# Patient Record
Sex: FEMALE | Race: WHITE | HISPANIC OR LATINO | Employment: UNEMPLOYED | URBAN - NONMETROPOLITAN AREA
[De-identification: names, ages, dates, MRNs, and addresses within clinical notes are randomized per-mention and may not be internally consistent; named-entity substitution may affect disease eponyms.]

---

## 2017-09-01 ENCOUNTER — TRANSCRIBE ORDERS (OUTPATIENT)
Dept: ADMINISTRATIVE | Facility: HOSPITAL | Age: 42
End: 2017-09-01

## 2017-09-01 DIAGNOSIS — Z12.31 VISIT FOR SCREENING MAMMOGRAM: Primary | ICD-10-CM

## 2017-09-11 ENCOUNTER — HOSPITAL ENCOUNTER (EMERGENCY)
Facility: HOSPITAL | Age: 42
Discharge: HOME | End: 2017-09-11
Attending: EMERGENCY MEDICINE | Admitting: EMERGENCY MEDICINE

## 2017-09-11 VITALS
SYSTOLIC BLOOD PRESSURE: 135 MMHG | RESPIRATION RATE: 20 BRPM | DIASTOLIC BLOOD PRESSURE: 95 MMHG | TEMPERATURE: 98.5 F | BODY MASS INDEX: 38.45 KG/M2 | HEIGHT: 63 IN | HEART RATE: 94 BPM | WEIGHT: 217 LBS | OXYGEN SATURATION: 99 %

## 2017-09-11 DIAGNOSIS — H65.03 BILATERAL ACUTE SEROUS OTITIS MEDIA, RECURRENCE NOT SPECIFIED: Primary | ICD-10-CM

## 2017-09-11 PROCEDURE — 99282 EMERGENCY DEPT VISIT SF MDM: CPT

## 2017-09-11 RX ORDER — MONTELUKAST SODIUM 10 MG/1
10 TABLET ORAL NIGHTLY
Status: ON HOLD | COMMUNITY
End: 2020-01-01

## 2017-09-11 RX ORDER — AMOXICILLIN 875 MG/1
875 TABLET, COATED ORAL 2 TIMES DAILY
Qty: 20 TABLET | Refills: 0 | Status: ON HOLD | OUTPATIENT
Start: 2017-09-11 | End: 2020-01-01

## 2017-09-11 RX ORDER — PANTOPRAZOLE SODIUM 20 MG/1
20 TABLET, DELAYED RELEASE ORAL DAILY
COMMUNITY
End: 2020-01-01

## 2017-09-11 RX ORDER — MECLIZINE HYDROCHLORIDE 25 MG/1
25 TABLET ORAL 3 TIMES DAILY PRN
Qty: 20 TABLET | Refills: 0 | Status: ON HOLD | OUTPATIENT
Start: 2017-09-11 | End: 2020-01-01

## 2017-09-11 RX ORDER — PSEUDOEPHEDRINE HYDROCHLORIDE 60 MG/1
60 TABLET, FILM COATED ORAL EVERY 6 HOURS PRN
Qty: 20 TABLET | Refills: 0 | Status: SHIPPED | OUTPATIENT
Start: 2017-09-11 | End: 2020-01-01 | Stop reason: HOSPADM

## 2017-09-11 RX ORDER — LEVOCETIRIZINE DIHYDROCHLORIDE 5 MG/1
5 TABLET, FILM COATED ORAL EVERY EVENING
Status: ON HOLD | COMMUNITY
End: 2020-01-01

## 2017-09-11 NOTE — ED PROVIDER NOTES
Subjective   HPI Comments: Feels like ears are popping and has congestion   Has been taking singulair but did not help     Patient is a 42 y.o. female presenting with dizziness.   History provided by:  Patient  Dizziness   Severity:  Moderate  Onset quality:  Sudden  Duration:  4 weeks  Timing:  Constant  Progression:  Worsening  Chronicity:  New  Relieved by:  Nothing  Worsened by:  Nothing  Ineffective treatments:  None tried  Associated symptoms: no chest pain, no diarrhea, no headaches, no nausea, no shortness of breath and no vomiting    Risk factors: no hx of vertigo        Review of Systems   Constitutional: Negative for chills and fever.   HENT: Negative for congestion, ear pain and sore throat.    Respiratory: Negative for cough, shortness of breath and wheezing.    Cardiovascular: Negative for chest pain.   Gastrointestinal: Negative for diarrhea, nausea and vomiting.   Genitourinary: Negative for dysuria and flank pain.   Skin: Negative for rash.   Neurological: Positive for dizziness. Negative for headaches.   Psychiatric/Behavioral: The patient is not nervous/anxious.    All other systems reviewed and are negative.      History reviewed. No pertinent past medical history.    Allergies   Allergen Reactions   • Bactrim [Sulfamethoxazole-Trimethoprim]    • Metronidazole        Past Surgical History:   Procedure Laterality Date   • TUBAL ABDOMINAL LIGATION         History reviewed. No pertinent family history.    Social History     Social History   • Marital status:      Spouse name: N/A   • Number of children: N/A   • Years of education: N/A     Social History Main Topics   • Smoking status: Never Smoker   • Smokeless tobacco: None   • Alcohol use No   • Drug use: No   • Sexual activity: Not Asked     Other Topics Concern   • None     Social History Narrative   • None           Objective   Physical Exam   Constitutional: She is oriented to person, place, and time. She appears well-developed and  well-nourished.   HENT:   Head: Normocephalic.   Mouth/Throat: Oropharynx is clear and moist.   Neck: Neck supple.   Cardiovascular: Normal rate and regular rhythm.    Pulmonary/Chest: Effort normal and breath sounds normal.   Abdominal: Soft. Bowel sounds are normal. There is no tenderness.   Musculoskeletal: Normal range of motion.   Neurological: She is alert and oriented to person, place, and time.   Skin: Skin is warm and dry.   Psychiatric: She has a normal mood and affect. Her behavior is normal. Judgment and thought content normal.   Nursing note and vitals reviewed.      Procedures         ED Course  ED Course                  MDM    Final diagnoses:   Bilateral acute serous otitis media, recurrence not specified            ALLY Whelan  09/13/17 [id number]

## 2018-06-21 ENCOUNTER — HOSPITAL ENCOUNTER (EMERGENCY)
Facility: HOSPITAL | Age: 43
Discharge: HOME | End: 2018-06-21
Attending: EMERGENCY MEDICINE | Admitting: INTERNAL MEDICINE

## 2018-06-21 ENCOUNTER — APPOINTMENT (OUTPATIENT)
Dept: CT IMAGING | Facility: HOSPITAL | Age: 43
End: 2018-06-21

## 2018-06-21 VITALS
DIASTOLIC BLOOD PRESSURE: 75 MMHG | SYSTOLIC BLOOD PRESSURE: 144 MMHG | TEMPERATURE: 98.5 F | BODY MASS INDEX: 38.27 KG/M2 | WEIGHT: 216 LBS | OXYGEN SATURATION: 100 % | HEART RATE: 71 BPM | RESPIRATION RATE: 17 BRPM | HEIGHT: 63 IN

## 2018-06-21 DIAGNOSIS — R51.9 SINUS HEADACHE: ICD-10-CM

## 2018-06-21 DIAGNOSIS — G44.201 ACUTE INTRACTABLE TENSION-TYPE HEADACHE: Primary | ICD-10-CM

## 2018-06-21 LAB
ALBUMIN SERPL-MCNC: 4 G/DL (ref 3.5–5)
ALBUMIN/GLOB SERPL: 1.3 G/DL (ref 1.5–2.5)
ALP SERPL-CCNC: 106 U/L (ref 35–104)
ALT SERPL W P-5'-P-CCNC: 23 U/L (ref 10–36)
ANION GAP SERPL CALCULATED.3IONS-SCNC: 5.8 MMOL/L (ref 3.6–11.2)
AST SERPL-CCNC: 30 U/L (ref 10–30)
BASOPHILS # BLD AUTO: 0.06 10*3/MM3 (ref 0–0.3)
BASOPHILS NFR BLD AUTO: 0.5 % (ref 0–2)
BILIRUB SERPL-MCNC: 0.4 MG/DL (ref 0.2–1.8)
BUN BLD-MCNC: 10 MG/DL (ref 7–21)
BUN/CREAT SERPL: 13 (ref 7–25)
CALCIUM SPEC-SCNC: 8.6 MG/DL (ref 7.7–10)
CHLORIDE SERPL-SCNC: 107 MMOL/L (ref 99–112)
CO2 SERPL-SCNC: 25.2 MMOL/L (ref 24.3–31.9)
CREAT BLD-MCNC: 0.77 MG/DL (ref 0.43–1.29)
DEPRECATED RDW RBC AUTO: 43.3 FL (ref 37–54)
EOSINOPHIL # BLD AUTO: 0.21 10*3/MM3 (ref 0–0.7)
EOSINOPHIL NFR BLD AUTO: 1.9 % (ref 0–5)
ERYTHROCYTE [DISTWIDTH] IN BLOOD BY AUTOMATED COUNT: 14 % (ref 11.5–14.5)
GFR SERPL CREATININE-BSD FRML MDRD: 83 ML/MIN/1.73
GLOBULIN UR ELPH-MCNC: 3.1 GM/DL
GLUCOSE BLD-MCNC: 103 MG/DL (ref 70–110)
HCT VFR BLD AUTO: 38.6 % (ref 37–47)
HGB BLD-MCNC: 12.6 G/DL (ref 12–16)
IMM GRANULOCYTES # BLD: 0.05 10*3/MM3 (ref 0–0.03)
IMM GRANULOCYTES NFR BLD: 0.5 % (ref 0–0.5)
LYMPHOCYTES # BLD AUTO: 3.61 10*3/MM3 (ref 1–3)
LYMPHOCYTES NFR BLD AUTO: 32.6 % (ref 21–51)
MCH RBC QN AUTO: 27.8 PG (ref 27–33)
MCHC RBC AUTO-ENTMCNC: 32.6 G/DL (ref 33–37)
MCV RBC AUTO: 85.2 FL (ref 80–94)
MONOCYTES # BLD AUTO: 0.92 10*3/MM3 (ref 0.1–0.9)
MONOCYTES NFR BLD AUTO: 8.3 % (ref 0–10)
NEUTROPHILS # BLD AUTO: 6.22 10*3/MM3 (ref 1.4–6.5)
NEUTROPHILS NFR BLD AUTO: 56.2 % (ref 30–70)
OSMOLALITY SERPL CALC.SUM OF ELEC: 275 MOSM/KG (ref 273–305)
PLATELET # BLD AUTO: 359 10*3/MM3 (ref 130–400)
PMV BLD AUTO: 11.8 FL (ref 6–10)
POTASSIUM BLD-SCNC: 3.8 MMOL/L (ref 3.5–5.3)
PROT SERPL-MCNC: 7.1 G/DL (ref 6–8)
RBC # BLD AUTO: 4.53 10*6/MM3 (ref 4.2–5.4)
SODIUM BLD-SCNC: 138 MMOL/L (ref 135–153)
WBC NRBC COR # BLD: 11.07 10*3/MM3 (ref 4.5–12.5)

## 2018-06-21 PROCEDURE — 70450 CT HEAD/BRAIN W/O DYE: CPT | Performed by: RADIOLOGY

## 2018-06-21 PROCEDURE — 70450 CT HEAD/BRAIN W/O DYE: CPT

## 2018-06-21 PROCEDURE — 99283 EMERGENCY DEPT VISIT LOW MDM: CPT

## 2018-06-21 PROCEDURE — 80053 COMPREHEN METABOLIC PANEL: CPT | Performed by: PHYSICIAN ASSISTANT

## 2018-06-21 PROCEDURE — 96360 HYDRATION IV INFUSION INIT: CPT

## 2018-06-21 PROCEDURE — 85025 COMPLETE CBC W/AUTO DIFF WBC: CPT | Performed by: PHYSICIAN ASSISTANT

## 2018-06-21 RX ORDER — KETOROLAC TROMETHAMINE 30 MG/ML
15 INJECTION, SOLUTION INTRAMUSCULAR; INTRAVENOUS ONCE
Status: DISCONTINUED | OUTPATIENT
Start: 2018-06-21 | End: 2018-06-22 | Stop reason: HOSPADM

## 2018-06-21 RX ORDER — IBUPROFEN 800 MG/1
800 TABLET ORAL EVERY 8 HOURS PRN
Qty: 90 TABLET | Refills: 0 | Status: ON HOLD | OUTPATIENT
Start: 2018-06-21 | End: 2020-01-01

## 2018-06-21 RX ORDER — GUAIFENESIN 200 MG/10ML
200 LIQUID ORAL EVERY 4 HOURS PRN
Qty: 240 ML | Refills: 0 | Status: ON HOLD | OUTPATIENT
Start: 2018-06-21 | End: 2020-01-01

## 2018-06-21 RX ORDER — SODIUM CHLORIDE 0.9 % (FLUSH) 0.9 %
10 SYRINGE (ML) INJECTION AS NEEDED
Status: DISCONTINUED | OUTPATIENT
Start: 2018-06-21 | End: 2018-06-22 | Stop reason: HOSPADM

## 2018-06-21 RX ADMIN — SODIUM CHLORIDE 1000 ML: 9 INJECTION, SOLUTION INTRAVENOUS at 21:20

## 2018-08-10 ENCOUNTER — HOSPITAL ENCOUNTER (OUTPATIENT)
Dept: MAMMOGRAPHY | Facility: HOSPITAL | Age: 43
Discharge: HOME | End: 2018-08-10
Admitting: NURSE PRACTITIONER

## 2018-08-10 DIAGNOSIS — Z12.31 VISIT FOR SCREENING MAMMOGRAM: ICD-10-CM

## 2018-08-10 PROCEDURE — 77067 SCR MAMMO BI INCL CAD: CPT | Performed by: RADIOLOGY

## 2018-08-10 PROCEDURE — 77063 BREAST TOMOSYNTHESIS BI: CPT

## 2018-08-10 PROCEDURE — 77067 SCR MAMMO BI INCL CAD: CPT

## 2018-08-10 PROCEDURE — 77063 BREAST TOMOSYNTHESIS BI: CPT | Performed by: RADIOLOGY

## 2018-08-23 ENCOUNTER — HOSPITAL ENCOUNTER (OUTPATIENT)
Dept: MAMMOGRAPHY | Facility: HOSPITAL | Age: 43
Discharge: HOME | End: 2018-08-23
Admitting: RADIOLOGY

## 2018-08-23 DIAGNOSIS — R92.8 ABNORMAL MAMMOGRAM: ICD-10-CM

## 2018-08-23 PROCEDURE — 77065 DX MAMMO INCL CAD UNI: CPT | Performed by: RADIOLOGY

## 2018-08-23 PROCEDURE — G0279 TOMOSYNTHESIS, MAMMO: HCPCS

## 2018-08-23 PROCEDURE — 77061 BREAST TOMOSYNTHESIS UNI: CPT | Performed by: RADIOLOGY

## 2018-08-23 PROCEDURE — 77065 DX MAMMO INCL CAD UNI: CPT

## 2020-01-01 ENCOUNTER — APPOINTMENT (OUTPATIENT)
Dept: GENERAL RADIOLOGY | Facility: HOSPITAL | Age: 45
End: 2020-01-01

## 2020-01-01 ENCOUNTER — TELEPHONE (OUTPATIENT)
Dept: ONCOLOGY | Facility: HOSPITAL | Age: 45
End: 2020-01-01

## 2020-01-01 ENCOUNTER — OFFICE VISIT (OUTPATIENT)
Dept: SURGERY | Facility: CLINIC | Age: 45
End: 2020-01-01

## 2020-01-01 ENCOUNTER — APPOINTMENT (OUTPATIENT)
Dept: CT IMAGING | Facility: HOSPITAL | Age: 45
End: 2020-01-01

## 2020-01-01 ENCOUNTER — ANESTHESIA (OUTPATIENT)
Dept: PERIOP | Facility: HOSPITAL | Age: 45
End: 2020-01-01

## 2020-01-01 ENCOUNTER — ANESTHESIA EVENT (OUTPATIENT)
Dept: PERIOP | Facility: HOSPITAL | Age: 45
End: 2020-01-01

## 2020-01-01 ENCOUNTER — CONSULT (OUTPATIENT)
Dept: ONCOLOGY | Facility: CLINIC | Age: 45
End: 2020-01-01

## 2020-01-01 ENCOUNTER — TELEPHONE (OUTPATIENT)
Dept: SURGERY | Facility: CLINIC | Age: 45
End: 2020-01-01

## 2020-01-01 ENCOUNTER — HOSPITAL ENCOUNTER (EMERGENCY)
Facility: HOSPITAL | Age: 45
Discharge: ANOTHER HOSPITAL | End: 2020-01-12
Attending: FAMILY MEDICINE | Admitting: FAMILY MEDICINE

## 2020-01-01 ENCOUNTER — APPOINTMENT (OUTPATIENT)
Dept: ULTRASOUND IMAGING | Facility: HOSPITAL | Age: 45
End: 2020-01-01

## 2020-01-01 ENCOUNTER — HOSPITAL ENCOUNTER (INPATIENT)
Facility: HOSPITAL | Age: 45
LOS: 10 days | End: 2020-02-10
Attending: EMERGENCY MEDICINE | Admitting: INTERNAL MEDICINE

## 2020-01-01 ENCOUNTER — HOSPITAL ENCOUNTER (OUTPATIENT)
Facility: HOSPITAL | Age: 45
Setting detail: HOSPITAL OUTPATIENT SURGERY
Discharge: HOME | End: 2020-01-02
Attending: SURGERY | Admitting: SURGERY

## 2020-01-01 ENCOUNTER — HOSPITAL ENCOUNTER (EMERGENCY)
Facility: HOSPITAL | Age: 45
Discharge: HOME | End: 2020-01-01
Attending: FAMILY MEDICINE | Admitting: FAMILY MEDICINE

## 2020-01-01 ENCOUNTER — INFUSION (OUTPATIENT)
Dept: ONCOLOGY | Facility: HOSPITAL | Age: 45
End: 2020-01-01

## 2020-01-01 ENCOUNTER — APPOINTMENT (OUTPATIENT)
Dept: CARDIOLOGY | Facility: HOSPITAL | Age: 45
End: 2020-01-01

## 2020-01-01 VITALS
OXYGEN SATURATION: 100 % | WEIGHT: 210 LBS | SYSTOLIC BLOOD PRESSURE: 125 MMHG | HEIGHT: 63 IN | RESPIRATION RATE: 18 BRPM | DIASTOLIC BLOOD PRESSURE: 75 MMHG | HEART RATE: 85 BPM | TEMPERATURE: 98.1 F | BODY MASS INDEX: 37.21 KG/M2

## 2020-01-01 VITALS
TEMPERATURE: 97.9 F | DIASTOLIC BLOOD PRESSURE: 83 MMHG | WEIGHT: 203 LBS | SYSTOLIC BLOOD PRESSURE: 129 MMHG | RESPIRATION RATE: 16 BRPM | BODY MASS INDEX: 35.97 KG/M2 | HEIGHT: 63 IN | HEART RATE: 88 BPM | OXYGEN SATURATION: 99 %

## 2020-01-01 VITALS
DIASTOLIC BLOOD PRESSURE: 88 MMHG | BODY MASS INDEX: 37.14 KG/M2 | SYSTOLIC BLOOD PRESSURE: 145 MMHG | HEART RATE: 105 BPM | WEIGHT: 209.6 LBS | HEIGHT: 63 IN

## 2020-01-01 VITALS
TEMPERATURE: 98.9 F | HEART RATE: 117 BPM | HEIGHT: 65 IN | BODY MASS INDEX: 33.66 KG/M2 | WEIGHT: 202 LBS | RESPIRATION RATE: 18 BRPM | DIASTOLIC BLOOD PRESSURE: 85 MMHG | SYSTOLIC BLOOD PRESSURE: 121 MMHG | OXYGEN SATURATION: 98 %

## 2020-01-01 VITALS
OXYGEN SATURATION: 98 % | SYSTOLIC BLOOD PRESSURE: 121 MMHG | RESPIRATION RATE: 18 BRPM | WEIGHT: 202 LBS | BODY MASS INDEX: 33.61 KG/M2 | TEMPERATURE: 98.9 F | HEART RATE: 117 BPM | DIASTOLIC BLOOD PRESSURE: 85 MMHG

## 2020-01-01 VITALS
WEIGHT: 210 LBS | SYSTOLIC BLOOD PRESSURE: 118 MMHG | HEART RATE: 80 BPM | RESPIRATION RATE: 17 BRPM | BODY MASS INDEX: 37.21 KG/M2 | DIASTOLIC BLOOD PRESSURE: 75 MMHG | OXYGEN SATURATION: 98 % | TEMPERATURE: 98 F | HEIGHT: 63 IN

## 2020-01-01 VITALS
SYSTOLIC BLOOD PRESSURE: 138 MMHG | DIASTOLIC BLOOD PRESSURE: 91 MMHG | HEART RATE: 95 BPM | WEIGHT: 210.2 LBS | BODY MASS INDEX: 37.25 KG/M2 | HEIGHT: 63 IN

## 2020-01-01 DIAGNOSIS — C18.2 MALIGNANT NEOPLASM OF ASCENDING COLON (HCC): Primary | ICD-10-CM

## 2020-01-01 DIAGNOSIS — Z95.828 PORT-A-CATH IN PLACE: Primary | ICD-10-CM

## 2020-01-01 DIAGNOSIS — C18.2 MALIGNANT NEOPLASM OF ASCENDING COLON (HCC): ICD-10-CM

## 2020-01-01 DIAGNOSIS — K56.609 SMALL BOWEL OBSTRUCTION (HCC): Primary | ICD-10-CM

## 2020-01-01 DIAGNOSIS — Z09 POSTOP CHECK: ICD-10-CM

## 2020-01-01 DIAGNOSIS — C18.9 METASTATIC COLON CANCER IN FEMALE: Primary | ICD-10-CM

## 2020-01-01 DIAGNOSIS — A41.9 SEPSIS, DUE TO UNSPECIFIED ORGANISM, UNSPECIFIED WHETHER ACUTE ORGAN DYSFUNCTION PRESENT (HCC): Primary | ICD-10-CM

## 2020-01-01 LAB
027 TOXIN: NORMAL
A-A DO2: 109.9 MMHG (ref 0–300)
A-A DO2: 110.2 MMHG (ref 0–300)
A-A DO2: 264.8 MMHG (ref 0–300)
A-A DO2: 34.8 MMHG (ref 0–300)
A-A DO2: 56.3 MMHG (ref 0–300)
A-A DO2: 57.5 MMHG (ref 0–300)
A-A DO2: 59.5 MMHG (ref 0–300)
A-A DO2: 61.4 MMHG (ref 0–300)
A-A DO2: 65.3 MMHG (ref 0–300)
A-A DO2: 66.2 MMHG (ref 0–300)
A-A DO2: 72.6 MMHG (ref 0–300)
A-A DO2: 73.9 MMHG (ref 0–300)
A-A DO2: 74.2 MMHG (ref 0–300)
A-A DO2: 76.5 MMHG (ref 0–300)
A-A DO2: 76.7 MMHG (ref 0–300)
A-A DO2: 78 MMHG (ref 0–300)
A-A DO2: 80.9 MMHG (ref 0–300)
A-A DO2: 81.6 MMHG (ref 0–300)
A-A DO2: 82.6 MMHG (ref 0–300)
A-A DO2: 9.2 MMHG (ref 0–300)
ABO + RH BLD: NORMAL
ABO GROUP BLD: NORMAL
ABO GROUP BLD: NORMAL
ALBUMIN SERPL-MCNC: 2.13 G/DL (ref 3.5–5.2)
ALBUMIN SERPL-MCNC: 2.83 G/DL (ref 3.5–5.2)
ALBUMIN SERPL-MCNC: 3.06 G/DL (ref 3.5–5.2)
ALBUMIN SERPL-MCNC: 3.26 G/DL (ref 3.5–5.2)
ALBUMIN SERPL-MCNC: 3.46 G/DL (ref 3.5–5.2)
ALBUMIN SERPL-MCNC: 3.72 G/DL (ref 3.5–5.2)
ALBUMIN SERPL-MCNC: 3.77 G/DL (ref 3.5–5.2)
ALBUMIN SERPL-MCNC: 3.93 G/DL (ref 3.5–5.2)
ALBUMIN SERPL-MCNC: 4.12 G/DL (ref 3.5–5.2)
ALBUMIN SERPL-MCNC: 4.14 G/DL (ref 3.5–5.2)
ALBUMIN/GLOB SERPL: 0.5 G/DL
ALBUMIN/GLOB SERPL: 0.6 G/DL
ALBUMIN/GLOB SERPL: 0.8 G/DL
ALBUMIN/GLOB SERPL: 0.9 G/DL
ALBUMIN/GLOB SERPL: 1 G/DL
ALBUMIN/GLOB SERPL: 1 G/DL
ALBUMIN/GLOB SERPL: 1.2 G/DL
ALBUMIN/GLOB SERPL: 1.5 G/DL
ALBUMIN/GLOB SERPL: 1.7 G/DL
ALBUMIN/GLOB SERPL: 1.8 G/DL
ALP SERPL-CCNC: 184 U/L (ref 39–117)
ALP SERPL-CCNC: 275 U/L (ref 39–117)
ALP SERPL-CCNC: 304 U/L (ref 39–117)
ALP SERPL-CCNC: 329 U/L (ref 39–117)
ALP SERPL-CCNC: 337 U/L (ref 39–117)
ALP SERPL-CCNC: 434 U/L (ref 39–117)
ALP SERPL-CCNC: 509 U/L (ref 39–117)
ALP SERPL-CCNC: 556 U/L (ref 39–117)
ALP SERPL-CCNC: 753 U/L (ref 39–117)
ALP SERPL-CCNC: 958 U/L (ref 39–117)
ALT SERPL W P-5'-P-CCNC: 112 U/L (ref 1–33)
ALT SERPL W P-5'-P-CCNC: 17 U/L (ref 1–33)
ALT SERPL W P-5'-P-CCNC: 20 U/L (ref 1–33)
ALT SERPL W P-5'-P-CCNC: 24 U/L (ref 1–33)
ALT SERPL W P-5'-P-CCNC: 27 U/L (ref 1–33)
ALT SERPL W P-5'-P-CCNC: 32 U/L (ref 1–33)
ALT SERPL W P-5'-P-CCNC: 40 U/L (ref 1–33)
ALT SERPL W P-5'-P-CCNC: 70 U/L (ref 1–33)
ALT SERPL W P-5'-P-CCNC: 79 U/L (ref 1–33)
ALT SERPL W P-5'-P-CCNC: 91 U/L (ref 1–33)
AMORPH URATE CRY URNS QL MICRO: ABNORMAL /HPF
AMYLASE SERPL-CCNC: 35 U/L (ref 28–100)
AMYLASE SERPL-CCNC: 61 U/L (ref 28–100)
ANION GAP SERPL CALCULATED.3IONS-SCNC: 16.6 MMOL/L (ref 5–15)
ANION GAP SERPL CALCULATED.3IONS-SCNC: 18 MMOL/L (ref 5–15)
ANION GAP SERPL CALCULATED.3IONS-SCNC: 19.7 MMOL/L (ref 5–15)
ANION GAP SERPL CALCULATED.3IONS-SCNC: 20.6 MMOL/L (ref 5–15)
ANION GAP SERPL CALCULATED.3IONS-SCNC: 20.7 MMOL/L (ref 5–15)
ANION GAP SERPL CALCULATED.3IONS-SCNC: 20.8 MMOL/L (ref 5–15)
ANION GAP SERPL CALCULATED.3IONS-SCNC: 22.6 MMOL/L (ref 5–15)
ANION GAP SERPL CALCULATED.3IONS-SCNC: 22.9 MMOL/L (ref 5–15)
ANION GAP SERPL CALCULATED.3IONS-SCNC: 23.1 MMOL/L (ref 5–15)
ANION GAP SERPL CALCULATED.3IONS-SCNC: 24.6 MMOL/L (ref 5–15)
ANION GAP SERPL CALCULATED.3IONS-SCNC: 24.6 MMOL/L (ref 5–15)
ANION GAP SERPL CALCULATED.3IONS-SCNC: 25 MMOL/L (ref 5–15)
ANION GAP SERPL CALCULATED.3IONS-SCNC: 26.1 MMOL/L (ref 5–15)
ANION GAP SERPL CALCULATED.3IONS-SCNC: 28.7 MMOL/L (ref 5–15)
ANISOCYTOSIS BLD QL: ABNORMAL
APPEARANCE FLD: ABNORMAL
APTT PPP: 29.7 SECONDS (ref 23.8–36.1)
APTT PPP: 44 SECONDS (ref 23.8–36.1)
ARTERIAL PATENCY WRIST A: ABNORMAL
ARTERIAL PATENCY WRIST A: POSITIVE
AST SERPL-CCNC: 103 U/L (ref 1–32)
AST SERPL-CCNC: 121 U/L (ref 1–32)
AST SERPL-CCNC: 30 U/L (ref 1–32)
AST SERPL-CCNC: 322 U/L (ref 1–32)
AST SERPL-CCNC: 33 U/L (ref 1–32)
AST SERPL-CCNC: 350 U/L (ref 1–32)
AST SERPL-CCNC: 410 U/L (ref 1–32)
AST SERPL-CCNC: 63 U/L (ref 1–32)
AST SERPL-CCNC: 640 U/L (ref 1–32)
AST SERPL-CCNC: 77 U/L (ref 1–32)
ATMOSPHERIC PRESS: 714 MMHG
ATMOSPHERIC PRESS: 714 MMHG
ATMOSPHERIC PRESS: 715 MMHG
ATMOSPHERIC PRESS: 721 MMHG
ATMOSPHERIC PRESS: 723 MMHG
ATMOSPHERIC PRESS: 724 MMHG
ATMOSPHERIC PRESS: 724 MMHG
ATMOSPHERIC PRESS: 725 MMHG
ATMOSPHERIC PRESS: 725 MMHG
ATMOSPHERIC PRESS: 726 MMHG
ATMOSPHERIC PRESS: 729 MMHG
ATMOSPHERIC PRESS: 730 MMHG
ATMOSPHERIC PRESS: 732 MMHG
ATMOSPHERIC PRESS: 734 MMHG
ATMOSPHERIC PRESS: 735 MMHG
ATMOSPHERIC PRESS: 737 MMHG
ATMOSPHERIC PRESS: 737 MMHG
B PERT DNA SPEC QL NAA+PROBE: NOT DETECTED
B-HCG UR QL: NEGATIVE
BACTERIA FLD CULT: NORMAL
BACTERIA SPEC AEROBE CULT: NORMAL
BACTERIA SPEC ANAEROBE CULT: NORMAL
BACTERIA SPEC RESP CULT: ABNORMAL
BACTERIA SPEC RESP CULT: ABNORMAL
BACTERIA UR QL AUTO: ABNORMAL /HPF
BASE EXCESS BLDA CALC-SCNC: -0.1 MMOL/L (ref 0–2)
BASE EXCESS BLDA CALC-SCNC: -1 MMOL/L (ref 0–2)
BASE EXCESS BLDA CALC-SCNC: -10.2 MMOL/L (ref 0–2)
BASE EXCESS BLDA CALC-SCNC: -12.9 MMOL/L (ref 0–2)
BASE EXCESS BLDA CALC-SCNC: -13.9 MMOL/L (ref 0–2)
BASE EXCESS BLDA CALC-SCNC: -14.1 MMOL/L (ref 0–2)
BASE EXCESS BLDA CALC-SCNC: -14.6 MMOL/L (ref 0–2)
BASE EXCESS BLDA CALC-SCNC: -15.5 MMOL/L (ref 0–2)
BASE EXCESS BLDA CALC-SCNC: -16.4 MMOL/L (ref 0–2)
BASE EXCESS BLDA CALC-SCNC: -2.4 MMOL/L (ref 0–2)
BASE EXCESS BLDA CALC-SCNC: -2.5 MMOL/L (ref 0–2)
BASE EXCESS BLDA CALC-SCNC: -2.7 MMOL/L (ref 0–2)
BASE EXCESS BLDA CALC-SCNC: -2.7 MMOL/L (ref 0–2)
BASE EXCESS BLDA CALC-SCNC: -2.8 MMOL/L (ref 0–2)
BASE EXCESS BLDA CALC-SCNC: -3.9 MMOL/L (ref 0–2)
BASE EXCESS BLDA CALC-SCNC: -5.4 MMOL/L (ref 0–2)
BASE EXCESS BLDA CALC-SCNC: -6.4 MMOL/L (ref 0–2)
BASE EXCESS BLDA CALC-SCNC: -6.7 MMOL/L (ref 0–2)
BASE EXCESS BLDA CALC-SCNC: -9.1 MMOL/L (ref 0–2)
BASE EXCESS BLDA CALC-SCNC: 4.7 MMOL/L (ref 0–2)
BASE EXCESS BLDV CALC-SCNC: -1.4 MMOL/L (ref 0–2)
BASOPHILS # BLD AUTO: 0.09 10*3/MM3 (ref 0–0.2)
BASOPHILS # BLD AUTO: 0.09 10*3/MM3 (ref 0–0.2)
BASOPHILS NFR BLD AUTO: 0.7 % (ref 0–1.5)
BASOPHILS NFR BLD AUTO: 0.7 % (ref 0–1.5)
BDY SITE: ABNORMAL
BH BB BLOOD EXPIRATION DATE: NORMAL
BH BB BLOOD TYPE BARCODE: 5100
BH BB DISPENSE STATUS: NORMAL
BH BB PRODUCT CODE: NORMAL
BH BB UNIT NUMBER: NORMAL
BH CV ECHO MEAS - % IVS THICK: 49.3 %
BH CV ECHO MEAS - % LVPW THICK: 21.7 %
BH CV ECHO MEAS - ACS: 2.1 CM
BH CV ECHO MEAS - AO ROOT AREA (BSA CORRECTED): 1.4
BH CV ECHO MEAS - AO ROOT AREA: 6.2 CM^2
BH CV ECHO MEAS - AO ROOT DIAM: 2.8 CM
BH CV ECHO MEAS - BSA(HAYCOCK): 2.2 M^2
BH CV ECHO MEAS - BSA: 2 M^2
BH CV ECHO MEAS - BZI_BMI: 40.2 KILOGRAMS/M^2
BH CV ECHO MEAS - BZI_METRIC_HEIGHT: 160 CM
BH CV ECHO MEAS - BZI_METRIC_WEIGHT: 103 KG
BH CV ECHO MEAS - EDV(CUBED): 67.1 ML
BH CV ECHO MEAS - EDV(TEICH): 72.7 ML
BH CV ECHO MEAS - EF(CUBED): 63.6 %
BH CV ECHO MEAS - EF(TEICH): 55.7 %
BH CV ECHO MEAS - ESV(CUBED): 24.4 ML
BH CV ECHO MEAS - ESV(TEICH): 32.2 ML
BH CV ECHO MEAS - FS: 28.6 %
BH CV ECHO MEAS - IVS/LVPW: 1.1
BH CV ECHO MEAS - IVSD: 1 CM
BH CV ECHO MEAS - IVSS: 1.5 CM
BH CV ECHO MEAS - LA DIMENSION: 3.8 CM
BH CV ECHO MEAS - LA/AO: 1.3
BH CV ECHO MEAS - LV MASS(C)D: 129.8 GRAMS
BH CV ECHO MEAS - LV MASS(C)DI: 63.6 GRAMS/M^2
BH CV ECHO MEAS - LV MASS(C)S: 127.5 GRAMS
BH CV ECHO MEAS - LV MASS(C)SI: 62.5 GRAMS/M^2
BH CV ECHO MEAS - LVIDD: 4.1 CM
BH CV ECHO MEAS - LVIDS: 2.9 CM
BH CV ECHO MEAS - LVOT AREA (M): 2.5 CM^2
BH CV ECHO MEAS - LVOT AREA: 2.4 CM^2
BH CV ECHO MEAS - LVOT DIAM: 1.8 CM
BH CV ECHO MEAS - LVPWD: 0.96 CM
BH CV ECHO MEAS - LVPWS: 1.2 CM
BH CV ECHO MEAS - PA ACC SLOPE: 1157 CM/SEC^2
BH CV ECHO MEAS - PA ACC TIME: 0.07 SEC
BH CV ECHO MEAS - PA PR(ACCEL): 45.7 MMHG
BH CV ECHO MEAS - RAP SYSTOLE: 10 MMHG
BH CV ECHO MEAS - RVDD: 2.7 CM
BH CV ECHO MEAS - RVSP: 31.3 MMHG
BH CV ECHO MEAS - SI(CUBED): 20.9 ML/M^2
BH CV ECHO MEAS - SI(TEICH): 19.8 ML/M^2
BH CV ECHO MEAS - SV(CUBED): 42.7 ML
BH CV ECHO MEAS - SV(TEICH): 40.5 ML
BH CV ECHO MEAS - TR MAX VEL: 230.8 CM/SEC
BILIRUB SERPL-MCNC: 0.3 MG/DL (ref 0.2–1.2)
BILIRUB SERPL-MCNC: 0.4 MG/DL (ref 0.2–1.2)
BILIRUB SERPL-MCNC: 0.5 MG/DL (ref 0.2–1.2)
BILIRUB SERPL-MCNC: 0.5 MG/DL (ref 0.2–1.2)
BILIRUB SERPL-MCNC: 0.6 MG/DL (ref 0.2–1.2)
BILIRUB SERPL-MCNC: 1.2 MG/DL (ref 0.2–1.2)
BILIRUB SERPL-MCNC: 3.9 MG/DL (ref 0.2–1.2)
BILIRUB SERPL-MCNC: 4 MG/DL (ref 0.2–1.2)
BILIRUB SERPL-MCNC: 4.6 MG/DL (ref 0.2–1.2)
BILIRUB SERPL-MCNC: 5 MG/DL (ref 0.2–1.2)
BILIRUB UR QL STRIP: ABNORMAL
BILIRUB UR QL STRIP: ABNORMAL
BILIRUB UR QL STRIP: NEGATIVE
BILIRUB UR QL STRIP: NEGATIVE
BLD GP AB SCN SERPL QL: NEGATIVE
BODY TEMPERATURE: 0 C
BODY TEMPERATURE: 37 C
BUN BLD-MCNC: 13 MG/DL (ref 6–20)
BUN BLD-MCNC: 16 MG/DL (ref 6–20)
BUN BLD-MCNC: 20 MG/DL (ref 6–20)
BUN BLD-MCNC: 21 MG/DL (ref 6–20)
BUN BLD-MCNC: 21 MG/DL (ref 6–20)
BUN BLD-MCNC: 22 MG/DL (ref 6–20)
BUN BLD-MCNC: 24 MG/DL (ref 6–20)
BUN BLD-MCNC: 25 MG/DL (ref 6–20)
BUN BLD-MCNC: 26 MG/DL (ref 6–20)
BUN BLD-MCNC: 38 MG/DL (ref 6–20)
BUN BLD-MCNC: 5 MG/DL (ref 6–20)
BUN/CREAT SERPL: 11.5 (ref 7–25)
BUN/CREAT SERPL: 11.6 (ref 7–25)
BUN/CREAT SERPL: 12.3 (ref 7–25)
BUN/CREAT SERPL: 12.4 (ref 7–25)
BUN/CREAT SERPL: 12.6 (ref 7–25)
BUN/CREAT SERPL: 12.8 (ref 7–25)
BUN/CREAT SERPL: 13.3 (ref 7–25)
BUN/CREAT SERPL: 13.8 (ref 7–25)
BUN/CREAT SERPL: 16.7 (ref 7–25)
BUN/CREAT SERPL: 6.7 (ref 7–25)
BUN/CREAT SERPL: 7.2 (ref 7–25)
BUN/CREAT SERPL: 8.8 (ref 7–25)
BUN/CREAT SERPL: 9.4 (ref 7–25)
BUN/CREAT SERPL: 9.8 (ref 7–25)
C DIFF TOX GENS STL QL NAA+PROBE: NEGATIVE
C PNEUM DNA NPH QL NAA+NON-PROBE: NOT DETECTED
CA-I SERPL ISE-MCNC: 1 MMOL/L (ref 1.12–1.32)
CA-I SERPL ISE-MCNC: 1.08 MMOL/L (ref 1.12–1.32)
CALCIUM SPEC-SCNC: 7.5 MG/DL (ref 8.6–10.5)
CALCIUM SPEC-SCNC: 7.7 MG/DL (ref 8.6–10.5)
CALCIUM SPEC-SCNC: 7.8 MG/DL (ref 8.6–10.5)
CALCIUM SPEC-SCNC: 8 MG/DL (ref 8.6–10.5)
CALCIUM SPEC-SCNC: 8 MG/DL (ref 8.6–10.5)
CALCIUM SPEC-SCNC: 8.2 MG/DL (ref 8.6–10.5)
CALCIUM SPEC-SCNC: 8.3 MG/DL (ref 8.6–10.5)
CALCIUM SPEC-SCNC: 8.8 MG/DL (ref 8.6–10.5)
CALCIUM SPEC-SCNC: 8.9 MG/DL (ref 8.6–10.5)
CALCIUM SPEC-SCNC: 9 MG/DL (ref 8.6–10.5)
CAMPYLOBACTER STL CULT: NORMAL
CHLORIDE SERPL-SCNC: 100 MMOL/L (ref 98–107)
CHLORIDE SERPL-SCNC: 102 MMOL/L (ref 98–107)
CHLORIDE SERPL-SCNC: 78 MMOL/L (ref 98–107)
CHLORIDE SERPL-SCNC: 82 MMOL/L (ref 98–107)
CHLORIDE SERPL-SCNC: 82 MMOL/L (ref 98–107)
CHLORIDE SERPL-SCNC: 85 MMOL/L (ref 98–107)
CHLORIDE SERPL-SCNC: 87 MMOL/L (ref 98–107)
CHLORIDE SERPL-SCNC: 90 MMOL/L (ref 98–107)
CHLORIDE SERPL-SCNC: 94 MMOL/L (ref 98–107)
CHLORIDE SERPL-SCNC: 99 MMOL/L (ref 98–107)
CHLORIDE SERPL-SCNC: 99 MMOL/L (ref 98–107)
CK SERPL-CCNC: 297 U/L (ref 20–180)
CLARITY UR: ABNORMAL
CO2 BLDA-SCNC: 10.5 MMOL/L (ref 22–33)
CO2 BLDA-SCNC: 11.2 MMOL/L (ref 22–33)
CO2 BLDA-SCNC: 12.1 MMOL/L (ref 22–33)
CO2 BLDA-SCNC: 12.2 MMOL/L (ref 22–33)
CO2 BLDA-SCNC: 13.5 MMOL/L (ref 22–33)
CO2 BLDA-SCNC: 13.5 MMOL/L (ref 22–33)
CO2 BLDA-SCNC: 15.1 MMOL/L (ref 22–33)
CO2 BLDA-SCNC: 18.3 MMOL/L (ref 22–33)
CO2 BLDA-SCNC: 19.5 MMOL/L (ref 22–33)
CO2 BLDA-SCNC: 20.4 MMOL/L (ref 22–33)
CO2 BLDA-SCNC: 21.1 MMOL/L (ref 22–33)
CO2 BLDA-SCNC: 21.3 MMOL/L (ref 22–33)
CO2 BLDA-SCNC: 23.9 MMOL/L (ref 22–33)
CO2 BLDA-SCNC: 24.3 MMOL/L (ref 22–33)
CO2 BLDA-SCNC: 24.5 MMOL/L (ref 22–33)
CO2 BLDA-SCNC: 24.6 MMOL/L (ref 22–33)
CO2 BLDA-SCNC: 24.6 MMOL/L (ref 22–33)
CO2 BLDA-SCNC: 24.8 MMOL/L (ref 22–33)
CO2 BLDA-SCNC: 25 MMOL/L (ref 22–33)
CO2 BLDA-SCNC: 25.4 MMOL/L (ref 22–33)
CO2 BLDA-SCNC: 29.3 MMOL/L (ref 22–33)
CO2 SERPL-SCNC: 10.3 MMOL/L (ref 22–29)
CO2 SERPL-SCNC: 11.1 MMOL/L (ref 22–29)
CO2 SERPL-SCNC: 11.2 MMOL/L (ref 22–29)
CO2 SERPL-SCNC: 12.3 MMOL/L (ref 22–29)
CO2 SERPL-SCNC: 13.4 MMOL/L (ref 22–29)
CO2 SERPL-SCNC: 15.3 MMOL/L (ref 22–29)
CO2 SERPL-SCNC: 18.4 MMOL/L (ref 22–29)
CO2 SERPL-SCNC: 19.9 MMOL/L (ref 22–29)
CO2 SERPL-SCNC: 21 MMOL/L (ref 22–29)
CO2 SERPL-SCNC: 21.9 MMOL/L (ref 22–29)
CO2 SERPL-SCNC: 22 MMOL/L (ref 22–29)
CO2 SERPL-SCNC: 22.4 MMOL/L (ref 22–29)
CO2 SERPL-SCNC: 22.4 MMOL/L (ref 22–29)
CO2 SERPL-SCNC: 9.4 MMOL/L (ref 22–29)
COHGB MFR BLD: 0.9 % (ref 0–5)
COHGB MFR BLD: 1 % (ref 0–5)
COHGB MFR BLD: 1.1 % (ref 0–5)
COHGB MFR BLD: 1.2 % (ref 0–5)
COHGB MFR BLD: 1.2 % (ref 0–5)
COHGB MFR BLD: 1.3 % (ref 0–5)
COHGB MFR BLD: 1.4 % (ref 0–5)
COHGB MFR BLD: 1.4 % (ref 0–5)
COHGB MFR BLD: 1.5 % (ref 0–5)
COHGB MFR BLD: 1.6 % (ref 0–5)
COLOR FLD: YELLOW
COLOR UR: ABNORMAL
CREAT BLD-MCNC: 0.69 MG/DL (ref 0.57–1)
CREAT BLD-MCNC: 0.94 MG/DL (ref 0.57–1)
CREAT BLD-MCNC: 1.71 MG/DL (ref 0.57–1)
CREAT BLD-MCNC: 1.9 MG/DL (ref 0.57–1)
CREAT BLD-MCNC: 1.91 MG/DL (ref 0.57–1)
CREAT BLD-MCNC: 1.95 MG/DL (ref 0.57–1)
CREAT BLD-MCNC: 1.96 MG/DL (ref 0.57–1)
CREAT BLD-MCNC: 2.1 MG/DL (ref 0.57–1)
CREAT BLD-MCNC: 2.13 MG/DL (ref 0.57–1)
CREAT BLD-MCNC: 2.27 MG/DL (ref 0.57–1)
CREAT BLD-MCNC: 2.28 MG/DL (ref 0.57–1)
CREAT BLD-MCNC: 2.39 MG/DL (ref 0.57–1)
CREAT BLD-MCNC: 2.4 MG/DL (ref 0.57–1)
CREAT BLD-MCNC: 2.65 MG/DL (ref 0.57–1)
CREAT UR-MCNC: 146 MG/DL
CRP SERPL-MCNC: 18.05 MG/DL (ref 0–0.5)
CRP SERPL-MCNC: 20.16 MG/DL (ref 0–0.5)
CRP SERPL-MCNC: 22.31 MG/DL (ref 0–0.5)
CRP SERPL-MCNC: 24.06 MG/DL (ref 0–0.5)
CRP SERPL-MCNC: 29.28 MG/DL (ref 0–0.5)
CRP SERPL-MCNC: 29.35 MG/DL (ref 0–0.5)
CRP SERPL-MCNC: 30.94 MG/DL (ref 0–0.5)
CRP SERPL-MCNC: 31.38 MG/DL (ref 0–0.5)
CRP SERPL-MCNC: 34.91 MG/DL (ref 0–0.5)
CRP SERPL-MCNC: 5.27 MG/DL (ref 0–0.5)
CYTOLOGIST CVX/VAG CYTO: NORMAL
CYTOLOGIST CVX/VAG CYTO: NORMAL
D-LACTATE SERPL-SCNC: 1.4 MMOL/L (ref 0.5–2)
D-LACTATE SERPL-SCNC: 3.1 MMOL/L (ref 0.5–2)
D-LACTATE SERPL-SCNC: 3.3 MMOL/L (ref 0.5–2)
D-LACTATE SERPL-SCNC: 3.6 MMOL/L (ref 0.5–2)
D-LACTATE SERPL-SCNC: 3.7 MMOL/L (ref 0.5–2)
D-LACTATE SERPL-SCNC: 3.8 MMOL/L (ref 0.5–2)
D-LACTATE SERPL-SCNC: 3.8 MMOL/L (ref 0.5–2)
D-LACTATE SERPL-SCNC: 4.6 MMOL/L (ref 0.5–2)
D-LACTATE SERPL-SCNC: 4.6 MMOL/L (ref 0.5–2)
D-LACTATE SERPL-SCNC: 5.2 MMOL/L (ref 0.5–2)
D-LACTATE SERPL-SCNC: 5.4 MMOL/L (ref 0.5–2)
D-LACTATE SERPL-SCNC: 5.5 MMOL/L (ref 0.5–2)
D-LACTATE SERPL-SCNC: 5.7 MMOL/L (ref 0.5–2)
D-LACTATE SERPL-SCNC: 5.8 MMOL/L (ref 0.5–2)
D-LACTATE SERPL-SCNC: 6 MMOL/L (ref 0.5–2)
D-LACTATE SERPL-SCNC: 6 MMOL/L (ref 0.5–2)
D-LACTATE SERPL-SCNC: 6.7 MMOL/L (ref 0.5–2)
D-LACTATE SERPL-SCNC: 7.1 MMOL/L (ref 0.5–2)
D-LACTATE SERPL-SCNC: 7.1 MMOL/L (ref 0.5–2)
D-LACTATE SERPL-SCNC: 7.3 MMOL/L (ref 0.5–2)
D-LACTATE SERPL-SCNC: 7.5 MMOL/L (ref 0.5–2)
D-LACTATE SERPL-SCNC: 7.6 MMOL/L (ref 0.5–2)
D-LACTATE SERPL-SCNC: 7.6 MMOL/L (ref 0.5–2)
D-LACTATE SERPL-SCNC: 8.3 MMOL/L (ref 0.5–2)
D-LACTATE SERPL-SCNC: 8.3 MMOL/L (ref 0.5–2)
D-LACTATE SERPL-SCNC: 8.4 MMOL/L (ref 0.5–2)
D-LACTATE SERPL-SCNC: 8.5 MMOL/L (ref 0.5–2)
D-LACTATE SERPL-SCNC: 8.7 MMOL/L (ref 0.5–2)
D-LACTATE SERPL-SCNC: 8.9 MMOL/L (ref 0.5–2)
D-LACTATE SERPL-SCNC: 9.7 MMOL/L (ref 0.5–2)
D-LACTATE SERPL-SCNC: 9.9 MMOL/L (ref 0.5–2)
DEPRECATED RDW RBC AUTO: 42.9 FL (ref 37–54)
DEPRECATED RDW RBC AUTO: 43 FL (ref 37–54)
DEPRECATED RDW RBC AUTO: 46.3 FL (ref 37–54)
DEPRECATED RDW RBC AUTO: 47.2 FL (ref 37–54)
DEPRECATED RDW RBC AUTO: 47.9 FL (ref 37–54)
DEPRECATED RDW RBC AUTO: 49.2 FL (ref 37–54)
DEPRECATED RDW RBC AUTO: 49.4 FL (ref 37–54)
DEPRECATED RDW RBC AUTO: 52 FL (ref 37–54)
DEPRECATED RDW RBC AUTO: 52.2 FL (ref 37–54)
DEPRECATED RDW RBC AUTO: 52.9 FL (ref 37–54)
DEPRECATED RDW RBC AUTO: 53.6 FL (ref 37–54)
DEPRECATED RDW RBC AUTO: 54.3 FL (ref 37–54)
DEPRECATED RDW RBC AUTO: 54.8 FL (ref 37–54)
DEPRECATED RDW RBC AUTO: 56.5 FL (ref 37–54)
E COLI SXT STL QL IA: NEGATIVE
EOSINOPHIL # BLD AUTO: 0.04 10*3/MM3 (ref 0–0.4)
EOSINOPHIL # BLD AUTO: 0.17 10*3/MM3 (ref 0–0.4)
EOSINOPHIL # BLD MANUAL: 0.28 10*3/MM3 (ref 0–0.4)
EOSINOPHIL # BLD MANUAL: 0.54 10*3/MM3 (ref 0–0.4)
EOSINOPHIL # BLD MANUAL: 0.58 10*3/MM3 (ref 0–0.4)
EOSINOPHIL # BLD MANUAL: 0.76 10*3/MM3 (ref 0–0.4)
EOSINOPHIL NFR BLD AUTO: 0.3 % (ref 0.3–6.2)
EOSINOPHIL NFR BLD AUTO: 1.2 % (ref 0.3–6.2)
EOSINOPHIL NFR BLD MANUAL: 1 % (ref 0.3–6.2)
EOSINOPHIL NFR BLD MANUAL: 2 % (ref 0.3–6.2)
EOSINOPHIL NFR BLD MANUAL: 2 % (ref 0.3–6.2)
EOSINOPHIL NFR BLD MANUAL: 3 % (ref 0.3–6.2)
ERYTHROCYTE [DISTWIDTH] IN BLOOD BY AUTOMATED COUNT: 14.5 % (ref 12.3–15.4)
ERYTHROCYTE [DISTWIDTH] IN BLOOD BY AUTOMATED COUNT: 14.9 % (ref 12.3–15.4)
ERYTHROCYTE [DISTWIDTH] IN BLOOD BY AUTOMATED COUNT: 16.3 % (ref 12.3–15.4)
ERYTHROCYTE [DISTWIDTH] IN BLOOD BY AUTOMATED COUNT: 16.5 % (ref 12.3–15.4)
ERYTHROCYTE [DISTWIDTH] IN BLOOD BY AUTOMATED COUNT: 16.7 % (ref 12.3–15.4)
ERYTHROCYTE [DISTWIDTH] IN BLOOD BY AUTOMATED COUNT: 16.8 % (ref 12.3–15.4)
ERYTHROCYTE [DISTWIDTH] IN BLOOD BY AUTOMATED COUNT: 16.8 % (ref 12.3–15.4)
ERYTHROCYTE [DISTWIDTH] IN BLOOD BY AUTOMATED COUNT: 17.7 % (ref 12.3–15.4)
ERYTHROCYTE [DISTWIDTH] IN BLOOD BY AUTOMATED COUNT: 17.8 % (ref 12.3–15.4)
ERYTHROCYTE [DISTWIDTH] IN BLOOD BY AUTOMATED COUNT: 18.7 % (ref 12.3–15.4)
ERYTHROCYTE [DISTWIDTH] IN BLOOD BY AUTOMATED COUNT: 18.7 % (ref 12.3–15.4)
ERYTHROCYTE [DISTWIDTH] IN BLOOD BY AUTOMATED COUNT: 19.9 % (ref 12.3–15.4)
ERYTHROCYTE [DISTWIDTH] IN BLOOD BY AUTOMATED COUNT: 20.8 % (ref 12.3–15.4)
ERYTHROCYTE [DISTWIDTH] IN BLOOD BY AUTOMATED COUNT: 21.2 % (ref 12.3–15.4)
FERRITIN SERPL-MCNC: 58.75 NG/ML (ref 13–150)
FIBRINOGEN PPP-MCNC: 322 MG/DL (ref 173–524)
FLUAV AG NPH QL: NEGATIVE
FLUAV H1 2009 PAND RNA NPH QL NAA+PROBE: NOT DETECTED
FLUAV H1 HA GENE NPH QL NAA+PROBE: NOT DETECTED
FLUAV H3 RNA NPH QL NAA+PROBE: NOT DETECTED
FLUAV SUBTYP SPEC NAA+PROBE: NOT DETECTED
FLUBV AG NPH QL IA: NEGATIVE
FLUBV RNA ISLT QL NAA+PROBE: NOT DETECTED
FOLATE SERPL-MCNC: 6.53 NG/ML (ref 4.78–24.2)
GAS FLOW AIRWAY: 2 LPM
GAS FLOW AIRWAY: 2 LPM
GFR SERPL CREATININE-BSD FRML MDRD: 20 ML/MIN/1.73
GFR SERPL CREATININE-BSD FRML MDRD: 22 ML/MIN/1.73
GFR SERPL CREATININE-BSD FRML MDRD: 22 ML/MIN/1.73
GFR SERPL CREATININE-BSD FRML MDRD: 23 ML/MIN/1.73
GFR SERPL CREATININE-BSD FRML MDRD: 24 ML/MIN/1.73
GFR SERPL CREATININE-BSD FRML MDRD: 25 ML/MIN/1.73
GFR SERPL CREATININE-BSD FRML MDRD: 26 ML/MIN/1.73
GFR SERPL CREATININE-BSD FRML MDRD: 28 ML/MIN/1.73
GFR SERPL CREATININE-BSD FRML MDRD: 28 ML/MIN/1.73
GFR SERPL CREATININE-BSD FRML MDRD: 29 ML/MIN/1.73
GFR SERPL CREATININE-BSD FRML MDRD: 29 ML/MIN/1.73
GFR SERPL CREATININE-BSD FRML MDRD: 33 ML/MIN/1.73
GFR SERPL CREATININE-BSD FRML MDRD: 65 ML/MIN/1.73
GFR SERPL CREATININE-BSD FRML MDRD: 93 ML/MIN/1.73
GLOBULIN UR ELPH-MCNC: 2.4 GM/DL
GLOBULIN UR ELPH-MCNC: 2.5 GM/DL
GLOBULIN UR ELPH-MCNC: 2.6 GM/DL
GLOBULIN UR ELPH-MCNC: 3 GM/DL
GLOBULIN UR ELPH-MCNC: 3.1 GM/DL
GLOBULIN UR ELPH-MCNC: 3.1 GM/DL
GLOBULIN UR ELPH-MCNC: 4.2 GM/DL
GLOBULIN UR ELPH-MCNC: 4.2 GM/DL
GLOBULIN UR ELPH-MCNC: 4.3 GM/DL
GLOBULIN UR ELPH-MCNC: 4.4 GM/DL
GLUCOSE BLD-MCNC: 109 MG/DL (ref 65–99)
GLUCOSE BLD-MCNC: 121 MG/DL (ref 65–99)
GLUCOSE BLD-MCNC: 122 MG/DL (ref 65–99)
GLUCOSE BLD-MCNC: 131 MG/DL (ref 65–99)
GLUCOSE BLD-MCNC: 141 MG/DL (ref 65–99)
GLUCOSE BLD-MCNC: 153 MG/DL (ref 65–99)
GLUCOSE BLD-MCNC: 202 MG/DL (ref 65–99)
GLUCOSE BLD-MCNC: 288 MG/DL (ref 65–99)
GLUCOSE BLD-MCNC: 304 MG/DL (ref 65–99)
GLUCOSE BLD-MCNC: 317 MG/DL (ref 65–99)
GLUCOSE BLD-MCNC: 91 MG/DL (ref 65–99)
GLUCOSE BLD-MCNC: 94 MG/DL (ref 65–99)
GLUCOSE BLD-MCNC: 96 MG/DL (ref 65–99)
GLUCOSE BLD-MCNC: 98 MG/DL (ref 65–99)
GLUCOSE BLDC GLUCOMTR-MCNC: 108 MG/DL (ref 70–130)
GLUCOSE BLDC GLUCOMTR-MCNC: 162 MG/DL (ref 70–130)
GLUCOSE BLDC GLUCOMTR-MCNC: 184 MG/DL (ref 70–130)
GLUCOSE BLDC GLUCOMTR-MCNC: 187 MG/DL (ref 70–130)
GLUCOSE BLDC GLUCOMTR-MCNC: 208 MG/DL (ref 70–130)
GLUCOSE BLDC GLUCOMTR-MCNC: 249 MG/DL (ref 70–130)
GLUCOSE BLDC GLUCOMTR-MCNC: 257 MG/DL (ref 70–130)
GLUCOSE BLDC GLUCOMTR-MCNC: 272 MG/DL (ref 70–130)
GLUCOSE BLDC GLUCOMTR-MCNC: 274 MG/DL (ref 70–130)
GLUCOSE BLDC GLUCOMTR-MCNC: 283 MG/DL (ref 70–130)
GLUCOSE BLDC GLUCOMTR-MCNC: 290 MG/DL (ref 70–130)
GLUCOSE BLDC GLUCOMTR-MCNC: 308 MG/DL (ref 70–130)
GLUCOSE BLDC GLUCOMTR-MCNC: 308 MG/DL (ref 70–130)
GLUCOSE BLDC GLUCOMTR-MCNC: 312 MG/DL (ref 70–130)
GLUCOSE BLDC GLUCOMTR-MCNC: 312 MG/DL (ref 70–130)
GLUCOSE BLDC GLUCOMTR-MCNC: 354 MG/DL (ref 70–130)
GLUCOSE BLDC GLUCOMTR-MCNC: 367 MG/DL (ref 70–130)
GLUCOSE UR STRIP-MCNC: NEGATIVE MG/DL
GRAM STN SPEC: ABNORMAL
GRAM STN SPEC: NORMAL
GRAM STN SPEC: NORMAL
HADV DNA SPEC NAA+PROBE: NOT DETECTED
HAV IGM SERPL QL IA: NORMAL
HBV CORE IGM SERPL QL IA: NORMAL
HBV SURFACE AG SERPL QL IA: NORMAL
HCO3 BLDA-SCNC: 10.4 MMOL/L (ref 20–26)
HCO3 BLDA-SCNC: 11.3 MMOL/L (ref 20–26)
HCO3 BLDA-SCNC: 11.4 MMOL/L (ref 20–26)
HCO3 BLDA-SCNC: 12.5 MMOL/L (ref 20–26)
HCO3 BLDA-SCNC: 12.9 MMOL/L (ref 20–26)
HCO3 BLDA-SCNC: 14 MMOL/L (ref 20–26)
HCO3 BLDA-SCNC: 17.1 MMOL/L (ref 20–26)
HCO3 BLDA-SCNC: 18.5 MMOL/L (ref 20–26)
HCO3 BLDA-SCNC: 19.2 MMOL/L (ref 20–26)
HCO3 BLDA-SCNC: 19.9 MMOL/L (ref 20–26)
HCO3 BLDA-SCNC: 20.3 MMOL/L (ref 20–26)
HCO3 BLDA-SCNC: 22.7 MMOL/L (ref 20–26)
HCO3 BLDA-SCNC: 23.1 MMOL/L (ref 20–26)
HCO3 BLDA-SCNC: 23.2 MMOL/L (ref 20–26)
HCO3 BLDA-SCNC: 23.2 MMOL/L (ref 20–26)
HCO3 BLDA-SCNC: 23.4 MMOL/L (ref 20–26)
HCO3 BLDA-SCNC: 23.5 MMOL/L (ref 20–26)
HCO3 BLDA-SCNC: 23.9 MMOL/L (ref 20–26)
HCO3 BLDA-SCNC: 28.2 MMOL/L (ref 20–26)
HCO3 BLDA-SCNC: 9.8 MMOL/L (ref 20–26)
HCO3 BLDV-SCNC: 24.1 MMOL/L (ref 22–28)
HCOV 229E RNA SPEC QL NAA+PROBE: NOT DETECTED
HCOV HKU1 RNA SPEC QL NAA+PROBE: NOT DETECTED
HCOV NL63 RNA SPEC QL NAA+PROBE: NOT DETECTED
HCOV OC43 RNA SPEC QL NAA+PROBE: NOT DETECTED
HCT VFR BLD AUTO: 23.4 % (ref 34–46.6)
HCT VFR BLD AUTO: 26.1 % (ref 34–46.6)
HCT VFR BLD AUTO: 26.3 % (ref 34–46.6)
HCT VFR BLD AUTO: 26.7 % (ref 34–46.6)
HCT VFR BLD AUTO: 26.9 % (ref 34–46.6)
HCT VFR BLD AUTO: 27 % (ref 34–46.6)
HCT VFR BLD AUTO: 27 % (ref 34–46.6)
HCT VFR BLD AUTO: 28.2 % (ref 34–46.6)
HCT VFR BLD AUTO: 28.6 % (ref 34–46.6)
HCT VFR BLD AUTO: 28.9 % (ref 34–46.6)
HCT VFR BLD AUTO: 29.9 % (ref 34–46.6)
HCT VFR BLD AUTO: 30.6 % (ref 34–46.6)
HCT VFR BLD AUTO: 31.3 % (ref 34–46.6)
HCT VFR BLD AUTO: 33.4 % (ref 34–46.6)
HCT VFR BLD CALC: 22.3 % (ref 38–51)
HCT VFR BLD CALC: 24.5 % (ref 38–51)
HCT VFR BLD CALC: 25 % (ref 38–51)
HCT VFR BLD CALC: 25 % (ref 38–51)
HCT VFR BLD CALC: 25.2 % (ref 38–51)
HCT VFR BLD CALC: 25.3 % (ref 38–51)
HCT VFR BLD CALC: 25.3 % (ref 38–51)
HCT VFR BLD CALC: 25.5 % (ref 38–51)
HCT VFR BLD CALC: 25.5 % (ref 38–51)
HCT VFR BLD CALC: 25.6 % (ref 38–51)
HCT VFR BLD CALC: 25.9 % (ref 38–51)
HCT VFR BLD CALC: 25.9 % (ref 38–51)
HCT VFR BLD CALC: 26.1 % (ref 38–51)
HCT VFR BLD CALC: 26.1 % (ref 38–51)
HCT VFR BLD CALC: 26.2 % (ref 38–51)
HCT VFR BLD CALC: 26.6 % (ref 38–51)
HCT VFR BLD CALC: 27.3 % (ref 38–51)
HCT VFR BLD CALC: 27.9 % (ref 38–51)
HCT VFR BLD CALC: 28.3 % (ref 38–51)
HCT VFR BLD CALC: 28.3 % (ref 38–51)
HCV AB SER DONR QL: NORMAL
HGB BLD-MCNC: 10.1 G/DL (ref 12–15.9)
HGB BLD-MCNC: 6.6 G/DL (ref 12–15.9)
HGB BLD-MCNC: 7.6 G/DL (ref 12–15.9)
HGB BLD-MCNC: 7.9 G/DL (ref 12–15.9)
HGB BLD-MCNC: 8 G/DL (ref 12–15.9)
HGB BLD-MCNC: 8.2 G/DL (ref 12–15.9)
HGB BLD-MCNC: 8.3 G/DL (ref 12–15.9)
HGB BLD-MCNC: 8.3 G/DL (ref 12–15.9)
HGB BLD-MCNC: 8.4 G/DL (ref 12–15.9)
HGB BLD-MCNC: 8.4 G/DL (ref 12–15.9)
HGB BLD-MCNC: 8.8 G/DL (ref 12–15.9)
HGB BLD-MCNC: 8.8 G/DL (ref 12–15.9)
HGB BLD-MCNC: 9.2 G/DL (ref 12–15.9)
HGB BLD-MCNC: 9.4 G/DL (ref 12–15.9)
HGB BLDA-MCNC: 7.3 G/DL (ref 13.5–17.5)
HGB BLDA-MCNC: 8 G/DL (ref 13.5–17.5)
HGB BLDA-MCNC: 8.1 G/DL (ref 13.5–17.5)
HGB BLDA-MCNC: 8.2 G/DL (ref 13.5–17.5)
HGB BLDA-MCNC: 8.3 G/DL (ref 13.5–17.5)
HGB BLDA-MCNC: 8.4 G/DL (ref 13.5–17.5)
HGB BLDA-MCNC: 8.5 G/DL (ref 13.5–17.5)
HGB BLDA-MCNC: 8.6 G/DL (ref 13.5–17.5)
HGB BLDA-MCNC: 8.7 G/DL (ref 13.5–17.5)
HGB BLDA-MCNC: 8.9 G/DL (ref 13.5–17.5)
HGB BLDA-MCNC: 9.1 G/DL (ref 13.5–17.5)
HGB BLDA-MCNC: 9.2 G/DL (ref 13.5–17.5)
HGB BLDA-MCNC: 9.2 G/DL (ref 13.5–17.5)
HGB UR QL STRIP.AUTO: ABNORMAL
HGB UR QL STRIP.AUTO: ABNORMAL
HGB UR QL STRIP.AUTO: NEGATIVE
HGB UR QL STRIP.AUTO: NEGATIVE
HMPV RNA NPH QL NAA+NON-PROBE: NOT DETECTED
HOLD SPECIMEN: NORMAL
HOROWITZ INDEX BLD+IHG-RTO: 21 %
HOROWITZ INDEX BLD+IHG-RTO: 21 %
HOROWITZ INDEX BLD+IHG-RTO: 28 %
HOROWITZ INDEX BLD+IHG-RTO: 30 %
HOROWITZ INDEX BLD+IHG-RTO: 32 %
HOROWITZ INDEX BLD+IHG-RTO: 40 %
HOROWITZ INDEX BLD+IHG-RTO: 40 %
HOROWITZ INDEX BLD+IHG-RTO: 60 %
HPIV1 RNA SPEC QL NAA+PROBE: NOT DETECTED
HPIV2 RNA SPEC QL NAA+PROBE: NOT DETECTED
HPIV3 RNA NPH QL NAA+PROBE: NOT DETECTED
HPIV4 P GENE NPH QL NAA+PROBE: NOT DETECTED
HYALINE CASTS UR QL AUTO: ABNORMAL /LPF
HYPOCHROMIA BLD QL: ABNORMAL
IMM GRANULOCYTES # BLD AUTO: 0.06 10*3/MM3 (ref 0–0.05)
IMM GRANULOCYTES # BLD AUTO: 0.33 10*3/MM3 (ref 0–0.05)
IMM GRANULOCYTES NFR BLD AUTO: 0.5 % (ref 0–0.5)
IMM GRANULOCYTES NFR BLD AUTO: 2.4 % (ref 0–0.5)
INR PPP: 1.12 (ref 0.9–1.1)
INR PPP: 1.34 (ref 0.9–1.1)
INR PPP: 1.88 (ref 0.9–1.1)
INTERNAL NEGATIVE CONTROL: NEGATIVE
INTERNAL POSITIVE CONTROL: POSITIVE
IRON 24H UR-MRATE: 9 MCG/DL (ref 37–145)
IRON SATN MFR SERPL: 6 % (ref 20–50)
KETONES UR QL STRIP: ABNORMAL
KETONES UR QL STRIP: NEGATIVE
L PNEUMO1 AG UR QL IA: NEGATIVE
LAB AP CASE REPORT: NORMAL
LARGE PLATELETS: ABNORMAL
LDH SERPL-CCNC: 274 U/L (ref 135–214)
LEUKOCYTE ESTERASE UR QL STRIP.AUTO: ABNORMAL
LEUKOCYTE ESTERASE UR QL STRIP.AUTO: ABNORMAL
LEUKOCYTE ESTERASE UR QL STRIP.AUTO: NEGATIVE
LEUKOCYTE ESTERASE UR QL STRIP.AUTO: NEGATIVE
LIPASE SERPL-CCNC: 126 U/L (ref 13–60)
LIPASE SERPL-CCNC: 20 U/L (ref 13–60)
LIPASE SERPL-CCNC: 49 U/L (ref 13–60)
LYMPHOCYTES # BLD AUTO: 1.37 10*3/MM3 (ref 0.7–3.1)
LYMPHOCYTES # BLD AUTO: 2.01 10*3/MM3 (ref 0.7–3.1)
LYMPHOCYTES # BLD MANUAL: 0.5 10*3/MM3 (ref 0.7–3.1)
LYMPHOCYTES # BLD MANUAL: 1.35 10*3/MM3 (ref 0.7–3.1)
LYMPHOCYTES # BLD MANUAL: 1.4 10*3/MM3 (ref 0.7–3.1)
LYMPHOCYTES # BLD MANUAL: 1.68 10*3/MM3 (ref 0.7–3.1)
LYMPHOCYTES # BLD MANUAL: 1.75 10*3/MM3 (ref 0.7–3.1)
LYMPHOCYTES # BLD MANUAL: 2.28 10*3/MM3 (ref 0.7–3.1)
LYMPHOCYTES NFR BLD AUTO: 10 % (ref 19.6–45.3)
LYMPHOCYTES NFR BLD AUTO: 15.2 % (ref 19.6–45.3)
LYMPHOCYTES NFR BLD MANUAL: 2 % (ref 19.6–45.3)
LYMPHOCYTES NFR BLD MANUAL: 2 % (ref 5–12)
LYMPHOCYTES NFR BLD MANUAL: 3 % (ref 5–12)
LYMPHOCYTES NFR BLD MANUAL: 4 % (ref 5–12)
LYMPHOCYTES NFR BLD MANUAL: 4 % (ref 5–12)
LYMPHOCYTES NFR BLD MANUAL: 5 % (ref 19.6–45.3)
LYMPHOCYTES NFR BLD MANUAL: 5 % (ref 19.6–45.3)
LYMPHOCYTES NFR BLD MANUAL: 5.1 % (ref 19.6–45.3)
LYMPHOCYTES NFR BLD MANUAL: 5.1 % (ref 5–12)
LYMPHOCYTES NFR BLD MANUAL: 6 % (ref 19.6–45.3)
LYMPHOCYTES NFR BLD MANUAL: 8 % (ref 19.6–45.3)
LYMPHOCYTES NFR BLD MANUAL: 9 % (ref 5–12)
LYMPHOCYTES NFR FLD MANUAL: 9 %
Lab: ABNORMAL
Lab: NORMAL
M PNEUMO IGG SER IA-ACNC: NOT DETECTED
M PNEUMO IGM SER QL: NEGATIVE
MAGNESIUM SERPL-MCNC: 1.5 MG/DL (ref 1.6–2.6)
MAGNESIUM SERPL-MCNC: 1.6 MG/DL (ref 1.6–2.6)
MAGNESIUM SERPL-MCNC: 1.8 MG/DL (ref 1.6–2.6)
MAGNESIUM SERPL-MCNC: 1.9 MG/DL (ref 1.6–2.6)
MAGNESIUM SERPL-MCNC: 2 MG/DL (ref 1.6–2.6)
MAGNESIUM SERPL-MCNC: 2.1 MG/DL (ref 1.6–2.6)
MAGNESIUM SERPL-MCNC: 2.5 MG/DL (ref 1.6–2.6)
MAGNESIUM SERPL-MCNC: 2.5 MG/DL (ref 1.6–2.6)
MAXIMAL PREDICTED HEART RATE: 178 BPM
MCH RBC QN AUTO: 23 PG (ref 26.6–33)
MCH RBC QN AUTO: 23.4 PG (ref 26.6–33)
MCH RBC QN AUTO: 23.8 PG (ref 26.6–33)
MCH RBC QN AUTO: 24 PG (ref 26.6–33)
MCH RBC QN AUTO: 24.2 PG (ref 26.6–33)
MCH RBC QN AUTO: 24.3 PG (ref 26.6–33)
MCH RBC QN AUTO: 24.4 PG (ref 26.6–33)
MCH RBC QN AUTO: 24.5 PG (ref 26.6–33)
MCH RBC QN AUTO: 24.6 PG (ref 26.6–33)
MCH RBC QN AUTO: 24.9 PG (ref 26.6–33)
MCH RBC QN AUTO: 25 PG (ref 26.6–33)
MCH RBC QN AUTO: 25.2 PG (ref 26.6–33)
MCH RBC QN AUTO: 25.6 PG (ref 26.6–33)
MCH RBC QN AUTO: 26 PG (ref 26.6–33)
MCHC RBC AUTO-ENTMCNC: 28.2 G/DL (ref 31.5–35.7)
MCHC RBC AUTO-ENTMCNC: 29.1 G/DL (ref 31.5–35.7)
MCHC RBC AUTO-ENTMCNC: 29.4 G/DL (ref 31.5–35.7)
MCHC RBC AUTO-ENTMCNC: 29.6 G/DL (ref 31.5–35.7)
MCHC RBC AUTO-ENTMCNC: 29.8 G/DL (ref 31.5–35.7)
MCHC RBC AUTO-ENTMCNC: 30.2 G/DL (ref 31.5–35.7)
MCHC RBC AUTO-ENTMCNC: 30.4 G/DL (ref 31.5–35.7)
MCHC RBC AUTO-ENTMCNC: 30.7 G/DL (ref 31.5–35.7)
MCHC RBC AUTO-ENTMCNC: 30.7 G/DL (ref 31.5–35.7)
MCHC RBC AUTO-ENTMCNC: 30.9 G/DL (ref 31.5–35.7)
MCV RBC AUTO: 79.5 FL (ref 79–97)
MCV RBC AUTO: 79.5 FL (ref 79–97)
MCV RBC AUTO: 80.4 FL (ref 79–97)
MCV RBC AUTO: 80.8 FL (ref 79–97)
MCV RBC AUTO: 81.1 FL (ref 79–97)
MCV RBC AUTO: 81.5 FL (ref 79–97)
MCV RBC AUTO: 82.2 FL (ref 79–97)
MCV RBC AUTO: 82.3 FL (ref 79–97)
MCV RBC AUTO: 82.4 FL (ref 79–97)
MCV RBC AUTO: 82.8 FL (ref 79–97)
MCV RBC AUTO: 82.8 FL (ref 79–97)
MCV RBC AUTO: 83.3 FL (ref 79–97)
MCV RBC AUTO: 83.4 FL (ref 79–97)
MCV RBC AUTO: 84.3 FL (ref 79–97)
METAMYELOCYTES NFR BLD MANUAL: 2 % (ref 0–0)
METAMYELOCYTES NFR BLD MANUAL: 2 % (ref 0–0)
METAMYELOCYTES NFR BLD MANUAL: 3 % (ref 0–0)
METAMYELOCYTES NFR BLD MANUAL: 3 % (ref 0–0)
METAMYELOCYTES NFR BLD MANUAL: 7 % (ref 0–0)
METHGB BLD QL: 0.3 % (ref 0–3)
METHGB BLD QL: 0.4 % (ref 0–3)
METHGB BLD QL: 0.4 % (ref 0–3)
METHGB BLD QL: 0.5 % (ref 0–3)
METHGB BLD QL: 0.5 % (ref 0–3)
METHGB BLD QL: 0.6 % (ref 0–3)
METHGB BLD QL: 0.6 % (ref 0–3)
METHGB BLD QL: 0.7 % (ref 0–3)
METHGB BLD QL: 0.8 % (ref 0–3)
METHGB BLD QL: 0.8 % (ref 0–3)
METHGB BLD QL: 0.9 % (ref 0–3)
METHGB BLD QL: 1 % (ref 0–3)
METHGB BLD QL: <-0.1 % (ref 0–3)
METHOD: ABNORMAL
MICROCYTES BLD QL: ABNORMAL
MODALITY: ABNORMAL
MONOCYTES # BLD AUTO: 0.56 10*3/MM3 (ref 0.1–0.9)
MONOCYTES # BLD AUTO: 0.81 10*3/MM3 (ref 0.1–0.9)
MONOCYTES # BLD AUTO: 0.86 10*3/MM3 (ref 0.1–0.9)
MONOCYTES # BLD AUTO: 0.87 10*3/MM3 (ref 0.1–0.9)
MONOCYTES # BLD AUTO: 1.01 10*3/MM3 (ref 0.1–0.9)
MONOCYTES # BLD AUTO: 1.14 10*3/MM3 (ref 0.1–0.9)
MONOCYTES # BLD AUTO: 1.68 10*3/MM3 (ref 0.1–0.9)
MONOCYTES # BLD AUTO: 2.62 10*3/MM3 (ref 0.1–0.9)
MONOCYTES NFR BLD AUTO: 6.4 % (ref 5–12)
MONOCYTES NFR BLD AUTO: 6.5 % (ref 5–12)
MONOS+MACROS NFR FLD: 13 %
MUCOUS THREADS URNS QL MICRO: ABNORMAL /HPF
MYELOCYTES NFR BLD MANUAL: 1 % (ref 0–0)
MYELOCYTES NFR BLD MANUAL: 3 % (ref 0–0)
NEUTROPHILS # BLD AUTO: 10.15 10*3/MM3 (ref 1.7–7)
NEUTROPHILS # BLD AUTO: 10.85 10*3/MM3 (ref 1.7–7)
NEUTROPHILS # BLD AUTO: 21.91 10*3/MM3 (ref 1.7–7)
NEUTROPHILS # BLD AUTO: 22.51 10*3/MM3 (ref 1.7–7)
NEUTROPHILS # BLD AUTO: 23.48 10*3/MM3 (ref 1.7–7)
NEUTROPHILS # BLD AUTO: 24.15 10*3/MM3 (ref 1.7–7)
NEUTROPHILS # BLD AUTO: 24.58 10*3/MM3 (ref 1.7–7)
NEUTROPHILS # BLD AUTO: 28.68 10*3/MM3 (ref 1.7–7)
NEUTROPHILS NFR BLD AUTO: 76.8 % (ref 42.7–76)
NEUTROPHILS NFR BLD AUTO: 79.3 % (ref 42.7–76)
NEUTROPHILS NFR BLD MANUAL: 71 % (ref 42.7–76)
NEUTROPHILS NFR BLD MANUAL: 76 % (ref 42.7–76)
NEUTROPHILS NFR BLD MANUAL: 78 % (ref 42.7–76)
NEUTROPHILS NFR BLD MANUAL: 83 % (ref 42.7–76)
NEUTROPHILS NFR BLD MANUAL: 83.8 % (ref 42.7–76)
NEUTROPHILS NFR BLD MANUAL: 87 % (ref 42.7–76)
NEUTROPHILS NFR FLD MANUAL: 78 %
NEUTS BAND NFR BLD MANUAL: 1 % (ref 0–5)
NEUTS BAND NFR BLD MANUAL: 12 % (ref 0–5)
NEUTS BAND NFR BLD MANUAL: 3 % (ref 0–5)
NEUTS BAND NFR BLD MANUAL: 3 % (ref 0–5)
NEUTS BAND NFR BLD MANUAL: 4 % (ref 0–5)
NEUTS BAND NFR BLD MANUAL: 9 % (ref 0–5)
NITRITE UR QL STRIP: NEGATIVE
NOTE: ABNORMAL
NOTIFIED BY: ABNORMAL
NOTIFIED WHO: ABNORMAL
NRBC BLD AUTO-RTO: 0 /100 WBC (ref 0–0.2)
NRBC BLD AUTO-RTO: 0 /100 WBC (ref 0–0.2)
NRBC SPEC MANUAL: 1 /100 WBC (ref 0–0.2)
NT-PROBNP SERPL-MCNC: 1467 PG/ML (ref 5–450)
NT-PROBNP SERPL-MCNC: 214.1 PG/ML (ref 5–450)
NUC CELL # FLD: 4390 /MM3
OXYHGB MFR BLDV: 67.5 % (ref 45–75)
OXYHGB MFR BLDV: 92.7 % (ref 94–99)
OXYHGB MFR BLDV: 93.7 % (ref 94–99)
OXYHGB MFR BLDV: 93.8 % (ref 94–99)
OXYHGB MFR BLDV: 94.2 % (ref 94–99)
OXYHGB MFR BLDV: 94.3 % (ref 94–99)
OXYHGB MFR BLDV: 94.4 % (ref 94–99)
OXYHGB MFR BLDV: 94.6 % (ref 94–99)
OXYHGB MFR BLDV: 94.9 % (ref 94–99)
OXYHGB MFR BLDV: 94.9 % (ref 94–99)
OXYHGB MFR BLDV: 95.2 % (ref 94–99)
OXYHGB MFR BLDV: 95.5 % (ref 94–99)
OXYHGB MFR BLDV: 95.8 % (ref 94–99)
OXYHGB MFR BLDV: 96 % (ref 94–99)
OXYHGB MFR BLDV: 96.2 % (ref 94–99)
OXYHGB MFR BLDV: 96.3 % (ref 94–99)
OXYHGB MFR BLDV: 96.8 % (ref 94–99)
OXYHGB MFR BLDV: 96.9 % (ref 94–99)
OXYHGB MFR BLDV: 97.6 % (ref 94–99)
OXYHGB MFR BLDV: 98.5 % (ref 94–99)
OXYHGB MFR BLDV: 99.3 % (ref 94–99)
PATH INTERP BLD-IMP: NORMAL
PATH INTERP BLD-IMP: NORMAL
PATH REPORT.FINAL DX SPEC: NORMAL
PCO2 BLDA: 20 MM HG (ref 35–45)
PCO2 BLDA: 24 MM HG (ref 35–45)
PCO2 BLDA: 24.6 MM HG (ref 35–45)
PCO2 BLDA: 25 MM HG (ref 35–45)
PCO2 BLDA: 26.2 MM HG (ref 35–45)
PCO2 BLDA: 30.4 MM HG (ref 35–45)
PCO2 BLDA: 32.5 MM HG (ref 35–45)
PCO2 BLDA: 33.6 MM HG (ref 35–45)
PCO2 BLDA: 35.2 MM HG (ref 35–45)
PCO2 BLDA: 35.4 MM HG (ref 35–45)
PCO2 BLDA: 36.4 MM HG (ref 35–45)
PCO2 BLDA: 37.2 MM HG (ref 35–45)
PCO2 BLDA: 37.3 MM HG (ref 35–45)
PCO2 BLDA: 37.8 MM HG (ref 35–45)
PCO2 BLDA: 39.5 MM HG (ref 35–45)
PCO2 BLDA: 39.9 MM HG (ref 35–45)
PCO2 BLDA: 41.5 MM HG (ref 35–45)
PCO2 BLDA: 44.1 MM HG (ref 35–45)
PCO2 BLDA: 44.7 MM HG (ref 35–45)
PCO2 BLDA: 46.3 MM HG (ref 35–45)
PCO2 BLDV: 42.9 MM HG (ref 41–51)
PCO2 TEMP ADJ BLD: ABNORMAL MM[HG]
PEEP RESPIRATORY: 10 CM[H2O]
PEEP RESPIRATORY: 5 CM[H2O]
PEEP RESPIRATORY: 6 CM[H2O]
PEEP RESPIRATORY: 8 CM[H2O]
PEEP RESPIRATORY: 8 CM[H2O]
PF4 HEPARIN CMPLX AB SER-ACNC: 0.15 OD (ref 0–0.4)
PH BLDA: 7.21 PH UNITS (ref 7.35–7.45)
PH BLDA: 7.22 PH UNITS (ref 7.35–7.45)
PH BLDA: 7.22 PH UNITS (ref 7.35–7.45)
PH BLDA: 7.24 PH UNITS (ref 7.35–7.45)
PH BLDA: 7.24 PH UNITS (ref 7.35–7.45)
PH BLDA: 7.26 PH UNITS (ref 7.35–7.45)
PH BLDA: 7.27 PH UNITS (ref 7.35–7.45)
PH BLDA: 7.3 PH UNITS (ref 7.35–7.45)
PH BLDA: 7.31 PH UNITS (ref 7.35–7.45)
PH BLDA: 7.32 PH UNITS (ref 7.35–7.45)
PH BLDA: 7.33 PH UNITS (ref 7.35–7.45)
PH BLDA: 7.34 PH UNITS (ref 7.35–7.45)
PH BLDA: 7.35 PH UNITS (ref 7.35–7.45)
PH BLDA: 7.35 PH UNITS (ref 7.35–7.45)
PH BLDA: 7.36 PH UNITS (ref 7.35–7.45)
PH BLDA: 7.41 PH UNITS (ref 7.35–7.45)
PH BLDA: 7.41 PH UNITS (ref 7.35–7.45)
PH BLDA: 7.42 PH UNITS (ref 7.35–7.45)
PH BLDA: 7.44 PH UNITS (ref 7.35–7.45)
PH BLDA: 7.5 PH UNITS (ref 7.35–7.45)
PH BLDV: 7.36 PH UNITS (ref 7.32–7.42)
PH UR STRIP.AUTO: 6.5 [PH] (ref 5–8)
PH UR STRIP.AUTO: <=5 [PH] (ref 5–8)
PH, TEMP CORRECTED: ABNORMAL
PHOSPHATE SERPL-MCNC: 1.6 MG/DL (ref 2.5–4.5)
PHOSPHATE SERPL-MCNC: 1.8 MG/DL (ref 2.5–4.5)
PHOSPHATE SERPL-MCNC: 2.2 MG/DL (ref 2.5–4.5)
PHOSPHATE SERPL-MCNC: 3.3 MG/DL (ref 2.5–4.5)
PHOSPHATE SERPL-MCNC: 3.6 MG/DL (ref 2.5–4.5)
PHOSPHATE SERPL-MCNC: 3.7 MG/DL (ref 2.5–4.5)
PHOSPHATE SERPL-MCNC: 3.9 MG/DL (ref 2.5–4.5)
PLAT MORPH BLD: NORMAL
PLATELET # BLD AUTO: 104 10*3/MM3 (ref 140–450)
PLATELET # BLD AUTO: 119 10*3/MM3 (ref 140–450)
PLATELET # BLD AUTO: 178 10*3/MM3 (ref 140–450)
PLATELET # BLD AUTO: 206 10*3/MM3 (ref 140–450)
PLATELET # BLD AUTO: 217 10*3/MM3 (ref 140–450)
PLATELET # BLD AUTO: 273 10*3/MM3 (ref 140–450)
PLATELET # BLD AUTO: 362 10*3/MM3 (ref 140–450)
PLATELET # BLD AUTO: 40 10*3/MM3 (ref 140–450)
PLATELET # BLD AUTO: 483 10*3/MM3 (ref 140–450)
PLATELET # BLD AUTO: 490 10*3/MM3 (ref 140–450)
PLATELET # BLD AUTO: 50 10*3/MM3 (ref 140–450)
PLATELET # BLD AUTO: 506 10*3/MM3 (ref 140–450)
PLATELET # BLD AUTO: 56 10*3/MM3 (ref 140–450)
PLATELET # BLD AUTO: 67 10*3/MM3 (ref 140–450)
PMV BLD AUTO: 10.4 FL (ref 6–12)
PMV BLD AUTO: 10.6 FL (ref 6–12)
PMV BLD AUTO: 10.6 FL (ref 6–12)
PMV BLD AUTO: 10.7 FL (ref 6–12)
PMV BLD AUTO: 10.9 FL (ref 6–12)
PMV BLD AUTO: 11 FL (ref 6–12)
PMV BLD AUTO: 11.2 FL (ref 6–12)
PMV BLD AUTO: 11.3 FL (ref 6–12)
PMV BLD AUTO: 11.4 FL (ref 6–12)
PMV BLD AUTO: 12.1 FL (ref 6–12)
PMV BLD AUTO: 12.3 FL (ref 6–12)
PMV BLD AUTO: ABNORMAL FL
PO2 BLDA: 102 MM HG (ref 83–108)
PO2 BLDA: 102 MM HG (ref 83–108)
PO2 BLDA: 103 MM HG (ref 83–108)
PO2 BLDA: 105 MM HG (ref 83–108)
PO2 BLDA: 107 MM HG (ref 83–108)
PO2 BLDA: 117 MM HG (ref 83–108)
PO2 BLDA: 149 MM HG (ref 83–108)
PO2 BLDA: 73.2 MM HG (ref 83–108)
PO2 BLDA: 78.9 MM HG (ref 83–108)
PO2 BLDA: 79.1 MM HG (ref 83–108)
PO2 BLDA: 80.8 MM HG (ref 83–108)
PO2 BLDA: 83.9 MM HG (ref 83–108)
PO2 BLDA: 85.2 MM HG (ref 83–108)
PO2 BLDA: 85.4 MM HG (ref 83–108)
PO2 BLDA: 85.7 MM HG (ref 83–108)
PO2 BLDA: 85.9 MM HG (ref 83–108)
PO2 BLDA: 88.4 MM HG (ref 83–108)
PO2 BLDA: 89.6 MM HG (ref 83–108)
PO2 BLDA: 90.9 MM HG (ref 83–108)
PO2 BLDA: 96.6 MM HG (ref 83–108)
PO2 BLDV: 37 MM HG (ref 27–53)
PO2 TEMP ADJ BLD: ABNORMAL MM[HG]
POIKILOCYTOSIS BLD QL SMEAR: ABNORMAL
POLYCHROMASIA BLD QL SMEAR: ABNORMAL
POTASSIUM BLD-SCNC: 3 MMOL/L (ref 3.5–5.2)
POTASSIUM BLD-SCNC: 3.2 MMOL/L (ref 3.5–5.2)
POTASSIUM BLD-SCNC: 3.4 MMOL/L (ref 3.5–5.2)
POTASSIUM BLD-SCNC: 3.7 MMOL/L (ref 3.5–5.2)
POTASSIUM BLD-SCNC: 3.7 MMOL/L (ref 3.5–5.2)
POTASSIUM BLD-SCNC: 3.9 MMOL/L (ref 3.5–5.2)
POTASSIUM BLD-SCNC: 4.2 MMOL/L (ref 3.5–5.2)
POTASSIUM BLD-SCNC: 4.3 MMOL/L (ref 3.5–5.2)
POTASSIUM BLD-SCNC: 4.6 MMOL/L (ref 3.5–5.2)
POTASSIUM BLD-SCNC: 4.8 MMOL/L (ref 3.5–5.2)
POTASSIUM BLD-SCNC: 5 MMOL/L (ref 3.5–5.2)
PROCALCITONIN SERPL-MCNC: 4.07 NG/ML (ref 0.1–0.25)
PROT SERPL-MCNC: 6.2 G/DL (ref 6–8.5)
PROT SERPL-MCNC: 6.3 G/DL (ref 6–8.5)
PROT SERPL-MCNC: 6.4 G/DL (ref 6–8.5)
PROT SERPL-MCNC: 6.5 G/DL (ref 6–8.5)
PROT SERPL-MCNC: 6.5 G/DL (ref 6–8.5)
PROT SERPL-MCNC: 6.6 G/DL (ref 6–8.5)
PROT SERPL-MCNC: 6.8 G/DL (ref 6–8.5)
PROT SERPL-MCNC: 7.2 G/DL (ref 6–8.5)
PROT SERPL-MCNC: 7.8 G/DL (ref 6–8.5)
PROT SERPL-MCNC: 7.9 G/DL (ref 6–8.5)
PROT UR QL STRIP: ABNORMAL
PROTHROMBIN TIME: 15 SECONDS (ref 11–15.4)
PROTHROMBIN TIME: 17.2 SECONDS (ref 11–15.4)
PROTHROMBIN TIME: 22.6 SECONDS (ref 11–15.4)
RBC # BLD AUTO: 2.87 10*6/MM3 (ref 3.77–5.28)
RBC # BLD AUTO: 3.17 10*6/MM3 (ref 3.77–5.28)
RBC # BLD AUTO: 3.19 10*6/MM3 (ref 3.77–5.28)
RBC # BLD AUTO: 3.2 10*6/MM3 (ref 3.77–5.28)
RBC # BLD AUTO: 3.24 10*6/MM3 (ref 3.77–5.28)
RBC # BLD AUTO: 3.32 10*6/MM3 (ref 3.77–5.28)
RBC # BLD AUTO: 3.34 10*6/MM3 (ref 3.77–5.28)
RBC # BLD AUTO: 3.38 10*6/MM3 (ref 3.77–5.28)
RBC # BLD AUTO: 3.47 10*6/MM3 (ref 3.77–5.28)
RBC # BLD AUTO: 3.49 10*6/MM3 (ref 3.77–5.28)
RBC # BLD AUTO: 3.76 10*6/MM3 (ref 3.77–5.28)
RBC # BLD AUTO: 3.78 10*6/MM3 (ref 3.77–5.28)
RBC # BLD AUTO: 3.85 10*6/MM3 (ref 3.77–5.28)
RBC # BLD AUTO: 4.12 10*6/MM3 (ref 3.77–5.28)
RBC # FLD AUTO: ABNORMAL 10*3/UL
RBC # UR: ABNORMAL /HPF
REF LAB TEST METHOD: ABNORMAL
RH BLD: POSITIVE
RH BLD: POSITIVE
RHINOVIRUS RNA SPEC NAA+PROBE: NOT DETECTED
RSV RNA NPH QL NAA+NON-PROBE: NOT DETECTED
S PNEUM AG SPEC QL LA: NEGATIVE
SALM + SHIG STL CULT: NORMAL
SAO2 % BLDCOA: 94.5 % (ref 94–99)
SAO2 % BLDCOA: 95.9 % (ref 94–99)
SAO2 % BLDCOA: 96.3 % (ref 94–99)
SAO2 % BLDCOA: 96.5 % (ref 94–99)
SAO2 % BLDCOA: 96.6 % (ref 94–99)
SAO2 % BLDCOA: 96.7 % (ref 94–99)
SAO2 % BLDCOA: 96.8 % (ref 94–99)
SAO2 % BLDCOA: 97 % (ref 94–99)
SAO2 % BLDCOA: 97.2 % (ref 94–99)
SAO2 % BLDCOA: 97.6 % (ref 94–99)
SAO2 % BLDCOA: 98 % (ref 94–99)
SAO2 % BLDCOA: 98.2 % (ref 94–99)
SAO2 % BLDCOA: 98.4 % (ref 94–99)
SAO2 % BLDCOA: 98.6 % (ref 94–99)
SAO2 % BLDCOA: 99 % (ref 94–99)
SAO2 % BLDCOA: 99.1 % (ref 94–99)
SAO2 % BLDCOA: >99.2 % (ref 94–99)
SAO2 % BLDCOA: >99.2 % (ref 94–99)
SCAN SLIDE: NORMAL
SET MECH RESP RATE: 15
SET MECH RESP RATE: 20
SET MECH RESP RATE: 20
SET MECH RESP RATE: 24
SET MECH RESP RATE: 24
SET MECH RESP RATE: 28
SET MECH RESP RATE: 30
SET MECH RESP RATE: 30
SMALL PLATELETS BLD QL SMEAR: ADEQUATE
SODIUM BLD-SCNC: 125 MMOL/L (ref 136–145)
SODIUM BLD-SCNC: 127 MMOL/L (ref 136–145)
SODIUM BLD-SCNC: 128 MMOL/L (ref 136–145)
SODIUM BLD-SCNC: 130 MMOL/L (ref 136–145)
SODIUM BLD-SCNC: 130 MMOL/L (ref 136–145)
SODIUM BLD-SCNC: 131 MMOL/L (ref 136–145)
SODIUM BLD-SCNC: 132 MMOL/L (ref 136–145)
SODIUM BLD-SCNC: 133 MMOL/L (ref 136–145)
SODIUM BLD-SCNC: 133 MMOL/L (ref 136–145)
SODIUM BLD-SCNC: 134 MMOL/L (ref 136–145)
SODIUM BLD-SCNC: 139 MMOL/L (ref 136–145)
SODIUM BLD-SCNC: 139 MMOL/L (ref 136–145)
SODIUM UR-SCNC: <20 MMOL/L
SP GR UR STRIP: 1.02 (ref 1–1.03)
SP GR UR STRIP: >1.03 (ref 1–1.03)
SQUAMOUS #/AREA URNS HPF: ABNORMAL /HPF
STOMATOCYTES BLD QL SMEAR: ABNORMAL
STRESS TARGET HR: 151 BPM
T&S EXPIRATION DATE: NORMAL
T3FREE SERPL-MCNC: 1.14 PG/ML (ref 2–4.4)
T4 FREE SERPL-MCNC: 0.97 NG/DL (ref 0.93–1.7)
TIBC SERPL-MCNC: 162 MCG/DL (ref 298–536)
TRANSFERRIN SERPL-MCNC: 109 MG/DL (ref 200–360)
TRIGL SERPL-MCNC: 481 MG/DL (ref 0–150)
TROPONIN T SERPL-MCNC: <0.01 NG/ML (ref 0–0.03)
TROPONIN T SERPL-MCNC: <0.01 NG/ML (ref 0–0.03)
TSH SERPL DL<=0.05 MIU/L-ACNC: 9.27 UIU/ML (ref 0.27–4.2)
UNIT  ABO: NORMAL
UNIT  RH: NORMAL
URATE CRY URNS QL MICRO: ABNORMAL /HPF
URATE CRY URNS QL MICRO: ABNORMAL /HPF
URATE SERPL-MCNC: 5.3 MG/DL (ref 2.4–5.7)
UROBILINOGEN UR QL STRIP: ABNORMAL
VANCOMYCIN SERPL-MCNC: 15.4 MCG/ML (ref 5–40)
VANCOMYCIN SERPL-MCNC: 16.9 MCG/ML (ref 5–40)
VANCOMYCIN SERPL-MCNC: 21.6 MCG/ML (ref 5–40)
VANCOMYCIN SERPL-MCNC: 23.2 MCG/ML (ref 5–40)
VANCOMYCIN SERPL-MCNC: 24.8 MCG/ML (ref 5–40)
VANCOMYCIN SERPL-MCNC: 29.6 MCG/ML (ref 5–40)
VANCOMYCIN SERPL-MCNC: 30.8 MCG/ML (ref 5–40)
VANCOMYCIN SERPL-MCNC: 38.2 MCG/ML (ref 5–40)
VANCOMYCIN SERPL-MCNC: 39.1 MCG/ML (ref 5–40)
VENTILATOR MODE: ABNORMAL
VIT B12 BLD-MCNC: 1757 PG/ML (ref 211–946)
VT ON VENT VENT: 330 ML
VT ON VENT VENT: 380 ML
VT ON VENT VENT: 450 ML
WBC NRBC COR # BLD: 13.21 10*3/MM3 (ref 3.4–10.8)
WBC NRBC COR # BLD: 13.68 10*3/MM3 (ref 3.4–10.8)
WBC NRBC COR # BLD: 25.18 10*3/MM3 (ref 3.4–10.8)
WBC NRBC COR # BLD: 26.99 10*3/MM3 (ref 3.4–10.8)
WBC NRBC COR # BLD: 27.93 10*3/MM3 (ref 3.4–10.8)
WBC NRBC COR # BLD: 28.49 10*3/MM3 (ref 3.4–10.8)
WBC NRBC COR # BLD: 29.1 10*3/MM3 (ref 3.4–10.8)
WBC NRBC COR # BLD: 33.02 10*3/MM3 (ref 3.4–10.8)
WBC NRBC COR # BLD: 38.57 10*3/MM3 (ref 3.4–10.8)
WBC NRBC COR # BLD: 40.02 10*3/MM3 (ref 3.4–10.8)
WBC NRBC COR # BLD: 42.7 10*3/MM3 (ref 3.4–10.8)
WBC NRBC COR # BLD: 49.47 10*3/MM3 (ref 3.4–10.8)
WBC NRBC COR # BLD: 50.27 10*3/MM3 (ref 3.4–10.8)
WBC NRBC COR # BLD: 54.33 10*3/MM3 (ref 3.4–10.8)
WBC UR QL AUTO: ABNORMAL /HPF
WHOLE BLOOD HOLD SPECIMEN: NORMAL
WHOLE BLOOD HOLD SPECIMEN: NORMAL

## 2020-01-01 PROCEDURE — 25010000002 VANCOMYCIN 5 G RECONSTITUTED SOLUTION 5,000 MG VIAL: Performed by: INTERNAL MEDICINE

## 2020-01-01 PROCEDURE — 94003 VENT MGMT INPAT SUBQ DAY: CPT

## 2020-01-01 PROCEDURE — C1769 GUIDE WIRE: HCPCS | Performed by: SURGERY

## 2020-01-01 PROCEDURE — 83050 HGB METHEMOGLOBIN QUAN: CPT

## 2020-01-01 PROCEDURE — 71045 X-RAY EXAM CHEST 1 VIEW: CPT | Performed by: RADIOLOGY

## 2020-01-01 PROCEDURE — P9047 ALBUMIN (HUMAN), 25%, 50ML: HCPCS | Performed by: INTERNAL MEDICINE

## 2020-01-01 PROCEDURE — 82375 ASSAY CARBOXYHB QUANT: CPT

## 2020-01-01 PROCEDURE — 80053 COMPREHEN METABOLIC PANEL: CPT | Performed by: INTERNAL MEDICINE

## 2020-01-01 PROCEDURE — 96374 THER/PROPH/DIAG INJ IV PUSH: CPT

## 2020-01-01 PROCEDURE — 84484 ASSAY OF TROPONIN QUANT: CPT | Performed by: EMERGENCY MEDICINE

## 2020-01-01 PROCEDURE — 83605 ASSAY OF LACTIC ACID: CPT | Performed by: EMERGENCY MEDICINE

## 2020-01-01 PROCEDURE — 83735 ASSAY OF MAGNESIUM: CPT | Performed by: INTERNAL MEDICINE

## 2020-01-01 PROCEDURE — P9016 RBC LEUKOCYTES REDUCED: HCPCS

## 2020-01-01 PROCEDURE — 25010000002 LORAZEPAM PER 2 MG: Performed by: INTERNAL MEDICINE

## 2020-01-01 PROCEDURE — 74176 CT ABD & PELVIS W/O CONTRAST: CPT

## 2020-01-01 PROCEDURE — 99291 CRITICAL CARE FIRST HOUR: CPT | Performed by: INTERNAL MEDICINE

## 2020-01-01 PROCEDURE — 25010000002 THIAMINE PER 100 MG: Performed by: INTERNAL MEDICINE

## 2020-01-01 PROCEDURE — 25010000002 ONDANSETRON PER 1 MG: Performed by: NURSE ANESTHETIST, CERTIFIED REGISTERED

## 2020-01-01 PROCEDURE — P9612 CATHETERIZE FOR URINE SPEC: HCPCS

## 2020-01-01 PROCEDURE — 84132 ASSAY OF SERUM POTASSIUM: CPT | Performed by: INTERNAL MEDICINE

## 2020-01-01 PROCEDURE — 74177 CT ABD & PELVIS W/CONTRAST: CPT

## 2020-01-01 PROCEDURE — 82728 ASSAY OF FERRITIN: CPT | Performed by: INTERNAL MEDICINE

## 2020-01-01 PROCEDURE — 99283 EMERGENCY DEPT VISIT LOW MDM: CPT

## 2020-01-01 PROCEDURE — 85007 BL SMEAR W/DIFF WBC COUNT: CPT | Performed by: SURGERY

## 2020-01-01 PROCEDURE — 36600 WITHDRAWAL OF ARTERIAL BLOOD: CPT

## 2020-01-01 PROCEDURE — 82805 BLOOD GASES W/O2 SATURATION: CPT

## 2020-01-01 PROCEDURE — 94799 UNLISTED PULMONARY SVC/PX: CPT

## 2020-01-01 PROCEDURE — 25010000002 ENOXAPARIN PER 10 MG: Performed by: INTERNAL MEDICINE

## 2020-01-01 PROCEDURE — P9047 ALBUMIN (HUMAN), 25%, 50ML: HCPCS | Performed by: SURGERY

## 2020-01-01 PROCEDURE — 87045 FECES CULTURE AEROBIC BACT: CPT | Performed by: INTERNAL MEDICINE

## 2020-01-01 PROCEDURE — 71045 X-RAY EXAM CHEST 1 VIEW: CPT

## 2020-01-01 PROCEDURE — 85007 BL SMEAR W/DIFF WBC COUNT: CPT | Performed by: INTERNAL MEDICINE

## 2020-01-01 PROCEDURE — 84100 ASSAY OF PHOSPHORUS: CPT | Performed by: INTERNAL MEDICINE

## 2020-01-01 PROCEDURE — 83605 ASSAY OF LACTIC ACID: CPT | Performed by: INTERNAL MEDICINE

## 2020-01-01 PROCEDURE — 82330 ASSAY OF CALCIUM: CPT | Performed by: INTERNAL MEDICINE

## 2020-01-01 PROCEDURE — 87899 AGENT NOS ASSAY W/OPTIC: CPT | Performed by: INTERNAL MEDICINE

## 2020-01-01 PROCEDURE — 93005 ELECTROCARDIOGRAM TRACING: CPT | Performed by: INTERNAL MEDICINE

## 2020-01-01 PROCEDURE — 25010000002 MIDAZOLAM PER 1MG: Performed by: INTERNAL MEDICINE

## 2020-01-01 PROCEDURE — 47379 UNLISTED LAPS PX LIVER: CPT | Performed by: SURGERY

## 2020-01-01 PROCEDURE — 36415 COLL VENOUS BLD VENIPUNCTURE: CPT

## 2020-01-01 PROCEDURE — 25010000002 MICAFUNGIN SODIUM 100 MG RECONSTITUTED SOLUTION 1 EACH VIAL: Performed by: SURGERY

## 2020-01-01 PROCEDURE — 85384 FIBRINOGEN ACTIVITY: CPT | Performed by: INTERNAL MEDICINE

## 2020-01-01 PROCEDURE — 93306 TTE W/DOPPLER COMPLETE: CPT

## 2020-01-01 PROCEDURE — 80048 BASIC METABOLIC PNL TOTAL CA: CPT | Performed by: INTERNAL MEDICINE

## 2020-01-01 PROCEDURE — 93005 ELECTROCARDIOGRAM TRACING: CPT | Performed by: EMERGENCY MEDICINE

## 2020-01-01 PROCEDURE — 85610 PROTHROMBIN TIME: CPT | Performed by: RADIOLOGY

## 2020-01-01 PROCEDURE — 96361 HYDRATE IV INFUSION ADD-ON: CPT

## 2020-01-01 PROCEDURE — 25010000002 HEPARIN (PORCINE) PER 1000 UNITS: Performed by: SURGERY

## 2020-01-01 PROCEDURE — 84550 ASSAY OF BLOOD/URIC ACID: CPT | Performed by: PHYSICIAN ASSISTANT

## 2020-01-01 PROCEDURE — 76705 ECHO EXAM OF ABDOMEN: CPT

## 2020-01-01 PROCEDURE — 85007 BL SMEAR W/DIFF WBC COUNT: CPT | Performed by: EMERGENCY MEDICINE

## 2020-01-01 PROCEDURE — 25010000002 HYDROMORPHONE PER 4 MG: Performed by: INTERNAL MEDICINE

## 2020-01-01 PROCEDURE — 99233 SBSQ HOSP IP/OBS HIGH 50: CPT | Performed by: INTERNAL MEDICINE

## 2020-01-01 PROCEDURE — 80202 ASSAY OF VANCOMYCIN: CPT | Performed by: INTERNAL MEDICINE

## 2020-01-01 PROCEDURE — 25010000002 HEPARIN (PORCINE) PER 1000 UNITS: Performed by: INTERNAL MEDICINE

## 2020-01-01 PROCEDURE — 87040 BLOOD CULTURE FOR BACTERIA: CPT | Performed by: INTERNAL MEDICINE

## 2020-01-01 PROCEDURE — 85027 COMPLETE CBC AUTOMATED: CPT | Performed by: INTERNAL MEDICINE

## 2020-01-01 PROCEDURE — 82962 GLUCOSE BLOOD TEST: CPT

## 2020-01-01 PROCEDURE — 86140 C-REACTIVE PROTEIN: CPT | Performed by: SURGERY

## 2020-01-01 PROCEDURE — 63710000001 INSULIN ASPART PER 5 UNITS: Performed by: PHYSICIAN ASSISTANT

## 2020-01-01 PROCEDURE — 82570 ASSAY OF URINE CREATININE: CPT | Performed by: INTERNAL MEDICINE

## 2020-01-01 PROCEDURE — 25010000002 PROPOFOL 10 MG/ML EMULSION: Performed by: NURSE ANESTHETIST, CERTIFIED REGISTERED

## 2020-01-01 PROCEDURE — 76604 US EXAM CHEST: CPT

## 2020-01-01 PROCEDURE — 83735 ASSAY OF MAGNESIUM: CPT | Performed by: EMERGENCY MEDICINE

## 2020-01-01 PROCEDURE — 99024 POSTOP FOLLOW-UP VISIT: CPT | Performed by: SURGERY

## 2020-01-01 PROCEDURE — 25010000002 ALBUMIN HUMAN 25% PER 50 ML: Performed by: SURGERY

## 2020-01-01 PROCEDURE — 25010000002 HYDROMORPHONE PER 4 MG: Performed by: EMERGENCY MEDICINE

## 2020-01-01 PROCEDURE — 76705 ECHO EXAM OF ABDOMEN: CPT | Performed by: RADIOLOGY

## 2020-01-01 PROCEDURE — 5A1955Z RESPIRATORY VENTILATION, GREATER THAN 96 CONSECUTIVE HOURS: ICD-10-PCS | Performed by: NURSE ANESTHETIST, CERTIFIED REGISTERED

## 2020-01-01 PROCEDURE — 86140 C-REACTIVE PROTEIN: CPT | Performed by: NURSE PRACTITIONER

## 2020-01-01 PROCEDURE — 84481 FREE ASSAY (FT-3): CPT | Performed by: INTERNAL MEDICINE

## 2020-01-01 PROCEDURE — 02HV33Z INSERTION OF INFUSION DEVICE INTO SUPERIOR VENA CAVA, PERCUTANEOUS APPROACH: ICD-10-PCS | Performed by: SURGERY

## 2020-01-01 PROCEDURE — 25010000002 ALBUMIN HUMAN 25% PER 50 ML: Performed by: INTERNAL MEDICINE

## 2020-01-01 PROCEDURE — 25010000002 FENTANYL CITRATE (PF) 100 MCG/2ML SOLUTION: Performed by: NURSE ANESTHETIST, CERTIFIED REGISTERED

## 2020-01-01 PROCEDURE — 0W9G3ZZ DRAINAGE OF PERITONEAL CAVITY, PERCUTANEOUS APPROACH: ICD-10-PCS | Performed by: INTERNAL MEDICINE

## 2020-01-01 PROCEDURE — 86022 PLATELET ANTIBODIES: CPT | Performed by: INTERNAL MEDICINE

## 2020-01-01 PROCEDURE — 99239 HOSP IP/OBS DSCHRG MGMT >30: CPT | Performed by: INTERNAL MEDICINE

## 2020-01-01 PROCEDURE — 25010000002 HYDROCORTISONE SODIUM SUCCINATE 100 MG RECONSTITUTED SOLUTION: Performed by: INTERNAL MEDICINE

## 2020-01-01 PROCEDURE — 99292 CRITICAL CARE ADDL 30 MIN: CPT | Performed by: INTERNAL MEDICINE

## 2020-01-01 PROCEDURE — 96375 TX/PRO/DX INJ NEW DRUG ADDON: CPT

## 2020-01-01 PROCEDURE — 25010000002 KETOROLAC TROMETHAMINE PER 15 MG: Performed by: NURSE ANESTHETIST, CERTIFIED REGISTERED

## 2020-01-01 PROCEDURE — 25010000002 FUROSEMIDE PER 20 MG: Performed by: INTERNAL MEDICINE

## 2020-01-01 PROCEDURE — 82607 VITAMIN B-12: CPT | Performed by: INTERNAL MEDICINE

## 2020-01-01 PROCEDURE — 84300 ASSAY OF URINE SODIUM: CPT | Performed by: INTERNAL MEDICINE

## 2020-01-01 PROCEDURE — 25010000002 ONDANSETRON PER 1 MG: Performed by: INTERNAL MEDICINE

## 2020-01-01 PROCEDURE — 25010000002 PIPERACILLIN-TAZOBACTAM: Performed by: INTERNAL MEDICINE

## 2020-01-01 PROCEDURE — 87040 BLOOD CULTURE FOR BACTERIA: CPT | Performed by: PHYSICIAN ASSISTANT

## 2020-01-01 PROCEDURE — 87427 SHIGA-LIKE TOXIN AG IA: CPT | Performed by: INTERNAL MEDICINE

## 2020-01-01 PROCEDURE — 63710000001 INSULIN REGULAR HUMAN PER 5 UNITS: Performed by: INTERNAL MEDICINE

## 2020-01-01 PROCEDURE — 86140 C-REACTIVE PROTEIN: CPT | Performed by: PHYSICIAN ASSISTANT

## 2020-01-01 PROCEDURE — 81025 URINE PREGNANCY TEST: CPT | Performed by: ANESTHESIOLOGY

## 2020-01-01 PROCEDURE — 87015 SPECIMEN INFECT AGNT CONCNTJ: CPT | Performed by: INTERNAL MEDICINE

## 2020-01-01 PROCEDURE — 0 IOVERSOL 68 % SOLUTION: Performed by: INTERNAL MEDICINE

## 2020-01-01 PROCEDURE — 25010000002 VITAMIN K1 PER 1 MG: Performed by: INTERNAL MEDICINE

## 2020-01-01 PROCEDURE — 76000 FLUOROSCOPY <1 HR PHYS/QHP: CPT

## 2020-01-01 PROCEDURE — 25010000002 ONDANSETRON PER 1 MG: Performed by: PHYSICIAN ASSISTANT

## 2020-01-01 PROCEDURE — 87493 C DIFF AMPLIFIED PROBE: CPT | Performed by: INTERNAL MEDICINE

## 2020-01-01 PROCEDURE — 99223 1ST HOSP IP/OBS HIGH 75: CPT | Performed by: INTERNAL MEDICINE

## 2020-01-01 PROCEDURE — 0 IOVERSOL 68 % SOLUTION: Performed by: FAMILY MEDICINE

## 2020-01-01 PROCEDURE — 0BH18EZ INSERTION OF ENDOTRACHEAL AIRWAY INTO TRACHEA, VIA NATURAL OR ARTIFICIAL OPENING ENDOSCOPIC: ICD-10-PCS | Performed by: NURSE ANESTHETIST, CERTIFIED REGISTERED

## 2020-01-01 PROCEDURE — C1751 CATH, INF, PER/CENT/MIDLINE: HCPCS

## 2020-01-01 PROCEDURE — 86738 MYCOPLASMA ANTIBODY: CPT | Performed by: INTERNAL MEDICINE

## 2020-01-01 PROCEDURE — 84443 ASSAY THYROID STIM HORMONE: CPT | Performed by: EMERGENCY MEDICINE

## 2020-01-01 PROCEDURE — 25010000002 PIPERACILLIN-TAZOBACTAM: Performed by: SURGERY

## 2020-01-01 PROCEDURE — 87205 SMEAR GRAM STAIN: CPT | Performed by: PHYSICIAN ASSISTANT

## 2020-01-01 PROCEDURE — 85025 COMPLETE CBC W/AUTO DIFF WBC: CPT | Performed by: INTERNAL MEDICINE

## 2020-01-01 PROCEDURE — 85610 PROTHROMBIN TIME: CPT | Performed by: INTERNAL MEDICINE

## 2020-01-01 PROCEDURE — 80053 COMPREHEN METABOLIC PANEL: CPT | Performed by: PHYSICIAN ASSISTANT

## 2020-01-01 PROCEDURE — 94002 VENT MGMT INPAT INIT DAY: CPT

## 2020-01-01 PROCEDURE — 93010 ELECTROCARDIOGRAM REPORT: CPT | Performed by: SPECIALIST

## 2020-01-01 PROCEDURE — 84145 PROCALCITONIN (PCT): CPT | Performed by: INTERNAL MEDICINE

## 2020-01-01 PROCEDURE — 84439 ASSAY OF FREE THYROXINE: CPT | Performed by: INTERNAL MEDICINE

## 2020-01-01 PROCEDURE — 87046 STOOL CULTR AEROBIC BACT EA: CPT | Performed by: INTERNAL MEDICINE

## 2020-01-01 PROCEDURE — 36556 INSERT NON-TUNNEL CV CATH: CPT | Performed by: SURGERY

## 2020-01-01 PROCEDURE — 25010000002 PROPOFOL 10 MG/ML EMULSION: Performed by: INTERNAL MEDICINE

## 2020-01-01 PROCEDURE — 87070 CULTURE OTHR SPECIMN AEROBIC: CPT | Performed by: PHYSICIAN ASSISTANT

## 2020-01-01 PROCEDURE — 85730 THROMBOPLASTIN TIME PARTIAL: CPT | Performed by: RADIOLOGY

## 2020-01-01 PROCEDURE — 25010000002 HYDROMORPHONE 1 MG/ML SOLUTION: Performed by: INTERNAL MEDICINE

## 2020-01-01 PROCEDURE — S0260 H&P FOR SURGERY: HCPCS | Performed by: SURGERY

## 2020-01-01 PROCEDURE — 87804 INFLUENZA ASSAY W/OPTIC: CPT | Performed by: EMERGENCY MEDICINE

## 2020-01-01 PROCEDURE — 76000 FLUOROSCOPY <1 HR PHYS/QHP: CPT | Performed by: RADIOLOGY

## 2020-01-01 PROCEDURE — 74178 CT ABD&PLV WO CNTR FLWD CNTR: CPT | Performed by: RADIOLOGY

## 2020-01-01 PROCEDURE — 0JH63XZ INSERTION OF TUNNELED VASCULAR ACCESS DEVICE INTO CHEST SUBCUTANEOUS TISSUE AND FASCIA, PERCUTANEOUS APPROACH: ICD-10-PCS | Performed by: SURGERY

## 2020-01-01 PROCEDURE — 86140 C-REACTIVE PROTEIN: CPT | Performed by: EMERGENCY MEDICINE

## 2020-01-01 PROCEDURE — 86900 BLOOD TYPING SEROLOGIC ABO: CPT

## 2020-01-01 PROCEDURE — 83690 ASSAY OF LIPASE: CPT | Performed by: EMERGENCY MEDICINE

## 2020-01-01 PROCEDURE — 86140 C-REACTIVE PROTEIN: CPT | Performed by: INTERNAL MEDICINE

## 2020-01-01 PROCEDURE — 86901 BLOOD TYPING SEROLOGIC RH(D): CPT | Performed by: INTERNAL MEDICINE

## 2020-01-01 PROCEDURE — C1750 CATH, HEMODIALYSIS,LONG-TERM: HCPCS | Performed by: SURGERY

## 2020-01-01 PROCEDURE — 99285 EMERGENCY DEPT VISIT HI MDM: CPT

## 2020-01-01 PROCEDURE — 5A1D70Z PERFORMANCE OF URINARY FILTRATION, INTERMITTENT, LESS THAN 6 HOURS PER DAY: ICD-10-PCS | Performed by: INTERNAL MEDICINE

## 2020-01-01 PROCEDURE — 25010000002 MIDAZOLAM PER 1 MG: Performed by: NURSE ANESTHETIST, CERTIFIED REGISTERED

## 2020-01-01 PROCEDURE — 25010000002 PIPERACILLIN-TAZOBACTAM: Performed by: EMERGENCY MEDICINE

## 2020-01-01 PROCEDURE — 25010000003 CEFAZOLIN PER 500 MG: Performed by: SURGERY

## 2020-01-01 PROCEDURE — 36558 INSERT TUNNELED CV CATH: CPT | Performed by: SURGERY

## 2020-01-01 PROCEDURE — 81001 URINALYSIS AUTO W/SCOPE: CPT | Performed by: PHYSICIAN ASSISTANT

## 2020-01-01 PROCEDURE — 82550 ASSAY OF CK (CPK): CPT | Performed by: INTERNAL MEDICINE

## 2020-01-01 PROCEDURE — 81001 URINALYSIS AUTO W/SCOPE: CPT | Performed by: EMERGENCY MEDICINE

## 2020-01-01 PROCEDURE — C1788 PORT, INDWELLING, IMP: HCPCS | Performed by: SURGERY

## 2020-01-01 PROCEDURE — 86901 BLOOD TYPING SEROLOGIC RH(D): CPT

## 2020-01-01 PROCEDURE — 99203 OFFICE O/P NEW LOW 30 MIN: CPT | Performed by: SURGERY

## 2020-01-01 PROCEDURE — 80074 ACUTE HEPATITIS PANEL: CPT | Performed by: INTERNAL MEDICINE

## 2020-01-01 PROCEDURE — 80053 COMPREHEN METABOLIC PANEL: CPT | Performed by: EMERGENCY MEDICINE

## 2020-01-01 PROCEDURE — 89051 BODY FLUID CELL COUNT: CPT | Performed by: INTERNAL MEDICINE

## 2020-01-01 PROCEDURE — 85025 COMPLETE CBC W/AUTO DIFF WBC: CPT | Performed by: SURGERY

## 2020-01-01 PROCEDURE — 74022 RADEX COMPL AQT ABD SERIES: CPT

## 2020-01-01 PROCEDURE — 86923 COMPATIBILITY TEST ELECTRIC: CPT

## 2020-01-01 PROCEDURE — 83615 LACTATE (LD) (LDH) ENZYME: CPT | Performed by: PHYSICIAN ASSISTANT

## 2020-01-01 PROCEDURE — 83690 ASSAY OF LIPASE: CPT | Performed by: INTERNAL MEDICINE

## 2020-01-01 PROCEDURE — 36410 VNPNXR 3YR/> PHY/QHP DX/THER: CPT

## 2020-01-01 PROCEDURE — 36561 INSERT TUNNELED CV CATH: CPT | Performed by: SURGERY

## 2020-01-01 PROCEDURE — 25010000002 MEROPENEM: Performed by: INTERNAL MEDICINE

## 2020-01-01 PROCEDURE — 82746 ASSAY OF FOLIC ACID SERUM: CPT | Performed by: INTERNAL MEDICINE

## 2020-01-01 PROCEDURE — 83690 ASSAY OF LIPASE: CPT | Performed by: PHYSICIAN ASSISTANT

## 2020-01-01 PROCEDURE — 49083 ABD PARACENTESIS W/IMAGING: CPT | Performed by: INTERNAL MEDICINE

## 2020-01-01 PROCEDURE — C1894 INTRO/SHEATH, NON-LASER: HCPCS | Performed by: SURGERY

## 2020-01-01 PROCEDURE — 87086 URINE CULTURE/COLONY COUNT: CPT | Performed by: FAMILY MEDICINE

## 2020-01-01 PROCEDURE — 83540 ASSAY OF IRON: CPT | Performed by: INTERNAL MEDICINE

## 2020-01-01 PROCEDURE — 36430 TRANSFUSION BLD/BLD COMPNT: CPT

## 2020-01-01 PROCEDURE — 83605 ASSAY OF LACTIC ACID: CPT | Performed by: PHYSICIAN ASSISTANT

## 2020-01-01 PROCEDURE — 84478 ASSAY OF TRIGLYCERIDES: CPT | Performed by: INTERNAL MEDICINE

## 2020-01-01 PROCEDURE — 85025 COMPLETE CBC W/AUTO DIFF WBC: CPT | Performed by: EMERGENCY MEDICINE

## 2020-01-01 PROCEDURE — 99253 IP/OBS CNSLTJ NEW/EST LOW 45: CPT | Performed by: INTERNAL MEDICINE

## 2020-01-01 PROCEDURE — 96376 TX/PRO/DX INJ SAME DRUG ADON: CPT

## 2020-01-01 PROCEDURE — 71250 CT THORAX DX C-: CPT

## 2020-01-01 PROCEDURE — 81001 URINALYSIS AUTO W/SCOPE: CPT | Performed by: FAMILY MEDICINE

## 2020-01-01 PROCEDURE — 85060 BLOOD SMEAR INTERPRETATION: CPT | Performed by: INTERNAL MEDICINE

## 2020-01-01 PROCEDURE — 83880 ASSAY OF NATRIURETIC PEPTIDE: CPT | Performed by: EMERGENCY MEDICINE

## 2020-01-01 PROCEDURE — 82150 ASSAY OF AMYLASE: CPT | Performed by: EMERGENCY MEDICINE

## 2020-01-01 PROCEDURE — 87040 BLOOD CULTURE FOR BACTERIA: CPT | Performed by: EMERGENCY MEDICINE

## 2020-01-01 PROCEDURE — 87075 CULTR BACTERIA EXCEPT BLOOD: CPT | Performed by: INTERNAL MEDICINE

## 2020-01-01 PROCEDURE — 96523 IRRIG DRUG DELIVERY DEVICE: CPT

## 2020-01-01 PROCEDURE — 74178 CT ABD&PLV WO CNTR FLWD CNTR: CPT

## 2020-01-01 PROCEDURE — 86900 BLOOD TYPING SEROLOGIC ABO: CPT | Performed by: INTERNAL MEDICINE

## 2020-01-01 PROCEDURE — 82820 HEMOGLOBIN-OXYGEN AFFINITY: CPT

## 2020-01-01 PROCEDURE — 25010000002 ONDANSETRON PER 1 MG: Performed by: EMERGENCY MEDICINE

## 2020-01-01 PROCEDURE — 77001 FLUOROGUIDE FOR VEIN DEVICE: CPT | Performed by: SURGERY

## 2020-01-01 PROCEDURE — 25010000002 KETOROLAC TROMETHAMINE PER 15 MG: Performed by: PHYSICIAN ASSISTANT

## 2020-01-01 PROCEDURE — 93306 TTE W/DOPPLER COMPLETE: CPT | Performed by: INTERNAL MEDICINE

## 2020-01-01 PROCEDURE — 81001 URINALYSIS AUTO W/SCOPE: CPT | Performed by: INTERNAL MEDICINE

## 2020-01-01 PROCEDURE — 82150 ASSAY OF AMYLASE: CPT | Performed by: INTERNAL MEDICINE

## 2020-01-01 PROCEDURE — 85025 COMPLETE CBC W/AUTO DIFF WBC: CPT | Performed by: PHYSICIAN ASSISTANT

## 2020-01-01 PROCEDURE — 84484 ASSAY OF TROPONIN QUANT: CPT | Performed by: INTERNAL MEDICINE

## 2020-01-01 PROCEDURE — 0099U HC BIOFIRE FILMARRAY RESP PANEL 1: CPT | Performed by: INTERNAL MEDICINE

## 2020-01-01 PROCEDURE — 85730 THROMBOPLASTIN TIME PARTIAL: CPT | Performed by: INTERNAL MEDICINE

## 2020-01-01 PROCEDURE — 80202 ASSAY OF VANCOMYCIN: CPT

## 2020-01-01 PROCEDURE — 25010000002 NEOSTIGMINE 10 MG/10ML SOLUTION: Performed by: NURSE ANESTHETIST, CERTIFIED REGISTERED

## 2020-01-01 PROCEDURE — 80202 ASSAY OF VANCOMYCIN: CPT | Performed by: SURGERY

## 2020-01-01 PROCEDURE — 93010 ELECTROCARDIOGRAM REPORT: CPT | Performed by: INTERNAL MEDICINE

## 2020-01-01 PROCEDURE — 76604 US EXAM CHEST: CPT | Performed by: RADIOLOGY

## 2020-01-01 PROCEDURE — 84466 ASSAY OF TRANSFERRIN: CPT | Performed by: INTERNAL MEDICINE

## 2020-01-01 PROCEDURE — 87070 CULTURE OTHR SPECIMN AEROBIC: CPT | Performed by: INTERNAL MEDICINE

## 2020-01-01 PROCEDURE — 87205 SMEAR GRAM STAIN: CPT | Performed by: INTERNAL MEDICINE

## 2020-01-01 PROCEDURE — 99205 OFFICE O/P NEW HI 60 MIN: CPT | Performed by: INTERNAL MEDICINE

## 2020-01-01 PROCEDURE — 99284 EMERGENCY DEPT VISIT MOD MDM: CPT

## 2020-01-01 PROCEDURE — 25010000002 ONDANSETRON PER 1 MG: Performed by: FAMILY MEDICINE

## 2020-01-01 PROCEDURE — 83880 ASSAY OF NATRIURETIC PEPTIDE: CPT | Performed by: INTERNAL MEDICINE

## 2020-01-01 PROCEDURE — 25010000002 VANCOMYCIN 5 G RECONSTITUTED SOLUTION 5,000 MG VIAL: Performed by: EMERGENCY MEDICINE

## 2020-01-01 PROCEDURE — 86850 RBC ANTIBODY SCREEN: CPT | Performed by: INTERNAL MEDICINE

## 2020-01-01 DEVICE — VACCESS CT POWER-INJECTABLE IMPLANTABLE PORT (WITH SUTURE PLUGS) (8F)
Type: IMPLANTABLE DEVICE | Site: SUBCLAVIAN | Status: FUNCTIONAL
Brand: VACCESS

## 2020-01-01 RX ORDER — ONDANSETRON 2 MG/ML
INJECTION INTRAMUSCULAR; INTRAVENOUS AS NEEDED
Status: DISCONTINUED | OUTPATIENT
Start: 2020-01-01 | End: 2020-01-01 | Stop reason: SURG

## 2020-01-01 RX ORDER — ALBUMIN (HUMAN) 12.5 G/50ML
12 SOLUTION INTRAVENOUS AS NEEDED
Status: DISPENSED | OUTPATIENT
Start: 2020-01-01 | End: 2020-01-01

## 2020-01-01 RX ORDER — SODIUM CHLORIDE 0.9 % (FLUSH) 0.9 %
10 SYRINGE (ML) INJECTION EVERY 12 HOURS SCHEDULED
Status: DISCONTINUED | OUTPATIENT
Start: 2020-01-01 | End: 2020-02-11 | Stop reason: HOSPADM

## 2020-01-01 RX ORDER — MIDAZOLAM HYDROCHLORIDE 1 MG/ML
2 INJECTION INTRAMUSCULAR; INTRAVENOUS
Status: DISCONTINUED | OUTPATIENT
Start: 2020-01-01 | End: 2020-02-11 | Stop reason: HOSPADM

## 2020-01-01 RX ORDER — FAMOTIDINE 10 MG/ML
20 INJECTION, SOLUTION INTRAVENOUS AS NEEDED
Status: CANCELLED | OUTPATIENT
Start: 2020-01-01

## 2020-01-01 RX ORDER — AMOXICILLIN AND CLAVULANATE POTASSIUM 875; 125 MG/1; MG/1
1 TABLET, FILM COATED ORAL 2 TIMES DAILY
COMMUNITY

## 2020-01-01 RX ORDER — PANTOPRAZOLE SODIUM 40 MG/1
40 TABLET, DELAYED RELEASE ORAL EVERY MORNING
Status: DISCONTINUED | OUTPATIENT
Start: 2020-01-01 | End: 2020-01-01

## 2020-01-01 RX ORDER — SODIUM CHLORIDE 0.9 % (FLUSH) 0.9 %
10 SYRINGE (ML) INJECTION AS NEEDED
Status: DISCONTINUED | OUTPATIENT
Start: 2020-01-01 | End: 2020-01-01 | Stop reason: HOSPADM

## 2020-01-01 RX ORDER — ALBUMIN (HUMAN) 12.5 G/50ML
25 SOLUTION INTRAVENOUS
Status: COMPLETED | OUTPATIENT
Start: 2020-01-01 | End: 2020-01-01

## 2020-01-01 RX ORDER — SODIUM CHLORIDE 0.9 % (FLUSH) 0.9 %
10 SYRINGE (ML) INJECTION AS NEEDED
Status: DISCONTINUED | OUTPATIENT
Start: 2020-01-01 | End: 2020-02-11 | Stop reason: HOSPADM

## 2020-01-01 RX ORDER — FUROSEMIDE 10 MG/ML
INJECTION INTRAMUSCULAR; INTRAVENOUS
Status: DISPENSED
Start: 2020-01-01 | End: 2020-01-01

## 2020-01-01 RX ORDER — LIDOCAINE HYDROCHLORIDE 20 MG/ML
INJECTION, SOLUTION INFILTRATION; PERINEURAL AS NEEDED
Status: DISCONTINUED | OUTPATIENT
Start: 2020-01-01 | End: 2020-01-01 | Stop reason: SURG

## 2020-01-01 RX ORDER — CYCLOBENZAPRINE HCL 5 MG
5 TABLET ORAL 2 TIMES DAILY
COMMUNITY

## 2020-01-01 RX ORDER — FENTANYL CITRATE 50 UG/ML
INJECTION, SOLUTION INTRAMUSCULAR; INTRAVENOUS AS NEEDED
Status: DISCONTINUED | OUTPATIENT
Start: 2020-01-01 | End: 2020-01-01 | Stop reason: SURG

## 2020-01-01 RX ORDER — POTASSIUM CHLORIDE 7.45 MG/ML
10 INJECTION INTRAVENOUS
Status: DISPENSED | OUTPATIENT
Start: 2020-01-01 | End: 2020-01-01

## 2020-01-01 RX ORDER — ROCURONIUM BROMIDE 10 MG/ML
INJECTION, SOLUTION INTRAVENOUS AS NEEDED
Status: DISCONTINUED | OUTPATIENT
Start: 2020-01-01 | End: 2020-01-01 | Stop reason: SURG

## 2020-01-01 RX ORDER — KETOROLAC TROMETHAMINE 30 MG/ML
INJECTION, SOLUTION INTRAMUSCULAR; INTRAVENOUS AS NEEDED
Status: DISCONTINUED | OUTPATIENT
Start: 2020-01-01 | End: 2020-01-01 | Stop reason: SURG

## 2020-01-01 RX ORDER — HEPARIN SODIUM 5000 [USP'U]/ML
INJECTION, SOLUTION INTRAVENOUS; SUBCUTANEOUS AS NEEDED
Status: DISCONTINUED | OUTPATIENT
Start: 2020-01-01 | End: 2020-01-01 | Stop reason: HOSPADM

## 2020-01-01 RX ORDER — PANTOPRAZOLE SODIUM 40 MG/1
40 TABLET, DELAYED RELEASE ORAL EVERY MORNING
Status: CANCELLED | OUTPATIENT
Start: 2020-01-01

## 2020-01-01 RX ORDER — MEPERIDINE HYDROCHLORIDE 25 MG/ML
12.5 INJECTION INTRAMUSCULAR; INTRAVENOUS; SUBCUTANEOUS
Status: DISCONTINUED | OUTPATIENT
Start: 2020-01-01 | End: 2020-01-01 | Stop reason: HOSPADM

## 2020-01-01 RX ORDER — ONDANSETRON 4 MG/1
4 TABLET, ORALLY DISINTEGRATING ORAL EVERY 6 HOURS PRN
COMMUNITY

## 2020-01-01 RX ORDER — MIDAZOLAM HYDROCHLORIDE 1 MG/ML
INJECTION INTRAMUSCULAR; INTRAVENOUS AS NEEDED
Status: DISCONTINUED | OUTPATIENT
Start: 2020-01-01 | End: 2020-01-01 | Stop reason: SURG

## 2020-01-01 RX ORDER — OXYCODONE HYDROCHLORIDE 5 MG/1
5 TABLET ORAL EVERY 6 HOURS PRN
Status: CANCELLED | OUTPATIENT
Start: 2020-01-01

## 2020-01-01 RX ORDER — SODIUM CHLORIDE 0.9 % (FLUSH) 0.9 %
10 SYRINGE (ML) INJECTION EVERY 12 HOURS SCHEDULED
Status: DISCONTINUED | OUTPATIENT
Start: 2020-01-01 | End: 2020-01-01 | Stop reason: HOSPADM

## 2020-01-01 RX ORDER — CHLORHEXIDINE GLUCONATE 0.12 MG/ML
15 RINSE ORAL EVERY 12 HOURS SCHEDULED
Status: DISCONTINUED | OUTPATIENT
Start: 2020-01-01 | End: 2020-02-11 | Stop reason: HOSPADM

## 2020-01-01 RX ORDER — ACETAMINOPHEN 325 MG/1
650 TABLET ORAL EVERY 6 HOURS PRN
Status: DISCONTINUED | OUTPATIENT
Start: 2020-01-01 | End: 2020-02-11 | Stop reason: HOSPADM

## 2020-01-01 RX ORDER — PHYTONADIONE 10 MG/ML
10 INJECTION, EMULSION INTRAMUSCULAR; INTRAVENOUS; SUBCUTANEOUS DAILY
Status: DISCONTINUED | OUTPATIENT
Start: 2020-01-01 | End: 2020-01-01

## 2020-01-01 RX ORDER — ALBUMIN (HUMAN) 12.5 G/50ML
25 SOLUTION INTRAVENOUS AS NEEDED
Status: DISPENSED | OUTPATIENT
Start: 2020-01-01 | End: 2020-01-01

## 2020-01-01 RX ORDER — MAGNESIUM SULFATE HEPTAHYDRATE 40 MG/ML
2 INJECTION, SOLUTION INTRAVENOUS AS NEEDED
Status: DISCONTINUED | OUTPATIENT
Start: 2020-01-01 | End: 2020-02-11 | Stop reason: HOSPADM

## 2020-01-01 RX ORDER — DIAZEPAM 2 MG/1
2 TABLET ORAL EVERY 8 HOURS PRN
COMMUNITY

## 2020-01-01 RX ORDER — PROPOFOL 10 MG/ML
VIAL (ML) INTRAVENOUS AS NEEDED
Status: DISCONTINUED | OUTPATIENT
Start: 2020-01-01 | End: 2020-01-01 | Stop reason: SURG

## 2020-01-01 RX ORDER — DIAZEPAM 2 MG/1
2 TABLET ORAL EVERY 8 HOURS PRN
Status: CANCELLED | OUTPATIENT
Start: 2020-01-01

## 2020-01-01 RX ORDER — MAGNESIUM SULFATE HEPTAHYDRATE 40 MG/ML
4 INJECTION, SOLUTION INTRAVENOUS ONCE
Status: COMPLETED | OUTPATIENT
Start: 2020-01-01 | End: 2020-01-01

## 2020-01-01 RX ORDER — SODIUM CHLORIDE 9 MG/ML
INJECTION, SOLUTION INTRAVENOUS CONTINUOUS PRN
Status: DISCONTINUED | OUTPATIENT
Start: 2020-01-01 | End: 2020-01-01 | Stop reason: SURG

## 2020-01-01 RX ORDER — SODIUM CHLORIDE 0.9 % (FLUSH) 0.9 %
10 SYRINGE (ML) INJECTION AS NEEDED
Status: CANCELLED | OUTPATIENT
Start: 2020-01-01

## 2020-01-01 RX ORDER — HYDROMORPHONE HYDROCHLORIDE 1 MG/ML
0.5 INJECTION, SOLUTION INTRAMUSCULAR; INTRAVENOUS; SUBCUTANEOUS EVERY 4 HOURS PRN
Status: DISCONTINUED | OUTPATIENT
Start: 2020-01-01 | End: 2020-01-01

## 2020-01-01 RX ORDER — HEPARIN SODIUM (PORCINE) LOCK FLUSH IV SOLN 100 UNIT/ML 100 UNIT/ML
500 SOLUTION INTRAVENOUS AS NEEDED
Status: CANCELLED | OUTPATIENT
Start: 2020-01-01

## 2020-01-01 RX ORDER — FENTANYL CITRATE 50 UG/ML
50 INJECTION, SOLUTION INTRAMUSCULAR; INTRAVENOUS
Status: DISCONTINUED | OUTPATIENT
Start: 2020-01-01 | End: 2020-01-01 | Stop reason: HOSPADM

## 2020-01-01 RX ORDER — ALBUMIN (HUMAN) 12.5 G/50ML
25 SOLUTION INTRAVENOUS AS NEEDED
Status: COMPLETED | OUTPATIENT
Start: 2020-01-01 | End: 2020-01-01

## 2020-01-01 RX ORDER — PANTOPRAZOLE SODIUM 40 MG/10ML
40 INJECTION, POWDER, LYOPHILIZED, FOR SOLUTION INTRAVENOUS EVERY 12 HOURS SCHEDULED
Status: DISCONTINUED | OUTPATIENT
Start: 2020-01-01 | End: 2020-02-11 | Stop reason: HOSPADM

## 2020-01-01 RX ORDER — MAGNESIUM HYDROXIDE 1200 MG/15ML
LIQUID ORAL AS NEEDED
Status: DISCONTINUED | OUTPATIENT
Start: 2020-01-01 | End: 2020-01-01 | Stop reason: HOSPADM

## 2020-01-01 RX ORDER — FENTANYL CITRATE/PF 100MCG/2ML
SYRINGE (ML) INTRAVENOUS CONTINUOUS
Status: DISCONTINUED | OUTPATIENT
Start: 2020-01-01 | End: 2020-02-11 | Stop reason: HOSPADM

## 2020-01-01 RX ORDER — HEPARIN SODIUM (PORCINE) LOCK FLUSH IV SOLN 100 UNIT/ML 100 UNIT/ML
5 SOLUTION INTRAVENOUS AS NEEDED
Status: DISCONTINUED | OUTPATIENT
Start: 2020-01-01 | End: 2020-02-11 | Stop reason: HOSPADM

## 2020-01-01 RX ORDER — FUROSEMIDE 10 MG/ML
80 INJECTION INTRAMUSCULAR; INTRAVENOUS ONCE
Status: COMPLETED | OUTPATIENT
Start: 2020-01-01 | End: 2020-01-01

## 2020-01-01 RX ORDER — SODIUM CHLORIDE 9 MG/ML
INJECTION, SOLUTION INTRAVENOUS AS NEEDED
Status: DISCONTINUED | OUTPATIENT
Start: 2020-01-01 | End: 2020-01-01 | Stop reason: HOSPADM

## 2020-01-01 RX ORDER — ACETAMINOPHEN 325 MG/1
975 TABLET ORAL ONCE
Status: DISCONTINUED | OUTPATIENT
Start: 2020-01-01 | End: 2020-01-01

## 2020-01-01 RX ORDER — SODIUM CHLORIDE 9 MG/ML
75 INJECTION, SOLUTION INTRAVENOUS CONTINUOUS
Status: DISCONTINUED | OUTPATIENT
Start: 2020-01-01 | End: 2020-01-01

## 2020-01-01 RX ORDER — MAGNESIUM SULFATE HEPTAHYDRATE 40 MG/ML
4 INJECTION, SOLUTION INTRAVENOUS AS NEEDED
Status: DISCONTINUED | OUTPATIENT
Start: 2020-01-01 | End: 2020-02-11 | Stop reason: HOSPADM

## 2020-01-01 RX ORDER — ONDANSETRON 2 MG/ML
4 INJECTION INTRAMUSCULAR; INTRAVENOUS ONCE
Status: COMPLETED | OUTPATIENT
Start: 2020-01-01 | End: 2020-01-01

## 2020-01-01 RX ORDER — CYCLOBENZAPRINE HCL 10 MG
5 TABLET ORAL 2 TIMES DAILY
Status: CANCELLED | OUTPATIENT
Start: 2020-01-01

## 2020-01-01 RX ORDER — ACETAMINOPHEN 325 MG/1
650 TABLET ORAL EVERY 6 HOURS PRN
Status: CANCELLED | OUTPATIENT
Start: 2020-01-01

## 2020-01-01 RX ORDER — FLUOROURACIL 50 MG/ML
400 INJECTION, SOLUTION INTRAVENOUS ONCE
Status: CANCELLED | OUTPATIENT
Start: 2020-01-01

## 2020-01-01 RX ORDER — IPRATROPIUM BROMIDE AND ALBUTEROL SULFATE 2.5; .5 MG/3ML; MG/3ML
3 SOLUTION RESPIRATORY (INHALATION) ONCE AS NEEDED
Status: DISCONTINUED | OUTPATIENT
Start: 2020-01-01 | End: 2020-01-01 | Stop reason: HOSPADM

## 2020-01-01 RX ORDER — SODIUM CHLORIDE, SODIUM LACTATE, POTASSIUM CHLORIDE, CALCIUM CHLORIDE 600; 310; 30; 20 MG/100ML; MG/100ML; MG/100ML; MG/100ML
125 INJECTION, SOLUTION INTRAVENOUS CONTINUOUS
Status: DISCONTINUED | OUTPATIENT
Start: 2020-01-01 | End: 2020-01-01 | Stop reason: HOSPADM

## 2020-01-01 RX ORDER — LIDOCAINE HYDROCHLORIDE 20 MG/ML
5 SOLUTION OROPHARYNGEAL
Status: DISCONTINUED | OUTPATIENT
Start: 2020-01-01 | End: 2020-01-01 | Stop reason: HOSPADM

## 2020-01-01 RX ORDER — HYDROMORPHONE HYDROCHLORIDE 1 MG/ML
0.5 INJECTION, SOLUTION INTRAMUSCULAR; INTRAVENOUS; SUBCUTANEOUS ONCE
Status: COMPLETED | OUTPATIENT
Start: 2020-01-01 | End: 2020-01-01

## 2020-01-01 RX ORDER — SODIUM CHLORIDE 0.9 % (FLUSH) 0.9 %
20 SYRINGE (ML) INJECTION AS NEEDED
Status: DISCONTINUED | OUTPATIENT
Start: 2020-01-01 | End: 2020-02-11 | Stop reason: HOSPADM

## 2020-01-01 RX ORDER — CYCLOBENZAPRINE HCL 10 MG
5 TABLET ORAL 2 TIMES DAILY
Status: DISCONTINUED | OUTPATIENT
Start: 2020-01-01 | End: 2020-01-01

## 2020-01-01 RX ORDER — HYDROCODONE BITARTRATE AND ACETAMINOPHEN 7.5; 325 MG/1; MG/1
1 TABLET ORAL 4 TIMES DAILY PRN
Qty: 12 TABLET | Refills: 0 | Status: SHIPPED | OUTPATIENT
Start: 2020-01-01 | End: 2020-01-01

## 2020-01-01 RX ORDER — ACETAMINOPHEN 325 MG/1
650 TABLET ORAL EVERY 6 HOURS PRN
COMMUNITY

## 2020-01-01 RX ORDER — FAMOTIDINE 10 MG/ML
INJECTION, SOLUTION INTRAVENOUS AS NEEDED
Status: DISCONTINUED | OUTPATIENT
Start: 2020-01-01 | End: 2020-01-01 | Stop reason: SURG

## 2020-01-01 RX ORDER — L.ACID,PARA/B.BIFIDUM/S.THERM 8B CELL
1 CAPSULE ORAL DAILY
Status: DISCONTINUED | OUTPATIENT
Start: 2020-01-01 | End: 2020-01-01 | Stop reason: ALTCHOICE

## 2020-01-01 RX ORDER — SODIUM CHLORIDE 9 MG/ML
250 INJECTION, SOLUTION INTRAVENOUS CONTINUOUS
Status: DISCONTINUED | OUTPATIENT
Start: 2020-01-01 | End: 2020-01-01

## 2020-01-01 RX ORDER — ONDANSETRON 4 MG/1
4 TABLET, ORALLY DISINTEGRATING ORAL EVERY 8 HOURS PRN
Qty: 15 TABLET | Refills: 0 | Status: SHIPPED | OUTPATIENT
Start: 2020-01-01 | End: 2020-01-01

## 2020-01-01 RX ORDER — ONDANSETRON 2 MG/ML
4 INJECTION INTRAMUSCULAR; INTRAVENOUS AS NEEDED
Status: DISCONTINUED | OUTPATIENT
Start: 2020-01-01 | End: 2020-01-01 | Stop reason: HOSPADM

## 2020-01-01 RX ORDER — OXYCODONE HYDROCHLORIDE AND ACETAMINOPHEN 5; 325 MG/1; MG/1
1 TABLET ORAL ONCE AS NEEDED
Status: DISCONTINUED | OUTPATIENT
Start: 2020-01-01 | End: 2020-01-01 | Stop reason: HOSPADM

## 2020-01-01 RX ORDER — FLUOXETINE HYDROCHLORIDE 40 MG/1
CAPSULE ORAL
COMMUNITY
Start: 2019-01-01 | End: 2020-01-01

## 2020-01-01 RX ORDER — ONDANSETRON 4 MG/1
4 TABLET, ORALLY DISINTEGRATING ORAL EVERY 6 HOURS PRN
Qty: 20 TABLET | Refills: 0 | Status: ON HOLD | OUTPATIENT
Start: 2020-01-01 | End: 2020-01-01

## 2020-01-01 RX ORDER — ONDANSETRON 4 MG/1
4 TABLET, ORALLY DISINTEGRATING ORAL EVERY 6 HOURS PRN
Status: CANCELLED | OUTPATIENT
Start: 2020-01-01

## 2020-01-01 RX ORDER — LIDOCAINE HYDROCHLORIDE 20 MG/ML
INJECTION, SOLUTION EPIDURAL; INFILTRATION; INTRACAUDAL; PERINEURAL AS NEEDED
Status: DISCONTINUED | OUTPATIENT
Start: 2020-01-01 | End: 2020-01-01 | Stop reason: SURG

## 2020-01-01 RX ORDER — NEOSTIGMINE METHYLSULFATE 1 MG/ML
INJECTION, SOLUTION INTRAVENOUS AS NEEDED
Status: DISCONTINUED | OUTPATIENT
Start: 2020-01-01 | End: 2020-01-01 | Stop reason: SURG

## 2020-01-01 RX ORDER — ALBUMIN (HUMAN) 12.5 G/50ML
25 SOLUTION INTRAVENOUS AS NEEDED
Status: ACTIVE | OUTPATIENT
Start: 2020-01-01 | End: 2020-01-01

## 2020-01-01 RX ORDER — DIAZEPAM 5 MG/1
2.5 TABLET ORAL EVERY 8 HOURS PRN
Status: DISCONTINUED | OUTPATIENT
Start: 2020-01-01 | End: 2020-02-11 | Stop reason: HOSPADM

## 2020-01-01 RX ORDER — CETIRIZINE HYDROCHLORIDE 10 MG/1
10 TABLET ORAL DAILY
COMMUNITY
End: 2020-01-01

## 2020-01-01 RX ORDER — DOBUTAMINE HYDROCHLORIDE 200 MG/100ML
5 INJECTION INTRAVENOUS CONTINUOUS
Status: DISCONTINUED | OUTPATIENT
Start: 2020-01-01 | End: 2020-01-01

## 2020-01-01 RX ORDER — MONTELUKAST SODIUM 10 MG/1
10 TABLET ORAL NIGHTLY
COMMUNITY
End: 2020-01-01

## 2020-01-01 RX ORDER — GLYCOPYRROLATE 0.2 MG/ML
INJECTION INTRAMUSCULAR; INTRAVENOUS AS NEEDED
Status: DISCONTINUED | OUTPATIENT
Start: 2020-01-01 | End: 2020-01-01 | Stop reason: SURG

## 2020-01-01 RX ORDER — BUPIVACAINE HYDROCHLORIDE AND EPINEPHRINE 5; 5 MG/ML; UG/ML
INJECTION, SOLUTION EPIDURAL; INTRACAUDAL; PERINEURAL AS NEEDED
Status: DISCONTINUED | OUTPATIENT
Start: 2020-01-01 | End: 2020-01-01 | Stop reason: HOSPADM

## 2020-01-01 RX ORDER — DEXTROSE MONOHYDRATE 50 MG/ML
250 INJECTION, SOLUTION INTRAVENOUS ONCE
Status: CANCELLED | OUTPATIENT
Start: 2020-01-01

## 2020-01-01 RX ORDER — LORAZEPAM 2 MG/ML
1 INJECTION INTRAMUSCULAR
Status: DISCONTINUED | OUTPATIENT
Start: 2020-01-01 | End: 2020-02-11 | Stop reason: HOSPADM

## 2020-01-01 RX ORDER — GLYCOPYRROLATE 0.2 MG/ML
0.1 INJECTION INTRAMUSCULAR; INTRAVENOUS EVERY 4 HOURS PRN
Status: DISCONTINUED | OUTPATIENT
Start: 2020-01-01 | End: 2020-02-11 | Stop reason: HOSPADM

## 2020-01-01 RX ORDER — ALBUMIN (HUMAN) 12.5 G/50ML
12.5 SOLUTION INTRAVENOUS
Status: COMPLETED | OUTPATIENT
Start: 2020-01-01 | End: 2020-01-01

## 2020-01-01 RX ORDER — AMOXICILLIN AND CLAVULANATE POTASSIUM 875; 125 MG/1; MG/1
1 TABLET, FILM COATED ORAL 2 TIMES DAILY
Status: CANCELLED | OUTPATIENT
Start: 2020-01-01 | End: 2020-01-01

## 2020-01-01 RX ORDER — ACETAMINOPHEN 160 MG/5ML
650 SOLUTION ORAL ONCE
Status: COMPLETED | OUTPATIENT
Start: 2020-01-01 | End: 2020-01-01

## 2020-01-01 RX ORDER — ONDANSETRON 2 MG/ML
4 INJECTION INTRAMUSCULAR; INTRAVENOUS EVERY 6 HOURS PRN
Status: DISCONTINUED | OUTPATIENT
Start: 2020-01-01 | End: 2020-02-11 | Stop reason: HOSPADM

## 2020-01-01 RX ORDER — KETOROLAC TROMETHAMINE 30 MG/ML
30 INJECTION, SOLUTION INTRAMUSCULAR; INTRAVENOUS ONCE
Status: COMPLETED | OUTPATIENT
Start: 2020-01-01 | End: 2020-01-01

## 2020-01-01 RX ORDER — OXYCODONE HYDROCHLORIDE 5 MG/1
5 TABLET ORAL EVERY 6 HOURS PRN
COMMUNITY

## 2020-01-01 RX ORDER — LORAZEPAM 2 MG/ML
1 INJECTION INTRAMUSCULAR EVERY 4 HOURS PRN
Status: DISCONTINUED | OUTPATIENT
Start: 2020-01-01 | End: 2020-01-01

## 2020-01-01 RX ORDER — OMEPRAZOLE 20 MG/1
20 CAPSULE, DELAYED RELEASE ORAL DAILY
COMMUNITY

## 2020-01-01 RX ORDER — FLUOXETINE HYDROCHLORIDE 20 MG/1
20 CAPSULE ORAL DAILY
Status: DISCONTINUED | OUTPATIENT
Start: 2020-01-01 | End: 2020-01-01

## 2020-01-01 RX ORDER — OXYCODONE HYDROCHLORIDE 5 MG/1
5 TABLET ORAL EVERY 6 HOURS PRN
Status: DISCONTINUED | OUTPATIENT
Start: 2020-01-01 | End: 2020-02-11 | Stop reason: HOSPADM

## 2020-01-01 RX ORDER — DIPHENHYDRAMINE HYDROCHLORIDE 50 MG/ML
50 INJECTION INTRAMUSCULAR; INTRAVENOUS AS NEEDED
Status: CANCELLED | OUTPATIENT
Start: 2020-01-01

## 2020-01-01 RX ORDER — PALONOSETRON 0.05 MG/ML
0.25 INJECTION, SOLUTION INTRAVENOUS ONCE
Status: CANCELLED | OUTPATIENT
Start: 2020-01-01

## 2020-01-01 RX ORDER — HEPARIN SODIUM 5000 [USP'U]/ML
5000 INJECTION, SOLUTION INTRAVENOUS; SUBCUTANEOUS EVERY 8 HOURS SCHEDULED
Status: DISCONTINUED | OUTPATIENT
Start: 2020-01-01 | End: 2020-01-01

## 2020-01-01 RX ORDER — SODIUM CHLORIDE 9 MG/ML
250 INJECTION, SOLUTION INTRAVENOUS ONCE
Status: CANCELLED | OUTPATIENT
Start: 2020-01-01

## 2020-01-01 RX ORDER — ALBUMIN (HUMAN) 12.5 G/50ML
25 SOLUTION INTRAVENOUS ONCE
Status: DISCONTINUED | OUTPATIENT
Start: 2020-01-01 | End: 2020-01-01

## 2020-01-01 RX ADMIN — SODIUM CHLORIDE 150 ML/HR: 9 INJECTION, SOLUTION INTRAVENOUS at 20:17

## 2020-01-01 RX ADMIN — ALBUMIN (HUMAN) 25 G: 0.25 INJECTION, SOLUTION INTRAVENOUS at 09:21

## 2020-01-01 RX ADMIN — SODIUM CHLORIDE, PRESERVATIVE FREE 10 ML: 5 INJECTION INTRAVENOUS at 20:22

## 2020-01-01 RX ADMIN — PROPOFOL 40 MCG/KG/MIN: 10 INJECTION, EMULSION INTRAVENOUS at 16:52

## 2020-01-01 RX ADMIN — HYDROMORPHONE HYDROCHLORIDE 1 MG: 1 INJECTION, SOLUTION INTRAMUSCULAR; INTRAVENOUS; SUBCUTANEOUS at 13:56

## 2020-01-01 RX ADMIN — SODIUM CHLORIDE, PRESERVATIVE FREE 10 ML: 5 INJECTION INTRAVENOUS at 09:37

## 2020-01-01 RX ADMIN — PHYTONADIONE 10 MG: 10 INJECTION, EMULSION INTRAMUSCULAR; INTRAVENOUS; SUBCUTANEOUS at 13:01

## 2020-01-01 RX ADMIN — CYCLOBENZAPRINE HYDROCHLORIDE 5 MG: 10 TABLET, FILM COATED ORAL at 08:28

## 2020-01-01 RX ADMIN — Medication: at 10:38

## 2020-01-01 RX ADMIN — ALBUMIN (HUMAN) 25 G: 0.25 INJECTION, SOLUTION INTRAVENOUS at 21:40

## 2020-01-01 RX ADMIN — PROPOFOL 40 MCG/KG/MIN: 10 INJECTION, EMULSION INTRAVENOUS at 18:06

## 2020-01-01 RX ADMIN — HYDROMORPHONE HYDROCHLORIDE 1 MG: 1 INJECTION, SOLUTION INTRAMUSCULAR; INTRAVENOUS; SUBCUTANEOUS at 02:50

## 2020-01-01 RX ADMIN — CYCLOBENZAPRINE HYDROCHLORIDE 5 MG: 10 TABLET, FILM COATED ORAL at 08:20

## 2020-01-01 RX ADMIN — SODIUM CHLORIDE, PRESERVATIVE FREE 10 ML: 5 INJECTION INTRAVENOUS at 20:45

## 2020-01-01 RX ADMIN — NOREPINEPHRINE BITARTRATE 0.1 MCG/KG/MIN: 1 INJECTION INTRAVENOUS at 04:15

## 2020-01-01 RX ADMIN — HEPARIN SODIUM 5000 UNITS: 5000 INJECTION INTRAVENOUS; SUBCUTANEOUS at 05:38

## 2020-01-01 RX ADMIN — THIAMINE HYDROCHLORIDE 500 MG: 100 INJECTION, SOLUTION INTRAMUSCULAR; INTRAVENOUS at 23:32

## 2020-01-01 RX ADMIN — SODIUM CHLORIDE 75 ML/HR: 9 INJECTION, SOLUTION INTRAVENOUS at 18:35

## 2020-01-01 RX ADMIN — PANTOPRAZOLE SODIUM 40 MG: 40 TABLET, DELAYED RELEASE ORAL at 06:39

## 2020-01-01 RX ADMIN — CHLORHEXIDINE GLUCONATE 15 ML: 1.2 RINSE ORAL at 20:48

## 2020-01-01 RX ADMIN — MICAFUNGIN SODIUM 100 MG: 20 INJECTION, POWDER, LYOPHILIZED, FOR SOLUTION INTRAVENOUS at 12:17

## 2020-01-01 RX ADMIN — ALBUMIN (HUMAN) 25 G: 0.25 INJECTION, SOLUTION INTRAVENOUS at 02:12

## 2020-01-01 RX ADMIN — SODIUM CHLORIDE, PRESERVATIVE FREE 10 ML: 5 INJECTION INTRAVENOUS at 08:59

## 2020-01-01 RX ADMIN — SODIUM BICARBONATE 125 MEQ: 84 INJECTION, SOLUTION INTRAVENOUS at 20:32

## 2020-01-01 RX ADMIN — Medication 500 UNITS: at 16:28

## 2020-01-01 RX ADMIN — MICAFUNGIN SODIUM 100 MG: 20 INJECTION, POWDER, LYOPHILIZED, FOR SOLUTION INTRAVENOUS at 12:36

## 2020-01-01 RX ADMIN — SODIUM CHLORIDE, PRESERVATIVE FREE 10 ML: 5 INJECTION INTRAVENOUS at 20:16

## 2020-01-01 RX ADMIN — HYDROCORTISONE SODIUM SUCCINATE 50 MG: 100 INJECTION, POWDER, FOR SOLUTION INTRAMUSCULAR; INTRAVENOUS at 04:11

## 2020-01-01 RX ADMIN — SODIUM CHLORIDE 1000 ML: 9 INJECTION, SOLUTION INTRAVENOUS at 08:09

## 2020-01-01 RX ADMIN — PANTOPRAZOLE SODIUM 40 MG: 40 INJECTION, POWDER, FOR SOLUTION INTRAVENOUS at 20:46

## 2020-01-01 RX ADMIN — SODIUM CHLORIDE 1572 ML: 9 INJECTION, SOLUTION INTRAVENOUS at 20:16

## 2020-01-01 RX ADMIN — SODIUM CHLORIDE, PRESERVATIVE FREE 10 ML: 5 INJECTION INTRAVENOUS at 21:02

## 2020-01-01 RX ADMIN — NOREPINEPHRINE BITARTRATE 0.08 MCG/KG/MIN: 1 INJECTION INTRAVENOUS at 05:00

## 2020-01-01 RX ADMIN — FENTANYL CITRATE 50 MCG: 50 INJECTION INTRAMUSCULAR; INTRAVENOUS at 13:50

## 2020-01-01 RX ADMIN — CYCLOBENZAPRINE HYDROCHLORIDE 5 MG: 10 TABLET, FILM COATED ORAL at 21:29

## 2020-01-01 RX ADMIN — THIAMINE HYDROCHLORIDE 500 MG: 100 INJECTION, SOLUTION INTRAMUSCULAR; INTRAVENOUS at 20:06

## 2020-01-01 RX ADMIN — MEROPENEM 500 MG: 500 INJECTION, POWDER, FOR SOLUTION INTRAVENOUS at 14:07

## 2020-01-01 RX ADMIN — PANTOPRAZOLE SODIUM 40 MG: 40 TABLET, DELAYED RELEASE ORAL at 12:58

## 2020-01-01 RX ADMIN — SODIUM CHLORIDE, PRESERVATIVE FREE 10 ML: 5 INJECTION INTRAVENOUS at 08:27

## 2020-01-01 RX ADMIN — SODIUM CHLORIDE 250 ML: 9 INJECTION, SOLUTION INTRAVENOUS at 09:05

## 2020-01-01 RX ADMIN — PHYTONADIONE 10 MG: 10 INJECTION, EMULSION INTRAMUSCULAR; INTRAVENOUS; SUBCUTANEOUS at 12:27

## 2020-01-01 RX ADMIN — MIDAZOLAM HYDROCHLORIDE 2 MG: 1 INJECTION, SOLUTION INTRAMUSCULAR; INTRAVENOUS at 07:22

## 2020-01-01 RX ADMIN — SODIUM CHLORIDE, PRESERVATIVE FREE 10 ML: 5 INJECTION INTRAVENOUS at 08:05

## 2020-01-01 RX ADMIN — HYDROMORPHONE HYDROCHLORIDE 0.5 MG: 1 INJECTION, SOLUTION INTRAMUSCULAR; INTRAVENOUS; SUBCUTANEOUS at 10:32

## 2020-01-01 RX ADMIN — SODIUM CHLORIDE 250 ML: 9 INJECTION, SOLUTION INTRAVENOUS at 09:44

## 2020-01-01 RX ADMIN — ONDANSETRON 4 MG: 2 INJECTION INTRAMUSCULAR; INTRAVENOUS at 09:42

## 2020-01-01 RX ADMIN — SODIUM CHLORIDE, PRESERVATIVE FREE 10 ML: 5 INJECTION INTRAVENOUS at 09:50

## 2020-01-01 RX ADMIN — MIDAZOLAM HYDROCHLORIDE 2 MG: 1 INJECTION, SOLUTION INTRAMUSCULAR; INTRAVENOUS at 14:05

## 2020-01-01 RX ADMIN — MAGNESIUM SULFATE IN WATER 4 G: 40 INJECTION, SOLUTION INTRAVENOUS at 11:01

## 2020-01-01 RX ADMIN — Medication: at 10:37

## 2020-01-01 RX ADMIN — SODIUM CHLORIDE, PRESERVATIVE FREE 10 ML: 5 INJECTION INTRAVENOUS at 08:06

## 2020-01-01 RX ADMIN — THIAMINE HYDROCHLORIDE 500 MG: 100 INJECTION, SOLUTION INTRAMUSCULAR; INTRAVENOUS at 05:12

## 2020-01-01 RX ADMIN — INSULIN ASPART 2 UNITS: 100 INJECTION, SOLUTION INTRAVENOUS; SUBCUTANEOUS at 12:43

## 2020-01-01 RX ADMIN — HEPARIN SODIUM 5000 UNITS: 5000 INJECTION INTRAVENOUS; SUBCUTANEOUS at 06:38

## 2020-01-01 RX ADMIN — SODIUM CHLORIDE, PRESERVATIVE FREE 10 ML: 5 INJECTION INTRAVENOUS at 20:08

## 2020-01-01 RX ADMIN — NOREPINEPHRINE BITARTRATE 0.08 MCG/KG/MIN: 1 INJECTION INTRAVENOUS at 21:13

## 2020-01-01 RX ADMIN — PROPOFOL 40 MCG/KG/MIN: 10 INJECTION, EMULSION INTRAVENOUS at 21:53

## 2020-01-01 RX ADMIN — HEPARIN SODIUM 5000 UNITS: 5000 INJECTION INTRAVENOUS; SUBCUTANEOUS at 22:48

## 2020-01-01 RX ADMIN — PROPOFOL 40 MCG/KG/MIN: 10 INJECTION, EMULSION INTRAVENOUS at 03:00

## 2020-01-01 RX ADMIN — ALBUMIN (HUMAN) 25 G: 0.25 INJECTION, SOLUTION INTRAVENOUS at 08:05

## 2020-01-01 RX ADMIN — PIPERACILLIN SODIUM,TAZOBACTAM SODIUM 3.38 G: 3; .375 INJECTION, POWDER, FOR SOLUTION INTRAVENOUS at 14:17

## 2020-01-01 RX ADMIN — INSULIN ASPART 7 UNITS: 100 INJECTION, SOLUTION INTRAVENOUS; SUBCUTANEOUS at 00:45

## 2020-01-01 RX ADMIN — SODIUM CHLORIDE, PRESERVATIVE FREE 10 ML: 5 INJECTION INTRAVENOUS at 08:21

## 2020-01-01 RX ADMIN — HEPARIN SODIUM 5000 UNITS: 5000 INJECTION INTRAVENOUS; SUBCUTANEOUS at 05:00

## 2020-01-01 RX ADMIN — GLYCOPYRROLATE 0.4 MG: 0.4 INJECTION INTRAMUSCULAR; INTRAVENOUS at 15:04

## 2020-01-01 RX ADMIN — CHLORHEXIDINE GLUCONATE 15 ML: 1.2 RINSE ORAL at 21:00

## 2020-01-01 RX ADMIN — NEOSTIGMINE METHYLSULFATE 3 MG: 1 INJECTION, SOLUTION INTRAVENOUS at 15:04

## 2020-01-01 RX ADMIN — HYDROCORTISONE SODIUM SUCCINATE 50 MG: 100 INJECTION, POWDER, FOR SOLUTION INTRAMUSCULAR; INTRAVENOUS at 14:29

## 2020-01-01 RX ADMIN — VASOPRESSIN 0.04 UNITS/MIN: 20 INJECTION INTRAVENOUS at 00:25

## 2020-01-01 RX ADMIN — HYDROMORPHONE HYDROCHLORIDE 1 MG: 1 INJECTION, SOLUTION INTRAMUSCULAR; INTRAVENOUS; SUBCUTANEOUS at 15:57

## 2020-01-01 RX ADMIN — PIPERACILLIN SODIUM,TAZOBACTAM SODIUM 3.38 G: 3; .375 INJECTION, POWDER, FOR SOLUTION INTRAVENOUS at 06:34

## 2020-01-01 RX ADMIN — SODIUM CHLORIDE, PRESERVATIVE FREE 10 ML: 5 INJECTION INTRAVENOUS at 20:09

## 2020-01-01 RX ADMIN — Medication: at 16:26

## 2020-01-01 RX ADMIN — HYDROMORPHONE HYDROCHLORIDE 1 MG: 1 INJECTION, SOLUTION INTRAMUSCULAR; INTRAVENOUS; SUBCUTANEOUS at 22:36

## 2020-01-01 RX ADMIN — KETOROLAC TROMETHAMINE 30 MG: 30 INJECTION, SOLUTION INTRAMUSCULAR at 17:26

## 2020-01-01 RX ADMIN — PROPOFOL 40 MCG/KG/MIN: 10 INJECTION, EMULSION INTRAVENOUS at 04:40

## 2020-01-01 RX ADMIN — SODIUM CHLORIDE, PRESERVATIVE FREE 10 ML: 5 INJECTION INTRAVENOUS at 09:18

## 2020-01-01 RX ADMIN — SODIUM CHLORIDE, PRESERVATIVE FREE 10 ML: 5 INJECTION INTRAVENOUS at 08:38

## 2020-01-01 RX ADMIN — HYDROMORPHONE HYDROCHLORIDE 0.5 MG: 1 INJECTION, SOLUTION INTRAMUSCULAR; INTRAVENOUS; SUBCUTANEOUS at 13:02

## 2020-01-01 RX ADMIN — HYDROMORPHONE HYDROCHLORIDE 0.5 MG: 1 INJECTION, SOLUTION INTRAMUSCULAR; INTRAVENOUS; SUBCUTANEOUS at 14:59

## 2020-01-01 RX ADMIN — ALBUMIN (HUMAN) 25 G: 0.25 INJECTION, SOLUTION INTRAVENOUS at 07:32

## 2020-01-01 RX ADMIN — PROPOFOL 40 MCG/KG/MIN: 10 INJECTION, EMULSION INTRAVENOUS at 01:01

## 2020-01-01 RX ADMIN — SODIUM CHLORIDE, PRESERVATIVE FREE 10 ML: 5 INJECTION INTRAVENOUS at 21:04

## 2020-01-01 RX ADMIN — ALBUMIN HUMAN 12.5 G: 0.25 SOLUTION INTRAVENOUS at 18:35

## 2020-01-01 RX ADMIN — ALBUMIN (HUMAN) 25 G: 0.25 INJECTION, SOLUTION INTRAVENOUS at 10:18

## 2020-01-01 RX ADMIN — SODIUM CHLORIDE, PRESERVATIVE FREE 10 ML: 5 INJECTION INTRAVENOUS at 20:17

## 2020-01-01 RX ADMIN — SODIUM CHLORIDE, PRESERVATIVE FREE 10 ML: 5 INJECTION INTRAVENOUS at 20:27

## 2020-01-01 RX ADMIN — THIAMINE HYDROCHLORIDE 500 MG: 100 INJECTION, SOLUTION INTRAMUSCULAR; INTRAVENOUS at 05:00

## 2020-01-01 RX ADMIN — ALBUMIN HUMAN 12.5 G: 0.25 SOLUTION INTRAVENOUS at 10:38

## 2020-01-01 RX ADMIN — HYDROMORPHONE HYDROCHLORIDE 0.5 MG: 1 INJECTION, SOLUTION INTRAMUSCULAR; INTRAVENOUS; SUBCUTANEOUS at 05:01

## 2020-01-01 RX ADMIN — PROPOFOL 40 MCG/KG/MIN: 10 INJECTION, EMULSION INTRAVENOUS at 04:48

## 2020-01-01 RX ADMIN — DOXYCYCLINE 100 MG: 100 INJECTION, POWDER, LYOPHILIZED, FOR SOLUTION INTRAVENOUS at 06:33

## 2020-01-01 RX ADMIN — SODIUM CHLORIDE, PRESERVATIVE FREE 10 ML: 5 INJECTION INTRAVENOUS at 09:01

## 2020-01-01 RX ADMIN — VASOPRESSIN 0.04 UNITS/MIN: 20 INJECTION INTRAVENOUS at 09:16

## 2020-01-01 RX ADMIN — HYDROCORTISONE SODIUM SUCCINATE 50 MG: 100 INJECTION, POWDER, FOR SOLUTION INTRAMUSCULAR; INTRAVENOUS at 14:33

## 2020-01-01 RX ADMIN — ONDANSETRON 4 MG: 2 INJECTION INTRAMUSCULAR; INTRAVENOUS at 21:28

## 2020-01-01 RX ADMIN — Medication: at 02:02

## 2020-01-01 RX ADMIN — SODIUM CHLORIDE, PRESERVATIVE FREE 10 ML: 5 INJECTION INTRAVENOUS at 20:23

## 2020-01-01 RX ADMIN — SODIUM CHLORIDE, PRESERVATIVE FREE 10 ML: 5 INJECTION INTRAVENOUS at 08:37

## 2020-01-01 RX ADMIN — HYDROCORTISONE SODIUM SUCCINATE 50 MG: 100 INJECTION, POWDER, FOR SOLUTION INTRAMUSCULAR; INTRAVENOUS at 03:17

## 2020-01-01 RX ADMIN — MIDAZOLAM HYDROCHLORIDE 2 MG: 1 INJECTION, SOLUTION INTRAMUSCULAR; INTRAVENOUS at 13:31

## 2020-01-01 RX ADMIN — SODIUM BICARBONATE 125 MEQ: 84 INJECTION, SOLUTION INTRAVENOUS at 08:26

## 2020-01-01 RX ADMIN — PROPOFOL 40 MCG/KG/MIN: 10 INJECTION, EMULSION INTRAVENOUS at 11:00

## 2020-01-01 RX ADMIN — ALBUMIN HUMAN 12.5 G: 0.25 SOLUTION INTRAVENOUS at 18:53

## 2020-01-01 RX ADMIN — SODIUM CHLORIDE 250 ML: 9 INJECTION, SOLUTION INTRAVENOUS at 10:05

## 2020-01-01 RX ADMIN — CYCLOBENZAPRINE HYDROCHLORIDE 5 MG: 10 TABLET, FILM COATED ORAL at 20:27

## 2020-01-01 RX ADMIN — ALBUMIN (HUMAN) 25 G: 0.25 INJECTION, SOLUTION INTRAVENOUS at 03:57

## 2020-01-01 RX ADMIN — Medication: at 06:12

## 2020-01-01 RX ADMIN — FENTANYL CITRATE 100 MCG: 50 INJECTION INTRAMUSCULAR; INTRAVENOUS at 14:05

## 2020-01-01 RX ADMIN — THIAMINE HYDROCHLORIDE 500 MG: 100 INJECTION, SOLUTION INTRAMUSCULAR; INTRAVENOUS at 20:59

## 2020-01-01 RX ADMIN — THIAMINE HYDROCHLORIDE 500 MG: 100 INJECTION, SOLUTION INTRAMUSCULAR; INTRAVENOUS at 12:40

## 2020-01-01 RX ADMIN — ALBUMIN (HUMAN) 25 G: 0.25 INJECTION, SOLUTION INTRAVENOUS at 10:34

## 2020-01-01 RX ADMIN — HUMAN INSULIN 15 UNITS: 100 INJECTION, SOLUTION SUBCUTANEOUS at 19:56

## 2020-01-01 RX ADMIN — INSULIN ASPART 6 UNITS: 100 INJECTION, SOLUTION INTRAVENOUS; SUBCUTANEOUS at 00:25

## 2020-01-01 RX ADMIN — SODIUM CHLORIDE, PRESERVATIVE FREE 10 ML: 5 INJECTION INTRAVENOUS at 20:48

## 2020-01-01 RX ADMIN — INSULIN ASPART 4 UNITS: 100 INJECTION, SOLUTION INTRAVENOUS; SUBCUTANEOUS at 23:57

## 2020-01-01 RX ADMIN — HYDROMORPHONE HYDROCHLORIDE 0.5 MG: 1 INJECTION, SOLUTION INTRAMUSCULAR; INTRAVENOUS; SUBCUTANEOUS at 17:11

## 2020-01-01 RX ADMIN — CHLORHEXIDINE GLUCONATE 15 ML: 1.2 RINSE ORAL at 20:06

## 2020-01-01 RX ADMIN — PROPOFOL 50 MCG/KG/MIN: 10 INJECTION, EMULSION INTRAVENOUS at 13:33

## 2020-01-01 RX ADMIN — SODIUM BICARBONATE 125 MEQ: 84 INJECTION, SOLUTION INTRAVENOUS at 15:28

## 2020-01-01 RX ADMIN — SODIUM CHLORIDE, PRESERVATIVE FREE 10 ML: 5 INJECTION INTRAVENOUS at 20:49

## 2020-01-01 RX ADMIN — PROPOFOL 40 MCG/KG/MIN: 10 INJECTION, EMULSION INTRAVENOUS at 21:31

## 2020-01-01 RX ADMIN — SODIUM CHLORIDE, PRESERVATIVE FREE 10 ML: 5 INJECTION INTRAVENOUS at 16:27

## 2020-01-01 RX ADMIN — LIDOCAINE HYDROCHLORIDE 60 MG: 20 INJECTION, SOLUTION INFILTRATION; PERINEURAL at 14:12

## 2020-01-01 RX ADMIN — NOREPINEPHRINE BITARTRATE 0.08 MCG/KG/MIN: 1 INJECTION INTRAVENOUS at 14:55

## 2020-01-01 RX ADMIN — ALBUMIN HUMAN 12.5 G: 0.25 SOLUTION INTRAVENOUS at 19:54

## 2020-01-01 RX ADMIN — PROPOFOL 40 MCG/KG/MIN: 10 INJECTION, EMULSION INTRAVENOUS at 08:41

## 2020-01-01 RX ADMIN — SODIUM BICARBONATE 150 MEQ: 84 INJECTION, SOLUTION INTRAVENOUS at 00:25

## 2020-01-01 RX ADMIN — HYDROMORPHONE HYDROCHLORIDE 0.5 MG: 1 INJECTION, SOLUTION INTRAMUSCULAR; INTRAVENOUS; SUBCUTANEOUS at 00:04

## 2020-01-01 RX ADMIN — PROPOFOL 40 MCG/KG/MIN: 10 INJECTION, EMULSION INTRAVENOUS at 10:58

## 2020-01-01 RX ADMIN — HYDROCORTISONE SODIUM SUCCINATE 50 MG: 100 INJECTION, POWDER, FOR SOLUTION INTRAMUSCULAR; INTRAVENOUS at 08:26

## 2020-01-01 RX ADMIN — CYCLOBENZAPRINE HYDROCHLORIDE 5 MG: 10 TABLET, FILM COATED ORAL at 21:02

## 2020-01-01 RX ADMIN — POTASSIUM PHOSPHATE, MONOBASIC AND POTASSIUM PHOSPHATE, DIBASIC 15 MMOL: 224; 236 INJECTION, SOLUTION INTRAVENOUS at 19:36

## 2020-01-01 RX ADMIN — LIDOCAINE HYDROCHLORIDE 3 ML: 20 INJECTION, SOLUTION EPIDURAL; INFILTRATION; INTRACAUDAL; PERINEURAL at 13:31

## 2020-01-01 RX ADMIN — ONDANSETRON 4 MG: 2 INJECTION INTRAMUSCULAR; INTRAVENOUS at 15:27

## 2020-01-01 RX ADMIN — VASOPRESSIN 0.04 UNITS/MIN: 20 INJECTION INTRAVENOUS at 10:04

## 2020-01-01 RX ADMIN — ASCORBIC ACID, VITAMIN A PALMITATE, CHOLECALCIFEROL, THIAMINE HYDROCHLORIDE, RIBOFLAVIN-5 PHOSPHATE SODIUM, PYRIDOXINE HYDROCHLORIDE, NIACINAMIDE, DEXPANTHENOL, ALPHA-TOCOPHEROL ACETATE, VITAMIN K1, FOLIC ACID, BIOTIN, CYANOCOBALAMIN: 200; 3300; 200; 6; 3.6; 6; 40; 15; 10; 150; 600; 60; 5 INJECTION, SOLUTION INTRAVENOUS at 08:43

## 2020-01-01 RX ADMIN — IOVERSOL 100 ML: 678 INJECTION INTRA-ARTERIAL; INTRAVENOUS at 19:13

## 2020-01-01 RX ADMIN — Medication 1 CAPSULE: at 11:11

## 2020-01-01 RX ADMIN — SODIUM CHLORIDE, POTASSIUM CHLORIDE, SODIUM LACTATE AND CALCIUM CHLORIDE 125 ML/HR: 600; 310; 30; 20 INJECTION, SOLUTION INTRAVENOUS at 13:47

## 2020-01-01 RX ADMIN — PIPERACILLIN SODIUM,TAZOBACTAM SODIUM 3.38 G: 3; .375 INJECTION, POWDER, FOR SOLUTION INTRAVENOUS at 21:48

## 2020-01-01 RX ADMIN — HEPARIN SODIUM 5000 UNITS: 5000 INJECTION INTRAVENOUS; SUBCUTANEOUS at 21:01

## 2020-01-01 RX ADMIN — PANTOPRAZOLE SODIUM 40 MG: 40 INJECTION, POWDER, FOR SOLUTION INTRAVENOUS at 08:42

## 2020-01-01 RX ADMIN — DEXMEDETOMIDINE HYDROCHLORIDE 1.5 MCG/KG/HR: 100 INJECTION, SOLUTION INTRAVENOUS at 19:51

## 2020-01-01 RX ADMIN — PANTOPRAZOLE SODIUM 40 MG: 40 TABLET, DELAYED RELEASE ORAL at 06:27

## 2020-01-01 RX ADMIN — PROPOFOL 40 MCG/KG/MIN: 10 INJECTION, EMULSION INTRAVENOUS at 06:27

## 2020-01-01 RX ADMIN — DEXMEDETOMIDINE HYDROCHLORIDE 1 MCG/KG/HR: 100 INJECTION, SOLUTION INTRAVENOUS at 23:26

## 2020-01-01 RX ADMIN — HYDROCORTISONE SODIUM SUCCINATE 50 MG: 100 INJECTION, POWDER, FOR SOLUTION INTRAMUSCULAR; INTRAVENOUS at 20:43

## 2020-01-01 RX ADMIN — SODIUM CHLORIDE, PRESERVATIVE FREE 10 ML: 5 INJECTION INTRAVENOUS at 09:17

## 2020-01-01 RX ADMIN — SODIUM CHLORIDE, PRESERVATIVE FREE 10 ML: 5 INJECTION INTRAVENOUS at 08:04

## 2020-01-01 RX ADMIN — THIAMINE HYDROCHLORIDE 500 MG: 100 INJECTION, SOLUTION INTRAMUSCULAR; INTRAVENOUS at 04:12

## 2020-01-01 RX ADMIN — THIAMINE HYDROCHLORIDE 500 MG: 100 INJECTION, SOLUTION INTRAMUSCULAR; INTRAVENOUS at 13:32

## 2020-01-01 RX ADMIN — Medication: at 08:45

## 2020-01-01 RX ADMIN — HYDROMORPHONE HYDROCHLORIDE 1 MG: 1 INJECTION, SOLUTION INTRAMUSCULAR; INTRAVENOUS; SUBCUTANEOUS at 20:02

## 2020-01-01 RX ADMIN — SODIUM CHLORIDE, PRESERVATIVE FREE 10 ML: 5 INJECTION INTRAVENOUS at 08:23

## 2020-01-01 RX ADMIN — HYDROCORTISONE SODIUM SUCCINATE 50 MG: 100 INJECTION, POWDER, FOR SOLUTION INTRAMUSCULAR; INTRAVENOUS at 03:08

## 2020-01-01 RX ADMIN — OXYCODONE HYDROCHLORIDE 5 MG: 5 TABLET ORAL at 01:36

## 2020-01-01 RX ADMIN — SODIUM BICARBONATE 150 MEQ: 84 INJECTION, SOLUTION INTRAVENOUS at 17:30

## 2020-01-01 RX ADMIN — MAGNESIUM SULFATE IN WATER 4 G: 40 INJECTION, SOLUTION INTRAVENOUS at 12:01

## 2020-01-01 RX ADMIN — Medication: at 14:10

## 2020-01-01 RX ADMIN — CHLORHEXIDINE GLUCONATE 15 ML: 1.2 RINSE ORAL at 08:36

## 2020-01-01 RX ADMIN — PANTOPRAZOLE SODIUM 40 MG: 40 INJECTION, POWDER, FOR SOLUTION INTRAVENOUS at 09:16

## 2020-01-01 RX ADMIN — LEUCINE, PHENYLALANINE, LYSINE, METHIONINE, ISOLEUCINE, VALINE, HISTIDINE, THREONINE, TRYPTOPHAN, ALANINE, GLYCINE, ARGININE, PROLINE, SERINE, TYROSINE, DEXTROSE: 365; 280; 290; 200; 300; 290; 240; 210; 90; 1035; 515; 575; 340; 250; 20; 20 INJECTION INTRAVENOUS at 18:09

## 2020-01-01 RX ADMIN — PIPERACILLIN SODIUM,TAZOBACTAM SODIUM 3.38 G: 3; .375 INJECTION, POWDER, FOR SOLUTION INTRAVENOUS at 15:00

## 2020-01-01 RX ADMIN — GLYCOPYRROLATE 0.1 MG: 0.2 INJECTION INTRAMUSCULAR; INTRAVENOUS at 15:57

## 2020-01-01 RX ADMIN — PROPOFOL 40 MCG/KG/MIN: 10 INJECTION, EMULSION INTRAVENOUS at 04:02

## 2020-01-01 RX ADMIN — Medication: at 08:06

## 2020-01-01 RX ADMIN — INSULIN ASPART 6 UNITS: 100 INJECTION, SOLUTION INTRAVENOUS; SUBCUTANEOUS at 05:08

## 2020-01-01 RX ADMIN — PANTOPRAZOLE SODIUM 40 MG: 40 INJECTION, POWDER, FOR SOLUTION INTRAVENOUS at 20:07

## 2020-01-01 RX ADMIN — SODIUM CHLORIDE 250 ML: 9 INJECTION, SOLUTION INTRAVENOUS at 10:45

## 2020-01-01 RX ADMIN — HYDROMORPHONE HYDROCHLORIDE 0.5 MG: 1 INJECTION, SOLUTION INTRAMUSCULAR; INTRAVENOUS; SUBCUTANEOUS at 19:34

## 2020-01-01 RX ADMIN — LEUCINE, PHENYLALANINE, LYSINE, METHIONINE, ISOLEUCINE, VALINE, HISTIDINE, THREONINE, TRYPTOPHAN, ALANINE, GLYCINE, ARGININE, PROLINE, SERINE, TYROSINE, DEXTROSE: 365; 280; 290; 200; 300; 290; 240; 210; 90; 1035; 515; 575; 340; 250; 20; 20 INJECTION INTRAVENOUS at 18:07

## 2020-01-01 RX ADMIN — VANCOMYCIN HYDROCHLORIDE 1750 MG: 5 INJECTION, POWDER, LYOPHILIZED, FOR SOLUTION INTRAVENOUS at 21:54

## 2020-01-01 RX ADMIN — PIPERACILLIN AND TAZOBACTAM 4.5 G: 4; .5 INJECTION, POWDER, LYOPHILIZED, FOR SOLUTION INTRAVENOUS; PARENTERAL at 20:20

## 2020-01-01 RX ADMIN — SODIUM CHLORIDE, PRESERVATIVE FREE 10 ML: 5 INJECTION INTRAVENOUS at 20:44

## 2020-01-01 RX ADMIN — SODIUM CHLORIDE 1000 ML: 9 INJECTION, SOLUTION INTRAVENOUS at 08:08

## 2020-01-01 RX ADMIN — HEPARIN SODIUM 5000 UNITS: 5000 INJECTION INTRAVENOUS; SUBCUTANEOUS at 06:27

## 2020-01-01 RX ADMIN — FLUOXETINE HYDROCHLORIDE 20 MG: 20 CAPSULE ORAL at 08:58

## 2020-01-01 RX ADMIN — ALBUMIN (HUMAN) 25 G: 0.25 INJECTION, SOLUTION INTRAVENOUS at 09:03

## 2020-01-01 RX ADMIN — PIPERACILLIN SODIUM,TAZOBACTAM SODIUM 3.38 G: 3; .375 INJECTION, POWDER, FOR SOLUTION INTRAVENOUS at 05:33

## 2020-01-01 RX ADMIN — ALBUMIN (HUMAN) 25 G: 0.25 INJECTION, SOLUTION INTRAVENOUS at 13:05

## 2020-01-01 RX ADMIN — HYDROCORTISONE SODIUM SUCCINATE 50 MG: 100 INJECTION, POWDER, FOR SOLUTION INTRAMUSCULAR; INTRAVENOUS at 09:16

## 2020-01-01 RX ADMIN — ROCURONIUM BROMIDE 20 MG: 10 SOLUTION INTRAVENOUS at 14:12

## 2020-01-01 RX ADMIN — SODIUM CHLORIDE 500 ML: 9 INJECTION, SOLUTION INTRAVENOUS at 19:18

## 2020-01-01 RX ADMIN — PROPOFOL 40 MCG/KG/MIN: 10 INJECTION, EMULSION INTRAVENOUS at 00:38

## 2020-01-01 RX ADMIN — ALBUMIN HUMAN 12.5 G: 0.25 SOLUTION INTRAVENOUS at 19:09

## 2020-01-01 RX ADMIN — CHLORHEXIDINE GLUCONATE 15 ML: 1.2 RINSE ORAL at 09:15

## 2020-01-01 RX ADMIN — HYDROCORTISONE SODIUM SUCCINATE 50 MG: 100 INJECTION, POWDER, FOR SOLUTION INTRAMUSCULAR; INTRAVENOUS at 15:17

## 2020-01-01 RX ADMIN — PANTOPRAZOLE SODIUM 40 MG: 40 INJECTION, POWDER, FOR SOLUTION INTRAVENOUS at 08:37

## 2020-01-01 RX ADMIN — PANTOPRAZOLE SODIUM 40 MG: 40 INJECTION, POWDER, FOR SOLUTION INTRAVENOUS at 20:57

## 2020-01-01 RX ADMIN — PIPERACILLIN SODIUM,TAZOBACTAM SODIUM 3.38 G: 3; .375 INJECTION, POWDER, FOR SOLUTION INTRAVENOUS at 17:17

## 2020-01-01 RX ADMIN — ALBUMIN (HUMAN) 25 G: 0.25 INJECTION, SOLUTION INTRAVENOUS at 02:46

## 2020-01-01 RX ADMIN — CEFAZOLIN 2 G: 1 INJECTION, POWDER, FOR SOLUTION INTRAMUSCULAR; INTRAVENOUS; PARENTERAL at 14:05

## 2020-01-01 RX ADMIN — VANCOMYCIN HYDROCHLORIDE 1250 MG: 5 INJECTION, POWDER, LYOPHILIZED, FOR SOLUTION INTRAVENOUS at 17:21

## 2020-01-01 RX ADMIN — NOREPINEPHRINE BITARTRATE 0.3 MCG/KG/MIN: 1 INJECTION INTRAVENOUS at 08:27

## 2020-01-01 RX ADMIN — ALBUMIN HUMAN 12.5 G: 0.25 SOLUTION INTRAVENOUS at 13:17

## 2020-01-01 RX ADMIN — HEPARIN SODIUM 5000 UNITS: 5000 INJECTION INTRAVENOUS; SUBCUTANEOUS at 22:31

## 2020-01-01 RX ADMIN — MINERAL OIL AND PETROLATUM 1 APPLICATION: 150; 830 OINTMENT OPHTHALMIC at 15:49

## 2020-01-01 RX ADMIN — SODIUM CHLORIDE, PRESERVATIVE FREE 10 ML: 5 INJECTION INTRAVENOUS at 21:05

## 2020-01-01 RX ADMIN — VASOPRESSIN 0.04 UNITS/MIN: 20 INJECTION INTRAVENOUS at 16:34

## 2020-01-01 RX ADMIN — HYDROMORPHONE HYDROCHLORIDE 0.5 MG: 1 INJECTION, SOLUTION INTRAMUSCULAR; INTRAVENOUS; SUBCUTANEOUS at 16:30

## 2020-01-01 RX ADMIN — Medication: at 02:27

## 2020-01-01 RX ADMIN — NOREPINEPHRINE BITARTRATE 0.14 MCG/KG/MIN: 1 INJECTION INTRAVENOUS at 18:53

## 2020-01-01 RX ADMIN — ONDANSETRON 4 MG: 2 INJECTION INTRAMUSCULAR; INTRAVENOUS at 03:34

## 2020-01-01 RX ADMIN — CHLORHEXIDINE GLUCONATE 15 ML: 1.2 RINSE ORAL at 20:21

## 2020-01-01 RX ADMIN — DEXMEDETOMIDINE HYDROCHLORIDE 1.5 MCG/KG/HR: 100 INJECTION, SOLUTION INTRAVENOUS at 16:14

## 2020-01-01 RX ADMIN — PIPERACILLIN SODIUM,TAZOBACTAM SODIUM 3.38 G: 3; .375 INJECTION, POWDER, FOR SOLUTION INTRAVENOUS at 05:38

## 2020-01-01 RX ADMIN — PROPOFOL 40 MCG/KG/MIN: 10 INJECTION, EMULSION INTRAVENOUS at 13:07

## 2020-01-01 RX ADMIN — PANTOPRAZOLE SODIUM 40 MG: 40 INJECTION, POWDER, FOR SOLUTION INTRAVENOUS at 08:25

## 2020-01-01 RX ADMIN — Medication: at 20:33

## 2020-01-01 RX ADMIN — MIDAZOLAM HYDROCHLORIDE 2 MG: 1 INJECTION, SOLUTION INTRAMUSCULAR; INTRAVENOUS at 16:48

## 2020-01-01 RX ADMIN — MINERAL OIL AND PETROLATUM: 150; 830 OINTMENT OPHTHALMIC at 06:02

## 2020-01-01 RX ADMIN — Medication: at 08:36

## 2020-01-01 RX ADMIN — HYDROCORTISONE SODIUM SUCCINATE 50 MG: 100 INJECTION, POWDER, FOR SOLUTION INTRAMUSCULAR; INTRAVENOUS at 20:57

## 2020-01-01 RX ADMIN — PANTOPRAZOLE SODIUM 40 MG: 40 INJECTION, POWDER, FOR SOLUTION INTRAVENOUS at 08:04

## 2020-01-01 RX ADMIN — PROPOFOL 100 MG: 10 INJECTION, EMULSION INTRAVENOUS at 13:33

## 2020-01-01 RX ADMIN — SODIUM BICARBONATE 150 MEQ: 84 INJECTION, SOLUTION INTRAVENOUS at 12:37

## 2020-01-01 RX ADMIN — Medication: at 14:22

## 2020-01-01 RX ADMIN — SODIUM CHLORIDE 1000 ML: 9 INJECTION, SOLUTION INTRAVENOUS at 15:26

## 2020-01-01 RX ADMIN — VASOPRESSIN 0.04 UNITS/MIN: 20 INJECTION INTRAVENOUS at 12:33

## 2020-01-01 RX ADMIN — PHYTONADIONE 10 MG: 10 INJECTION, EMULSION INTRAMUSCULAR; INTRAVENOUS; SUBCUTANEOUS at 14:19

## 2020-01-01 RX ADMIN — THIAMINE HYDROCHLORIDE 500 MG: 100 INJECTION, SOLUTION INTRAMUSCULAR; INTRAVENOUS at 20:44

## 2020-01-01 RX ADMIN — NOREPINEPHRINE BITARTRATE 0.1 MCG/KG/MIN: 1 INJECTION INTRAVENOUS at 20:07

## 2020-01-01 RX ADMIN — HYDROMORPHONE HYDROCHLORIDE 0.5 MG: 1 INJECTION, SOLUTION INTRAMUSCULAR; INTRAVENOUS; SUBCUTANEOUS at 12:23

## 2020-01-01 RX ADMIN — INSULIN ASPART 7 UNITS: 100 INJECTION, SOLUTION INTRAVENOUS; SUBCUTANEOUS at 17:11

## 2020-01-01 RX ADMIN — PROPOFOL 40 MCG/KG/MIN: 10 INJECTION, EMULSION INTRAVENOUS at 23:11

## 2020-01-01 RX ADMIN — ALBUMIN HUMAN 12.5 G: 0.25 SOLUTION INTRAVENOUS at 13:15

## 2020-01-01 RX ADMIN — SODIUM CHLORIDE, PRESERVATIVE FREE 10 ML: 5 INJECTION INTRAVENOUS at 20:20

## 2020-01-01 RX ADMIN — THIAMINE HYDROCHLORIDE 500 MG: 100 INJECTION, SOLUTION INTRAMUSCULAR; INTRAVENOUS at 11:02

## 2020-01-01 RX ADMIN — THIAMINE HYDROCHLORIDE 500 MG: 100 INJECTION, SOLUTION INTRAMUSCULAR; INTRAVENOUS at 20:09

## 2020-01-01 RX ADMIN — Medication 150 MEQ: at 20:50

## 2020-01-01 RX ADMIN — PANTOPRAZOLE SODIUM 40 MG: 40 TABLET, DELAYED RELEASE ORAL at 05:38

## 2020-01-01 RX ADMIN — PIPERACILLIN SODIUM,TAZOBACTAM SODIUM 3.38 G: 3; .375 INJECTION, POWDER, FOR SOLUTION INTRAVENOUS at 22:31

## 2020-01-01 RX ADMIN — SODIUM CHLORIDE, PRESERVATIVE FREE 10 ML: 5 INJECTION INTRAVENOUS at 09:38

## 2020-01-01 RX ADMIN — PROPOFOL 40 MCG/KG/MIN: 10 INJECTION, EMULSION INTRAVENOUS at 17:43

## 2020-01-01 RX ADMIN — SODIUM CHLORIDE 1000 ML: 9 INJECTION, SOLUTION INTRAVENOUS at 07:14

## 2020-01-01 RX ADMIN — FENTANYL CITRATE 50 MCG: 50 INJECTION INTRAMUSCULAR; INTRAVENOUS at 14:56

## 2020-01-01 RX ADMIN — SODIUM CHLORIDE, PRESERVATIVE FREE 10 ML: 5 INJECTION INTRAVENOUS at 08:24

## 2020-01-01 RX ADMIN — INSULIN ASPART 4 UNITS: 100 INJECTION, SOLUTION INTRAVENOUS; SUBCUTANEOUS at 18:18

## 2020-01-01 RX ADMIN — INSULIN ASPART 4 UNITS: 100 INJECTION, SOLUTION INTRAVENOUS; SUBCUTANEOUS at 12:19

## 2020-01-01 RX ADMIN — HYDROCORTISONE SODIUM SUCCINATE 50 MG: 100 INJECTION, POWDER, FOR SOLUTION INTRAMUSCULAR; INTRAVENOUS at 20:06

## 2020-01-01 RX ADMIN — ALBUMIN HUMAN 12.5 G: 0.25 SOLUTION INTRAVENOUS at 19:41

## 2020-01-01 RX ADMIN — FUROSEMIDE 80 MG: 10 INJECTION, SOLUTION INTRAMUSCULAR; INTRAVENOUS at 16:32

## 2020-01-01 RX ADMIN — PROPOFOL 40 MCG/KG/MIN: 10 INJECTION, EMULSION INTRAVENOUS at 20:42

## 2020-01-01 RX ADMIN — PROPOFOL 30 MCG/KG/MIN: 10 INJECTION, EMULSION INTRAVENOUS at 07:21

## 2020-01-01 RX ADMIN — MEROPENEM 500 MG: 500 INJECTION, POWDER, FOR SOLUTION INTRAVENOUS at 14:22

## 2020-01-01 RX ADMIN — HYDROCORTISONE SODIUM SUCCINATE 50 MG: 100 INJECTION, POWDER, FOR SOLUTION INTRAMUSCULAR; INTRAVENOUS at 03:01

## 2020-01-01 RX ADMIN — ACETAMINOPHEN 650 MG: 325 TABLET ORAL at 16:05

## 2020-01-01 RX ADMIN — CYCLOBENZAPRINE HYDROCHLORIDE 5 MG: 10 TABLET, FILM COATED ORAL at 08:58

## 2020-01-01 RX ADMIN — MICAFUNGIN SODIUM 100 MG: 20 INJECTION, POWDER, LYOPHILIZED, FOR SOLUTION INTRAVENOUS at 13:31

## 2020-01-01 RX ADMIN — SODIUM CHLORIDE 1000 ML: 9 INJECTION, SOLUTION INTRAVENOUS at 15:49

## 2020-01-01 RX ADMIN — SODIUM BICARBONATE 150 MEQ: 84 INJECTION, SOLUTION INTRAVENOUS at 20:21

## 2020-01-01 RX ADMIN — PROPOFOL 40 MCG/KG/MIN: 10 INJECTION, EMULSION INTRAVENOUS at 14:18

## 2020-01-01 RX ADMIN — MIDAZOLAM HYDROCHLORIDE 2 MG: 1 INJECTION, SOLUTION INTRAMUSCULAR; INTRAVENOUS at 13:17

## 2020-01-01 RX ADMIN — SODIUM CHLORIDE 250 ML: 9 INJECTION, SOLUTION INTRAVENOUS at 12:06

## 2020-01-01 RX ADMIN — INSULIN ASPART 7 UNITS: 100 INJECTION, SOLUTION INTRAVENOUS; SUBCUTANEOUS at 06:01

## 2020-01-01 RX ADMIN — Medication: at 08:10

## 2020-01-01 RX ADMIN — SODIUM CHLORIDE 75 ML/HR: 9 INJECTION, SOLUTION INTRAVENOUS at 06:39

## 2020-01-01 RX ADMIN — MICAFUNGIN SODIUM 100 MG: 20 INJECTION, POWDER, LYOPHILIZED, FOR SOLUTION INTRAVENOUS at 12:04

## 2020-01-01 RX ADMIN — SODIUM CHLORIDE 1000 ML: 9 INJECTION, SOLUTION INTRAVENOUS at 03:34

## 2020-01-01 RX ADMIN — HYDROMORPHONE HYDROCHLORIDE 0.5 MG: 1 INJECTION, SOLUTION INTRAMUSCULAR; INTRAVENOUS; SUBCUTANEOUS at 06:39

## 2020-01-01 RX ADMIN — FENTANYL CITRATE 50 MCG: 50 INJECTION INTRAMUSCULAR; INTRAVENOUS at 13:39

## 2020-01-01 RX ADMIN — VASOPRESSIN 0.04 UNITS/MIN: 20 INJECTION INTRAVENOUS at 22:52

## 2020-01-01 RX ADMIN — SODIUM CHLORIDE 1000 ML: 9 INJECTION, SOLUTION INTRAVENOUS at 10:38

## 2020-01-01 RX ADMIN — VANCOMYCIN HYDROCHLORIDE 1000 MG: 5 INJECTION, POWDER, LYOPHILIZED, FOR SOLUTION INTRAVENOUS at 17:12

## 2020-01-01 RX ADMIN — HYDROMORPHONE HYDROCHLORIDE 1 MG: 1 INJECTION, SOLUTION INTRAMUSCULAR; INTRAVENOUS; SUBCUTANEOUS at 06:15

## 2020-01-01 RX ADMIN — PROPOFOL 40 MCG/KG/MIN: 10 INJECTION, EMULSION INTRAVENOUS at 09:30

## 2020-01-01 RX ADMIN — INSULIN ASPART 8 UNITS: 100 INJECTION, SOLUTION INTRAVENOUS; SUBCUTANEOUS at 05:31

## 2020-01-01 RX ADMIN — HYDROCORTISONE SODIUM SUCCINATE 50 MG: 100 INJECTION, POWDER, FOR SOLUTION INTRAMUSCULAR; INTRAVENOUS at 08:04

## 2020-01-01 RX ADMIN — SODIUM CHLORIDE, PRESERVATIVE FREE 10 ML: 5 INJECTION INTRAVENOUS at 08:42

## 2020-01-01 RX ADMIN — ACETAMINOPHEN 650 MG: 160 SOLUTION ORAL at 20:17

## 2020-01-01 RX ADMIN — SODIUM CHLORIDE 250 ML: 9 INJECTION, SOLUTION INTRAVENOUS at 08:05

## 2020-01-01 RX ADMIN — OXYCODONE HYDROCHLORIDE 5 MG: 5 TABLET ORAL at 13:52

## 2020-01-01 RX ADMIN — SODIUM CHLORIDE 250 ML: 9 INJECTION, SOLUTION INTRAVENOUS at 07:13

## 2020-01-01 RX ADMIN — NOREPINEPHRINE BITARTRATE 0.16 MCG/KG/MIN: 1 INJECTION INTRAVENOUS at 09:30

## 2020-01-01 RX ADMIN — OXYCODONE HYDROCHLORIDE 5 MG: 5 TABLET ORAL at 20:48

## 2020-01-01 RX ADMIN — MAGNESIUM SULFATE IN WATER 4 G: 40 INJECTION, SOLUTION INTRAVENOUS at 10:27

## 2020-01-01 RX ADMIN — SODIUM CHLORIDE 250 ML: 9 INJECTION, SOLUTION INTRAVENOUS at 08:48

## 2020-01-01 RX ADMIN — HEPARIN SODIUM 5000 UNITS: 5000 INJECTION INTRAVENOUS; SUBCUTANEOUS at 14:07

## 2020-01-01 RX ADMIN — PIPERACILLIN SODIUM,TAZOBACTAM SODIUM 3.38 G: 3; .375 INJECTION, POWDER, FOR SOLUTION INTRAVENOUS at 00:04

## 2020-01-01 RX ADMIN — CHLORHEXIDINE GLUCONATE 15 ML: 1.2 RINSE ORAL at 09:36

## 2020-01-01 RX ADMIN — LIDOCAINE HYDROCHLORIDE 5 ML: 20 SOLUTION ORAL; TOPICAL at 07:03

## 2020-01-01 RX ADMIN — SODIUM BICARBONATE 150 MEQ: 84 INJECTION, SOLUTION INTRAVENOUS at 08:41

## 2020-01-01 RX ADMIN — SODIUM BICARBONATE 125 MEQ: 84 INJECTION, SOLUTION INTRAVENOUS at 13:53

## 2020-01-01 RX ADMIN — INSULIN ASPART 7 UNITS: 100 INJECTION, SOLUTION INTRAVENOUS; SUBCUTANEOUS at 17:40

## 2020-01-01 RX ADMIN — SODIUM BICARBONATE 150 MEQ: 84 INJECTION, SOLUTION INTRAVENOUS at 06:52

## 2020-01-01 RX ADMIN — THIAMINE HYDROCHLORIDE 500 MG: 100 INJECTION, SOLUTION INTRAMUSCULAR; INTRAVENOUS at 03:17

## 2020-01-01 RX ADMIN — ALBUMIN HUMAN 12.5 G: 0.25 SOLUTION INTRAVENOUS at 10:39

## 2020-01-01 RX ADMIN — FAMOTIDINE 20 MG: 10 INJECTION INTRAVENOUS at 14:12

## 2020-01-01 RX ADMIN — POTASSIUM PHOSPHATE, MONOBASIC AND POTASSIUM PHOSPHATE, DIBASIC 15 MMOL: 224; 236 INJECTION, SOLUTION INTRAVENOUS at 11:02

## 2020-01-01 RX ADMIN — MICAFUNGIN SODIUM 100 MG: 20 INJECTION, POWDER, LYOPHILIZED, FOR SOLUTION INTRAVENOUS at 14:33

## 2020-01-01 RX ADMIN — SODIUM CHLORIDE: 9 INJECTION, SOLUTION INTRAVENOUS at 13:28

## 2020-01-01 RX ADMIN — HYDROCORTISONE SODIUM SUCCINATE 50 MG: 100 INJECTION, POWDER, FOR SOLUTION INTRAMUSCULAR; INTRAVENOUS at 02:57

## 2020-01-01 RX ADMIN — HEPARIN SODIUM 5000 UNITS: 5000 INJECTION INTRAVENOUS; SUBCUTANEOUS at 13:53

## 2020-01-01 RX ADMIN — ALBUMIN HUMAN 12.5 G: 0.25 SOLUTION INTRAVENOUS at 20:08

## 2020-01-01 RX ADMIN — SODIUM CHLORIDE 1000 ML: 9 INJECTION, SOLUTION INTRAVENOUS at 07:31

## 2020-01-01 RX ADMIN — SODIUM CHLORIDE 250 ML: 9 INJECTION, SOLUTION INTRAVENOUS at 11:10

## 2020-01-01 RX ADMIN — LORAZEPAM 1 MG: 2 INJECTION INTRAMUSCULAR; INTRAVENOUS at 17:55

## 2020-01-01 RX ADMIN — HYDROMORPHONE HYDROCHLORIDE 0.5 MG: 1 INJECTION, SOLUTION INTRAMUSCULAR; INTRAVENOUS; SUBCUTANEOUS at 21:29

## 2020-01-01 RX ADMIN — ALBUMIN HUMAN 12.5 G: 0.25 SOLUTION INTRAVENOUS at 10:33

## 2020-01-01 RX ADMIN — POTASSIUM PHOSPHATE, MONOBASIC AND POTASSIUM PHOSPHATE, DIBASIC 30 MMOL: 224; 236 INJECTION, SOLUTION INTRAVENOUS at 05:41

## 2020-01-01 RX ADMIN — HYDROMORPHONE HYDROCHLORIDE 0.5 MG: 1 INJECTION, SOLUTION INTRAMUSCULAR; INTRAVENOUS; SUBCUTANEOUS at 20:32

## 2020-01-01 RX ADMIN — SODIUM CHLORIDE 250 ML: 9 INJECTION, SOLUTION INTRAVENOUS at 12:53

## 2020-01-01 RX ADMIN — Medication: at 03:05

## 2020-01-01 RX ADMIN — LEUCINE, PHENYLALANINE, LYSINE, METHIONINE, ISOLEUCINE, VALINE, HISTIDINE, THREONINE, TRYPTOPHAN, ALANINE, GLYCINE, ARGININE, PROLINE, SERINE, TYROSINE, DEXTROSE: 365; 280; 290; 200; 300; 290; 240; 210; 90; 1035; 515; 575; 340; 250; 20; 20 INJECTION INTRAVENOUS at 17:36

## 2020-01-01 RX ADMIN — MINERAL OIL AND PETROLATUM: 150; 830 OINTMENT OPHTHALMIC at 05:52

## 2020-01-01 RX ADMIN — THIAMINE HYDROCHLORIDE 500 MG: 100 INJECTION, SOLUTION INTRAMUSCULAR; INTRAVENOUS at 14:32

## 2020-01-01 RX ADMIN — MICAFUNGIN SODIUM 100 MG: 20 INJECTION, POWDER, LYOPHILIZED, FOR SOLUTION INTRAVENOUS at 15:27

## 2020-01-01 RX ADMIN — OXYCODONE HYDROCHLORIDE 5 MG: 5 TABLET ORAL at 11:47

## 2020-01-01 RX ADMIN — LORAZEPAM 1 MG: 2 INJECTION INTRAMUSCULAR; INTRAVENOUS at 14:36

## 2020-01-01 RX ADMIN — HYDROCORTISONE SODIUM SUCCINATE 50 MG: 100 INJECTION, POWDER, FOR SOLUTION INTRAMUSCULAR; INTRAVENOUS at 16:49

## 2020-01-01 RX ADMIN — MINERAL OIL AND PETROLATUM: 150; 830 OINTMENT OPHTHALMIC at 17:21

## 2020-01-01 RX ADMIN — MINERAL OIL AND PETROLATUM: 150; 830 OINTMENT OPHTHALMIC at 04:37

## 2020-01-01 RX ADMIN — ENOXAPARIN SODIUM 40 MG: 40 INJECTION SUBCUTANEOUS at 12:57

## 2020-01-01 RX ADMIN — SODIUM CHLORIDE 250 ML: 9 INJECTION, SOLUTION INTRAVENOUS at 11:48

## 2020-01-01 RX ADMIN — SODIUM BICARBONATE 150 MEQ: 84 INJECTION, SOLUTION INTRAVENOUS at 02:57

## 2020-01-01 RX ADMIN — HYDROCORTISONE SODIUM SUCCINATE 50 MG: 100 INJECTION, POWDER, FOR SOLUTION INTRAMUSCULAR; INTRAVENOUS at 16:33

## 2020-01-01 RX ADMIN — INSULIN ASPART 6 UNITS: 100 INJECTION, SOLUTION INTRAVENOUS; SUBCUTANEOUS at 05:43

## 2020-01-01 RX ADMIN — MEROPENEM 500 MG: 500 INJECTION, POWDER, FOR SOLUTION INTRAVENOUS at 16:33

## 2020-01-01 RX ADMIN — IOVERSOL 100 ML: 678 INJECTION INTRA-ARTERIAL; INTRAVENOUS at 17:08

## 2020-01-01 RX ADMIN — ALBUMIN HUMAN 12.5 G: 0.25 SOLUTION INTRAVENOUS at 10:37

## 2020-01-01 RX ADMIN — PROPOFOL 40 MCG/KG/MIN: 10 INJECTION, EMULSION INTRAVENOUS at 23:31

## 2020-01-01 RX ADMIN — PHYTONADIONE 10 MG: 10 INJECTION, EMULSION INTRAMUSCULAR; INTRAVENOUS; SUBCUTANEOUS at 14:32

## 2020-01-01 RX ADMIN — HYDROCORTISONE SODIUM SUCCINATE 50 MG: 100 INJECTION, POWDER, FOR SOLUTION INTRAMUSCULAR; INTRAVENOUS at 00:26

## 2020-01-01 RX ADMIN — SODIUM BICARBONATE 125 MEQ: 84 INJECTION, SOLUTION INTRAVENOUS at 05:12

## 2020-01-01 RX ADMIN — THIAMINE HYDROCHLORIDE 500 MG: 100 INJECTION, SOLUTION INTRAMUSCULAR; INTRAVENOUS at 20:11

## 2020-01-01 RX ADMIN — FENTANYL CITRATE 50 MCG: 50 INJECTION INTRAMUSCULAR; INTRAVENOUS at 14:58

## 2020-01-01 RX ADMIN — SODIUM CHLORIDE 500 ML: 9 INJECTION, SOLUTION INTRAVENOUS at 18:11

## 2020-01-01 RX ADMIN — PROPOFOL 40 MCG/KG/MIN: 10 INJECTION, EMULSION INTRAVENOUS at 17:29

## 2020-01-01 RX ADMIN — PROPOFOL 40 MCG/KG/MIN: 10 INJECTION, EMULSION INTRAVENOUS at 03:13

## 2020-01-01 RX ADMIN — INSULIN ASPART 2 UNITS: 100 INJECTION, SOLUTION INTRAVENOUS; SUBCUTANEOUS at 17:35

## 2020-01-01 RX ADMIN — SODIUM CHLORIDE, PRESERVATIVE FREE 10 ML: 5 INJECTION INTRAVENOUS at 08:43

## 2020-01-01 RX ADMIN — SODIUM CHLORIDE, PRESERVATIVE FREE 10 ML: 5 INJECTION INTRAVENOUS at 08:44

## 2020-01-01 RX ADMIN — VANCOMYCIN HYDROCHLORIDE 1250 MG: 5 INJECTION, POWDER, LYOPHILIZED, FOR SOLUTION INTRAVENOUS at 18:18

## 2020-01-01 RX ADMIN — CHLORHEXIDINE GLUCONATE 15 ML: 1.2 RINSE ORAL at 08:04

## 2020-01-01 RX ADMIN — HYDROCORTISONE SODIUM SUCCINATE 50 MG: 100 INJECTION, POWDER, FOR SOLUTION INTRAMUSCULAR; INTRAVENOUS at 08:42

## 2020-01-01 RX ADMIN — Medication: at 00:25

## 2020-01-01 RX ADMIN — LORAZEPAM 1 MG: 2 INJECTION INTRAMUSCULAR; INTRAVENOUS at 20:54

## 2020-01-01 RX ADMIN — HYDROCORTISONE SODIUM SUCCINATE 100 MG: 100 INJECTION, POWDER, FOR SOLUTION INTRAMUSCULAR; INTRAVENOUS at 20:46

## 2020-01-01 RX ADMIN — OXYCODONE HYDROCHLORIDE 5 MG: 5 TABLET ORAL at 06:27

## 2020-01-01 RX ADMIN — PROPOFOL 5 MCG/KG/MIN: 10 INJECTION, EMULSION INTRAVENOUS at 19:52

## 2020-01-01 RX ADMIN — PANTOPRAZOLE SODIUM 40 MG: 40 INJECTION, POWDER, FOR SOLUTION INTRAVENOUS at 20:21

## 2020-01-01 RX ADMIN — VANCOMYCIN HYDROCHLORIDE 1000 MG: 5 INJECTION, POWDER, LYOPHILIZED, FOR SOLUTION INTRAVENOUS at 11:12

## 2020-01-01 RX ADMIN — PROPOFOL 30 MCG/KG/MIN: 10 INJECTION, EMULSION INTRAVENOUS at 12:39

## 2020-01-01 RX ADMIN — ASCORBIC ACID, VITAMIN A PALMITATE, CHOLECALCIFEROL, THIAMINE HYDROCHLORIDE, RIBOFLAVIN-5 PHOSPHATE SODIUM, PYRIDOXINE HYDROCHLORIDE, NIACINAMIDE, DEXPANTHENOL, ALPHA-TOCOPHEROL ACETATE, VITAMIN K1, FOLIC ACID, BIOTIN, CYANOCOBALAMIN: 200; 3300; 200; 6; 3.6; 6; 40; 15; 10; 150; 600; 60; 5 INJECTION, SOLUTION INTRAVENOUS at 09:16

## 2020-01-01 RX ADMIN — PANTOPRAZOLE SODIUM 40 MG: 40 INJECTION, POWDER, FOR SOLUTION INTRAVENOUS at 20:44

## 2020-01-01 RX ADMIN — SODIUM CHLORIDE, PRESERVATIVE FREE 10 ML: 5 INJECTION INTRAVENOUS at 20:28

## 2020-01-01 RX ADMIN — ROCURONIUM BROMIDE 50 MG: 10 SOLUTION INTRAVENOUS at 13:33

## 2020-01-01 RX ADMIN — HYDROMORPHONE HYDROCHLORIDE 0.5 MG: 1 INJECTION, SOLUTION INTRAMUSCULAR; INTRAVENOUS; SUBCUTANEOUS at 08:22

## 2020-01-01 RX ADMIN — MINERAL OIL AND PETROLATUM: 150; 830 OINTMENT OPHTHALMIC at 10:07

## 2020-01-01 RX ADMIN — OXYCODONE HYDROCHLORIDE 5 MG: 5 TABLET ORAL at 19:47

## 2020-01-01 RX ADMIN — HYDROMORPHONE HYDROCHLORIDE 0.5 MG: 1 INJECTION, SOLUTION INTRAMUSCULAR; INTRAVENOUS; SUBCUTANEOUS at 02:04

## 2020-01-01 RX ADMIN — PIPERACILLIN SODIUM,TAZOBACTAM SODIUM 3.38 G: 3; .375 INJECTION, POWDER, FOR SOLUTION INTRAVENOUS at 06:27

## 2020-01-01 RX ADMIN — NOREPINEPHRINE BITARTRATE 0.08 MCG/KG/MIN: 1 INJECTION INTRAVENOUS at 17:45

## 2020-01-01 RX ADMIN — ONDANSETRON 4 MG: 2 INJECTION INTRAMUSCULAR; INTRAVENOUS at 14:12

## 2020-01-01 RX ADMIN — CYCLOBENZAPRINE HYDROCHLORIDE 5 MG: 10 TABLET, FILM COATED ORAL at 09:49

## 2020-01-01 RX ADMIN — HYDROCORTISONE SODIUM SUCCINATE 50 MG: 100 INJECTION, POWDER, FOR SOLUTION INTRAMUSCULAR; INTRAVENOUS at 08:37

## 2020-01-01 RX ADMIN — SODIUM BICARBONATE 125 MEQ: 84 INJECTION, SOLUTION INTRAVENOUS at 16:03

## 2020-01-01 RX ADMIN — FLUOXETINE HYDROCHLORIDE 20 MG: 20 CAPSULE ORAL at 10:32

## 2020-01-01 RX ADMIN — Medication: at 20:30

## 2020-01-01 RX ADMIN — PROPOFOL 40 MCG/KG/MIN: 10 INJECTION, EMULSION INTRAVENOUS at 18:09

## 2020-01-01 RX ADMIN — CHLORHEXIDINE GLUCONATE 15 ML: 1.2 RINSE ORAL at 08:44

## 2020-01-01 RX ADMIN — CHLORHEXIDINE GLUCONATE 15 ML: 1.2 RINSE ORAL at 20:43

## 2020-01-01 RX ADMIN — ACETAMINOPHEN 650 MG: 325 TABLET ORAL at 23:30

## 2020-01-01 RX ADMIN — HYDROMORPHONE HYDROCHLORIDE 0.5 MG: 1 INJECTION, SOLUTION INTRAMUSCULAR; INTRAVENOUS; SUBCUTANEOUS at 04:15

## 2020-01-01 RX ADMIN — SODIUM CHLORIDE 1000 ML: 9 INJECTION, SOLUTION INTRAVENOUS at 20:35

## 2020-01-01 RX ADMIN — LEUCINE, PHENYLALANINE, LYSINE, METHIONINE, ISOLEUCINE, VALINE, HISTIDINE, THREONINE, TRYPTOPHAN, ALANINE, GLYCINE, ARGININE, PROLINE, SERINE, TYROSINE, DEXTROSE: 365; 280; 290; 200; 300; 290; 240; 210; 90; 1035; 515; 575; 340; 250; 20; 20 INJECTION INTRAVENOUS at 17:30

## 2020-01-01 RX ADMIN — SODIUM CHLORIDE 250 ML: 9 INJECTION, SOLUTION INTRAVENOUS at 12:48

## 2020-01-01 RX ADMIN — Medication: at 15:56

## 2020-01-01 RX ADMIN — PIPERACILLIN SODIUM,TAZOBACTAM SODIUM 3.38 G: 3; .375 INJECTION, POWDER, FOR SOLUTION INTRAVENOUS at 06:38

## 2020-01-01 RX ADMIN — INSULIN ASPART 8 UNITS: 100 INJECTION, SOLUTION INTRAVENOUS; SUBCUTANEOUS at 00:41

## 2020-01-01 RX ADMIN — HEPARIN SODIUM 5000 UNITS: 5000 INJECTION INTRAVENOUS; SUBCUTANEOUS at 13:50

## 2020-01-01 RX ADMIN — CHLORHEXIDINE GLUCONATE 15 ML: 1.2 RINSE ORAL at 20:10

## 2020-01-01 RX ADMIN — FLUOXETINE HYDROCHLORIDE 20 MG: 20 CAPSULE ORAL at 08:27

## 2020-01-01 RX ADMIN — INSULIN ASPART 2 UNITS: 100 INJECTION, SOLUTION INTRAVENOUS; SUBCUTANEOUS at 11:06

## 2020-01-01 RX ADMIN — Medication: at 00:38

## 2020-01-01 RX ADMIN — Medication: at 21:51

## 2020-01-01 RX ADMIN — VANCOMYCIN HYDROCHLORIDE 1000 MG: 5 INJECTION, POWDER, LYOPHILIZED, FOR SOLUTION INTRAVENOUS at 17:35

## 2020-01-01 RX ADMIN — HYDROMORPHONE HYDROCHLORIDE 1 MG: 1 INJECTION, SOLUTION INTRAMUSCULAR; INTRAVENOUS; SUBCUTANEOUS at 17:55

## 2020-01-01 RX ADMIN — PROPOFOL 150 MG: 10 INJECTION, EMULSION INTRAVENOUS at 14:12

## 2020-01-01 RX ADMIN — Medication 200 MEQ: at 20:23

## 2020-01-01 RX ADMIN — KETOROLAC TROMETHAMINE 30 MG: 30 INJECTION, SOLUTION INTRAMUSCULAR; INTRAVENOUS at 15:11

## 2020-01-01 RX ADMIN — SODIUM CHLORIDE, PRESERVATIVE FREE 10 ML: 5 INJECTION INTRAVENOUS at 08:22

## 2020-01-01 RX ADMIN — HYDROMORPHONE HYDROCHLORIDE 0.5 MG: 1 INJECTION, SOLUTION INTRAMUSCULAR; INTRAVENOUS; SUBCUTANEOUS at 09:04

## 2020-01-01 RX ADMIN — ALBUMIN (HUMAN) 25 G: 0.25 INJECTION, SOLUTION INTRAVENOUS at 11:35

## 2020-01-01 RX ADMIN — HEPARIN SODIUM 5000 UNITS: 5000 INJECTION INTRAVENOUS; SUBCUTANEOUS at 21:51

## 2020-01-01 RX ADMIN — NOREPINEPHRINE BITARTRATE 0.02 MCG/KG/MIN: 1 INJECTION INTRAVENOUS at 19:51

## 2020-01-01 RX ADMIN — MEROPENEM 500 MG: 500 INJECTION, POWDER, FOR SOLUTION INTRAVENOUS at 15:22

## 2020-01-01 RX ADMIN — VANCOMYCIN HYDROCHLORIDE 1250 MG: 5 INJECTION, POWDER, LYOPHILIZED, FOR SOLUTION INTRAVENOUS at 20:47

## 2020-01-01 RX ADMIN — VASOPRESSIN 0.04 UNITS/MIN: 20 INJECTION INTRAVENOUS at 08:39

## 2020-01-01 RX ADMIN — SODIUM CHLORIDE, PRESERVATIVE FREE 10 ML: 5 INJECTION INTRAVENOUS at 08:20

## 2020-01-01 RX ADMIN — ONDANSETRON 4 MG: 2 INJECTION INTRAMUSCULAR; INTRAVENOUS at 07:14

## 2020-01-01 RX ADMIN — ALBUMIN HUMAN 12.5 G: 0.25 SOLUTION INTRAVENOUS at 19:24

## 2020-01-01 RX ADMIN — THIAMINE HYDROCHLORIDE 500 MG: 100 INJECTION, SOLUTION INTRAMUSCULAR; INTRAVENOUS at 03:01

## 2020-01-01 RX ADMIN — PANTOPRAZOLE SODIUM 40 MG: 40 INJECTION, POWDER, FOR SOLUTION INTRAVENOUS at 20:10

## 2020-01-01 RX ADMIN — HEPARIN SODIUM 5000 UNITS: 5000 INJECTION INTRAVENOUS; SUBCUTANEOUS at 05:33

## 2020-01-01 RX ADMIN — PROPOFOL 40 MCG/KG/MIN: 10 INJECTION, EMULSION INTRAVENOUS at 13:12

## 2020-01-01 RX ADMIN — ONDANSETRON 4 MG: 2 INJECTION INTRAMUSCULAR; INTRAVENOUS at 15:06

## 2020-01-01 RX ADMIN — PROPOFOL 40 MCG/KG/MIN: 10 INJECTION, EMULSION INTRAVENOUS at 00:30

## 2020-01-01 RX ADMIN — HYDROCORTISONE SODIUM SUCCINATE 50 MG: 100 INJECTION, POWDER, FOR SOLUTION INTRAMUSCULAR; INTRAVENOUS at 20:21

## 2020-01-01 RX ADMIN — ALBUMIN (HUMAN) 25 G: 0.25 INJECTION, SOLUTION INTRAVENOUS at 11:49

## 2020-01-01 RX ADMIN — MEROPENEM 500 MG: 500 INJECTION, POWDER, FOR SOLUTION INTRAVENOUS at 14:33

## 2020-01-01 RX ADMIN — HEPARIN SODIUM 5000 UNITS: 5000 INJECTION INTRAVENOUS; SUBCUTANEOUS at 14:59

## 2020-01-01 RX ADMIN — IOVERSOL 90 ML: 678 INJECTION INTRA-ARTERIAL; INTRAVENOUS at 05:35

## 2020-01-01 RX ADMIN — Medication: at 20:10

## 2020-01-01 RX ADMIN — HYDROMORPHONE HYDROCHLORIDE 0.5 MG: 1 INJECTION, SOLUTION INTRAMUSCULAR; INTRAVENOUS; SUBCUTANEOUS at 21:28

## 2020-01-01 RX ADMIN — THIAMINE HYDROCHLORIDE 500 MG: 100 INJECTION, SOLUTION INTRAMUSCULAR; INTRAVENOUS at 12:05

## 2020-01-01 RX ADMIN — ALBUMIN HUMAN 12.5 G: 0.25 SOLUTION INTRAVENOUS at 13:16

## 2020-01-01 RX ADMIN — CHLORHEXIDINE GLUCONATE 15 ML: 1.2 RINSE ORAL at 08:21

## 2020-01-01 RX ADMIN — PROPOFOL 30 MCG/KG/MIN: 10 INJECTION, EMULSION INTRAVENOUS at 06:43

## 2020-01-01 NOTE — ED PROVIDER NOTES
Subjective     History provided by:  Patient  Abdominal Pain   Pain location:  Generalized  Pain quality: aching    Pain radiates to:  Back  Pain severity:  Moderate  Onset quality:  Sudden  Duration:  2 days  Timing:  Constant  Progression:  Waxing and waning  Chronicity:  New  Relieved by:  Nothing  Associated symptoms: nausea and vomiting    Associated symptoms: no chest pain, no dysuria and no fever        Review of Systems   Constitutional: Negative.  Negative for fever.   HENT: Negative.    Respiratory: Negative.    Cardiovascular: Negative.  Negative for chest pain.   Gastrointestinal: Positive for abdominal pain, nausea and vomiting.   Endocrine: Negative.    Genitourinary: Negative.  Negative for dysuria.   Skin: Negative.    Neurological: Negative.    Psychiatric/Behavioral: Negative.    All other systems reviewed and are negative.      No past medical history on file.    Allergies   Allergen Reactions   • Bactrim [Sulfamethoxazole-Trimethoprim]    • Metronidazole        Past Surgical History:   Procedure Laterality Date   • TUBAL ABDOMINAL LIGATION         Family History   Problem Relation Age of Onset   • Breast cancer Neg Hx        Social History     Socioeconomic History   • Marital status:      Spouse name: Not on file   • Number of children: Not on file   • Years of education: Not on file   • Highest education level: Not on file   Tobacco Use   • Smoking status: Never Smoker   Substance and Sexual Activity   • Alcohol use: No   • Drug use: No           Objective   Physical Exam   Constitutional: She is oriented to person, place, and time. She appears well-developed and well-nourished. No distress.   HENT:   Head: Normocephalic and atraumatic.   Right Ear: External ear normal.   Left Ear: External ear normal.   Nose: Nose normal.   Eyes: Pupils are equal, round, and reactive to light. Conjunctivae and EOM are normal.   Neck: Normal range of motion. Neck supple. No JVD present. No tracheal  deviation present.   Cardiovascular: Normal rate, regular rhythm and normal heart sounds.   No murmur heard.  Pulmonary/Chest: Effort normal and breath sounds normal. No respiratory distress. She has no wheezes.   Abdominal: Soft. Bowel sounds are normal. There is generalized tenderness.   Musculoskeletal: Normal range of motion. She exhibits no edema or deformity.   Neurological: She is alert and oriented to person, place, and time. No cranial nerve deficit.   Skin: Skin is warm and dry. No rash noted. She is not diaphoretic. No erythema. No pallor.   Psychiatric: She has a normal mood and affect. Her behavior is normal. Thought content normal.   Nursing note and vitals reviewed.      Procedures           ED Course  ED Course as of Jan 01 1845   Wed Jan 01, 2020   1747 CT abd pelvis rad interpreted:  1. Markedly abnormal examination. There is an approximate 7.5 cm x 5.3 cm cecal mass suggesting colon carcinoma. There is inflammatory stranding in the surrounding mesenteric fat which could reflect neoplastic spread as well as multiple small lymph nodes  in the mesentery of the right lower quadrant concerning for metastatic disease.  2. Slight dilatation of the terminal ileum which demonstrates an air-fluid level which could reflect a degree of partial obstruction of the terminal ileum presumably due to the mass. However, the remainder of the small bowel is normal in course and  caliber.  3. Extensive metastatic disease seen throughout the liver as detailed above.  4. Adenopathy in the periportal region characteristic of metastatic disease. Retroperitoneal lymph nodes are borderline in size and could also reflect metastatic disease. The largest of which measures 1.3 cm at the level of the left renal vein.  5. 2.6 cm low-density mass on the left adrenal gland could represent metastatic disease or possibly an adrenal adenoma.  6. Haziness in the anterior mesentery of the left upper quadrant in the region of the omentum  with subtle nodularity concerning for metastatic disease.  7. Diverticulosis.  8. Cortical scarring right kidney.    [RB]   1826 Discussed CT findings with patient.  Patient does not wish to be admitted in the hospital.    [RB]   1826 Consulted on-call oncology Dr. Burton.  Requested for him to be consulted as well as consult general surgery so pathology can be obtained.    [RB]   1827 Discussed patient with Dr. Grimes, will see pt on 1/2 at 12:15. .    [RB]      ED Course User Index  [RB] Viraj Mckenna PA                                               MDM  Number of Diagnoses or Management Options  Metastatic colon cancer in female (CMS/HCC): new and requires workup     Amount and/or Complexity of Data Reviewed  Clinical lab tests: ordered and reviewed  Tests in the radiology section of CPT®: ordered and reviewed  Discuss the patient with other providers: yes    Risk of Complications, Morbidity, and/or Mortality  Presenting problems: moderate  Diagnostic procedures: moderate  Management options: moderate    Patient Progress  Patient progress: stable      Final diagnoses:   Metastatic colon cancer in female (CMS/HCC)            Viraj Mckenna PA  01/01/20 8100

## 2020-01-02 PROBLEM — C18.2 MALIGNANT NEOPLASM OF ASCENDING COLON (HCC): Status: ACTIVE | Noted: 2020-01-01

## 2020-01-02 NOTE — ANESTHESIA POSTPROCEDURE EVALUATION
Patient: Gracy Norman    Procedure Summary     Date:  01/02/20 Room / Location:   [facility] 01 /  [facility]    Anesthesia Start:  1405 Anesthesia Stop:  1515    Procedures:       INSERTION VENOUS ACCESS DEVICE (N/A )      LIVER BIOPSY LAPAROSCOPIC (N/A Abdomen) Diagnosis:       Malignant neoplasm of ascending colon (CMS/HCC)      (Malignant neoplasm of ascending colon (CMS/HCC) [C18.2])    Surgeon:  Sophie Grimes MD Provider:  Rich Lubin MD    Anesthesia Type:  general ASA Status:  2          Anesthesia Type: general    Vitals  Vitals Value Taken Time   /73 1/2/2020  3:46 PM   Temp 97.2 °F (36.2 °C) 1/2/2020  3:16 PM   Pulse 80 1/2/2020  3:46 PM   Resp 18 1/2/2020  3:46 PM   SpO2 99 % 1/2/2020  3:46 PM           Post Anesthesia Care and Evaluation    Patient location during evaluation: PHASE II  Patient participation: complete - patient participated  Level of consciousness: awake and alert  Pain score: 1  Pain management: adequate  Airway patency: patent  Anesthetic complications: No anesthetic complications  PONV Status: controlled  Cardiovascular status: acceptable  Respiratory status: acceptable  Hydration status: acceptable

## 2020-01-02 NOTE — OP NOTE
[id number]-x-Llwd insertion    Surgeon:  Sophie Grimes M.D., F.A.C.S.    Assistant;  none    Indications: This patient presents for placement of an [id number]-f-[id number] for chemotherapy    Pre-operative Diagnosis: stage IV colon cancer    Post-operative Diagnosis:  Same    Anesthesia: general    Procedure Details   After obtaining informed consent and receiving preoperative antibiotic patient was taken to the operating room and placed in the supine position.  After administration of anesthesia the chest and neck were prepped and draped in sterile fashion.  The left subclavian vein was cannulated with use of the Seldinger technique.  The guidewire was passed and position was confirmed under fluoroscopy.  Following this, a transverse incision was made inferior to the site of guidewire entry and carried down to the pectoralis fashion.  A pocket large enough to admit the port without tension was created.  The guidewire was brought through the incision.    The port and catheter were attached and flushed with heparinized saline, 50 units per mL.  The catheter was cut to appropriate length.  Under fluoroscopic guidance, the dilator sheath was passed over the guidewire.  The guidewire and dilator were removed and the catheter was passed over the peel-away sheath which was then removed.  There was no resistance to irrigation or aspiration of blood.  Fluoroscopy confirmed the catheter to be in good position without kinking.  The port was then sutured in with 3-0 Prolene sutures.  After ensuring hemostasis, the skin was closed in a 2 layer closure of 3-0 Vicryl sutures to Hoda's fascia.  The skin was closed with a 4-0 Vicryl subcuticular stitch.  Dressing was placed.     Patient tolerated the procedure well, was taken to the recovery room in stable condition where chest x-ray pending    Instrument, sponge, and needle counts were correct at closure and at the conclusion of the case.     Findings:  Flouroscopy demonstrated good position  of the port    Estimated Blood Loss: 10 mL or less    Blood administered:  none           Drains: None           Specimens: None        Grafts and Implants: yes         Complications: none           Condition: Stable

## 2020-01-02 NOTE — TELEPHONE ENCOUNTER
Called Miss Swanson in our Finance Department and ask what to do about a pre-cert on Miss Norman's Liver Biopsy. She said that as a rule when they activate Medicaid it goes back to at least to the first of the month. I guess I'll wait until she has an insurance # and retro authorize it. I really have not other choice. ARABELLA

## 2020-01-02 NOTE — ANESTHESIA PREPROCEDURE EVALUATION
Anesthesia Evaluation     Patient summary reviewed and Nursing notes reviewed   no history of anesthetic complications:  NPO Solid Status: > 8 hours  NPO Liquid Status: > 8 hours           Airway   Mallampati: II  TM distance: >3 FB  Neck ROM: full  no difficulty expected  Dental - normal exam     Pulmonary - negative pulmonary ROS and normal exam   (-) asthma, not a smoker  Cardiovascular - negative cardio ROS and normal exam  Exercise tolerance: good (4-7 METS)    NYHA Classification: II    (-) hypertension, past MI, dysrhythmias, angina, CHF      Neuro/Psych  (+) headaches, psychiatric history Anxiety and Depression,     (-) seizures, CVA  GI/Hepatic/Renal/Endo    (+) obesity,  GERD,  liver disease,   (-) no renal disease, diabetes, no thyroid disorder    Musculoskeletal (-) negative ROS    Abdominal  - normal exam    Bowel sounds: normal.   Substance History - negative use  (-) alcohol use, drug use     OB/GYN negative ob/gyn ROS         Other      history of cancer    (-) arthritis                  Anesthesia Plan    ASA 2     general     intravenous induction     Anesthetic plan, all risks, benefits, and alternatives have been provided, discussed and informed consent has been obtained with: patient.  Use of blood products discussed with patient  Consented to blood products.

## 2020-01-02 NOTE — ANESTHESIA PROCEDURE NOTES
Airway  Urgency: elective    Date/Time: 1/2/2020 2:12 PM  Airway not difficult    General Information and Staff    Patient location during procedure: OR  Anesthesiologist: Rich Lubin MD  CRNA: Hammad Mcclure CRNA    Indications and Patient Condition  Indications for airway management: airway protection    Preoxygenated: yes  MILS maintained throughout  Mask difficulty assessment: 0 - not attempted    Final Airway Details  Final airway type: endotracheal airway      Successful airway: ETT  Cuffed: yes   Successful intubation technique: direct laryngoscopy  Facilitating devices/methods: intubating stylet  Endotracheal tube insertion site: oral  Blade: Fabi  Blade size: 3  ETT size (mm): 7.0  Cormack-Lehane Classification: grade IIa - partial view of glottis  Placement verified by: chest auscultation, capnometry and palpation of cuff   Cuff volume (mL): 10  Measured from: lips  ETT/EBT  to lips (cm): 21  Number of attempts at approach: 1  Assessment: lips, teeth, and gum same as pre-op and atraumatic intubation    Additional Comments  Dentition and lips same as preop

## 2020-01-02 NOTE — H&P (VIEW-ONLY)
Subjective   Gracy Norman is a 44 y.o. female is being seen for consultation today at the request of ALLY Crockett for ER follow up.    History of Present Illness  Ms. Norman was seen in the office today for follow-up following her emergency room visit yesterday.  The patient presented to the emergency room with complaints of nausea and vomiting and felt that she had a stomach virus.  CT was done which demonstrated findings suggestive of right colon cancer metastatic to the liver.  The patient states she feels well.  She has not noticed any blood in her stool.  She has not had any fatigue.  She has not had any unexplained weight loss.  The patient has no family history of colon cancer  Allergies   Allergen Reactions   • Bactrim [Sulfamethoxazole-Trimethoprim]    • Metronidazole      Current Outpatient Medications   Medication Sig Dispense Refill   • FLUoxetine (PROzac) 40 MG capsule      • ibuprofen (ADVIL,MOTRIN) 800 MG tablet Take 1 tablet by mouth Every 8 (Eight) Hours As Needed for Mild Pain . 90 tablet 0   • montelukast (SINGULAIR) 10 MG tablet Take 10 mg by mouth Every Night.     • ondansetron ODT (ZOFRAN-ODT) 4 MG disintegrating tablet Take 1 tablet by mouth Every 6 (Six) Hours As Needed for Nausea. 20 tablet 0   • pantoprazole (PROTONIX) 20 MG EC tablet Take 20 mg by mouth Daily.     • pseudoephedrine (SUDAFED) 60 MG tablet Take 1 tablet by mouth Every 6 (Six) Hours As Needed for Congestion. 20 tablet 0   • amoxicillin (AMOXIL) 875 MG tablet Take 1 tablet by mouth 2 (Two) Times a Day. 20 tablet 0   • guaifenesin (ROBITUSSIN) 100 MG/5ML liquid Take 10 mL by mouth Every 4 (Four) Hours As Needed for Cough. 240 mL 0   • levocetirizine (XYZAL) 5 MG tablet Take 5 mg by mouth Every Evening.     • meclizine (ANTIVERT) 25 MG tablet Take 1 tablet by mouth 3 (Three) Times a Day As Needed for dizziness. 20 tablet 0     No current facility-administered medications for this visit.      History reviewed. No pertinent past  "medical history.  Past Surgical History:   Procedure Laterality Date   • TUBAL ABDOMINAL LIGATION       Review of Systems  General: negative  Integumentary: negative  Eyes: negative  ENT: negative  Respiratory: shortness of breath  Gastrointestinal: heartburn  Cardiovascular: negative  Neurological: headaches  Psychiatric: anxiety  Hematologic/Lymphatic: negative  Genitourinary: negative  Musculoskeletal: negative  Endocrine: negative  Breasts: negative    The following portions of the patient's history were reviewed and updated as appropriate: allergies, current medications, past family history, past medical history, past social history, past surgical history and problem list.    Objective   Ht 160 cm (63\")   Wt 95.3 kg (210 lb 3.2 oz)   BMI 37.24 kg/m²    Physical Exam  General:  This is a WD WN female in no acute distress  HEENT exam:  WNL. Sclera are anicteric.  EOMI  Neck:  supple, FROM, without thyromegaly, cervical or supraclavicular adenopathy  Lungs:  Respiratory effort normal. Auscultation: Clear, without wheezes, rhonchi, rales  Heart:  Regular rate and rhythm, without murmur, gallop, rub.  No pedal edema  Abdomen: Bowel sounds present.  The abdomen is soft, nontender, nondistended.  No palpable mass  Musculoskeletal:  muscle strength/tone is normal.  Gait and station: normal. No digital cyanosis  Psyc:  alert, oriented x 3.  Mood and affect are appropriate  skin:  Warm with good turgor.  Without rash or lesion  extremities:  Examination of the extremities revealed no cyanosis, clubbing or edema.    Results/Data  CT report and images were reviewed and I agree with the assessment.  Case discussed with Dr. Burton  Procedures       Assessment/Plan   Colon cancer, metastatic to liver    Proceed with port placement and laparoscopic liver biopsy       Discussion/Summary: Dr. Burton to see patient in follow-up in office next week to recommend treatment planned and review results    Patient's Body mass index is " 37.24 kg/m². BMI is above normal parameters. Recommendations include: educational material.       Future Appointments   Date Time Provider Department Center   1/9/2020  3:00 PM CELSO Burton MD [facility]         Please note that portions of this note were completed with a voice recognition program.  This document has been electronically signed by Sophie FLORES MD on January 2, 2020 1:01 PM

## 2020-01-02 NOTE — OP NOTE
Laparoscopic liver biopsy    Pre-op: Metastatic colon cancer    Post-op:  Same    Surgeon:  Sophie Grimes M.D., F.A.C.S.    Assistant: Andrea    Anesthesia:  general      Indications: Patient with CT findings suggestive of ascending colon carcinoma with liver metastases       Procedure Details   After obtaining informed consent and receiving preoperative antibiotics and with venous compression boots in place, the patient was taken to the operating room and placed under anesthesia.  Incision was made above the umbilicus and the Veress needle was inserted.  Pneumoperitoneum was obtained to 15 mmHg and the 5 mm trocar was placed.  Camera was inserted, confirming position within the abdomen.  Patient was placed in reverse Trendelenburg position and under direct visualization a left upper quadrant and right upper quadrant 5 mm trocar were placed.  With use of the EndoShears a nodule at the edge of the liver just medial to the gallbladder was excised and removed and sent for permanent section.  After obtaining hemostasis with the cautery and irrigation the skin incisions were closed with 4-0 Vicryl sutures.  Half percent Marcaine with epinephrine was injected for analgesia    Findings:  Extensive metastatic disease with multiple tumor nodules in the right and left lobes of the liver.  A sending colon tumor mass identified which was not fixed to the pelvic or lateral sidewall    Estimated Blood Loss:  Minimal    Blood Administered: none           Drains: none           Specimens:   ID Type Source Tests Collected by Time   A : liver biopsy  Tissue Liver TISSUE PATHOLOGY EXAM Sophie Grimes MD 1/2/2020 [id number]        Grafts and Implants: No       Complications:  none           Disposition: PACU - hemodynamically stable.           Condition: stable

## 2020-01-02 NOTE — PROGRESS NOTES
Subjective   Gracy Norman is a 44 y.o. female is being seen for consultation today at the request of ALLY Crockett for ER follow up.    History of Present Illness  Ms. Norman was seen in the office today for follow-up following her emergency room visit yesterday.  The patient presented to the emergency room with complaints of nausea and vomiting and felt that she had a stomach virus.  CT was done which demonstrated findings suggestive of right colon cancer metastatic to the liver.  The patient states she feels well.  She has not noticed any blood in her stool.  She has not had any fatigue.  She has not had any unexplained weight loss.  The patient has no family history of colon cancer  Allergies   Allergen Reactions   • Bactrim [Sulfamethoxazole-Trimethoprim]    • Metronidazole      Current Outpatient Medications   Medication Sig Dispense Refill   • FLUoxetine (PROzac) 40 MG capsule      • ibuprofen (ADVIL,MOTRIN) 800 MG tablet Take 1 tablet by mouth Every 8 (Eight) Hours As Needed for Mild Pain . 90 tablet 0   • montelukast (SINGULAIR) 10 MG tablet Take 10 mg by mouth Every Night.     • ondansetron ODT (ZOFRAN-ODT) 4 MG disintegrating tablet Take 1 tablet by mouth Every 6 (Six) Hours As Needed for Nausea. 20 tablet 0   • pantoprazole (PROTONIX) 20 MG EC tablet Take 20 mg by mouth Daily.     • pseudoephedrine (SUDAFED) 60 MG tablet Take 1 tablet by mouth Every 6 (Six) Hours As Needed for Congestion. 20 tablet 0   • amoxicillin (AMOXIL) 875 MG tablet Take 1 tablet by mouth 2 (Two) Times a Day. 20 tablet 0   • guaifenesin (ROBITUSSIN) 100 MG/5ML liquid Take 10 mL by mouth Every 4 (Four) Hours As Needed for Cough. 240 mL 0   • levocetirizine (XYZAL) 5 MG tablet Take 5 mg by mouth Every Evening.     • meclizine (ANTIVERT) 25 MG tablet Take 1 tablet by mouth 3 (Three) Times a Day As Needed for dizziness. 20 tablet 0     No current facility-administered medications for this visit.      History reviewed. No pertinent past  "medical history.  Past Surgical History:   Procedure Laterality Date   • TUBAL ABDOMINAL LIGATION       Review of Systems  General: negative  Integumentary: negative  Eyes: negative  ENT: negative  Respiratory: shortness of breath  Gastrointestinal: heartburn  Cardiovascular: negative  Neurological: headaches  Psychiatric: anxiety  Hematologic/Lymphatic: negative  Genitourinary: negative  Musculoskeletal: negative  Endocrine: negative  Breasts: negative    The following portions of the patient's history were reviewed and updated as appropriate: allergies, current medications, past family history, past medical history, past social history, past surgical history and problem list.    Objective   Ht 160 cm (63\")   Wt 95.3 kg (210 lb 3.2 oz)   BMI 37.24 kg/m²   Physical Exam  General:  This is a WD WN female in no acute distress  HEENT exam:  WNL. Sclera are anicteric.  EOMI  Neck:  supple, FROM, without thyromegaly, cervical or supraclavicular adenopathy  Lungs:  Respiratory effort normal. Auscultation: Clear, without wheezes, rhonchi, rales  Heart:  Regular rate and rhythm, without murmur, gallop, rub.  No pedal edema  Abdomen: Bowel sounds present.  The abdomen is soft, nontender, nondistended.  No palpable mass  Musculoskeletal:  muscle strength/tone is normal.  Gait and station: normal. No digital cyanosis  Psyc:  alert, oriented x 3.  Mood and affect are appropriate  skin:  Warm with good turgor.  Without rash or lesion  extremities:  Examination of the extremities revealed no cyanosis, clubbing or edema.    Results/Data  CT report and images were reviewed and I agree with the assessment.  Case discussed with Dr. Burton  Procedures       Assessment/Plan   Colon cancer, metastatic to liver    Proceed with port placement and laparoscopic liver biopsy       Discussion/Summary: Dr. Burton to see patient in follow-up in office next week to recommend treatment planned and review results    Patient's Body mass index is " 37.24 kg/m². BMI is above normal parameters. Recommendations include: educational material.       Future Appointments   Date Time Provider Department Center   1/9/2020  3:00 PM CELSO Burton MD [facility]         Please note that portions of this note were completed with a voice recognition program.

## 2020-01-09 PROBLEM — Z95.828 PORT-A-CATH IN PLACE: Status: ACTIVE | Noted: 2020-01-01

## 2020-01-09 NOTE — PROGRESS NOTES
"Subjective   Gracy Norman is a 44 y.o. female here today for post op and pathology report.    History of Present Illness  Ms. Norman was seen in the office today for first postoperative visit following port placement and laparoscopic liver biopsy which demonstrated poorly differentiated adenocarcinoma consistent with colon primary.  The patient voices no complaints related to her surgery.  She did have constipation immediately after surgery which has resolved.  She has no symptoms of obstruction.  She is scheduled to see Dr. Burton this afternoon regarding treatment.  The patient complains of difficulty sleeping.  She is concerned about some changes in medication her primary care provider made.  She also stated that friends had suggested she would be better off in [place] for her treatment and this was discussed at length.  Allergies   Allergen Reactions   • Bactrim [Sulfamethoxazole-Trimethoprim]    • Metronidazole          Current Outpatient Medications   Medication Sig Dispense Refill   • cetirizine (zyrTEC) 10 MG tablet Take 10 mg by mouth Daily.     • montelukast (SINGULAIR) 10 MG tablet Take 10 mg by mouth Every Night.     • ondansetron ODT (ZOFRAN-ODT) 4 MG disintegrating tablet Dissolve 1 tablet on the tongue Every 8 (Eight) Hours As Needed for Nausea or Vomiting. 15 tablet 0   • pantoprazole (PROTONIX) 20 MG EC tablet Take 20 mg by mouth Daily.     • FLUoxetine (PROzac) 40 MG capsule      • HYDROcodone-acetaminophen (NORCO) 7.5-325 MG per tablet Take 1 tablet by mouth 4 (Four) Times a Day As Needed for Moderate Pain. 12 tablet 0     No current facility-administered medications for this visit.        Objective   /88 (BP Location: Left arm)   Pulse 105   Ht 160 cm (63\")   Wt 95.1 kg (209 lb 9.6 oz)   LMP 12/31/2019 (Exact Date)   BMI 37.13 kg/m²    Physical Exam  This is a well-developed well-nourished female in no acute distress  HEENT examination: Sclera are anicteric  Abdomen: Nontender, " nondistended  Skin/incisions: Incision sites were inspected and demonstrate no drainage or erythema  Results/Data  Pathology report was reviewed and discussed with the patient    Procedures     Assessment/Plan   A sending colon cancer metastatic to the liver.    Patient has scheduled appointment with medical oncology this afternoon.       Discussion/Summary: I talked with the patient about the NCCN guidelines and reassured her that she would be getting excellent care by staying here in [place].  We also discussed that if she has a good response to treatment there would probably be a window of opportunity down the road where colon resection would be reasonable and I would be happy to see her back at that time.     Future Appointments   Date Time Provider Department Center   1/9/2020  3:00 PM CELSO Burton MD [facility]         Please note that portions of this note were completed with a voice recognition program.

## 2020-01-09 NOTE — PROGRESS NOTES
"  Name:  Gracy Norman  :  1975  Date:  2020     REFERRING PROVIDER  ALLY Crockett    PRIMARY CARE PHYSICIAN  Almas Stone MD    REASON FOR CONSULTATION  1. Malignant neoplasm of ascending colon (CMS/HCC)      CHIEF COMPLAINT  None.    Dear Dr. Stone,    HISTORY OF PRESENT ILLNESS:   I saw Ms. Norman in initial consultation today in our medical oncology clinic. As you are aware, she is a pleasant, 44 y.o.,  female with minimal past medical history who presented to the ED on 2020 with symptoms of fatigue, nausea and intermittent vomiting that she thought was related to a stomach virus. Unfortunately, and very unexpectedly, a CT of the abdomen and pelvis identified a large, but nonobstructing, ~7.5 x 5.3 cm cecal mass, numerous areas of adenopathy and extensive lesions throughout the liver, highly concerning for metastatic colon cancer. As she felt well enough to be discharged from the ED, she was referred to both our clinic and general surgery. Upon outpatient follow up, surgery has since placed a powerport and biopsied one of the liver lesions, confirming the diagnosis. She now presents to our clinic for further evaluation and management.    INTERIM HISTORY:  Ms. Norman presents to clinic today for initial consultation accompanied by her . They confirm the above history. She has no known family history of colon cancer. She and her  have five children (the youngest is thirteen). Other than mild, persistent fatigue and intermittent nausea, she has no specific symptoms and feels to be in her usual state of health. She denies any pain, changes in appetite or changes in her bowel movements. She is keeping her attitude as positive as she can, because doing so is \"all that can be done\".    Past Medical History:   Diagnosis Date   • Anxiety    • Cancer (CMS/HCC)     colon   • GERD (gastroesophageal reflux disease)    • Headache    • Snoring        Past Surgical History: "   Procedure Laterality Date   • LIVER BIOPSY N/A 1/2/2020    Procedure: LIVER BIOPSY LAPAROSCOPIC;  Surgeon: Sophie Grimes MD;  Location: [facility] OR;  Service: General   • TUBAL ABDOMINAL LIGATION     • VENOUS ACCESS DEVICE (PORT) INSERTION N/A 1/2/2020    Procedure: INSERTION VENOUS ACCESS DEVICE;  Surgeon: Sophie Grimes MD;  Location: [facility] OR;  Service: General       Social History     Socioeconomic History   • Marital status:      Spouse name: Not on file   • Number of children: Not on file   • Years of education: Not on file   • Highest education level: Not on file   Tobacco Use   • Smoking status: Never Smoker   • Smokeless tobacco: Never Used   Substance and Sexual Activity   • Alcohol use: No   • Drug use: No   • Sexual activity: Defer       Family History   Problem Relation Age of Onset   • Hypertension Mother    • Diabetes Mother    • Cancer Father         stomach   • Breast cancer Neg Hx        Allergies   Allergen Reactions   • Bactrim [Sulfamethoxazole-Trimethoprim] Swelling     Throat swells    • Metronidazole Swelling     Throat swelling and facial rash        Current Outpatient Medications   Medication Sig Dispense Refill   • cetirizine (zyrTEC) 10 MG tablet Take 10 mg by mouth Daily.     • FLUoxetine (PROzac) 40 MG capsule      • HYDROcodone-acetaminophen (NORCO) 7.5-325 MG per tablet Take 1 tablet by mouth 4 (Four) Times a Day As Needed for Moderate Pain. 12 tablet 0   • montelukast (SINGULAIR) 10 MG tablet Take 10 mg by mouth Every Night.     • ondansetron ODT (ZOFRAN-ODT) 4 MG disintegrating tablet Dissolve 1 tablet on the tongue Every 8 (Eight) Hours As Needed for Nausea or Vomiting. 15 tablet 0   • pantoprazole (PROTONIX) 20 MG EC tablet Take 20 mg by mouth Daily.       No current facility-administered medications for this visit.      Facility-Administered Medications Ordered in Other Visits   Medication Dose Route Frequency Provider Last Rate Last Dose   • heparin flush  "(porcine) 100 UNIT/ML injection 500 Units  500 Units Intravenous PRN CELSO Burton MD   500 Units at 01/09/20 1628   • sodium chloride 0.9 % flush 10 mL  10 mL Intravenous PRN CELSO Burton MD   10 mL at 01/09/20 1627     REVIEW OF SYSTEMS  CONSTITUTIONAL:  No fever, chills or night sweats. Mild, chronic fatigue.  EYES:  No blurry vision, diplopia or other vision changes.  ENT:  No hearing loss, nosebleeds or sore throat.  CARDIOVASCULAR:  No palpitations, arrhythmia, syncopal episodes or edema.  PULMONARY:  No hemoptysis, wheezing, chronic cough or shortness of breath.  GASTROINTESTINAL:  No constipation or diarrhea. No abdominal pain. Intermittent nausea with occasional (but very infrequent) vomiting.  GENITOURINARY:  No hematuria, kidney stones or frequent urination.  MUSCULOSKELETAL:  No joint or back pains.  INTEGUMENTARY: No rashes or pruritus.  ENDOCRINE:  No excessive thirst or hot flashes.  HEMATOLOGIC:  No history of free bleeding, spontaneous bleeding or clotting.  IMMUNOLOGIC:  No allergies or frequent infections.  NEUROLOGIC: No numbness, tingling, seizures or weakness.  PSYCHIATRIC:  No anxiety or depression.    PHYSICAL EXAMINATION  /85   Pulse 117   Temp 98.9 °F (37.2 °C) (Oral)   Resp 18   Ht 165.1 cm (65\")   Wt 91.6 kg (202 lb)   LMP 12/31/2019 (Exact Date)   SpO2 98%   BMI 33.61 kg/m²     Pain Score:  Pain Score    01/09/20 1520   PainSc: 0-No pain       PHQ-Score Total:  PHQ-9 Total Score: 1    GENERAL:  A well-developed, well-nourished, obese,  female in no acute distress.  HEENT:  Pupils equally round and reactive to light. Extraocular muscles intact.  CARDIOVASCULAR:  Regular rate and rhythm.  No murmurs, gallops or rubs.  LUNGS:  Clear to auscultation bilaterally.  ABDOMEN:  Soft, nontender, nondistended with positive bowel sounds.  EXTREMITIES:  No clubbing, cyanosis or edema bilaterally.  SKIN:  No rashes or petechiae.  NEURO:  Cranial nerves grossly intact. No " focal deficits.  PSYCH:  Alert and oriented x3.    LABORATORY    Lab Results   Component Value Date    WBC 13.21 (H) 01/01/2020    HGB 10.1 (L) 01/01/2020    HCT 33.4 (L) 01/01/2020    MCV 81.1 01/01/2020     (H) 01/01/2020    NEUTROABS 10.15 (H) 01/01/2020       Lab Results   Component Value Date     01/01/2020    K 3.9 01/01/2020     01/01/2020    CO2 22.4 01/01/2020    BUN 5 (L) 01/01/2020    CREATININE 0.69 01/01/2020    GLUCOSE 109 (H) 01/01/2020    CALCIUM 8.9 01/01/2020    AST 33 (H) 01/01/2020    ALT 24 01/01/2020    ALKPHOS 184 (H) 01/01/2020    BILITOT 0.3 01/01/2020    PROTEINTOT 7.9 01/01/2020    ALBUMIN 3.72 01/01/2020     IMAGING  CT abdomen and pelvis with contrast (01/01/2020):  Impression:  1) Markedly abnormal examination. There is an approximately 7.5 cm x 5.3 cm cecal mass suggesting colon carcinoma. There is inflammatory stranding in the surrounding mesenteric fat which could reflect neoplastic spread as well as multiple small lymph nodes in the mesentery of the right lower quadrant concerning for metastatic disease.  2) Slight dilatation of the terminal ileum which demonstrates an air-fluid level which could reflect a degree of partial obstruction of the terminal ileum presumably due to the mass. However, the remainder of the small bowel is normal in course and caliber.  3) Extensive metastatic disease seen throughout the liver. There are multiple, too numerous to count low-density masses involving both hepatic lobes characteristic of hepatic metastatic disease. The largest mass in the left lobe measures 4.4 x 4.3 cm while the largest mass in the inferior right hepatic lobe measures 4.9 x 4.4 cm.  4) Adenopathy in the periportal region characteristic of metastatic disease. Retroperitoneal lymph nodes are borderline in size and could also reflect metastatic disease. The largest of which measures 1.3 cm at the level of the left renal vein.  5) 2.6 cm low-density mass on the  left adrenal gland could represent metastatic disease or possibly an adrenal adenoma.  6) Haziness in the anterior mesentery of the left upper quadrant in the region of the omentum with subtle nodularity concerning for metastatic disease.  7) Diverticulosis.  8) Cortical scarring right kidney.    PATHOLOGY  Liver lesion, biopsy (01/02/2020):  Metastatic, poorly differentiated adenocarcinoma, morphologically compatible with colonic origin. MSI testing pending.    IMPRESSION AND PLAN  Ms. Normna is a 44 y.o., white female with:  Metastatic colon adenocarcinoma: Diagnosed on New Year's Day after presenting to the ED with symptoms of fatigue and nausea. A CT of the abdomen and pelvis unexpectedly identified a nonobstructing, but large (7.5 x 5.3 cm) cecal mass (the probable primary), extensive adenopathy (including the periportal and retroperitoneal regions) and multiple, too numerous to count low-density lesions in both lobes of the liver (the largest measuring ~4.9 x 4.4 cm). Surgery performed a biopsy of one of these liver lesions on 01/02/2020 and confirmed the diagnosis. I had a long discussion with the patient and her  in clinic today regarding this diagnosis and its prognosis. They are aware that this disease is not curable; however, particularly given her young age and excellent performance status, it is treatable, and sustained remissions are possible (and a debulking surgery eventually may be an option if she responds to initial, systemic therapy). By default, the standard, front-line palliative treatment regimen is [id number] FOLFOX + Avastin; and, as a powerport was placed at the time of her biopsy, we could potentially start this as early as next week. However, prior to proceeding, we will await the pending results of MSI testing. If she is found to be MSI-H, then she would be an excellent candidate for the NRG- trial (and she would be very interested). If she is found to be microsatellite stable,  we will plan to see her back in clinic in ~two to three weeks, on day #1 of the second cycle of FOLFOX + Avastin. If she is MSI-H, we will change the follow up plan accordingly. In the meantime, regardless, we will also request that her liver biopsy specimen be sent off for complete molecular profiling (CARIS). The patient and her  were in agreement with these plans.    It is a pleasure to participate in Ms. Norman's care. Please do not hesitate to call with any questions or concerns that you may have.    A total of 60 minutes were spent coordinating this patient’s care in clinic today; more than 50% of this time was face-to-face with the patient and her , reviewing her medical history, discussing the diagnosis and its prognosis and counseling on the current evaluation, likely treatment and followup plan. All questions were answered to their satisfaction.    FOLLOW UP  MSI testing on last week's liver biopsy pending. If stable, then proceed with palliative, [id number] FOLFOX + Avastin by the end of next week and return to clinic in ~2.5 weeks, on day #1 of cycle #2. If MSI-H, enrollment into the NRG- trial will likely be pursued instead (and the follow up will be adjusted accordingly).          This document was electronically signed by CELSO Burton MD January 9, 2020 4:42 PM      CC: MD Viraj Hernandez PA Barbara Michna, MD

## 2020-01-12 NOTE — ED NOTES
Called [facility] Spoke with Belen requesting GI Per Provider Fatmata Vaughn Speaking with Dr. Harper [place] Rect[facility] Specialist.     Nikki Otoole  01/12/20 2777

## 2020-01-12 NOTE — ED PROVIDER NOTES
Subjective   44-year-old female presented to the emergency room with complaints of vomiting.  She states this started yesterday.  She states is gotten progressively worse.  She is having difficulty now keeping even clear liquids down.  She denies diarrhea or constipation.  She denies significant abdominal pain.  She denies chest pain, shortness of breath, or fever.  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  She recently had liver biopsy on 1/2/2020 by Dr. Grimes.      History provided by:  Patient   used: No    Vomiting   The primary symptoms include abdominal pain, nausea and vomiting. Primary symptoms do not include fever, diarrhea, hematemesis, dysuria, myalgias or arthralgias. The illness began yesterday. The onset was sudden. The problem has been gradually worsening.   The illness is also significant for bloating. The illness does not include chills, anorexia or constipation. Associated medical issues comments: colon cancer with metastasis to the liver.       Review of Systems   Constitutional: Negative.  Negative for chills and fever.   HENT: Negative.  Negative for congestion, facial swelling, hearing loss, nosebleeds, rhinorrhea, sore throat, tinnitus and trouble swallowing.    Eyes: Negative.  Negative for pain, discharge, redness and itching.   Respiratory: Negative for cough, chest tightness, shortness of breath and wheezing.    Cardiovascular: Negative.  Negative for chest pain.   Gastrointestinal: Positive for abdominal pain, bloating, nausea and vomiting. Negative for anorexia, constipation, diarrhea and hematemesis.   Endocrine: Negative.    Genitourinary: Negative.  Negative for dysuria.   Musculoskeletal: Negative for arthralgias and myalgias.   Skin: Negative.    Neurological: Negative.    Psychiatric/Behavioral: Negative.    All other systems reviewed and are negative.      Past Medical History:   Diagnosis Date   •  Anxiety    • Cancer (CMS/HCC)     colon   • GERD (gastroesophageal reflux disease)    • Headache    • Snoring        Allergies   Allergen Reactions   • Bactrim [Sulfamethoxazole-Trimethoprim] Swelling     Throat swells    • Metronidazole Swelling     Throat swelling and facial rash        Past Surgical History:   Procedure Laterality Date   • LIVER BIOPSY N/A 1/2/2020    Procedure: LIVER BIOPSY LAPAROSCOPIC;  Surgeon: Sophie Grimes MD;  Location: [facility] OR;  Service: General   • TUBAL ABDOMINAL LIGATION     • VENOUS ACCESS DEVICE (PORT) INSERTION N/A 1/2/2020    Procedure: INSERTION VENOUS ACCESS DEVICE;  Surgeon: Sophie Grimes MD;  Location: [facility] OR;  Service: General       Family History   Problem Relation Age of Onset   • Hypertension Mother    • Diabetes Mother    • Cancer Father         stomach   • Breast cancer Neg Hx        Social History     Socioeconomic History   • Marital status:      Spouse name: Not on file   • Number of children: Not on file   • Years of education: Not on file   • Highest education level: Not on file   Tobacco Use   • Smoking status: Never Smoker   • Smokeless tobacco: Never Used   Substance and Sexual Activity   • Alcohol use: No   • Drug use: No   • Sexual activity: Defer           Objective   Physical Exam   Constitutional: She is oriented to person, place, and time. She appears well-developed and well-nourished. No distress.   HENT:   Head: Normocephalic and atraumatic.   Right Ear: External ear normal.   Left Ear: External ear normal.   Nose: Nose normal.   Eyes: Pupils are equal, round, and reactive to light. Conjunctivae and EOM are normal.   Neck: Normal range of motion. Neck supple. No JVD present. No tracheal deviation present.   Cardiovascular: Normal rate, regular rhythm and normal heart sounds.   No murmur heard.  Pulmonary/Chest: Effort normal and breath sounds normal. No respiratory distress. She has no wheezes.   Abdominal: Soft. Bowel sounds are  normal. She exhibits no distension. There is no tenderness. There is no rebound.   Musculoskeletal: Normal range of motion. She exhibits no edema or deformity.   Neurological: She is alert and oriented to person, place, and time. No cranial nerve deficit.   Skin: Skin is warm and dry. No rash noted. She is not diaphoretic. No erythema. No pallor.   Psychiatric: She has a normal mood and affect. Her behavior is normal. Thought content normal.   Nursing note and vitals reviewed.      Procedures           ED Course  ED Course as of Jan 12 0703   Sun Jan 12, 2020   0457 IMPRESSION:  No acute findings. Nonobstructive bowel gas pattern.     Signer Name: Hang Gutierrez MD   Signed: 1/12/2020 4:39 AM    [MH]   0601 IMPRESSION:     1. Again noted is a large mass within the cecum and proximal ascending colon, most consistent with colon carcinoma. There is extensive metastatic lymphadenopathy surrounding the cecum and throughout the retroperitoneum, small bowel mesentery, and upper  abdomen. Worsening nodular thickening throughout the omentum, consistent with peritoneal carcinomatosis.  2. Extensive hepatic metastatic disease.  3. 2.7 cm left adrenal mass, highly suspicious for metastatic disease.  4. Small amount of abdominal and pelvic ascites.  5. Increasing dilatation of the terminal ileum with fecalization of the ileal contents and air-fluid levels in the distal ileum, likely representing developing small bowel obstruction secondary to the colonic mass.              Signer Name: Hang Gutierrez MD   Signed: 1/12/2020 5:50 AM    [MH]   0611 Spoke with Dr. Grullon he recommends transferring to tertiary center.     [MH]   0633 Spoke with Dr. Harper, colorectal surgeon at , he recommended placing NG tube and transfer to  ED.  He will accept the patient and further work-up and evaluate.    [MH]   0659   Discussed plan of care with patient.  She is agreeable to be transferred to the [facility].     []      ED Course User Index  [MH] Fatmata Vaughn PA-C                                               MDM  Number of Diagnoses or Management Options  Malignant neoplasm of ascending colon (CMS/HCC): new and requires workup  Small bowel obstruction (CMS/HCC): new and requires workup     Amount and/or Complexity of Data Reviewed  Clinical lab tests: ordered and reviewed  Tests in the radiology section of CPT®: ordered and reviewed  Discuss the patient with other providers: yes    Risk of Complications, Morbidity, and/or Mortality  Presenting problems: moderate  Diagnostic procedures: moderate  Management options: moderate    Patient Progress  Patient progress: stable      Final diagnoses:   Small bowel obstruction (CMS/HCC)   Malignant neoplasm of ascending colon (CMS/HCC)            Fatmata Vaughn PA-C  01/12/20 0703

## 2020-01-12 NOTE — ED NOTES
Radha called from [facility] and advised [facility]. Nurse and Provider has been made aware.      Brisa Shah  01/12/20 0745

## 2020-01-12 NOTE — ED NOTES
Called [facility] for transfer. Talked to Radha, she is going to get a hold of [facility] and call me back.      Brisa Shah  01/12/20 0798

## 2020-01-12 NOTE — ED NOTES
[facility] is not able to take the transfer due to no trucks. Per [facility] Brisa Rincon  01/12/20 0741

## 2020-01-12 NOTE — ED NOTES
Called [facility] requesting transport to  ED awaiting return call from [facility].     Nikki Otoole  01/12/20 0616

## 2020-01-12 NOTE — ED NOTES
Called [facility] for an update if they are able to take the patient or not.      Brisa Shah  01/12/20 0732

## 2020-01-28 NOTE — TELEPHONE ENCOUNTER
----- Message from Erin Caruso sent at 1/22/2020  4:23 PM EST -----  Regarding: RE: new treatment  I moved her ov out 2 weeks in hopes that we hear from her.        ----- Message -----  From: Regla Montano, RADHA  Sent: 1/22/2020   3:33 PM EST  To: Ryanne Acevedon Nino, Erin Caruso  Subject: FW: new treatment                                Office visit on 1/28 can be cancelled per Dr Burton and he will see her with cycle 2. I have tried to call her to see if we can start her tx on Monday 1/27 @ 830 am but am having a very hard time getting in touch with her. (Discharged from  on 1/20/20). I have tried numerous x and their is no voicemail to leave a message. If patient calls back would you please let her know we can start her tx (FOLFOX- hold Avastin with first cycle) on Monday?  Thank you   ----- Message -----  From: Alice Rene RegSched Rep  Sent: 1/22/2020  12:37 PM EST  To: CELSO Burton MD, Regla Montano RN  Subject: RE: new treatment                                Good to go.  ----- Message -----  From: CELSO Burton MD  Sent: 1/9/2020   4:07 PM EST  To: Fany Mariee [facility] ED, #  Subject: new treatment                                    Palliative, [id number] FOLFOX + Avastin for newly diagnosed metastatic colon adenocarcinoma. [facility] already in place and would like to give the first cycle next week UNLESS she is shown to be MSI-H (these results are pending, but they may be back as early as tomorrow), in which case she may pursue enrollment into [facility] instead. Thanks.

## 2020-01-28 NOTE — TELEPHONE ENCOUNTER
Attempted to call patient to schedule treatment. (Several failed attempts have been made). Patient does not have a voice mail set up so unable to leave a message. Will send letter today asking patient to call the office to schedule treatment.

## 2020-01-31 PROBLEM — A41.9 SEPSIS (HCC): Status: ACTIVE | Noted: 2020-01-01

## 2020-02-01 NOTE — ED NOTES
Pt resting quietly on stretcher with improving complaints of abdominal pain, HR tachycardic and provider aware.  Pt AAOx4 with no resp distress noted, respirations even and unlabored.  Pt denies any needs at this time.  Skin PWD.  Pt family at bedside. Will continue to monitor and follow plan of care.  Bed rails up x2, bed in lowest position, call light in reach.       Berenice Méndez, RN  02/01/20 1193

## 2020-02-01 NOTE — H&P
[facility] HOSPITALIST HISTORY AND PHYSICAL    Patient Identification:  Name:  Gracy Norman  Age:  44 y.o.  Sex:  female  :  1975  MRN:  [id number]   Visit Number:  [id number]  Room number:  CC10/1C  Primary Care Physician:  Almas Stone MD    Date of Admission: 2020     Subjective     Chief complaint:    Chief Complaint   Patient presents with   • Weakness - Generalized   • Post-op Problem       History of presenting illness:  44 y.o. female who presented to [facility] emergency department with abdominal pain after drinking a sugary, carbonated drink.  She states that at about 3 PM on 2020 couple minutes after she drank the sugary drink, she had sudden onset of bilateral lower abdominal pain that was sharp and radiated to her bilateral lower back.  She denies fever, vomiting, shortness of air, chest pain, dysuria.  The patient states that she has had frequent kidney infections in the past but she does not feel that this is a problem at this time as she does not burn when she urinates.  She also states that her normal heart rate runs 100-120.  She does have a cough but she thought this was due to allergies as she has been producing some mucus that is nonbloody.  In the emergency department, patient had signs of sepsis and thus she was admitted to the critical care unit because she possibly had septic shock based on labs and vital signs.    The patient was previously healthy until 2020.  She came to [facility] emergency department at that time with nausea, vomiting, and fatigue.  A CT scan of her abdomen and pelvis found a mass in her cecum that measured 7.5 cm x 5.3 cm.  There was also lots of liver metastases and extensive lymphadenopathy.  She underwent outpatient liver biopsy on 2020 and pathology came back as poorly differentiated adenocarcinoma of colon origin.  She then presented to our emergency department again on 2020 and was  diagnosed with a partial small bowel obstruction.  She was transferred to the [facility] so that she could be evaluated by a larger surgical team.  She underwent a diverting loop ileostomy on 1/17/2020.  She then was discharged home from the [facility] on 1/20/2020.  She saw Dr. Burton in our oncology clinic 1 time and she has chosen to follow-up at [facility].  In fact, she has an appointment on Monday, February 3, 2020, at which time she is expected to start chemotherapy.  Please note that she had a left chest port placed by our surgeons on 1/2/2020.  ---------------------------------------------------------------------------------------------------------------------   Review of Systems   Constitutional: Negative for chills, fatigue and fever.   HENT: Negative for congestion and rhinorrhea.    Eyes: Negative for pain, discharge and redness.   Respiratory: Positive for cough. Negative for shortness of breath and wheezing.    Cardiovascular: Negative for chest pain, palpitations and leg swelling.   Gastrointestinal: Positive for abdominal distention. Negative for diarrhea, nausea and vomiting.   Genitourinary: Negative for enuresis and hematuria.   Musculoskeletal: Positive for back pain. Negative for arthralgias, joint swelling and myalgias.   Skin: Negative for color change, pallor, rash and wound.   Neurological: Negative for seizures, syncope, speech difficulty, weakness and light-headedness.   Psychiatric/Behavioral: Negative for agitation, behavioral problems and confusion.     ---------------------------------------------------------------------------------------------------------------------   Past Medical History:   Diagnosis Date   • Adenocarcinoma of cecum, stage 4b (CMS/HCC) 01/2020   • Anxiety    • GERD (gastroesophageal reflux disease)    • Headache    • Obesity (BMI 30-39.9)    • Snoring      Past Surgical History:   Procedure Laterality Date   • LIVER BIOPSY N/A  1/2/2020    Procedure: LIVER BIOPSY LAPAROSCOPIC;  Surgeon: Sophie Grimes MD;  Location: [facility];  Service: General   • NY ILEOSTOMY/JEJUNOSTOMY,NONTUBE     • TUBAL ABDOMINAL LIGATION     • VENOUS ACCESS DEVICE (PORT) INSERTION N/A 1/2/2020    Procedure: INSERTION VENOUS ACCESS DEVICE;  Surgeon: Sophie Grimes MD;  Location: [facility];  Service: General     Family History   Problem Relation Age of Onset   • Hypertension Mother    • Diabetes Mother    • Cancer Father         stomach   • Breast cancer Neg Hx      Social History     Socioeconomic History   • Marital status:    Tobacco Use   • Smoking status: Never Smoker   • Smokeless tobacco: Never Used   Substance and Sexual Activity   • Alcohol use: No   • Drug use: No   • Sexual activity: Defer     ---------------------------------------------------------------------------------------------------------------------   Allergies:  Bactrim [sulfamethoxazole-trimethoprim]; Metronidazole; and Sulfa antibiotics  ---------------------------------------------------------------------------------------------------------------------   Medications below are reported home medications pulling from within the system; at this time, these medications have not been reconciled unless otherwise specified and are in the verification process for further verifcation as current home medications.    Prior to Admission Medications     Prescriptions Last Dose Informant Patient Reported? Taking?    amoxicillin-clavulanate (AUGMENTIN) 875-125 MG per tablet 1/31/2020 Medication Bottle Yes Yes    Take 1 tablet by mouth 2 (Two) Times a Day.    cyclobenzaprine (FLEXERIL) 5 MG tablet 1/31/2020 Medication Bottle Yes Yes    Take 5 mg by mouth 2 (Two) Times a Day.    enoxaparin (LOVENOX) 40 MG/0.4ML solution syringe 1/31/2020 Medication Bottle Yes Yes    Inject 40 mg under the skin into the appropriate area as directed Daily.    omeprazole (priLOSEC) 20 MG capsule 1/31/2020 Medication  Bottle Yes Yes    Take 20 mg by mouth Daily.    acetaminophen (TYLENOL) 325 MG tablet Unknown Medication Bottle Yes No    Take 650 mg by mouth Every 6 (Six) Hours As Needed for Mild Pain .    diazePAM (VALIUM) 2 MG tablet Unknown Medication Bottle Yes No    Take 2 mg by mouth Every 8 (Eight) Hours As Needed for Anxiety.    ondansetron ODT (ZOFRAN-ODT) 4 MG disintegrating tablet Unknown Medication Bottle Yes No    Take 4 mg by mouth Every 6 (Six) Hours As Needed for Nausea or Vomiting.    oxyCODONE (ROXICODONE) 5 MG immediate release tablet Unknown Medication Bottle Yes No    Take 5 mg by mouth Every 6 (Six) Hours As Needed for Moderate Pain .        Objective     Vital Signs:  Temp:  [98.4 °F (36.9 °C)-102.4 °F (39.1 °C)] 98.4 °F (36.9 °C)  Heart Rate:  [117-152] 117  Resp:  [20] 20  BP: ()/(55-96) 129/93    Mean Arterial Pressure (Non-Invasive) for the past 24 hrs (Last 3 readings):   Noninvasive MAP (mmHg)   02/01/20 0242 106   02/01/20 0232 94   02/01/20 0221 104     SpO2:  [94 %-100 %] 98 %  Device (Oxygen Therapy): room air  Body mass index is 36.31 kg/m².    Wt Readings from Last 3 Encounters:   01/31/20 93 kg (205 lb)   01/12/20 92.1 kg (203 lb)   01/09/20 91.6 kg (202 lb)      ---------------------------------------------------------------------------------------------------------------------   Physical Exam:  General: She is sitting up in bed.  She does not appear to be acutely ill nor chronically ill.  She is in no acute respiratory distress and is able to talk in complete sentences.  HENT: Normocephalic, atraumatic.  Tacky mucous membranes.  Eyes: Pupils are equal, round, and reactive to light.  Extraocular movements are intact.  No icterus and no conjunctivitis.  Cardiac: Tachycardic, no murmurs.  Lungs: She has some faint crackles in her bilateral lower lobes.  There is no wheezing.  No prolonged expiratory phase.  Good air movement throughout all lung fields.  Abdomen: She has a fresh ileostomy  on the right side of her abdomen with liquid and brown-colored stool in the ostomy bag.  She has active bowel sounds.  She is tender to palpation but it is mainly where her surgical site is located.  There is no rebound tenderness.  Genitourinary: No Christine catheter in place.  Neurologic: Cranial nerves II through XII are intact.  She is able to follow all commands.  She does not have slurred speech.  She does not have a facial droop.  She has normal and equal strength in her bilateral arms and legs.  Peripheral vascular: No edema.  Strong pulses in all 4 extremities.  No mottling and no cyanosis.  Musculoskeletal: No edema.  No obvious deformities to her arms and legs.  She is not missing any digits.  Her ankles, knees, wrists, and elbows are without any edema or erythema.  Skin: There are no rashes, open sores, or skin discoloration.  Psych: She has a normal thought process with no pressured speech.  She appears to have good decision skills.  ---------------------------------------------------------------------------------------------------------------------  EKG:  Sinus tachycardia 146, QTc 536 ms, flat Twaves in the inferior and lateral leads with no comparison EKGs in our system.      Telemetry:  Sinus tachycardia in the 125's.    I have personally looked at both the EKG and the telemetry strips.  --------------------------------------------------------------------------------------------------------------------  LABS:    CBC and coagulation:  Results from last 7 days   Lab Units 02/01/20  0022 01/31/20 1955   LACTATE mmol/L 3.6* 5.2*   CRP mg/dL  --  30.94*   WBC 10*3/mm3  --  29.10*   HEMOGLOBIN g/dL  --  8.8*   HEMATOCRIT %  --  29.9*   MCV fL  --  79.5   MCHC g/dL  --  29.4*   PLATELETS 10*3/mm3  --  483*     Acid/base balance:  Results from last 7 days   Lab Units 01/31/20 2005   PH, ARTERIAL pH units 7.417   PO2 ART mm Hg 85.7   PCO2, ARTERIAL mm Hg 20.0*   HCO3 ART mmol/L 12.9*     Renal and  electrolytes:  Results from last 7 days   Lab Units 01/31/20 1955   SODIUM mmol/L 130*   POTASSIUM mmol/L 5.0   MAGNESIUM mg/dL 1.6   CHLORIDE mmol/L 94*   CO2 mmol/L 13.4*   BUN mg/dL 26*   CREATININE mg/dL 1.95*   EGFR IF NONAFRICN AM mL/min/1.73 28*   CALCIUM mg/dL 8.8   GLUCOSE mg/dL 122*     Estimated Creatinine Clearance: 40.7 mL/min (A) (by C-G formula based on SCr of 1.95 mg/dL (H)).    Liver and pancreatic function:  Results from last 7 days   Lab Units 01/31/20 1955   ALBUMIN g/dL 2.83*   BILIRUBIN mg/dL 0.5   ALK PHOS U/L 958*   AST (SGOT) U/L 121*   ALT (SGPT) U/L 32   AMYLASE U/L 35   LIPASE U/L 49     Endocrine function:  Lab Results   Component Value Date    TSH 9.270 (H) 01/31/2020     Cardiac:  Results from last 7 days   Lab Units 01/31/20 1955   TROPONIN T ng/mL <0.010   PROBNP pg/mL 214.1       Cultures:  Brief Urine Lab Results  (Last result in the past 365 days)      Color   Clarity   Blood   Leuk Est   Nitrite   Protein   CREAT   Urine HCG        01/31/20 2028 Dark Yellow Turbid Negative Negative Negative 30 mg/dL (1+)             Lab Results   Component Value Date    URINECX 25,000 CFU/mL Mixed Razia Isolated 01/01/2020     Lab Results   Component Value Date    BLOODCX No growth at 5 days 01/01/2020    BLOODCX No growth at 5 days 01/01/2020       I have personally looked at the labs and they are summarized above.  ----------------------------------------------------------------------------------------------------------------------  Detailed radiology reports for the last 24 hours:    Imaging Results (Last 24 Hours)     Procedure Component Value Units Date/Time    CT Chest Without Contrast [[id number]] Collected:  01/31/20 2149     Updated:  01/31/20 2151    Narrative:       CT Chest WO    INDICATION:   Sepsis with recent abdominal surgery for advanced colon cancer.    TECHNIQUE:   CT of the thorax without IV contrast. Coronal and sagittal reconstructions were obtained.  Radiation dose  reduction techniques included automated exposure control or exposure modulation based on body size. Count of known CT and cardiac nuc med studies  performed in previous 12 months: 2.     COMPARISON:   None available.    FINDINGS:  Right pleural effusion layering to a depth of 2.5 cm and a small left pleural effusion layering to a depth of less than a centimeter. Right-sided volume loss with atelectasis right lung base. Minimal left basilar atelectasis. No suspicious nodules or  masses. No focal consolidation. Small amount of vertical atelectasis anterior right upper lobe.    Heart size normal. Extensive anterior mediastinal lymphadenopathy which in the setting of known malignancy is concerning for metastases. Venous access port noted over the left upper chest with distal tip in the SVC. Widely disseminated liver metastases.  Please see CT abdomen and pelvis for remainder of findings within the abdomen and pelvis. Osseous structures and thoracic inlet unremarkable.      Impression:       Small bilateral pleural effusions right greater than left with bibasilar volume loss right greater than left. Atelectasis favored over focal consolidation.    Multiple enlarged anterior mediastinal lymph nodes largest measuring 1.2 x 1.5 cm and in the setting of known malignancy is highly concerning for metastatic disease. No definite pulmonary metastasis.    Signer Name: BUSTER Olivares MD   Signed: 1/31/2020 9:49 PM   Workstation Name: [email or web address] [facility]    [facility]    CT Abdomen Pelvis Without Contrast [[id number]] Collected:  01/31/20 2131     Updated:  01/31/20 2133    Narrative:       CT Abdomen Pelvis WO    INDICATION:   Abdominal pain with fever and sepsis. Recent surgery. Reported colon cancer and pain around surgical area.    TECHNIQUE:   CT of the abdomen and pelvis without IV contrast. Coronal and sagittal reconstructions were obtained.  Radiation dose reduction techniques included automated exposure  control or exposure modulation based on body size. Count of known CT and cardiac nuc  med studies performed in previous 12 months: 2.     COMPARISON:   CT abdomen and pelvis 1/12/2020    FINDINGS:  Abdomen: Small bilateral pleural effusions right greater than left with bibasilar atelectasis. Multiple hypodense and hyperdense liver lesions compatible with widely disseminated liver metastases. No ductal dilatation. Spleen and gallbladder are  unremarkable. Pancreas, kidneys and adrenal glands are unremarkable except for a small nonobstructing right renal stone.    Patient has undergone apparent diverting ileostomy. Soft tissue mass within the right lower quadrant measuring 8.69 6.8 x 8.9 cm most compatible with colon carcinoma involving the cecum and right colon with extension into the mesentery. Extensive  mesenteric and retroperitoneal lymphadenopathy. Increasing ascites when compared to 1/12/2020. Stomach and small bowel unremarkable.    Pelvis: Bladder decompressed. Uterus and adnexa are unremarkable. Moderate amount of induration and edema within the subcutaneous tissues along the lateral abdomen which may be related to third spacing of fluid. No drainable fluid collection or abscess.      Impression:       Postoperative changes from apparent diverting ileostomy with a normal-appearing right lower anterior abdominal wall ostomy.    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 Extensive retroperitoneal and mesenteric lymphadenopathy concerning for metastatic adenopathy.    Moderate amount of ascites which has increased from January 12.    Extensive liver metastases.    Small bilateral pleural effusions with bibasilar atelectasis right greater than left.    Moderate amount of induration and edema within the subcutaneous tissues along the flank regions bilaterally most likely related to third spacing of fluid and recent operative  intervention. No definitive abscess.          Signer Name: BUSTER Olivares MD   Signed: 1/31/2020 9:31 PM   Workstation Name: [email or web address]    [facility]    XR Chest 1 View [[id number]] Collected:  01/31/20 2046     Updated:  01/31/20 2048    Narrative:       XR CHEST 1 VW-     CLINICAL INDICATION: sepsis        COMPARISON: 01/12/2020      TECHNIQUE: Single frontal view of the chest.     FINDINGS:     Right basilar airspace disease  The cardiac silhouette is normal. The pulmonary vasculature is  unremarkable.  There is no evidence of an acute osseous abnormality.   There are no suspicious-appearing parenchymal soft tissue nodules.          Impression:       Appearance compatible with right basilar pneumonia     This report was finalized on 1/31/2020 8:46 PM by Dr. Cloton Salgado MD.           Final impressions for the last 30 days of radiology reports:    Xr Abdomen 2+ Vw With Chest 1 Vw  Result Date: 1/12/2020  No acute findings. Nonobstructive bowel gas pattern. Signer Name: Hang Gutierrez MD  Signed: 1/12/2020 4:39 AM  Workstation Name: [email or web address]  [facility]    Ct Abdomen Pelvis With Contrast  Result Date: 1/12/2020  1. Again noted is a large mass within the cecum and proximal ascending colon, most consistent with colon carcinoma. There is extensive metastatic lymphadenopathy surrounding the cecum and throughout the retroperitoneum, small bowel mesentery, and upper abdomen. Worsening nodular thickening throughout the omentum, consistent with peritoneal carcinomatosis. 2. Extensive hepatic metastatic disease. 3. 2.7 cm left adrenal mass, highly suspicious for metastatic disease. 4. Small amount of abdominal and pelvic ascites. 5. Increasing dilatation of the terminal ileum with fecalization of the ileal contents and air-fluid levels in the distal ileum, likely representing developing small bowel obstruction secondary to the colonic mass. Signer Name: Hang  MD Matt  Signed: 1/12/2020 5:50 AM  Workstation Name: [email or web address]  [facility]    Ct Abdomen Pelvis With Contrast  Result Date: 1/1/2020  1. Markedly abnormal examination. There is an approximate 7.5 cm x 5.3 cm cecal mass suggesting colon carcinoma. There is inflammatory stranding in the surrounding mesenteric fat which could reflect neoplastic spread as well as multiple small lymph nodes in the mesentery of the right lower quadrant concerning for metastatic disease. 2. Slight dilatation of the terminal ileum which demonstrates an air-fluid level which could reflect a degree of partial obstruction of the terminal ileum presumably due to the mass. However, the remainder of the small bowel is normal in course and caliber. 3. Extensive metastatic disease seen throughout the liver as detailed above. 4. Adenopathy in the periportal region characteristic of metastatic disease. Retroperitoneal lymph nodes are borderline in size and could also reflect metastatic disease. The largest of which measures 1.3 cm at the level of the left renal vein. 5. 2.6 cm low-density mass on the left adrenal gland could represent metastatic disease or possibly an adrenal adenoma. 6. Haziness in the anterior mesentery of the left upper quadrant in the region of the omentum with subtle nodularity concerning for metastatic disease. 7. Diverticulosis. 8. Cortical scarring right kidney. Signer Name: Bert Emerson MD  Signed: 1/1/2020 5:39 PM  Workstation Name: [email or web address]  [facility]    Xr Chest 1 View  Result Date: 1/12/2020  Orogastric tube in satisfactory position. Signer Name: Alex Burris MD  Signed: 1/12/2020 7:28 AM  Workstation Name: [email or web address]  [facility]    Xr Chest Ap  Result Date: 1/2/2020  Left Port-A-Cath is noted with tip in SVC. LEFT LUNG BASE ATELECTASIS OR MINIMAL AIRSPACE DISEASE. TINY PNEUMOTHORAX PERITONEUM PRESUMABLY POSTSURGICAL.  This report was  finalized on 1/2/2020 3:35 PM by Dr. Karlos Mendiola MD.      Fl Surgery Fluoro  Result Date: 1/2/2020  As above  This report was finalized on 1/2/2020 2:53 PM by Dr. Karlos Mendiola MD.        I have personally looked at the radiology images and I have read the available final reports.    Assessment & Plan       -Septic shock that was present on admission (WBC 29,100, CRP 30.94, lactic acid 5.2, temperature 102.4, heart rate 152, blood pressure 72/60) due to either right sided pneumonia or spontaneous bacterial peritonitis  -Stage 4 colon cancer with bulky lymphadenopathy and liver lesions and adrenal masses, with no chemotherapy started yet  -Lactic acidosis  -Prolonged QTC of 536 ms  -Acute kidney injury with baseline creatinine 0.7 and admission creatinine 1.95  -Elevated TSH with no history of hypothyroidism  -Acute hypomagnesemia  -Normocytic anemia    Admitted to the critical care unit as she has septic shock with low blood pressures in the emergency department.  The goal mean arterial blood pressure will be 65 mmHg or above.  If her blood pressures drop then we will start Levophed.  Cultures were obtained in the emergency department.  She is empirically been started on vancomycin, Zosyn, and doxycycline.  I am not sure if she has pneumonia but with her report of cough and possible atelectasis versus infiltrate on the imaging I have opted to treat her for possible pneumonia.  I will order sputum culture, atypical testing, a viral respiratory panel, and streptococcal testing.  I have discussed the patient with the daytime hospitalist, Dr. Riggs.  Dr. Riggs will evaluate the patient for possible spontaneous bacterial peritonitis; currently, the broad-spectrum antibiotics that we have her on would cover for this type of infection.  I will give her IV fluids for the acute kidney injury and continue to monitor her creatinine.  We will avoid QT prolonging agents.  We will replace the magnesium per our protocol and  continue to monitor the levels.  We will also trend the hemoglobin levels and if she continues to drop then we will consider a blood transfusion if the hemoglobin is less than 7.  We will also trend the lactic acid level as it is high.  The patient has outpatient follow-up with [facility] already scheduled for February 3, 2020.    VTE Prophylaxis:   Mechanical Order History:     None      Pharmalogical Order History:     Ordered     Dose Route Frequency Stop    02/01/20 0309  enoxaparin (LOVENOX) syringe 40 mg      40 mg SC Every 24 Hours --        The patient is high risk due to the following diagnoses/reasons:  Septic shock.    Denny Coy MD  [facility] Center[facility]  02/01/20  4:06 AM

## 2020-02-01 NOTE — PROGRESS NOTES
Pharmacy to dose Zosyn for sepsis: CrCl = 40.7 SCr = 1.95.  Dosed as follows:  Zosyn 4.5 gm x 1 given in ED then 3.375gm iv ext infusion q8h.  Pharmacy will monitor and adjust dosing as needed.     Kevyn Fields RPH  5:49 AM  02/01/20

## 2020-02-01 NOTE — PAYOR COMM NOTE
"[facility]   ANNIE GODOY  PHONE  [phone]  FAX  [phone]  NPI:  [id number]    REQUEST FOR INPATIENT AUTH    Matt Norman (44 y.o. Female)     Date of Birth Social Security Number Address Home Phone MRN    1975  [address] [place] [address]  [id number]    Mormonism Marital Status          None        Admission Date Admission Type Admitting Provider Attending Provider Department, Room/Bed    1/31/20 Emergency Denny Coy MD Hacker, Bill J, MD [facility] CRITICAL CARE, CC10/1C    Discharge Date Discharge Disposition Discharge Destination                       Attending Provider:  Max Riggs MD    Allergies:  Bactrim [Sulfamethoxazole-trimethoprim], Metronidazole, Sulfa Antibiotics    Isolation:  None   Infection:  None   Code Status:  CPR    Ht:  160 cm (63\")   Wt:  94.3 kg (207 lb 14.3 oz)    Admission Cmt:  None   Principal Problem:  Sepsis (CMS/HCC) [A41.9]                 Active Insurance as of 1/31/2020     Primary Coverage     Payor Plan Insurance Group Employer/Plan Group    [employer] [id number]     Payor Plan Address Payor Plan Phone Number Payor Plan Fax Number Effective Dates    [address] [phone]  11/1/1997 - None Entered    [facility] [id number]       Subscriber Name Subscriber Birth Date Member ID       MATT NORMAN 1975 [id number]                 Emergency Contacts      (Rel.) Home Phone Work Phone Mobile Phone    Danilo Norman (Spouse) -- [phone]            History & Physical    No notes of this type exist for this encounter.            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  She presents " with a one-day history of increasing diffuse right-sided abdominal pain that extends into her right flank and fever associated with increasing generalized weakness.  She denies headaches, neck pain, chest pain, shortness of breath, vomiting, syncope or near syncope, focal numbness or weakness, other symptoms or other complaints.          Review of Systems   Constitutional: Positive for fever. Negative for diaphoresis.   HENT: Negative for congestion, ear pain, sore throat and trouble swallowing.    Eyes: Negative for photophobia and pain.   Respiratory: Negative for shortness of breath, wheezing and stridor.    Cardiovascular: Negative for chest pain and palpitations.   Gastrointestinal: Positive for abdominal pain. Negative for abdominal distention, blood in stool, diarrhea and vomiting.   Endocrine: Negative for polydipsia and polyphagia.   Genitourinary: Positive for flank pain. Negative for difficulty urinating.   Musculoskeletal: Negative for neck pain and neck stiffness.   Skin: Negative for color change and pallor.   Neurological: Negative for seizures, syncope and speech difficulty.   Psychiatric/Behavioral: Negative for confusion.   All other systems reviewed and are negative.      Past Medical History:   Diagnosis Date   • Anxiety    • Cancer (CMS/HCC)     colon   • GERD (gastroesophageal reflux disease)    • Headache    • Snoring        Allergies   Allergen Reactions   • Bactrim [Sulfamethoxazole-Trimethoprim] Swelling     Throat swells    • Metronidazole Swelling     Throat swelling and facial rash    • Sulfa Antibiotics Anaphylaxis       Past Surgical History:   Procedure Laterality Date   • LIVER BIOPSY N/A 1/2/2020    Procedure: LIVER BIOPSY LAPAROSCOPIC;  Surgeon: Sophie Grimes MD;  Location: [facility];  Service: General   • TUBAL ABDOMINAL LIGATION     • VENOUS ACCESS DEVICE (PORT) INSERTION N/A 1/2/2020    Procedure: INSERTION VENOUS ACCESS DEVICE;  Surgeon: Sophie Grimes MD;  Location:   [facility] OR;  Service: General       Family History   Problem Relation Age of Onset   • Hypertension Mother    • Diabetes Mother    • Cancer Father         stomach   • Breast cancer Neg Hx        Social History     Socioeconomic History   • Marital status:      Spouse name: Not on file   • Number of children: Not on file   • Years of education: Not on file   • Highest education level: Not on file   Tobacco Use   • Smoking status: Never Smoker   • Smokeless tobacco: Never Used   Substance and Sexual Activity   • Alcohol use: No   • Drug use: No   • Sexual activity: Defer           Objective   Physical Exam   Constitutional: She is oriented to person, place, and time. She appears well-developed and well-nourished. She appears distressed.   Pleasant young female, appears acutely ill.   HENT:   Head: Normocephalic and atraumatic.   Eyes: Pupils are equal, round, and reactive to light. EOM are normal. No scleral icterus.   Neck: Normal range of motion. Neck supple. No neck rigidity. No tracheal deviation present.   Cardiovascular: Normal rate, regular rhythm and intact distal pulses.   Pulmonary/Chest: Effort normal and breath sounds normal. No respiratory distress. She exhibits no tenderness.   Abdominal: Soft. Bowel sounds are normal. There is tenderness (There is moderate diffuse right abdominal and right flank tenderness, no other tenderness, no acute peritoneal signs.). There is no rebound and no guarding.   Musculoskeletal: Normal range of motion. She exhibits no tenderness.   Neurological: She is alert and oriented to person, place, and time. She has normal strength. No sensory deficit. GCS eye subscore is 4. GCS verbal subscore is 5. GCS motor subscore is 6.   Skin: Skin is warm and dry. Capillary refill takes less than 2 seconds. She is not diaphoretic. No cyanosis. No pallor.   Psychiatric: She has a normal mood and affect. Her behavior is normal.   Nursing note and vitals reviewed.      Procedures  EKG  shows sinus tachycardia with a rate of 146.  Nonspecific ST-T changes.  No apparent acute ischemia.  CT Abdomen Pelvis Without Contrast   Final Result   Postoperative changes from apparent diverting ileostomy with a normal-appearing right lower anterior abdominal wall ostomy.      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 Extensive retroperitoneal and mesenteric lymphadenopathy concerning for metastatic adenopathy.      Moderate amount of ascites which has increased from January 12.      Extensive liver metastases.      Small bilateral pleural effusions with bibasilar atelectasis right greater than left.      Moderate amount of induration and edema within the subcutaneous tissues along the flank regions bilaterally most likely related to third spacing of fluid and recent operative intervention. No definitive abscess.               Signer Name: BUSTER Olivares MD    Signed: 1/31/2020 9:31 PM    Workstation Name: [facility]     Radiology [facility]      CT Chest Without Contrast   Final Result   Small bilateral pleural effusions right greater than left with bibasilar volume loss right greater than left. Atelectasis favored over focal consolidation.      Multiple enlarged anterior mediastinal lymph nodes largest measuring 1.2 x 1.5 cm and in the setting of known malignancy is highly concerning for metastatic disease. No definite pulmonary metastasis.      Signer Name: BUSTER Olivares MD    Signed: 1/31/2020 9:49 PM    Workstation Name: [facility]     Radiology [facility]      XR Chest 1 View   Final Result   Appearance compatible with right basilar pneumonia       This report was finalized on 1/31/2020 8:46 PM by Dr. Colton Salgado MD.            Results for orders placed or performed during the hospital encounter of 01/31/20   Influenza Antigen, Rapid - Swab, Nasopharynx   Result Value Ref Range     Influenza A Ag, EIA Negative Negative    Influenza B Ag, EIA Negative Negative   Comprehensive Metabolic Panel   Result Value Ref Range    Glucose 122 (H) 65 - 99 mg/dL    BUN 26 (H) 6 - 20 mg/dL    Creatinine 1.95 (H) 0.57 - 1.00 mg/dL    Sodium 130 (L) 136 - 145 mmol/L    Potassium 5.0 3.5 - 5.2 mmol/L    Chloride 94 (L) 98 - 107 mmol/L    CO2 13.4 (L) 22.0 - 29.0 mmol/L    Calcium 8.8 8.6 - 10.5 mg/dL    Total Protein 7.2 6.0 - 8.5 g/dL    Albumin 2.83 (L) 3.50 - 5.20 g/dL    ALT (SGPT) 32 1 - 33 U/L    AST (SGOT) 121 (H) 1 - 32 U/L    Alkaline Phosphatase 958 (H) 39 - 117 U/L    Total Bilirubin 0.5 0.2 - 1.2 mg/dL    eGFR Non African Amer 28 (L) >60 mL/min/1.73    Globulin 4.4 gm/dL    A/G Ratio 0.6 g/dL    BUN/Creatinine Ratio 13.3 7.0 - 25.0    Anion Gap 22.6 (H) 5.0 - 15.0 mmol/L   Lipase   Result Value Ref Range    Lipase 49 13 - 60 U/L   Amylase   Result Value Ref Range    Amylase 35 28 - 100 U/L   Urinalysis With Culture If Indicated - Urine, Catheter   Result Value Ref Range    Color, UA Dark Yellow (A) Yellow, Straw    Appearance, UA Turbid (A) Clear    pH, UA <=5.0 5.0 - 8.0    Specific Gravity, UA 1.023 1.005 - 1.030    Glucose, UA Negative Negative    Ketones, UA Trace (A) Negative    Bilirubin, UA Small (1+) (A) Negative    Blood, UA Negative Negative    Protein, UA 30 mg/dL (1+) (A) Negative    Leuk Esterase, UA Negative Negative    Nitrite, UA Negative Negative    Urobilinogen, UA 0.2 E.U./dL 0.2 - 1.0 E.U./dL   Troponin   Result Value Ref Range    Troponin T <0.010 0.000 - 0.030 ng/mL   Lactic Acid, Plasma   Result Value Ref Range    Lactate 5.2 (C) 0.5 - 2.0 mmol/L   C-reactive Protein   Result Value Ref Range    C-Reactive Protein 30.94 (H) 0.00 - 0.50 mg/dL   TSH   Result Value Ref Range    TSH 9.270 (H) 0.270 - 4.200 uIU/mL   Magnesium   Result Value Ref Range    Magnesium 1.6 1.6 - 2.6 mg/dL   BNP   Result Value Ref Range    proBNP 214.1 5.0 - 450.0 pg/mL   CBC Auto Differential   Result  Value Ref Range    WBC 29.10 (H) 3.40 - 10.80 10*3/mm3    RBC 3.76 (L) 3.77 - 5.28 10*6/mm3    Hemoglobin 8.8 (L) 12.0 - 15.9 g/dL    Hematocrit 29.9 (L) 34.0 - 46.6 %    MCV 79.5 79.0 - 97.0 fL    MCH 23.4 (L) 26.6 - 33.0 pg    MCHC 29.4 (L) 31.5 - 35.7 g/dL    RDW 16.5 (H) 12.3 - 15.4 %    RDW-SD 46.3 37.0 - 54.0 fl    MPV 10.9 6.0 - 12.0 fL    Platelets 483 (H) 140 - 450 10*3/mm3   Blood Gas, Arterial With Co-Ox   Result Value Ref Range    Site Left Radial     Rashaun's Test Positive     pH, Arterial 7.417 7.350 - 7.450 pH units    pCO2, Arterial 20.0 (L) 35.0 - 45.0 mm Hg    pO2, Arterial 85.7 83.0 - 108.0 mm Hg    HCO3, Arterial 12.9 (L) 20.0 - 26.0 mmol/L    Base Excess, Arterial -10.2 (L) 0.0 - 2.0 mmol/L    O2 Saturation, Arterial 97.0 94.0 - 99.0 %    Hemoglobin, Blood Gas 8.5 (L) 13.5 - 17.5 g/dL    Hematocrit, Blood Gas 26.1 (L) 38.0 - 51.0 %    Oxyhemoglobin 95.8 94 - 99 %    Methemoglobin 0.30 0.00 - 3.00 %    Carboxyhemoglobin 0.9 0 - 5 %    A-a Gradiant 34.8 0.0 - 300.0 mmHg    CO2 Content 13.5 (L) 22 - 33 mmol/L    Temperature 0.0 C    Barometric Pressure for Blood Gas 729 mmHg    Modality Room Air     FIO2 21 %    Ventilator Mode NA     Note      Collected by [id number]     pH, Temp Corrected      pCO2, Temperature Corrected      pO2, Temperature Corrected     Urinalysis, Microscopic Only - Urine, Catheter   Result Value Ref Range    RBC, UA 0-2 None Seen, 0-2 /HPF    WBC, UA 0-2 None Seen, 0-2 /HPF    Bacteria, UA 2+ (A) None Seen /HPF    Squamous Epithelial Cells, UA 3-6 (A) None Seen, 0-2 /HPF    Hyaline Casts, UA 0-2 None Seen /LPF    Uric Acid Crystals, UA Large/3+ None Seen /HPF    Amorphous Crystals, UA Moderate/2+ None Seen /HPF    Methodology Manual Light Microscopy    Light Blue Top   Result Value Ref Range    Extra Tube hold for add-on    Green Top (Gel)   Result Value Ref Range    Extra Tube Hold for add-ons.    Lavender Top   Result Value Ref Range    Extra Tube hold for add-on    Gold Top -  SST   Result Value Ref Range    Extra Tube Hold for add-ons.    Manual Differential   Result Value Ref Range    Neutrophil % 71.0 42.7 - 76.0 %    Lymphocyte % 6.0 (L) 19.6 - 45.3 %    Monocyte % 9.0 5.0 - 12.0 %    Eosinophil % 2.0 0.3 - 6.2 %    Bands %  12.0 (H) 0.0 - 5.0 %    Neutrophils Absolute 24.15 (H) 1.70 - 7.00 10*3/mm3    Lymphocytes Absolute 1.75 0.70 - 3.10 10*3/mm3    Monocytes Absolute 2.62 (H) 0.10 - 0.90 10*3/mm3    Eosinophils Absolute 0.58 (H) 0.00 - 0.40 10*3/mm3    Anisocytosis Slight/1+ None Seen    Hypochromia Mod/2+ None Seen    Microcytes Slight/1+ None Seen    Platelet Morphology Normal Normal               ED Course  ED Course as of Jan 31 2305 Fri Jan 31, 2020 2239 Case discussed with ALLY Frey with the hospitalist service.  She will discussed the case with Dr. Coy and make arrangements to admit patient to the hospital.    [CM]      ED Course User Index  [CM] Waqas York MD                                               [facility]    Final diagnoses:   Sepsis, due to unspecified organism, unspecified whether acute organ dysfunction present (CMS/[facility])             Please note that portions of this note were completed with a voice recognition program.        Waqas York MD  01/31/20 2305      Electronically signed by Waqas York MD at 01/31/20 2305     Steph Ibrahim at 01/31/20 2008        EKG performed @2008, given to Steph Randle  01/31/20 2014      Electronically signed by Steph Ibrahim at 01/31/20 2014     Berenice Méndez, RN at 01/31/20 2045        Pt resting quietly on stretcher with complaints of abdominal pain, HR tachycardic and provider aware.  Pt AAOx4 with no resp distress noted, respirations even and unlabored.  Pt denies any needs at this time.  Skin PWD.  Pt family at bedside. Will continue to monitor and follow plan of care.  Bed rails up x2, bed in lowest position, call light in reach.           Dev  Berenice ANDUJAR RN  01/31/20 2355      Electronically signed by Berenice Méndez RN at 01/31/20 2355     Berenice Méndez RN at 01/31/20 2145        Pt resting quietly on stretcher with complaints of abdominal pain, HR tachycardic and provider aware.  Pt AAOx4 with no resp distress noted, respirations even and unlabored.  Pt denies any needs at this time.  Skin PWD.  Pt family at bedside. Will continue to monitor and follow plan of care.  Bed rails up x2, bed in lowest position, call light in reach.         Berenice Méndez RN  01/31/20 2355      Electronically signed by Berenice Méndez, RN at 01/31/20 2355     Fatmata Keen at 01/31/20 2153        Paged  @this time     Fatmata Keen  01/31/20 2154      Electronically signed by Fatmata Keen at 01/31/20 2154     Berenice Méndez RN at 01/31/20 2245        Pt resting quietly on stretcher with complaints of abdominal pain, HR tachycardic and provider aware.  Pt AAOx4 with no resp distress noted, respirations even and unlabored.  Pt denies any needs at this time.  Skin PWD.  Pt family at bedside. Will continue to monitor and follow plan of care.  Bed rails up x2, bed in lowest position, call light in reach.         Berenice Méndez RN  01/31/20 2356      Electronically signed by Berenice Méndez RN at 01/31/20 2356     Berenice Méndez RN at 01/31/20 2345        Pt resting quietly on stretcher with complaints of abdominal pain, HR tachycardic and provider aware.  Pt AAOx4 with no resp distress noted, respirations even and unlabored.  Pt denies any needs at this time.  Skin PWD.  Pt family at bedside. Will continue to monitor and follow plan of care.  Bed rails up x2, bed in lowest position, call light in reach.         Berenice Méndez RN  01/31/20 2357      Electronically signed by Berenice Méndez, RN at 01/31/20 2357     Berenice Méndez, RN at 02/01/20 0045        Pt resting quietly on stretcher with improving complaints of  abdominal pain, HR tachycardic and provider aware.  Pt AAOx4 with no resp distress noted, respirations even and unlabored.  Pt denies any needs at this time.  Skin PWD.  Pt family at bedside. Will continue to monitor and follow plan of care.  Bed rails up x2, bed in lowest position, call light in reach.       Berenice Méndez RN  02/01/20 0317      Electronically signed by Berenice Méndez RN at 02/01/20 0317     Berenice Méndez RN at 02/01/20 0145        Pt resting quietly on stretcher with improving complaints of abdominal pain, HR tachycardic and provider aware.  Pt AAOx4 with no resp distress noted, respirations even and unlabored.  Pt denies any needs at this time.  Skin PWD.  Pt family at bedside. Will continue to monitor and follow plan of care.  Bed rails up x2, bed in lowest position, call light in reach.       Berenice Méndez RN  02/01/20 0317      Electronically signed by Berenice Méndez RN at 02/01/20 0317     Berenice Méndez RN at 02/01/20 0213        Report called to Larissa GARCIA at this time.      Berenice Méndez RN  02/01/20 0214      Electronically signed by Berenice Méndez RN at 02/01/20 0214     Berenice Méndez RN at 02/01/20 0250        Pt transferred to inpatient floor at this time. NADN, skin PWD, no resp distress noted. IV intact with no signs of infiltration. All pt belongings transferred with pt. Pt transferred via stretcher with ED nurse and ED tech, zols monitor in place.        Berenice Méndez RN  02/01/20 0319      Electronically signed by Berenice Méndez RN at 02/01/20 0319         Current Facility-Administered Medications   Medication Dose Route Frequency Provider Last Rate Last Dose   • cyclobenzaprine (FLEXERIL) tablet 5 mg  5 mg Oral BID Denny Coy MD   5 mg at 02/01/20 0913   • diazePAM (VALIUM) tablet 2.5 mg  2.5 mg Oral Q8H PRN Denny Coy MD       • heparin (porcine) 5000 UNIT/ML injection 5,000 Units  5,000 Units Subcutaneous  Q8H Max Riggs MD       • heparin injection 500 Units  5 mL Intravenous PRN Denny Coy MD       • HYDROmorphone (DILAUDID) injection 0.5 mg  0.5 mg Intravenous Q4H PRN Denny Coy MD   0.5 mg at 02/01/20 1302   • Magnesium Sulfate 2 gram Bolus, followed by 8 gram infusion (total Mg dose 10 grams)- Mg less than or equal to 1mg/dL  2 g Intravenous PRN Denny Coy MD        Or   • Magnesium Sulfate 2 gram / 50mL Infusion (GIVE X 3 BAGS TO EQUAL 6GM TOTAL DOSE) - Mg 1.1 - 1.5 mg/dl  2 g Intravenous PRN Denny Coy MD        Or   • Magnesium Sulfate 4 gram infusion- Mg 1.6-1.9 mg/dL  4 g Intravenous PRN Denny Coy MD       • magnesium sulfate 4g/100mL (PREMIX) infusion  4 g Intravenous Once Dorothy Salvador, [facility]   4 g at 02/01/20 1201   • ondansetron (ZOFRAN) injection 4 mg  4 mg Intravenous Q6H PRN Max Riggs MD   4 mg at 02/01/20 0942   • oxyCODONE (ROXICODONE) immediate release tablet 5 mg  5 mg Oral Q6H PRN Max Riggs MD   5 mg at 02/01/20 1147   • pantoprazole (PROTONIX) EC tablet 40 mg  40 mg Oral QAM Max Riggs MD   40 mg at 02/01/20 1258   • piperacillin-tazobactam (ZOSYN) 3.375 g/100 mL 0.9% NS IVPB (mbp)  3.375 g Intravenous Q8H Denny Coy MD   3.375 g at 02/01/20 1417   • sodium chloride 0.9 % bolus 1,000 mL  1,000 mL Intravenous Once Max Riggs MD       • sodium chloride 0.9 % flush 10 mL  10 mL Intravenous PRN Denny Coy MD       • sodium chloride 0.9 % flush 10 mL  10 mL Intravenous Q12H Denny Coy MD       • sodium chloride 0.9 % flush 10 mL  10 mL Intravenous PRN Denny Coy MD       • sodium chloride 0.9 % flush 10 mL  10 mL Intravenous Q12H Denny Coy MD       • sodium chloride 0.9 % flush 10 mL  10 mL Intravenous PRN Denny Coy MD       • sodium chloride 0.9 % flush 10 mL  10 mL Intravenous Q12H Denny Coy MD   10 mL at 02/01/20 0950   • sodium chloride 0.9 % flush 10 mL  10 mL  Intravenous PRN Denny Coy MD       • sodium chloride 0.9 % flush 20 mL  20 mL Intravenous PRN Denny Coy MD       • sodium chloride 0.9 % infusion  75 mL/hr Intravenous Continuous Denny Coy MD 75 mL/hr at 02/01/20 0328 75 mL/hr at 02/01/20 0328   • Vancomycin Pharmacy Intermittent Dosing   Does not apply Daily Denny Coy MD         Orders (last 24 hrs)      Start     Ordered    02/02/20 0600  Magnesium  Morning Draw      02/01/20 1226    02/02/20 0600  Iron Profile  Morning Draw      02/01/20 1454    02/02/20 0600  Ferritin  Morning Draw      02/01/20 1454    02/02/20 0600  Vitamin B12  Morning Draw      02/01/20 1454    02/02/20 0600  Folate  Morning Draw      02/01/20 1454    02/02/20 0600  CBC & Differential  Morning Draw      02/01/20 1454    02/02/20 0600  Comprehensive Metabolic Panel  Morning Draw      02/01/20 1454    02/02/20 0600  Lactic Acid, Plasma  Morning Draw      02/01/20 1454    02/01/20 1800  Dietary Nutrition Supplements Boost Plus (Ensure Enlive, Ensure Plus)  Daily With Breakfast, Lunch & Dinner,   Status:  Canceled     Comments:  Strawberry or vanilla   And add to floor stock for as needed.    02/01/20 1408    02/01/20 1800  Dietary Nutrition Supplements Boost Plus (Ensure Enlive, Ensure Plus)  Daily With Breakfast, Lunch & Dinner     Comments:  Strawberry or vanilla   And add to floor stock for as needed.  Bottled water on trays please    02/01/20 1408    02/01/20 1545  Vancomycin Pharmacy Intermittent Dosing  Daily      02/01/20 1447    02/01/20 1545  heparin (porcine) 5000 UNIT/ML injection 5,000 Units  Every 8 Hours Scheduled      02/01/20 1455    02/01/20 1530  sodium chloride 0.9 % bolus 1,000 mL  Once      02/01/20 1434    02/01/20 1454  Lactic Acid, Plasma  Once      02/01/20 1454    02/01/20 1447  T3, Free  Once      02/01/20 1454    02/01/20 1447  T4, Free  Once      02/01/20 1454    02/01/20 1435  Sodium, Urine, Random - Urine, Clean Catch  Once       02/01/20 1434    02/01/20 1435  Creatinine, Urine, Random - Urine, Clean Catch  Once      02/01/20 1434    02/01/20 1400  Vancomycin, Random  Timed      02/01/20 1100    02/01/20 1200  pantoprazole (PROTONIX) EC tablet 40 mg  Every Morning      02/01/20 1101    02/01/20 1141  Body fluid differential - Body Fluid, Peritoneum  Once      02/01/20 1140    02/01/20 1100  oxyCODONE (ROXICODONE) immediate release tablet 5 mg  Every 6 Hours PRN      02/01/20 1101    02/01/20 1030  sodium chloride 0.9 % bolus 1,000 mL  Once      02/01/20 0942    02/01/20 1000  magnesium sulfate 4g/100mL (PREMIX) infusion  Once      02/01/20 0841    02/01/20 0945  Bedside Paracentesis Without  Radiology  Once     Comments:  This order was created via procedure documentation    02/01/20 0944    02/01/20 0942  Diagnostic Bedside Paracentesis Without  Radiology  Once      02/01/20 0942    02/01/20 0942  Obtain Informed Consent  Once      02/01/20 0942    02/01/20 0942  Paracentesis Tray to Bedside  Once      02/01/20 0942    02/01/20 0942  Notify Provider - If Leaking From Paracentesis Site  Until Discontinued      02/01/20 0942    02/01/20 0942  Notify Provider - Paracentesis Labs  Until Discontinued      02/01/20 0942    02/01/20 0942  Body Fluid Cell Count With Differential - Body Fluid, Peritoneum  STAT      02/01/20 0942    02/01/20 0942  Body Fluid Culture - Body Fluid, Peritoneum  STAT      02/01/20 0942    02/01/20 0942  Anaerobic Culture - Body Fluid, Peritoneum  STAT      02/01/20 0942    02/01/20 0942  Body fluid cell count - Body Fluid, Peritoneum  PROCEDURE ONCE      02/01/20 0942    02/01/20 0939  Diet Full Liquid  Diet Effective Now      02/01/20 0942    02/01/20 0939  ECG 12 Lead  Once      02/01/20 0942    02/01/20 0937  ondansetron (ZOFRAN) injection 4 mg  Every 6 Hours PRN      02/01/20 0937    02/01/20 0932  Manual Differential  Once      02/01/20 0931    02/01/20 0900  sodium chloride 0.9 % flush 10 mL  Every 12 Hours  Scheduled      02/01/20 0309    02/01/20 0900  enoxaparin (LOVENOX) syringe 40 mg  Every 24 Hours,   Status:  Discontinued      02/01/20 0309    02/01/20 0900  cyclobenzaprine (FLEXERIL) tablet 5 mg  2 Times Daily      02/01/20 0459    02/01/20 0900  sodium chloride 0.9 % flush 10 mL  Every 12 Hours Scheduled      02/01/20 0559    02/01/20 0900  sodium chloride 0.9 % flush 10 mL  Every 12 Hours Scheduled      02/01/20 0559    02/01/20 0850  Scan Slide  Once      02/01/20 0849    02/01/20 0830  Respiratory Panel, PCR - Swab, Nasopharynx  Once      02/01/20 0830    02/01/20 0830  Mycoplasma Pneumoniae Antibody, IgM - Blood,  Once      02/01/20 0830    02/01/20 0830  Respiratory Culture - Sputum, Cough  Once      02/01/20 0830    02/01/20 0829  CBC & Differential  Morning Draw      02/01/20 0830    02/01/20 0829  Comprehensive Metabolic Panel  Morning Draw      02/01/20 0830    02/01/20 0829  C-reactive Protein  Morning Draw      02/01/20 0830    02/01/20 0829  Phosphorus  Morning Draw      02/01/20 0830    02/01/20 0829  Troponin  Morning Draw      02/01/20 0830    02/01/20 0829  S. Pneumo Ag Urine or CSF - Urine, Urine, Clean Catch  Once      02/01/20 0830    02/01/20 0829  Legionella Antigen, Urine - Urine, Urine, Clean Catch  Once      02/01/20 0830    02/01/20 0829  Lactic Acid, Plasma  Morning Draw      02/01/20 0830    02/01/20 0829  Protime-INR  Morning Draw      02/01/20 0830    02/01/20 0829  Magnesium  Morning Draw      02/01/20 0830    02/01/20 0829  CBC Auto Differential  PROCEDURE ONCE      02/01/20 0830    02/01/20 0828  Patient Currently On Electrolyte Replacement Protocol - Please Refer to MAR for Protocol Details  Misc Nursing Order (Specify)  Daily     Comments:  Patient Currently On Electrolyte Replacement Protocol - Please Refer to MAR for Protocol Details    02/01/20 0827    02/01/20 0827  Magnesium Sulfate 2 gram Bolus, followed by 8 gram infusion (total Mg dose 10 grams)- Mg less than or equal  to 1mg/dL  As Needed      02/01/20 0827 02/01/20 0827  Magnesium Sulfate 2 gram / 50mL Infusion (GIVE X 3 BAGS TO EQUAL 6GM TOTAL DOSE) - Mg 1.1 - 1.5 mg/dl  As Needed      02/01/20 0827    02/01/20 0827  Magnesium Sulfate 4 gram infusion- Mg 1.6-1.9 mg/dL  As Needed      02/01/20 0827    02/01/20 0639  DIET MESSAGE Pt request fresh fruit for breakfast, prefers cantaloupe  Once,   Status:  Canceled     Comments:  Pt request fresh fruit for breakfast, prefers cantaloupe    02/01/20 0640    02/01/20 0638  Nutrition Consult  Once     Provider:  (Not yet assigned)    02/01/20 0640 02/01/20 0630  piperacillin-tazobactam (ZOSYN) 3.375 g/100 mL 0.9% NS IVPB (mbp)  Every 8 Hours      02/01/20 0517    02/01/20 0626  Diet Regular  Diet Effective Now,   Status:  Canceled      02/01/20 0626    02/01/20 0600  doxycycline (VIBRAMYCIN) 100 mg/100 mL 0.9% NS MBP  Every 12 Hours,   Status:  Discontinued      02/01/20 0507    02/01/20 0600  Connectors / Hubs Must Be Scrubbed 15 Seconds Using 70% Alcohol Before Access - Allow to Dry Before Accessing Line  Continuous      02/01/20 0559 02/01/20 0600  Change Needleless Connectors  Per Order Details     Comments:  Change Needleless Connectors When:  - Removed For Any Reason  - Residual Blood or Debris Within Connector  - Prior to Drawing Blood Cultures  - Contamination of Connector  - After Administration of Blood or Blood Components    02/01/20 0559 02/01/20 0600  IMPLANTED PORT CARE - Change Transparent Dressing Every 7 Days  Weekly     Comments:  Per [facility] Policy    02/01/20 0559    02/01/20 0600  IMPLANTED PORT CARE - Change Mendieta Needle When Changing Dressing  Weekly      02/01/20 0559    02/01/20 0559  sodium chloride 0.9 % flush 10 mL  As Needed      02/01/20 0559 02/01/20 0559  sodium chloride 0.9 % flush 20 mL  As Needed      02/01/20 0559    02/01/20 0559  heparin injection 500 Units  As Needed      02/01/20 0559 02/01/20 0559  sodium chloride 0.9 % flush 10 mL   As Needed      02/01/20 0559    02/01/20 0554  Wound Ostomy Eval & Treat  Once     Comments:  Ostomy    02/01/20 0559    02/01/20 0509  Pharmacy to Dose Zosyn  Continuous PRN,   Status:  Discontinued      02/01/20 0509    02/01/20 0508  Pharmacy to dose vancomycin  Continuous PRN,   Status:  Discontinued      02/01/20 0509    02/01/20 0458  diazePAM (VALIUM) tablet 2.5 mg  Every 8 Hours PRN      02/01/20 0459    02/01/20 0458  HYDROmorphone (DILAUDID) injection 0.5 mg  Every 4 Hours PRN      02/01/20 0458    02/01/20 0400  Vital Signs Every Hour and Per Hospital Policy Based on Patient Condition  Every Hour      02/01/20 0309    02/01/20 0400  Intake and Output  Every Hour      02/01/20 0309    02/01/20 0310  Cardiac Monitoring  Continuous      02/01/20 0309    02/01/20 0310  Continuous Pulse Oximetry  Continuous      02/01/20 0309    02/01/20 0310  Strict Bed Rest  Until Discontinued      02/01/20 0309    02/01/20 0310  Use Mobility Guidelines for Advancement of Activity  Continuous      02/01/20 0309    02/01/20 0310  Height & Weight  Once      02/01/20 0309    02/01/20 0310  Daily Weights  Daily      02/01/20 0309    02/01/20 0310  Insert Peripheral IV  Once      02/01/20 0309    02/01/20 0310  Saline Lock & Maintain IV Access  Continuous      02/01/20 0309    02/01/20 0310  NPO Diet  Diet Effective Now,   Status:  Canceled      02/01/20 0309    02/01/20 0310  Oxygen Therapy- Nasal Cannula; 2 LPM; Titrate for SPO2: 90% - 95%  Continuous      02/01/20 0309    02/01/20 0309  sodium chloride 0.9 % flush 10 mL  As Needed      02/01/20 0309    02/01/20 0001  Lactic Acid, Reflex  STAT      02/01/20 0000    01/31/20 2311  Code Status and Medical Interventions:  Continuous      01/31/20 2311    01/31/20 2311  Inpatient Admission  Once      01/31/20 2311    01/31/20 3296  Hospitalist (on-call MD unless specified)  Once     Specialty:  Hospitalist  Provider:  Denny Coy MD    01/31/20 2255    01/31/20 8627   HYDROmorphone (DILAUDID) injection 0.5 mg  Once      01/31/20 2116 01/31/20 2118  ondansetron (ZOFRAN) injection 4 mg  Once      01/31/20 2116 01/31/20 2100  CT Chest Without Contrast  1 Time Imaging      01/31/20 2047 01/31/20 2059  Lactic Acid, Reflex Timer (This will reflex a repeat order 3-3:15 hours after ordered.)  Once      01/31/20 2058 01/31/20 2059  CT Abdomen Pelvis Without Contrast  1 Time Imaging     Comments:        01/31/20 2047 01/31/20 2052  Manual Differential  Once      01/31/20 2051 01/31/20 2047  CT Chest Pulmonary Embolism  1 Time Imaging,   Status:  Canceled      01/31/20 2047 01/31/20 2046  CT Abdomen Pelvis With Contrast  1 Time Imaging,   Status:  Canceled     Comments:  IV CONTRAST ONLY      01/31/20 2047 01/31/20 2038  Urinalysis, Microscopic Only - Urine, Clean Catch  Once      01/31/20 2037 01/31/20 2020  Scan Slide  Once,   Status:  Canceled      01/31/20 2019 01/31/20 2016  acetaminophen (TYLENOL) 160 MG/5ML solution 650 mg  Once      01/31/20 2014 01/31/20 2007  Blood Gas, Arterial With Co-Ox  Once      01/31/20 2005 01/31/20 1951  sodium chloride 0.9% - IBW for BMI > 30 bolus 1,572 mL  Once      01/31/20 1949 01/31/20 1951  sodium chloride 0.9 % infusion  Continuous      01/31/20 1949 01/31/20 1951  vancomycin (VANCOCIN) 1,750 mg in sodium chloride 0.9 % 500 mL IVPB  Once      01/31/20 1949 01/31/20 1951  piperacillin-tazobactam (ZOSYN) 4.5 g/100 mL 0.9% NS IVPB (mbp)  Once      01/31/20 1949 01/31/20 1951  acetaminophen (TYLENOL) tablet 975 mg  Once,   Status:  Discontinued      01/31/20 1949 01/31/20 1949  Blood Gas, Arterial  Once      01/31/20 1949 01/31/20 1949  BNP  Once      01/31/20 1949 01/31/20 1948  [place] Draw  Once      01/31/20 1949 01/31/20 1948  Troponin  Once      01/31/20 1949 01/31/20 1948  Blood Culture - Blood,  Once      01/31/20 1949 01/31/20 1948  Blood Culture - Blood,  Once      01/31/20  1949 01/31/20 1948  Lactic Acid, Plasma  Once      01/31/20 1949 01/31/20 1948  Influenza Antigen, Rapid - Swab, Nasopharynx  Once      01/31/20 1949 01/31/20 1948  C-reactive Protein  Once      01/31/20 1949 01/31/20 1948  TSH  Once      01/31/20 1949 01/31/20 1948  Magnesium  Once      01/31/20 1949 01/31/20 1948  ECG 12 Lead  Once      01/31/20 1949 01/31/20 1948  XR Chest 1 View  1 Time Imaging      01/31/20 1949 01/31/20 1948  Insert peripheral IV  Once      01/31/20 1949 01/31/20 1948  Insert 2nd peripheral IV  Once      01/31/20 1949 01/31/20 1948  Monitor Blood Pressure  Per Hospital Policy      01/31/20 1949 01/31/20 1948  Cardiac Monitoring  Once,   Status:  Canceled      01/31/20 1949 01/31/20 1948  Pulse Oximetry, Continuous  Continuous,   Status:  Canceled      01/31/20 1949 01/31/20 1948  Oxygen Therapy- Nasal Cannula; 2 LPM; Titrate for SPO2: equal to or greater than, 92%  Continuous,   Status:  Canceled      01/31/20 1949 01/31/20 1948  Light Blue Top  PROCEDURE ONCE      01/31/20 1949 01/31/20 1948  Green Top (Gel)  PROCEDURE ONCE      01/31/20 1949 01/31/20 1948  Lavender Top  PROCEDURE ONCE      01/31/20 1949 01/31/20 1948  Gold Top - SST  PROCEDURE ONCE      01/31/20 1949 01/31/20 1947  sodium chloride 0.9 % flush 10 mL  As Needed      01/31/20 1949 01/31/20 1947  CBC & Differential  Once      01/31/20 1949 01/31/20 1947  Comprehensive Metabolic Panel  Once      01/31/20 1949 01/31/20 1947  Lipase  Once      01/31/20 1949 01/31/20 1947  Amylase  STAT      01/31/20 1949 01/31/20 1947  Urinalysis With Culture If Indicated - Urine, Catheter  Once      01/31/20 1949 01/31/20 1947  CBC Auto Differential  PROCEDURE ONCE      01/31/20 1949    Unscheduled  Change Dressing to IV Site As Needed When Damp, Loose or Soiled  As Needed      02/01/20 0567    Unscheduled  Insert New Peripheral IV  As Needed     Comments:  Frequency Per  Facility Policy    02/01/20 0559    Unscheduled  IMPLANTED PORT CARE - Change Mendieta Needle As Needed  As Needed      02/01/20 0559    Unscheduled  Magnesium  As Needed      02/01/20 0827    Unscheduled  Potassium  As Needed      02/01/20 0827    Unscheduled  Apply Pressure Dressing to Paracentesis Site if Issues With Leaking  As Needed      02/01/20 0942    --  oxyCODONE (ROXICODONE) 5 MG immediate release tablet  Every 6 Hours PRN      01/31/20 2336    --  ondansetron ODT (ZOFRAN-ODT) 4 MG disintegrating tablet  Every 6 Hours PRN      01/31/20 2336    --  omeprazole (priLOSEC) 20 MG capsule  Daily      01/31/20 2336    --  acetaminophen (TYLENOL) 325 MG tablet  Every 6 Hours PRN      01/31/20 2336    --  amoxicillin-clavulanate (AUGMENTIN) 875-125 MG per tablet  2 Times Daily      01/31/20 2336    --  cyclobenzaprine (FLEXERIL) 5 MG tablet  2 Times Daily      01/31/20 2336    --  diazePAM (VALIUM) 2 MG tablet  Every 8 Hours PRN      01/31/20 2336    --  enoxaparin (LOVENOX) 40 MG/0.4ML solution syringe  Daily      01/31/20 2336                   Operative/Procedure Notes (last 24 hours) (Notes from 01/31/20 1540 through 02/01/20 1540)      Max Riggs MD at 02/01/20 0943      Procedure Orders    1. Bedside Paracentesis Without  Radiology [[id number]] ordered by Max Riggs MD at 02/01/20 0944            Post-procedure Diagnoses    1. Sepsis, due to unspecified organism, unspecified whether acute organ dysfunction present (CMS/[facility]) [A41.9]             Bedside Paracentesis Without  Radiology  Date/Time: 2/1/2020 9:44 AM  Performed by: Max Riggs MD  Authorized by: Max Riggs MD   Consent: Verbal consent obtained. Written consent obtained.  Consent given by: patient  Patient understanding: patient states understanding of the procedure being performed  Patient consent: the patient's understanding of the procedure matches consent given  Procedure consent: procedure consent matches procedure  "scheduled  Relevant documents: relevant documents present and verified  Test results: test results available and properly labeled  Site marked: the operative site was marked  Imaging studies: imaging studies available  Patient identity confirmed: verbally with patient, arm band and provided demographic data  Time out: Immediately prior to procedure a \"time out\" was called to verify the correct patient, procedure, equipment, support staff and site/side marked as required.  Initial or subsequent exam: initial  Procedure purpose: diagnostic and therapeutic  Indications: abdominal discomfort secondary to ascites and suspected peritonitis  Anesthesia: local infiltration    Anesthesia:  Local Anesthetic: lidocaine 1% without epinephrine  Preparation: Patient was prepped and draped in the usual sterile fashion.  Needle gauge: 20  Ultrasound guidance: yes  Puncture site: right lower quadrant  Fluid removed: 1200(ml)  Fluid appearance: clear  Dressing: 4x4 sterile gauze  Patient tolerance: Patient tolerated the procedure well with no immediate complications          Electronically signed by Max Riggs MD at 20 0945          Physician Progress Notes (last 24 hours) (Notes from 20 1540 through 20 1540)      Max Riggs MD at 20 1505              [facility]     Patient Identification:  Name:  Gracy Norman  Age:  44 y.o.  Sex:  female  :  1975  MRN:  [id number]  Visit Number:  [id number]  ROOM: [address]     Primary Care Provider:  Almas Stone MD    Length of stay in inpatient status:  1    Subjective     Chief Compliant:    Chief Complaint   Patient presents with   • Weakness - Generalized   • Post-op Problem       History of Presenting Illness:    Patient reports feeling better this morning but still complaining of abdominal and back pain. Tolerated bedside paracentesis well. Abdominal discomfort improved after procedure. Still requiring " PRN dilaudid regularly.     ROS:  Denies fevers/chills. Denies chest pain.   Otherwise 10 point ROS negative other than documented above in HPI.     Objective     Current Hospital Meds:  cyclobenzaprine 5 mg Oral BID   heparin (porcine) 5,000 Units Subcutaneous Q8H   magnesium sulfate 4 g Intravenous Once   pantoprazole 40 mg Oral QAM   piperacillin-tazobactam 3.375 g Intravenous Q8H   sodium chloride 1,000 mL Intravenous Once   sodium chloride 10 mL Intravenous Q12H   sodium chloride 10 mL Intravenous Q12H   sodium chloride 10 mL Intravenous Q12H   Vancomycin Pharmacy Intermittent Dosing  Does not apply Daily     sodium chloride 75 mL/hr Last Rate: 75 mL/hr (02/01/20 0328)       Current Antimicrobial Therapy:  Anti-Infectives (From admission, onward)    Ordered     Dose/Rate Route Frequency Start Stop    02/01/20 1447  Vancomycin Pharmacy Intermittent Dosing     Ordering Provider:  Denny Coy MD     Does not apply Daily 02/01/20 1545 02/05/20 0859    02/01/20 0517  piperacillin-tazobactam (ZOSYN) 3.375 g/100 mL 0.9% NS IVPB (mbp)     Dorothy Salvador [facility] reviewed the order on 02/01/20 1048.   Ordering Provider:  Denny Coy MD    3.375 g  over 4 Hours Intravenous Every 8 Hours 02/01/20 0630 02/08/20 0629    01/31/20 1949  vancomycin (VANCOCIN) 1,750 mg in sodium chloride 0.9 % 500 mL IVPB     Ordering Provider:  Waqas York MD    20 mg/kg × 93 kg Intravenous Once 01/31/20 1951 02/01/20 0000    01/31/20 1949  piperacillin-tazobactam (ZOSYN) 4.5 g/100 mL 0.9% NS IVPB (mbp)     Ordering Provider:  Waqas York MD    4.5 g Intravenous Once 01/31/20 1951 01/31/20 2129        Current Diuretic Therapy:  Diuretics (From admission, onward)    None        ----------------------------------------------------------------------------------------------------------------------  Vital Signs:  Temp:  [97.4 °F (36.3 °C)-102.4 °F (39.1 °C)] 98 °F (36.7 °C)  Heart Rate:  [117-152] 123  Resp:  [20-28]  26  BP: ()/(55-96) 125/83  SpO2:  [94 %-100 %] 98 %  on   ;   Device (Oxygen Therapy): room air  Body mass index is 36.83 kg/m².    Wt Readings from Last 3 Encounters:   02/01/20 94.3 kg (207 lb 14.3 oz)   01/12/20 92.1 kg (203 lb)   01/09/20 91.6 kg (202 lb)     Intake & Output (last 3 days)       01/29 0701 - 01/30 0700 01/30 0701 - 01/31 0700 01/31 0701 - 02/01 0700 02/01 0701 - 02/02 0700    P.O.    565    I.V. (mL/kg)    100 (1.1)    IV Piggyback   200 1100    Total Intake(mL/kg)   200 (2.1) 1765 (18.7)    Urine (mL/kg/hr)   225 125 (0.2)    Stool   375 225    Total Output   600 350    Net   -400 +1415                Diet Full Liquid  ----------------------------------------------------------------------------------------------------------------------  Physical exam:  Constitutional:  Well-developed and well-nourished.  No respiratory distress. Obese.   HENT:  Head:  Normocephalic and atraumatic.  Mouth:  Moist mucous membranes.    Eyes:  Conjunctivae and EOM are normal. No scleral icterus.    Neck:  Neck supple.  No JVD present.    Cardiovascular:  Normal rate, regular rhythm and normal heart sounds with no murmur.  Pulmonary/Chest:  No respiratory distress, no wheezes, no crackles, with normal breath sounds and good air movement.  Abdominal:  Distended. Soft. Mildly tender in lower quadrants.   Musculoskeletal:  No edema, no tenderness, and no deformity.  No red or swollen joints anywhere.    Neurological:  Alert and oriented to person, place, and time.  No cranial nerve deficit.  No tongue deviation.  No facial droop.  No slurred speech.   Skin:  Skin is warm and dry. No rash noted. No pallor.   Peripheral vascular:  Pulses in all 4 extremities with no clubbing, no cyanosis, no edema.  ----------------------------------------------------------------------------------------------------------------------  Tele:     ----------------------------------------------------------------------------------------------------------------------  Results from last 7 days   Lab Units 02/01/20 0840 02/01/20 0022 01/31/20 1955   CRP mg/dL 29.28*  --  30.94*   LACTATE mmol/L 3.8* 3.6* 5.2*   WBC 10*3/mm3 27.93*  --  29.10*   HEMOGLOBIN g/dL 7.9*  --  8.8*   HEMATOCRIT % 26.7*  --  29.9*   MCV fL 80.4  --  79.5   MCHC g/dL 29.6*  --  29.4*   PLATELETS 10*3/mm3 362  --  483*   INR  1.34*  --   --      Results from last 7 days   Lab Units 01/31/20 2005   PH, ARTERIAL pH units 7.417   PO2 ART mm Hg 85.7   PCO2, ARTERIAL mm Hg 20.0*   HCO3 ART mmol/L 12.9*     Results from last 7 days   Lab Units 02/01/20 0840 01/31/20 1955   SODIUM mmol/L 131* 130*   POTASSIUM mmol/L 4.8 5.0   MAGNESIUM mg/dL 1.5* 1.6   CHLORIDE mmol/L 99 94*   CO2 mmol/L 12.3* 13.4*   BUN mg/dL 26* 26*   CREATININE mg/dL 2.10* 1.95*   EGFR IF NONAFRICN AM mL/min/1.73 26* 28*   CALCIUM mg/dL 7.8* 8.8   PHOSPHORUS mg/dL 3.9  --    GLUCOSE mg/dL 96 122*   ALBUMIN g/dL 2.13* 2.83*   BILIRUBIN mg/dL 0.5 0.5   ALK PHOS U/L 753* 958*   AST (SGOT) U/L 103* 121*   ALT (SGPT) U/L 27 32   Estimated Creatinine Clearance: 38.1 mL/min (A) (by C-G formula based on SCr of 2.1 mg/dL (H)).  No results found for: AMMONIA  Results from last 7 days   Lab Units 02/01/20  0840 01/31/20 1955   TROPONIN T ng/mL <0.010 <0.010     Results from last 7 days   Lab Units 01/31/20 1955   PROBNP pg/mL 214.1         No results found for: HGBA1C, POCGLU  Lab Results   Component Value Date    TSH 9.270 (H) 01/31/2020     No results found for: PREGTESTUR, PREGSERUM, HCG, HCGQUANT  Pain Management Panel     There is no flowsheet data to display.        Brief Urine Lab Results  (Last result in the past 365 days)      Color   Clarity   Blood   Leuk Est   Nitrite   Protein   CREAT   Urine HCG        01/31/20 2028 Dark Yellow Turbid Negative Negative Negative 30 mg/dL (1+)             Blood Culture   Date Value  Ref Range Status   01/31/2020 No growth at less than 24 hours  Preliminary   01/31/2020 No growth at less than 24 hours  Preliminary     No results found for: URINECX  No results found for: WOUNDCX  No results found for: STOOLCX  No results found for: RESPCX  No results found for: AFBCX  Results from last 7 days   Lab Units 02/01/20  0840 02/01/20  0022 01/31/20  1955   LACTATE mmol/L 3.8* 3.6* 5.2*   CRP mg/dL 29.28*  --  30.94*       I have personally looked at the labs and they are summarized above.  ----------------------------------------------------------------------------------------------------------------------  Detailed radiology reports for the last 24 hours:    Imaging Results (Last 24 Hours)     Procedure Component Value Units Date/Time    CT Chest Without Contrast [[id number]] Collected:  01/31/20 2149     Updated:  01/31/20 2151    Narrative:       CT Chest WO    INDICATION:   Sepsis with recent abdominal surgery for advanced colon cancer.    TECHNIQUE:   CT of the thorax without IV contrast. Coronal and sagittal reconstructions were obtained.  Radiation dose reduction techniques included automated exposure control or exposure modulation based on body size. Count of known CT and cardiac nuc med studies  performed in previous 12 months: 2.     COMPARISON:   None available.    FINDINGS:  Right pleural effusion layering to a depth of 2.5 cm and a small left pleural effusion layering to a depth of less than a centimeter. Right-sided volume loss with atelectasis right lung base. Minimal left basilar atelectasis. No suspicious nodules or  masses. No focal consolidation. Small amount of vertical atelectasis anterior right upper lobe.    Heart size normal. Extensive anterior mediastinal lymphadenopathy which in the setting of known malignancy is concerning for metastases. Venous access port noted over the left upper chest with distal tip in the SVC. Widely disseminated liver metastases.  Please see CT abdomen  and pelvis for remainder of findings within the abdomen and pelvis. Osseous structures and thoracic inlet unremarkable.      Impression:       Small bilateral pleural effusions right greater than left with bibasilar volume loss right greater than left. Atelectasis favored over focal consolidation.    Multiple enlarged anterior mediastinal lymph nodes largest measuring 1.2 x 1.5 cm and in the setting of known malignancy is highly concerning for metastatic disease. No definite pulmonary metastasis.    Signer Name: BUSTER Olivares MD   Signed: 1/31/2020 9:49 PM   [facility]: [email or web address]    [facility]    CT Abdomen Pelvis Without Contrast [[id number]] Collected:  01/31/20 2131     Updated:  01/31/20 2133    Narrative:       CT Abdomen Pelvis WO    INDICATION:   Abdominal pain with fever and sepsis. Recent surgery. Reported colon cancer and pain around surgical area.    TECHNIQUE:   CT of the abdomen and pelvis without IV contrast. Coronal and sagittal reconstructions were obtained.  Radiation dose reduction techniques included automated exposure control or exposure modulation based on body size. Count of known CT and cardiac nuc  med studies performed in previous 12 months: 2.     COMPARISON:   CT abdomen and pelvis 1/12/2020    FINDINGS:  Abdomen: Small bilateral pleural effusions right greater than left with bibasilar atelectasis. Multiple hypodense and hyperdense liver lesions compatible with widely disseminated liver metastases. No ductal dilatation. Spleen and gallbladder are  unremarkable. Pancreas, kidneys and adrenal glands are unremarkable except for a small nonobstructing right renal stone.    Patient has undergone apparent diverting ileostomy. Soft tissue mass within the right lower quadrant measuring 8.69 6.8 x 8.9 cm most compatible with colon carcinoma involving the cecum and right colon with extension into the mesentery. Extensive  mesenteric and retroperitoneal  lymphadenopathy. Increasing ascites when compared to 1/12/2020. Stomach and small bowel unremarkable.    Pelvis: Bladder decompressed. Uterus and adnexa are unremarkable. Moderate amount of induration and edema within the subcutaneous tissues along the lateral abdomen which may be related to third spacing of fluid. No drainable fluid collection or abscess.      Impression:       Postoperative changes from apparent diverting ileostomy with a normal-appearing right lower anterior abdominal wall ostomy.    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 Extensive retroperitoneal and mesenteric lymphadenopathy concerning for metastatic adenopathy.    Moderate amount of ascites which has increased from January 12.    Extensive liver metastases.    Small bilateral pleural effusions with bibasilar atelectasis right greater than left.    Moderate amount of induration and edema within the subcutaneous tissues along the flank regions bilaterally most likely related to third spacing of fluid and recent operative intervention. No definitive abscess.          Signer Name: BUSTER Olivares MD   Signed: 1/31/2020 9:31 PM   Workstation Name: [email or web address]    [facility]    XR Chest 1 View [[id number]] Collected:  01/31/20 2046     Updated:  01/31/20 2048    Narrative:       XR CHEST 1 VW-     CLINICAL INDICATION: sepsis        COMPARISON: 01/12/2020      TECHNIQUE: Single frontal view of the chest.     FINDINGS:     Right basilar airspace disease  The cardiac silhouette is normal. The pulmonary vasculature is  unremarkable.  There is no evidence of an acute osseous abnormality.   There are no suspicious-appearing parenchymal soft tissue nodules.          Impression:       Appearance compatible with right basilar pneumonia     This report was finalized on 1/31/2020 8:46 PM by Dr. Colton Salgado MD.           Assessment & Plan       #Septic shock 2/2 SBP  #Lactic acidosis   - On presentation (WBC 29,100, CRP 30.94, lactic acid 5.2, temperature 102.4, heart rate 152, blood pressure 72/60  - CT chest showed some bibasilar consolidations that are likely   - Symptoms of abdominal pain more consistent with SBP  - Diagnostic paracentesis performed on 2/1 that revealed ANC: 3400. Confirming SBP. Culture pending  - Continue Vanc/Zosyn. Discontinue doxy as I do not suspect atypical infections.   - Blood culture NGTD   - Still tachycardic. Will continue PRN boluses.     #KODI  - Baseline Cr 0.7-0.9.   - Cr on presentation 1.95=>2.10  - Potential causes include prerenal, ATN 2/2 sepsis   - CT abdomen/pelvis did not reveal urinary obstruction.  - Will get FENA  - Continue to fluid resuscitate. Received 2 additional fluid bolus today.     #Stage 4 colon cancer with bulky lymphadenopathy and liver lesions and adrenal masses  - Scheduled to f/u with oncology at  on 2/3. Expects that she will start chemo soon. Suspect treatments would be palliative in nature.     #Mildly elevated TSH  - TSH 9  - Will get free T3, T4    #Hypomagnesemia   - Replaced    #Normocytic anemia   - Hemoglobin has been trending down. 10 on 1/2/20, now down to 7.9.   - Unclear etiology. Will get iron studies, vitamin B12, folate.   - Monitor for overt signs of bleeding     #Diverting loop ileostomy  - Performed 1/17 at  for SBO    F: Regular   A: PRN dilaudid   S: None  T: SubQ heparin   H: HOB elevated   U: PPI  G: PRN  S: N/A  B: PRN  I: PIVs, Port 1/2  D: Vanc/Zosyn     Code status: Full     Dispo: Continue CCU care.     50 minutes of critical care used on patient. Patient critically ill from septic shock 2/2 SBP. >50% of time spent providing bedside care and discussing care with staff.     VTE Prophylaxis:   Mechanical Order History:     None      Pharmalogical Order History:     Ordered     Dose Route Frequency Stop    02/01/20 5509  heparin (porcine) 5000 UNIT/ML injection  5,000 Units      5,000 Units SC Every 8 Hours Scheduled --    20 0309  enoxaparin (LOVENOX) syringe 40 mg  Status:  Discontinued      40 mg SC Every 24 Hours 20 1455          Max Riggs MD  [facility] Hospitalist  20  3:06 PM    Electronically signed by Max Riggs MD at 20 1509     Progress Notes signed by Isauro Calvo MD at 20 0703         Nurse Practitioner - Brief Progress Note  PERMANENT  2020 05:36    [facility]  [facility] - CCU - 10 - [place], [place] ([facility])    MATT HAGAN    Date of Service 2020 05:36    HPI/Events of Note AICU Provider Assessment Note    Tele AICU focused assessment note: Information obtained from patient's chart    45 y/o female with PMH of 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   CT abdomen/ pelvis reported moderate amount of ascites/ extensive liver metastases/ small bilateral pleural effusion with bibasilar atelectasis right greater than right/ no definitive abscess.   Treatments in the ED included: 30 ml/kg NS bolus ( 1572 ml's), zofran, dilaudid, and Zosyn/ Vancomcyin. Transferred to ICU for management .    Pertinent labs: LA 5.2, WBC 29.10, CRP 30.94, H&H 8.8/ 29.9, sodium 130, BUN/ creatine 26/ 1.95, and AB.41/ 20/ 85.7/ 12.9.     , BP 98/77, RR 24, temperature 37.7, and oxygen saturations are 96% on room air.     Assessment and Plan:  1. Sepsis  -Blood and urine cultures pending  -Influenza A&B negative  -Doxycycline/ Zosyn  -Received Vancomycin  -NS at 75 ml/hr  -Trend lactic acid till clear     2. Acute on chronic kidney injury  -Strict I&O  -Avoid nephrotoxic medications  -Monitor bmp  -Renal dose medications        __y___   Video Assessment  performed  __y___   Most recent labs reviewed  __y___   Vital Signs reviewed  __y___   Best Practices addressed:                 VTE prophylaxis: Lovenox sq                 SUP (when indicated): N/A                  Glycemic control: Blood sugar 122                      Please notify bedside physician when present or [facility] if glc > 180 X 2                 Sepsis guidelines: Yes                  Lung protective strategy: N/A                  Targeted Temperature Management: N/A     __y___     Spoke with bedside RN  __n___     Orders written      Contact [facility] for any needs if bedside physician is not present.    This note is drafted by Martina Win, [facility]      Interventions Major-Sepsis - evaluation and management  Intermediate-Diagnostic test evaluation        Electronically Signed by: Martina Win (NP) on 02/01/2020 06:44    Annotated By: Isauro Calvo)    Date: 02/01/20 07:03  Reviewed  the patient's chart and the note above agree with above    Electronically signed by Isauro Calvo MD at 02/01/20 0703       Consult Notes (last 24 hours) (Notes from 01/31/20 1540 through 02/01/20 1540)    No notes of this type exist for this encounter.

## 2020-02-01 NOTE — ED NOTES
Pt resting quietly on stretcher with complaints of abdominal pain, HR tachycardic and provider aware.  Pt AAOx4 with no resp distress noted, respirations even and unlabored.  Pt denies any needs at this time.  Skin PWD.  Pt family at bedside. Will continue to monitor and follow plan of care.  Bed rails up x2, bed in lowest position, call light in reach.         Berenice Méndez, RN  01/31/20 8089

## 2020-02-01 NOTE — ED PROVIDER NOTES
Subjective   Patient is a 44-year-old  female who has a history of metastatic cecal adenocarcinoma with liver and pulmonary metastases, peritoneal carcinomatosis who had a laparoscopic diverting loop ileostomy at the [facility] on January 17.  She presents with a one-day history of increasing diffuse right-sided abdominal pain that extends into her right flank and fever associated with increasing generalized weakness.  She denies headaches, neck pain, chest pain, shortness of breath, vomiting, syncope or near syncope, focal numbness or weakness, other symptoms or other complaints.          Review of Systems   Constitutional: Positive for fever. Negative for diaphoresis.   HENT: Negative for congestion, ear pain, sore throat and trouble swallowing.    Eyes: Negative for photophobia and pain.   Respiratory: Negative for shortness of breath, wheezing and stridor.    Cardiovascular: Negative for chest pain and palpitations.   Gastrointestinal: Positive for abdominal pain. Negative for abdominal distention, blood in stool, diarrhea and vomiting.   Endocrine: Negative for polydipsia and polyphagia.   Genitourinary: Positive for flank pain. Negative for difficulty urinating.   Musculoskeletal: Negative for neck pain and neck stiffness.   Skin: Negative for color change and pallor.   Neurological: Negative for seizures, syncope and speech difficulty.   Psychiatric/Behavioral: Negative for confusion.   All other systems reviewed and are negative.      Past Medical History:   Diagnosis Date   • Anxiety    • Cancer (CMS/HCC)     colon   • GERD (gastroesophageal reflux disease)    • Headache    • Snoring        Allergies   Allergen Reactions   • Bactrim [Sulfamethoxazole-Trimethoprim] Swelling     Throat swells    • Metronidazole Swelling     Throat swelling and facial rash    • Sulfa Antibiotics Anaphylaxis       Past Surgical History:   Procedure Laterality Date   • LIVER BIOPSY N/A 1/2/2020    Procedure:  LIVER BIOPSY LAPAROSCOPIC;  Surgeon: Sophie Grimes MD;  Location: [facility] OR;  Service: General   • TUBAL ABDOMINAL LIGATION     • VENOUS ACCESS DEVICE (PORT) INSERTION N/A 1/2/2020    Procedure: INSERTION VENOUS ACCESS DEVICE;  Surgeon: Sophie Grimes MD;  Location: [facility] OR;  Service: General       Family History   Problem Relation Age of Onset   • Hypertension Mother    • Diabetes Mother    • Cancer Father         stomach   • Breast cancer Neg Hx        Social History     Socioeconomic History   • Marital status:      Spouse name: Not on file   • Number of children: Not on file   • Years of education: Not on file   • Highest education level: Not on file   Tobacco Use   • Smoking status: Never Smoker   • Smokeless tobacco: Never Used   Substance and Sexual Activity   • Alcohol use: No   • Drug use: No   • Sexual activity: Defer           Objective   Physical Exam   Constitutional: She is oriented to person, place, and time. She appears well-developed and well-nourished. She appears distressed.   Pleasant young female, appears acutely ill.   HENT:   Head: Normocephalic and atraumatic.   Eyes: Pupils are equal, round, and reactive to light. EOM are normal. No scleral icterus.   Neck: Normal range of motion. Neck supple. No neck rigidity. No tracheal deviation present.   Cardiovascular: Normal rate, regular rhythm and intact distal pulses.   Pulmonary/Chest: Effort normal and breath sounds normal. No respiratory distress. She exhibits no tenderness.   Abdominal: Soft. Bowel sounds are normal. There is tenderness (There is moderate diffuse right abdominal and right flank tenderness, no other tenderness, no acute peritoneal signs.). There is no rebound and no guarding.   Musculoskeletal: Normal range of motion. She exhibits no tenderness.   Neurological: She is alert and oriented to person, place, and time. She has normal strength. No sensory deficit. GCS eye subscore is 4. GCS verbal subscore is 5. GCS  motor subscore is 6.   Skin: Skin is warm and dry. Capillary refill takes less than 2 seconds. She is not diaphoretic. No cyanosis. No pallor.   Psychiatric: She has a normal mood and affect. Her behavior is normal.   Nursing note and vitals reviewed.      Procedures  EKG shows sinus tachycardia with a rate of 146.  Nonspecific ST-T changes.  No apparent acute ischemia.  CT Abdomen Pelvis Without Contrast   Final Result   Postoperative changes from apparent diverting ileostomy with a normal-appearing right lower anterior abdominal wall ostomy.      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 Extensive retroperitoneal and mesenteric lymphadenopathy concerning for metastatic adenopathy.      Moderate amount of ascites which has increased from January 12.      Extensive liver metastases.      Small bilateral pleural effusions with bibasilar atelectasis right greater than left.      Moderate amount of induration and edema within the subcutaneous tissues along the flank regions bilaterally most likely related to third spacing of fluid and recent operative intervention. No definitive abscess.               Signer Name: BUSTER Olivares MD    Signed: 1/31/2020 9:31 PM    Workstation Name: [facility]     Radiology [facility]      CT Chest Without Contrast   Final Result   Small bilateral pleural effusions right greater than left with bibasilar volume loss right greater than left. Atelectasis favored over focal consolidation.      Multiple enlarged anterior mediastinal lymph nodes largest measuring 1.2 x 1.5 cm and in the setting of known malignancy is highly concerning for metastatic disease. No definite pulmonary metastasis.      Signer Name: BUSTER Olivares MD    Signed: 1/31/2020 9:49 PM    Workstation Name: [facility]     Radiology [facility]      XR Chest 1 View   Final Result   Appearance compatible  with right basilar pneumonia       This report was finalized on 1/31/2020 8:46 PM by Dr. Colton Salgado MD.            Results for orders placed or performed during the hospital encounter of 01/31/20   Influenza Antigen, Rapid - Swab, Nasopharynx   Result Value Ref Range    Influenza A Ag, EIA Negative Negative    Influenza B Ag, EIA Negative Negative   Comprehensive Metabolic Panel   Result Value Ref Range    Glucose 122 (H) 65 - 99 mg/dL    BUN 26 (H) 6 - 20 mg/dL    Creatinine 1.95 (H) 0.57 - 1.00 mg/dL    Sodium 130 (L) 136 - 145 mmol/L    Potassium 5.0 3.5 - 5.2 mmol/L    Chloride 94 (L) 98 - 107 mmol/L    CO2 13.4 (L) 22.0 - 29.0 mmol/L    Calcium 8.8 8.6 - 10.5 mg/dL    Total Protein 7.2 6.0 - 8.5 g/dL    Albumin 2.83 (L) 3.50 - 5.20 g/dL    ALT (SGPT) 32 1 - 33 U/L    AST (SGOT) 121 (H) 1 - 32 U/L    Alkaline Phosphatase 958 (H) 39 - 117 U/L    Total Bilirubin 0.5 0.2 - 1.2 mg/dL    eGFR Non African Amer 28 (L) >60 mL/min/1.73    Globulin 4.4 gm/dL    A/G Ratio 0.6 g/dL    BUN/Creatinine Ratio 13.3 7.0 - 25.0    Anion Gap 22.6 (H) 5.0 - 15.0 mmol/L   Lipase   Result Value Ref Range    Lipase 49 13 - 60 U/L   Amylase   Result Value Ref Range    Amylase 35 28 - 100 U/L   Urinalysis With Culture If Indicated - Urine, Catheter   Result Value Ref Range    Color, UA Dark Yellow (A) Yellow, Straw    Appearance, UA Turbid (A) Clear    pH, UA <=5.0 5.0 - 8.0    Specific Gravity, UA 1.023 1.005 - 1.030    Glucose, UA Negative Negative    Ketones, UA Trace (A) Negative    Bilirubin, UA Small (1+) (A) Negative    Blood, UA Negative Negative    Protein, UA 30 mg/dL (1+) (A) Negative    Leuk Esterase, UA Negative Negative    Nitrite, UA Negative Negative    Urobilinogen, UA 0.2 E.U./dL 0.2 - 1.0 E.U./dL   Troponin   Result Value Ref Range    Troponin T <0.010 0.000 - 0.030 ng/mL   Lactic Acid, Plasma   Result Value Ref Range    Lactate 5.2 (C) 0.5 - 2.0 mmol/L   C-reactive Protein   Result Value Ref Range    C-Reactive  Protein 30.94 (H) 0.00 - 0.50 mg/dL   TSH   Result Value Ref Range    TSH 9.270 (H) 0.270 - 4.200 uIU/mL   Magnesium   Result Value Ref Range    Magnesium 1.6 1.6 - 2.6 mg/dL   BNP   Result Value Ref Range    proBNP 214.1 5.0 - 450.0 pg/mL   CBC Auto Differential   Result Value Ref Range    WBC 29.10 (H) 3.40 - 10.80 10*3/mm3    RBC 3.76 (L) 3.77 - 5.28 10*6/mm3    Hemoglobin 8.8 (L) 12.0 - 15.9 g/dL    Hematocrit 29.9 (L) 34.0 - 46.6 %    MCV 79.5 79.0 - 97.0 fL    MCH 23.4 (L) 26.6 - 33.0 pg    MCHC 29.4 (L) 31.5 - 35.7 g/dL    RDW 16.5 (H) 12.3 - 15.4 %    RDW-SD 46.3 37.0 - 54.0 fl    MPV 10.9 6.0 - 12.0 fL    Platelets 483 (H) 140 - 450 10*3/mm3   Blood Gas, Arterial With Co-Ox   Result Value Ref Range    Site Left Radial     Rashaun's Test Positive     pH, Arterial 7.417 7.350 - 7.450 pH units    pCO2, Arterial 20.0 (L) 35.0 - 45.0 mm Hg    pO2, Arterial 85.7 83.0 - 108.0 mm Hg    HCO3, Arterial 12.9 (L) 20.0 - 26.0 mmol/L    Base Excess, Arterial -10.2 (L) 0.0 - 2.0 mmol/L    O2 Saturation, Arterial 97.0 94.0 - 99.0 %    Hemoglobin, Blood Gas 8.5 (L) 13.5 - 17.5 g/dL    Hematocrit, Blood Gas 26.1 (L) 38.0 - 51.0 %    Oxyhemoglobin 95.8 94 - 99 %    Methemoglobin 0.30 0.00 - 3.00 %    Carboxyhemoglobin 0.9 0 - 5 %    A-a Gradiant 34.8 0.0 - 300.0 mmHg    CO2 Content 13.5 (L) 22 - 33 mmol/L    Temperature 0.0 C    Barometric Pressure for Blood Gas 729 mmHg    Modality Room Air     FIO2 21 %    Ventilator Mode NA     Note      Collected by [id number]     pH, Temp Corrected      pCO2, Temperature Corrected      pO2, Temperature Corrected     Urinalysis, Microscopic Only - Urine, Catheter   Result Value Ref Range    RBC, UA 0-2 None Seen, 0-2 /HPF    WBC, UA 0-2 None Seen, 0-2 /HPF    Bacteria, UA 2+ (A) None Seen /HPF    Squamous Epithelial Cells, UA 3-6 (A) None Seen, 0-2 /HPF    Hyaline Casts, UA 0-2 None Seen /LPF    Uric Acid Crystals, UA Large/3+ None Seen /HPF    Amorphous Crystals, UA Moderate/2+ None Seen /HPF     Methodology Manual Light Microscopy    Light Blue Top   Result Value Ref Range    Extra Tube hold for add-on    Green Top (Gel)   Result Value Ref Range    Extra Tube Hold for add-ons.    Lavender Top   Result Value Ref Range    Extra Tube hold for add-on    Gold Top - SST   Result Value Ref Range    Extra Tube Hold for add-ons.    Manual Differential   Result Value Ref Range    Neutrophil % 71.0 42.7 - 76.0 %    Lymphocyte % 6.0 (L) 19.6 - 45.3 %    Monocyte % 9.0 5.0 - 12.0 %    Eosinophil % 2.0 0.3 - 6.2 %    Bands %  12.0 (H) 0.0 - 5.0 %    Neutrophils Absolute 24.15 (H) 1.70 - 7.00 10*3/mm3    Lymphocytes Absolute 1.75 0.70 - 3.10 10*3/mm3    Monocytes Absolute 2.62 (H) 0.10 - 0.90 10*3/mm3    Eosinophils Absolute 0.58 (H) 0.00 - 0.40 10*3/mm3    Anisocytosis Slight/1+ None Seen    Hypochromia Mod/2+ None Seen    Microcytes Slight/1+ None Seen    Platelet Morphology Normal Normal                ED Course  ED Course as of Jan 31 2305 Fri Jan 31, 2020 2239 Case discussed with ALLY Frey with the hospitalist service.  She will discussed the case with Dr. Coy and make arrangements to admit patient to the hospital.    [CM]      ED Course User Index  [CM] Waqas York MD                                               [facility]    Final diagnoses:   Sepsis, due to unspecified organism, unspecified whether acute organ dysfunction present (CMS/[facility])             Please note that portions of this note were completed with a voice recognition program.        Waqas York MD  01/31/20 8833

## 2020-02-01 NOTE — PROGRESS NOTES
Discharge Planning Assessment   S[facility]chez     Patient Name: Gracy Norman  MRN: [id number]  Today's Date: 2/1/2020    Admit Date: 1/31/2020    Discharge Needs Assessment     Row Name 02/01/20 1445       Living Environment    Lives With  grandchild(odalys);spouse;child(odalys), adult    Name(s) of Who Lives With Patient  Pt lives at home with her spouse, daughter, son in law, grandchild and her son.  She plans to return home at d/c.     Current Living Arrangements  home/apartment/condo    Primary Care Provided by  child(odalys)    Provides Primary Care For  no one, unable/limited ability to care for self    Family Caregiver if Needed  child(odalys), adult    Family Caregiver Names  Daughter Gracy Sarmiento helps her with ileostomy.    Quality of Family Relationships  supportive;involved;helpful       Resource/Environmental Concerns    Resource/Environmental Concerns  none    Transportation Concerns  car, none       Transition Planning    Patient/Family Anticipates Transition to  home    Patient/Family Anticipated Services at Transition  none    Transportation Anticipated  family or friend will provide Pt's  will transport.        Discharge Needs Assessment    Readmission Within the Last 30 Days  no previous admission in last 30 days    Concerns to be Addressed  adjustment to diagnosis/illness    Equipment Currently Used at Home  none    Anticipated Changes Related to Illness  none    Equipment Needed After Discharge  walker, standard Pt requests a walker at d/c r/t her weakness.     Provided post acute provider list?  Refused    Refused Provider List Comment  Pt stated she does not have a prefence of providers.     Current Discharge Risk  chronically ill        Discharge Plan     Row Name 02/01/20 6370       Plan    Plan  Pt lives at home with her  and multiple family members and she plans to return home at d/c.  Pt does not have any DME or home health.  She requests a walker at d/c.  She may need home health.  She does  not have a PCP but is agreeable to see  physician at d/c.  She is enrolled with meds to bed.  CM will follow.    Patient/Family in Agreement with Plan  yes    Plan Comments  CM spoke with pt in CCU. Her daughter and mother are at the bedside.  Pt stated she does have ostomy supplies at home.  She plans to f/u with [facility] for chemo later.  Pt has paracentesis this am with 1200ml fld removed.             Legal     Row Name 02/01/20 5652       Financial/Legal    Finance Comments  Pt stated she has [employer] now but has not received her card yet. She gets her Rx here at [facility] and she is enrolled with meds to bed.         Substance Abuse    No documentation.       Patient Forms    No documentation.           Pattie Staley RN

## 2020-02-01 NOTE — ED NOTES
Pt resting quietly on stretcher with complaints of abdominal pain, HR tachycardic and provider aware.  Pt AAOx4 with no resp distress noted, respirations even and unlabored.  Pt denies any needs at this time.  Skin PWD.  Pt family at bedside. Will continue to monitor and follow plan of care.  Bed rails up x2, bed in lowest position, call light in reach.         Berenice Méndez, RN  01/31/20 9132

## 2020-02-01 NOTE — ED NOTES
Pt resting quietly on stretcher with improving complaints of abdominal pain, HR tachycardic and provider aware.  Pt AAOx4 with no resp distress noted, respirations even and unlabored.  Pt denies any needs at this time.  Skin PWD.  Pt family at bedside. Will continue to monitor and follow plan of care.  Bed rails up x2, bed in lowest position, call light in reach.       Berenice Méndez, RN  02/01/20 5035

## 2020-02-01 NOTE — PLAN OF CARE
Patient had a paracentesis this AM with 1200 ml removed and tolerated well.  Patient has had 2 NS fluid Bolus, Magnesium replacement and antibiotics.  Patient has remained afebrile and states that she is feeling much better.  Patient has ambulated to toilet X2 with minimal UO.  Patient has been nauseous and requested to be changed from a regular diet to FLD.  Dietary suggest that patient has Gastrointestinal Soft diet when able to advance.  Patient is requiring pain medication frequently r/t back/abdominal pain.

## 2020-02-01 NOTE — PROGRESS NOTES
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   CT abdomen/ pelvis reported moderate amount of ascites/ extensive liver metastases/ small bilateral pleural effusion with bibasilar atelectasis right greater than right/ no definitive abscess.   Treatments in the ED included: 30 ml/kg NS bolus ( 1572 ml's), zofran, dilaudid, and Zosyn/ Vancomcyin. Transferred to ICU for management .    Pertinent labs: LA 5.2, WBC 29.10, CRP 30.94, H&H 8.8/ 29.9, sodium 130, BUN/ creatine 26/ 1.95, and AB.41/ 20/ 85.7/ 12.9.     , BP 98/77, RR 24, temperature 37.7, and oxygen saturations are 96% on room air.     Assessment and Plan:  1. Sepsis  -Blood and urine cultures pending  -Influenza A&B negative  -Doxycycline/ Zosyn  -Received Vancomycin  -NS at 75 ml/hr  -Trend lactic acid till clear     2. Acute on chronic kidney injury  -Strict I&O  -Avoid nephrotoxic medications  -Monitor bmp  -Renal dose medications        __y___   Video Assessment performed  __y___   Most recent labs reviewed  __y___   Vital Signs reviewed  __y___   Best Practices addressed:                 VTE prophylaxis: Lovenox sq                 SUP (when indicated): N/A                  Glycemic control: Blood sugar 122                      Please notify bedside physician when present or [facility] if glc > 180 X 2                 Sepsis guidelines: Yes                  Lung  protective strategy: N/A                  Targeted Temperature Management: N/A     __y___     Spoke with bedside RN  __n___     Orders written      Contact [facility] for any needs if bedside physician is not present.    This note is drafted by Martina Win [facility]      Interventions Major-Sepsis - evaluation and management  Intermediate-Diagnostic test evaluation        Electronically Signed by: Martina Win (NP) on 02/01/2020 06:44    Annotated By: Isauro Calvo (MD)    Date: 02/01/20 07:03  Reviewed  the patient's chart and the note above agree with above

## 2020-02-01 NOTE — PROGRESS NOTES
[facility] HOSPITALIST PROGRESS NOTE     Patient Identification:  Name:  Gracy Norman  Age:  44 y.o.  Sex:  female  :  1975  MRN:  [id number]  Visit Number:  [id number]  ROOM: [address]     Primary Care Provider:  Almas Stone MD    Length of stay in inpatient status:  1    Subjective     Chief Compliant:    Chief Complaint   Patient presents with   • Weakness - Generalized   • Post-op Problem       History of Presenting Illness:    Patient reports feeling better this morning but still complaining of abdominal and back pain. Tolerated bedside paracentesis well. Abdominal discomfort improved after procedure. Still requiring PRN dilaudid regularly.     ROS:  Denies fevers/chills. Denies chest pain.   Otherwise 10 point ROS negative other than documented above in HPI.     Objective     Current Hospital Meds:  cyclobenzaprine 5 mg Oral BID   heparin (porcine) 5,000 Units Subcutaneous Q8H   magnesium sulfate 4 g Intravenous Once   pantoprazole 40 mg Oral QAM   piperacillin-tazobactam 3.375 g Intravenous Q8H   sodium chloride 1,000 mL Intravenous Once   sodium chloride 10 mL Intravenous Q12H   sodium chloride 10 mL Intravenous Q12H   sodium chloride 10 mL Intravenous Q12H   Vancomycin Pharmacy Intermittent Dosing  Does not apply Daily     sodium chloride 75 mL/hr Last Rate: 75 mL/hr (20 0328)       Current Antimicrobial Therapy:  Anti-Infectives (From admission, onward)    Ordered     Dose/Rate Route Frequency Start Stop    20 1447  Vancomycin Pharmacy Intermittent Dosing     Ordering Provider:  Denny Coy MD     Does not apply Daily 20 1545 20 0859    20 0517  piperacillin-tazobactam (ZOSYN) 3.375 g/100 mL 0.9% NS IVPB (mbp)     Dorothy Salvador, [facility] reviewed the order on 20 1048.   Ordering Provider:  Denny Coy MD    3.375 g  over 4 Hours Intravenous Every 8 Hours 20 0630 20 0629    20 1949  vancomycin (VANCOCIN)  1,750 mg in sodium chloride 0.9 % 500 mL IVPB     Ordering Provider:  Waqas York MD    20 mg/kg × 93 kg Intravenous Once 01/31/20 1951 02/01/20 0000    01/31/20 1949  piperacillin-tazobactam (ZOSYN) 4.5 g/100 mL 0.9% NS IVPB (mbp)     Ordering Provider:  Waqas York MD    4.5 g Intravenous Once 01/31/20 1951 01/31/20 2129        Current Diuretic Therapy:  Diuretics (From admission, onward)    None        ----------------------------------------------------------------------------------------------------------------------  Vital Signs:  Temp:  [97.4 °F (36.3 °C)-102.4 °F (39.1 °C)] 98 °F (36.7 °C)  Heart Rate:  [117-152] 123  Resp:  [20-28] 26  BP: ()/(55-96) 125/83  SpO2:  [94 %-100 %] 98 %  on   ;   Device (Oxygen Therapy): room air  Body mass index is 36.83 kg/m².    Wt Readings from Last 3 Encounters:   02/01/20 94.3 kg (207 lb 14.3 oz)   01/12/20 92.1 kg (203 lb)   01/09/20 91.6 kg (202 lb)     Intake & Output (last 3 days)       01/29 0701 - 01/30 0700 01/30 0701 - 01/31 0700 01/31 0701 - 02/01 0700 02/01 0701 - 02/02 0700    P.O.    565    I.V. (mL/kg)    100 (1.1)    IV Piggyback   200 1100    Total Intake(mL/kg)   200 (2.1) 1765 (18.7)    Urine (mL/kg/hr)   225 125 (0.2)    Stool   375 225    Total Output   600 350    Net   -400 +1415                Diet Full Liquid  ----------------------------------------------------------------------------------------------------------------------  Physical exam:  Constitutional:  Well-developed and well-nourished.  No respiratory distress. Obese.   HENT:  Head:  Normocephalic and atraumatic.  Mouth:  Moist mucous membranes.    Eyes:  Conjunctivae and EOM are normal. No scleral icterus.    Neck:  Neck supple.  No JVD present.    Cardiovascular:  Normal rate, regular rhythm and normal heart sounds with no murmur.  Pulmonary/Chest:  No respiratory distress, no wheezes, no crackles, with normal breath sounds and good air movement.  Abdominal:   Distended. Soft. Mildly tender in lower quadrants.   Musculoskeletal:  No edema, no tenderness, and no deformity.  No red or swollen joints anywhere.    Neurological:  Alert and oriented to person, place, and time.  No cranial nerve deficit.  No tongue deviation.  No facial droop.  No slurred speech.   Skin:  Skin is warm and dry. No rash noted. No pallor.   Peripheral vascular:  Pulses in all 4 extremities with no clubbing, no cyanosis, no edema.  ----------------------------------------------------------------------------------------------------------------------  Tele:    ----------------------------------------------------------------------------------------------------------------------  Results from last 7 days   Lab Units 02/01/20  0840 02/01/20 0022 01/31/20 1955   CRP mg/dL 29.28*  --  30.94*   LACTATE mmol/L 3.8* 3.6* 5.2*   WBC 10*3/mm3 27.93*  --  29.10*   HEMOGLOBIN g/dL 7.9*  --  8.8*   HEMATOCRIT % 26.7*  --  29.9*   MCV fL 80.4  --  79.5   MCHC g/dL 29.6*  --  29.4*   PLATELETS 10*3/mm3 362  --  483*   INR  1.34*  --   --      Results from last 7 days   Lab Units 01/31/20 2005   PH, ARTERIAL pH units 7.417   PO2 ART mm Hg 85.7   PCO2, ARTERIAL mm Hg 20.0*   HCO3 ART mmol/L 12.9*     Results from last 7 days   Lab Units 02/01/20  0840 01/31/20 1955   SODIUM mmol/L 131* 130*   POTASSIUM mmol/L 4.8 5.0   MAGNESIUM mg/dL 1.5* 1.6   CHLORIDE mmol/L 99 94*   CO2 mmol/L 12.3* 13.4*   BUN mg/dL 26* 26*   CREATININE mg/dL 2.10* 1.95*   EGFR IF NONAFRICN AM mL/min/1.73 26* 28*   CALCIUM mg/dL 7.8* 8.8   PHOSPHORUS mg/dL 3.9  --    GLUCOSE mg/dL 96 122*   ALBUMIN g/dL 2.13* 2.83*   BILIRUBIN mg/dL 0.5 0.5   ALK PHOS U/L 753* 958*   AST (SGOT) U/L 103* 121*   ALT (SGPT) U/L 27 32   Estimated Creatinine Clearance: 38.1 mL/min (A) (by C-G formula based on SCr of 2.1 mg/dL (H)).  No results found for: AMMONIA  Results from last 7 days   Lab Units 02/01/20  0840 01/31/20 1955   TROPONIN T ng/mL <0.010 <0.010         Results from last 7 days   Lab Units 01/31/20 1955   PROBNP pg/mL 214.1         No results found for: HGBA1C, POCGLU  Lab Results   Component Value Date    TSH 9.270 (H) 01/31/2020     No results found for: PREGTESTUR, PREGSERUM, HCG, HCGQUANT  Pain Management Panel     There is no flowsheet data to display.        Brief Urine Lab Results  (Last result in the past 365 days)      Color   Clarity   Blood   Leuk Est   Nitrite   Protein   CREAT   Urine HCG        01/31/20 2028 Dark Yellow Turbid Negative Negative Negative 30 mg/dL (1+)             Blood Culture   Date Value Ref Range Status   01/31/2020 No growth at less than 24 hours  Preliminary   01/31/2020 No growth at less than 24 hours  Preliminary     No results found for: URINECX  No results found for: WOUNDCX  No results found for: STOOLCX  No results found for: RESPCX  No results found for: AFBCX  Results from last 7 days   Lab Units 02/01/20  0840 02/01/20  0022 01/31/20 1955   LACTATE mmol/L 3.8* 3.6* 5.2*   CRP mg/dL 29.28*  --  30.94*       I have personally looked at the labs and they are summarized above.  ----------------------------------------------------------------------------------------------------------------------  Detailed radiology reports for the last 24 hours:    Imaging Results (Last 24 Hours)     Procedure Component Value Units Date/Time    CT Chest Without Contrast [[id number]] Collected:  01/31/20 2149     Updated:  01/31/20 2151    Narrative:       CT Chest WO    INDICATION:   Sepsis with recent abdominal surgery for advanced colon cancer.    TECHNIQUE:   CT of the thorax without IV contrast. Coronal and sagittal reconstructions were obtained.  Radiation dose reduction techniques included automated exposure control or exposure modulation based on body size. Count of known CT and cardiac nuc med studies  performed in previous 12 months: 2.     COMPARISON:   None available.    FINDINGS:  Right pleural effusion layering to a depth of  2.5 cm and a small left pleural effusion layering to a depth of less than a centimeter. Right-sided volume loss with atelectasis right lung base. Minimal left basilar atelectasis. No suspicious nodules or  masses. No focal consolidation. Small amount of vertical atelectasis anterior right upper lobe.    Heart size normal. Extensive anterior mediastinal lymphadenopathy which in the setting of known malignancy is concerning for metastases. Venous access port noted over the left upper chest with distal tip in the SVC. Widely disseminated liver metastases.  Please see CT abdomen and pelvis for remainder of findings within the abdomen and pelvis. Osseous structures and thoracic inlet unremarkable.      Impression:       Small bilateral pleural effusions right greater than left with bibasilar volume loss right greater than left. Atelectasis favored over focal consolidation.    Multiple enlarged anterior mediastinal lymph nodes largest measuring 1.2 x 1.5 cm and in the setting of known malignancy is highly concerning for metastatic disease. No definite pulmonary metastasis.    Signer Name: BUSTER Olivares MD   Signed: 1/31/2020 9:49 PM   Workstation Name: [facility]    [facility]    CT Abdomen Pelvis Without Contrast [[id number]] Collected:  01/31/20 2131     Updated:  01/31/20 2133    Narrative:       CT Abdomen Pelvis WO    INDICATION:   Abdominal pain with fever and sepsis. Recent surgery. Reported colon cancer and pain around surgical area.    TECHNIQUE:   CT of the abdomen and pelvis without IV contrast. Coronal and sagittal reconstructions were obtained.  Radiation dose reduction techniques included automated exposure control or exposure modulation based on body size. Count of known CT and cardiac nuc  med studies performed in previous 12 months: 2.     COMPARISON:   CT abdomen and pelvis 1/12/2020    FINDINGS:  Abdomen: Small bilateral pleural effusions right greater than left with  bibasilar atelectasis. Multiple hypodense and hyperdense liver lesions compatible with widely disseminated liver metastases. No ductal dilatation. Spleen and gallbladder are  unremarkable. Pancreas, kidneys and adrenal glands are unremarkable except for a small nonobstructing right renal stone.    Patient has undergone apparent diverting ileostomy. Soft tissue mass within the right lower quadrant measuring 8.69 6.8 x 8.9 cm most compatible with colon carcinoma involving the cecum and right colon with extension into the mesentery. Extensive  mesenteric and retroperitoneal lymphadenopathy. Increasing ascites when compared to 1/12/2020. Stomach and small bowel unremarkable.    Pelvis: Bladder decompressed. Uterus and adnexa are unremarkable. Moderate amount of induration and edema within the subcutaneous tissues along the lateral abdomen which may be related to third spacing of fluid. No drainable fluid collection or abscess.      Impression:       Postoperative changes from apparent diverting ileostomy with a normal-appearing right lower anterior abdominal wall ostomy.    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 Extensive retroperitoneal and mesenteric lymphadenopathy concerning for metastatic adenopathy.    Moderate amount of ascites which has increased from January 12.    Extensive liver metastases.    Small bilateral pleural effusions with bibasilar atelectasis right greater than left.    Moderate amount of induration and edema within the subcutaneous tissues along the flank regions bilaterally most likely related to third spacing of fluid and recent operative intervention. No definitive abscess.          Signer Name: BUSTER Olivares MD   Signed: 1/31/2020 9:31 PM   Workstation Name: [email or web address]    [facility]    XR Chest 1 View [[id number]] Collected:  01/31/20 2046     Updated:  01/31/20 2048     Narrative:       XR CHEST 1 VW-     CLINICAL INDICATION: sepsis        COMPARISON: 01/12/2020      TECHNIQUE: Single frontal view of the chest.     FINDINGS:     Right basilar airspace disease  The cardiac silhouette is normal. The pulmonary vasculature is  unremarkable.  There is no evidence of an acute osseous abnormality.   There are no suspicious-appearing parenchymal soft tissue nodules.          Impression:       Appearance compatible with right basilar pneumonia     This report was finalized on 1/31/2020 8:46 PM by Dr. Colton Salgado MD.           Assessment & Plan    #Septic shock 2/2 SBP  #Lactic acidosis   - On presentation (WBC 29,100, CRP 30.94, lactic acid 5.2, temperature 102.4, heart rate 152, blood pressure 72/60  - CT chest showed some bibasilar consolidations that are likely   - Symptoms of abdominal pain more consistent with SBP  - Diagnostic paracentesis performed on 2/1 that revealed ANC: 3400. Confirming SBP. Culture pending  - Continue Vanc/Zosyn. Discontinue doxy as I do not suspect atypical infections.   - Blood culture NGTD   - Still tachycardic. Will continue PRN boluses.     #KODI  - Baseline Cr 0.7-0.9.   - Cr on presentation 1.95=>2.10  - Potential causes include prerenal, ATN 2/2 sepsis   - CT abdomen/pelvis did not reveal urinary obstruction.  - Will get FENA  - Continue to fluid resuscitate. Received 2 additional fluid bolus today.     #Stage 4 colon cancer with bulky lymphadenopathy and liver lesions and adrenal masses  - Scheduled to f/u with oncology at  on 2/3. Expects that she will start chemo soon. Suspect treatments would be palliative in nature.     #Mildly elevated TSH  - TSH 9  - Will get free T3, T4    #Hypomagnesemia   - Replaced    #Normocytic anemia   - Hemoglobin has been trending down. 10 on 1/2/20, now down to 7.9.   - Unclear etiology. Will get iron studies, vitamin B12, folate.   - Monitor for overt signs of bleeding     #Diverting loop ileostomy  - Performed 1/17  at  for SBO    F: Regular   A: PRN dilaudid   S: None  T: SubQ heparin   H: HOB elevated   U: PPI  G: PRN  S: N/A  B: PRN  I: PIVs, Port 1/2  D: Vanc/Zosyn     Code status: Full     Dispo: Continue CCU care.     50 minutes of critical care used on patient. Patient critically ill from septic shock 2/2 SBP. >50% of time spent providing bedside care and discussing care with staff.     VTE Prophylaxis:   Mechanical Order History:     None      Pharmalogical Order History:     Ordered     Dose Route Frequency Stop    02/01/20 1455  heparin (porcine) 5000 UNIT/ML injection 5,000 Units      5,000 Units SC Every 8 Hours Scheduled --    02/01/20 0309  enoxaparin (LOVENOX) syringe 40 mg  Status:  Discontinued      40 mg SC Every 24 Hours 02/01/20 1455          Max Riggs MD  [facility]  02/01/20  3:06 PM

## 2020-02-01 NOTE — PROGRESS NOTES
Pharmacokinetics Service Note:    Ms. Norman continues on day 2 of 5 of vancomycin for her sepsis due to possible R sided PNA.  A 14.5 hour post infusion random level was reported as 16.9 mg/L today.  Ongoing KODI noted with Cr of 2.1 mg/dL (usual baseline Cr 0.7-0.9 mg/dL).  Will redose with 1 gm today to maintain adequate levels. Will repeat a random level and follow her serum Cr trend tomorrow AM to guide further dosing.      Thank you.  Dorothy Salvador, Pharm.D.  2/1/2020  4:06 PM

## 2020-02-01 NOTE — PLAN OF CARE
Problem: Patient Care Overview  Goal: Plan of Care Review  Outcome: Ongoing (interventions implemented as appropriate)  Flowsheets  Taken 2/1/2020 0736  Progress: no change  Taken 2/1/2020 0251  Plan of Care Reviewed With: patient;spouse  Goal: Individualization and Mutuality  Outcome: Ongoing (interventions implemented as appropriate)  Goal: Discharge Needs Assessment  Outcome: Ongoing (interventions implemented as appropriate)  Goal: Interprofessional Rounds/Family Conf  Outcome: Ongoing (interventions implemented as appropriate)     Problem: Fall Risk (Adult)  Goal: Identify Related Risk Factors and Signs and Symptoms  Description  Related risk factors and signs and symptoms are identified upon initiation of Human Response Clinical Practice Guideline (CPG).  Outcome: Ongoing (interventions implemented as appropriate)  Flowsheets (Taken 2/1/2020 0736)  Related Risk Factors (Fall Risk): culprit medication(s); fatigue/slow reaction; environment unfamiliar  Goal: Absence of Fall  Description  Patient will demonstrate the desired outcomes by discharge/transition of care.  Outcome: Ongoing (interventions implemented as appropriate)  Flowsheets (Taken 2/1/2020 0736)  Absence of Fall: making progress toward outcome     Problem: Skin Injury Risk (Adult)  Goal: Identify Related Risk Factors and Signs and Symptoms  Description  Related risk factors and signs and symptoms are identified upon initiation of Human Response Clinical Practice Guideline (CPG).  Outcome: Ongoing (interventions implemented as appropriate)  Flowsheets (Taken 2/1/2020 0736)  Related Risk Factors (Skin Injury Risk): critical care admission; edema  Goal: Skin Health and Integrity  Description  Patient will demonstrate the desired outcomes by discharge/transition of care.  Outcome: Ongoing (interventions implemented as appropriate)  Flowsheets (Taken 2/1/2020 0736)  Skin Health and Integrity: making progress toward outcome     Problem: Sepsis/Septic  Shock (Adult)  Goal: Signs and Symptoms of Listed Potential Problems Will be Absent, Minimized or Managed (Sepsis/Septic Shock)  Description  Signs and symptoms of listed potential problems will be absent, minimized or managed by discharge/transition of care (reference Sepsis/Septic Shock (Adult) CPG).  Outcome: Ongoing (interventions implemented as appropriate)  Flowsheets (Taken 2/1/2020 8018)  Problems Assessed (Sepsis): all  Problems Present (Sepsis): infection progression; situational response

## 2020-02-01 NOTE — ED NOTES
Pt resting quietly on stretcher with complaints of abdominal pain, HR tachycardic and provider aware.  Pt AAOx4 with no resp distress noted, respirations even and unlabored.  Pt denies any needs at this time.  Skin PWD.  Pt family at bedside. Will continue to monitor and follow plan of care.  Bed rails up x2, bed in lowest position, call light in reach.         Berenice Méndez, RN  01/31/20 7431

## 2020-02-01 NOTE — ED NOTES
Pt resting quietly on stretcher with complaints of abdominal pain, HR tachycardic and provider aware.  Pt AAOx4 with no resp distress noted, respirations even and unlabored.  Pt denies any needs at this time.  Skin PWD.  Pt family at bedside. Will continue to monitor and follow plan of care.  Bed rails up x2, bed in lowest position, call light in reach.           Berenice Méndez, RN  01/31/20 7191

## 2020-02-01 NOTE — ED NOTES
Pt transferred to inpatient floor at this time. NADN, skin PWD, no resp distress noted. IV intact with no signs of infiltration. All pt belongings transferred with pt. Pt transferred via stretcher with ED nurse and ED tech, zols monitor in place.        Berenice Méndez RN  02/01/20 5783

## 2020-02-02 NOTE — NURSING NOTE
Progressive Mobility    Date: 02/02/20     Mobility Initiation Contradictions: None    Day of Mobility 1  Activity Performed: AROM, Pt ambulated to toilet and back to bed.    Patient: tolerated    Assisted by 1 person/persons    Device(s) used: N/A    Loreto Huggins RN   02/02/20

## 2020-02-02 NOTE — PLAN OF CARE
Problem: Patient Care Overview  Goal: Plan of Care Review  Outcome: Ongoing (interventions implemented as appropriate)  Flowsheets  Taken 2/2/2020 0332  Progress: no change  Taken 2/2/2020 0200  Plan of Care Reviewed With: patient  Goal: Individualization and Mutuality  Outcome: Ongoing (interventions implemented as appropriate)  Goal: Discharge Needs Assessment  Outcome: Ongoing (interventions implemented as appropriate)  Goal: Interprofessional Rounds/Family Conf  Outcome: Ongoing (interventions implemented as appropriate)     Problem: Fall Risk (Adult)  Goal: Identify Related Risk Factors and Signs and Symptoms  Description  Related risk factors and signs and symptoms are identified upon initiation of Human Response Clinical Practice Guideline (CPG).  Outcome: Ongoing (interventions implemented as appropriate)  Flowsheets  Taken 2/1/2020 0736  Related Risk Factors (Fall Risk): culprit medication(s);fatigue/slow reaction;environment unfamiliar  Taken 2/2/2020 0332  Signs and Symptoms (Fall Risk): presence of risk factors  Goal: Absence of Fall  Description  Patient will demonstrate the desired outcomes by discharge/transition of care.  Outcome: Ongoing (interventions implemented as appropriate)  Flowsheets (Taken 2/1/2020 0736)  Absence of Fall: making progress toward outcome     Problem: Skin Injury Risk (Adult)  Goal: Identify Related Risk Factors and Signs and Symptoms  Description  Related risk factors and signs and symptoms are identified upon initiation of Human Response Clinical Practice Guideline (CPG).  Outcome: Ongoing (interventions implemented as appropriate)  Flowsheets (Taken 2/1/2020 0736)  Related Risk Factors (Skin Injury Risk): critical care admission;edema  Goal: Skin Health and Integrity  Description  Patient will demonstrate the desired outcomes by discharge/transition of care.  Outcome: Ongoing (interventions implemented as appropriate)  Flowsheets (Taken 2/1/2020 0736)  Skin Health and  Integrity: making progress toward outcome     Problem: Sepsis/Septic Shock (Adult)  Goal: Signs and Symptoms of Listed Potential Problems Will be Absent, Minimized or Managed (Sepsis/Septic Shock)  Description  Signs and symptoms of listed potential problems will be absent, minimized or managed by discharge/transition of care (reference Sepsis/Septic Shock (Adult) CPG).  Outcome: Ongoing (interventions implemented as appropriate)  Flowsheets (Taken 2/1/2020 1536)  Problems Assessed (Sepsis): all  Problems Present (Sepsis): infection progression;situational response

## 2020-02-02 NOTE — PROGRESS NOTES
Pharmacokinetics Service Note:    Ms. Norman continues on day 3 of 5 of vancomycin for her possible R sided pneumonia and possible SBP.  A 7 hour post infusion random level was reported as 30.8 mg/L this AM and her serum Cr has trended up slightly to 2.27 mg/dL with an estimated ClCr of around 35 mL/min.  No redosing is needed at this time.  Will repeat a random level tomorrow AM and follow her serum Cr trend to guide further dosing.      Thank you.  Dorothy Salvador, Pharm.D.  2/2/2020  3:27 PM

## 2020-02-02 NOTE — PROGRESS NOTES
[facility] HOSPITALIST PROGRESS NOTE     Patient Identification:  Name:  Gracy Norman  Age:  44 y.o.  Sex:  female  :  1975  MRN:  [id number]  Visit Number:  [id number]  ROOM: [address]     Primary Care Provider:  Almas Stone MD    Length of stay in inpatient status:  2    Subjective     Chief Compliant:    Chief Complaint   Patient presents with   • Weakness - Generalized   • Post-op Problem       History of Presenting Illness:    Patient reports feeling better today. Still having some abdominal pain when she moves that wraps around her right side. Requested her home SSRI. Patient still tachycardic. Net positive around 4L yesterday.     ROS:  Denies fevers/chills. Denies chest pain.   Otherwise 10 point ROS negative other than documented above in HPI.     Objective     Current Hospital Meds:  cyclobenzaprine 5 mg Oral BID   FLUoxetine 20 mg Oral Daily   heparin (porcine) 5,000 Units Subcutaneous Q8H   pantoprazole 40 mg Oral QAM   piperacillin-tazobactam 3.375 g Intravenous Q8H   sodium chloride 1,000 mL Intravenous Once   sodium chloride 10 mL Intravenous Q12H   sodium chloride 10 mL Intravenous Q12H   sodium chloride 10 mL Intravenous Q12H   Vancomycin Pharmacy Intermittent Dosing  Does not apply Daily     sodium chloride 75 mL/hr Last Rate: 75 mL/hr (20 0639)       Current Antimicrobial Therapy:  Anti-Infectives (From admission, onward)    Ordered     Dose/Rate Route Frequency Start Stop    20 1601  vancomycin (VANCOCIN) 1,000 mg in sodium chloride 0.9 % 250 mL IVPB     Ordering Provider:  Denny Coy MD    1,000 mg  over 60 Minutes Intravenous Once 20 1800 20 1812    20 1447  Vancomycin Pharmacy Intermittent Dosing  Review   Ordering Provider:  Denny Coy MD     Does not apply Daily 20 1545 20 0859    20 0517  piperacillin-tazobactam (ZOSYN) 3.375 g/100 mL 0.9% NS IVPB (mbp)     Dorothy Salvador, [facility] reviewed the  order on 02/01/20 1048.   Ordering Provider:  Denny Coy MD    3.375 g  over 4 Hours Intravenous Every 8 Hours 02/01/20 0630 02/08/20 0629 01/31/20 1949  vancomycin (VANCOCIN) 1,750 mg in sodium chloride 0.9 % 500 mL IVPB     Ordering Provider:  Waqas York MD    20 mg/kg × 93 kg Intravenous Once 01/31/20 1951 02/01/20 0000    01/31/20 1949  piperacillin-tazobactam (ZOSYN) 4.5 g/100 mL 0.9% NS IVPB (mbp)     Ordering Provider:  Waqas York MD    4.5 g Intravenous Once 01/31/20 1951 01/31/20 2129        Current Diuretic Therapy:  Diuretics (From admission, onward)    None        ----------------------------------------------------------------------------------------------------------------------  Vital Signs:  Temp:  [97.4 °F (36.3 °C)-98.8 °F (37.1 °C)] 98.4 °F (36.9 °C)  Heart Rate:  [114-128] 122  Resp:  [21-31] 28  BP: ()/() 125/89  SpO2:  [96 %-98 %] 96 %  on   ;   Device (Oxygen Therapy): room air  Body mass index is 36.83 kg/m².    Wt Readings from Last 3 Encounters:   02/01/20 94.3 kg (207 lb 14.3 oz)   01/12/20 92.1 kg (203 lb)   01/09/20 91.6 kg (202 lb)     Intake & Output (last 3 days)       01/30 0701 - 01/31 0700 01/31 0701 - 02/01 0700 02/01 0701 - 02/02 0700 02/02 0701 - 02/03 0700    P.O.   919     I.V. (mL/kg)   1193 (12.7)     Blood    300    IV Piggyback  200 2750 50    Total Intake(mL/kg)  200 (2.1) 4862 (51.6) 350 (3.7)    Urine (mL/kg/hr)  225 685 (0.3)     Stool  375 425     Total Output  600 1110     Net  -400 +3752 +350                Diet Full Liquid  ----------------------------------------------------------------------------------------------------------------------  Physical exam:  Constitutional:  Well-developed and well-nourished.  No respiratory distress. Obese.   HENT:  Head:  Normocephalic and atraumatic.  Mouth:  Moist mucous membranes.    Eyes:  Conjunctivae and EOM are normal. No scleral icterus.    Neck:  Neck supple.  No JVD present.     Cardiovascular:  Normal rate, regular rhythm and normal heart sounds with no murmur.  Pulmonary/Chest:  No respiratory distress, no wheezes, no crackles, with normal breath sounds and good air movement.  Abdominal:  Distention improved. Soft. Mildly tender in lower quadrants.   Musculoskeletal:  No edema, no tenderness, and no deformity.  No red or swollen joints anywhere.    Neurological:  Alert and oriented to person, place, and time.  No cranial nerve deficit.  No tongue deviation.  No facial droop.  No slurred speech.   Skin:  Skin is warm and dry. No rash noted. No pallor.   Peripheral vascular:  Pulses in all 4 extremities with no clubbing, no cyanosis, no edema.  ----------------------------------------------------------------------------------------------------------------------  Tele:    ----------------------------------------------------------------------------------------------------------------------  Results from last 7 days   Lab Units 02/02/20  0120 02/01/20  1948 02/01/20  1618 02/01/20  0840  01/31/20 1955   CRP mg/dL  --   --   --  29.28*  --  30.94*   LACTATE mmol/L  --  3.8* 4.6* 3.8*   < > 5.2*   WBC 10*3/mm3 25.18*  --   --  27.93*  --  29.10*   HEMOGLOBIN g/dL 6.6*  --   --  7.9*  --  8.8*   HEMATOCRIT % 23.4*  --   --  26.7*  --  29.9*   MCV fL 81.5  --   --  80.4  --  79.5   MCHC g/dL 28.2*  --   --  29.6*  --  29.4*   PLATELETS 10*3/mm3 273  --   --  362  --  483*   INR   --   --   --  1.34*  --   --     < > = values in this interval not displayed.     Results from last 7 days   Lab Units 01/31/20 2005   PH, ARTERIAL pH units 7.417   PO2 ART mm Hg 85.7   PCO2, ARTERIAL mm Hg 20.0*   HCO3 ART mmol/L 12.9*     Results from last 7 days   Lab Units 02/02/20  0120 02/01/20  0840 01/31/20  1955   SODIUM mmol/L 132* 131* 130*   POTASSIUM mmol/L 4.6 4.8 5.0   MAGNESIUM mg/dL 2.5 1.5* 1.6   CHLORIDE mmol/L 100 99 94*   CO2 mmol/L 11.2* 12.3* 13.4*   BUN mg/dL 26* 26* 26*   CREATININE mg/dL  2.27* 2.10* 1.95*   EGFR IF NONAFRICN AM mL/min/1.73 24* 26* 28*   CALCIUM mg/dL 7.5* 7.8* 8.8   PHOSPHORUS mg/dL  --  3.9  --    GLUCOSE mg/dL 91 96 122*   ALBUMIN g/dL 3.06* 2.13* 2.83*   BILIRUBIN mg/dL 0.6 0.5 0.5   ALK PHOS U/L 556* 753* 958*   AST (SGOT) U/L 77* 103* 121*   ALT (SGPT) U/L 20 27 32   Estimated Creatinine Clearance: 35.3 mL/min (A) (by C-G formula based on SCr of 2.27 mg/dL (H)).  No results found for: AMMONIA  Results from last 7 days   Lab Units 02/01/20  0840 01/31/20 1955   TROPONIN T ng/mL <0.010 <0.010     Results from last 7 days   Lab Units 01/31/20 1955   PROBNP pg/mL 214.1         No results found for: HGBA1C, POCGLU  Lab Results   Component Value Date    TSH 9.270 (H) 01/31/2020    FREET4 0.97 02/01/2020     No results found for: PREGTESTUR, PREGSERUM, HCG, HCGQUANT  Pain Management Panel     Pain Management Panel Latest Ref Rng & Units 2/1/2020    CREATININE UR mg/dL 146.0        Brief Urine Lab Results  (Last result in the past 365 days)      Color   Clarity   Blood   Leuk Est   Nitrite   Protein   CREAT   Urine HCG        02/01/20 1701             146.0           Blood Culture   Date Value Ref Range Status   01/31/2020 No growth at 24 hours  Preliminary   01/31/2020 No growth at 24 hours  Preliminary     No results found for: URINECX  No results found for: WOUNDCX  No results found for: STOOLCX  No results found for: RESPCX  No results found for: AFBCX  Results from last 7 days   Lab Units 02/01/20  1948 02/01/20  1618 02/01/20  0840 02/01/20 0022 01/31/20 1955   LACTATE mmol/L 3.8* 4.6* 3.8* 3.6* 5.2*   CRP mg/dL  --   --  29.28*  --  30.94*       I have personally looked at the labs and they are summarized above.  ----------------------------------------------------------------------------------------------------------------------  Detailed radiology reports for the last 24 hours:    Imaging Results (Last 24 Hours)     ** No results found for the last 24 hours. **         Assessment & Plan    #Septic shock 2/2 SBP  #Lactic acidosis   - On presentation (WBC 29,100, CRP 30.94, lactic acid 5.2, temperature 102.4, heart rate 152, blood pressure 72/60  - CT chest showed some bibasilar consolidations that are likely atelectasis   - Symptoms of abdominal pain more consistent with SBP  - Diagnostic paracentesis performed on 2/1 that revealed ANC: 3400. Confirming SBP. Culture NGTD  - Continue Vanc/Zosyn. Discontinue doxy on 2/1 as I do not suspect atypical infections.   - Blood culture NGTD   - Still tachycardic. Will continue volume resuscitation as needed.     #KODI  - Baseline Cr 0.7-0.9.   - Cr on presentation 1.95=>2.10=>2.27  - Potential causes include prerenal, ATN 2/2 sepsis , hepatorenal syndrome  - CT abdomen/pelvis did not reveal urinary obstruction.  - Urine sodium <20, consistent with severe prerenal vs. Hepatorenal   - Continue to fluid resuscitate as needed. Day 2 of albumin challenge.     #Stage 4 colon cancer with bulky lymphadenopathy and widely disseminated liver metastases and adrenal masses  - Scheduled to f/u with oncology at  on 2/3. Expects that she will start chemo soon. Suspect treatments would be palliative in nature.   - Suspect patient has developed some level of liver dysfunction for disseminated liver metastases   - PRN opiates for cancer related pain     #Normocytic anemia   - Hemoglobin has been trending down. 10 on 1/2/20  - 8.8=>7.9=>6.6, Transfused 1 unit of pRBCs  - No signs of active bleeding, BUN/creatinine not elevated, suspect patient was very hemoconcentrated on admission and fluids have diluted blood   - Iron studies consistent with SNEHAL and AOCD, B12 and folate normal.   - Will start on IV PPI and monitor for overt signs of bleeding     #Mildly elevated TSH  - TSH 9, free T4 normal   - Hold on starting synthroid. Can f/u outpatient.     #Hypomagnesemia   - Replaced    #Diverting loop ileostomy  - Performed 1/17 at  for SBO    F: Regular   A:  PRN dilaudid   S: None  T: SubQ heparin   H: HOB elevated   U: PPI  G: PRN  S: N/A  B: PRN  I: PIVs, Port 1/2  D: Vanc/Zosyn     Code status: Full     Dispo: Continue CCU care.     40 minutes of critical care used on patient. Patient critically ill from septic shock 2/2 SBP. >50% of time spent providing bedside care and discussing care with staff.        VTE Prophylaxis:   Mechanical Order History:     None      Pharmalogical Order History:     Ordered     Dose Route Frequency Stop    02/01/20 1455  heparin (porcine) 5000 UNIT/ML injection 5,000 Units      5,000 Units SC Every 8 Hours Scheduled --    02/01/20 0309  enoxaparin (LOVENOX) syringe 40 mg  Status:  Discontinued      40 mg SC Every 24 Hours 02/01/20 1455          Max Riggs MD  [facility]  02/02/20  2:17 PM

## 2020-02-03 NOTE — PROGRESS NOTES
"    [facility] HOSPITALIST PROGRESS NOTE     Patient Identification:  Name:  Gracy Norman  Age:  44 y.o.  Sex:  female  :  1975  MRN:  [id number]  Visit Number:  [id number]  ROOM: [address]     Primary Care Provider:  Almas Stone MD    Length of stay in inpatient status:  3    Subjective     Chief Compliant:    Chief Complaint   Patient presents with   • Weakness - Generalized   • Post-op Problem     History of Presenting Illness:    Patient remains very ill, more tachycardic today, still acidotic, reports she feels \"okay\" overall, reports she thought her tachycardia was due to anxiety yesterday, I started her on bicarb gtt today given she clinically appears unwell, Bicarb was 9, have consulted Nephrology and appreciate recs, UOP has been minimal, ID stopped Vanc this AM but given overall clinical appearance I will continue for now along w/ Zosyn, no organisms have grown out of cultures, she denies any fevers or chills, WBC count slighly up at 28K, repeating CXR and ABG given slightly more tachypneic today.    Objective     Current Hospital Meds:  cyclobenzaprine 5 mg Oral BID   FLUoxetine 20 mg Oral Daily   heparin (porcine) 5,000 Units Subcutaneous Q8H   pantoprazole 40 mg Oral QAM   piperacillin-tazobactam 3.375 g Intravenous Q8H   sodium chloride 1,000 mL Intravenous Once   sodium chloride 10 mL Intravenous Q12H   sodium chloride 10 mL Intravenous Q12H   sodium chloride 10 mL Intravenous Q12H     Pharmacy to dose vancomycin    sodium bicarbonate drip (greater than 75 mEq/bag) 125 mEq       Current Antimicrobial Therapy:  Anti-Infectives (From admission, onward)    Ordered     Dose/Rate Route Frequency Start Stop    20 1212  Pharmacy to dose vancomycin     Ordering Provider:  Saad Garcia MD     Does not apply Continuous PRN 20 1211 02/10/20 1210    20 1601  vancomycin (VANCOCIN) 1,000 mg in sodium chloride 0.9 % 250 mL IVPB     Ordering Provider:  Denny Coy " MD BRITNI    1,000 mg  over 60 Minutes Intravenous Once 02/01/20 1800 02/01/20 1812    02/01/20 0517  piperacillin-tazobactam (ZOSYN) 3.375 g/100 mL 0.9% NS IVPB (mbp)     Dorothy Salvador, [facility], located within [facility] reviewed the order on 02/01/20 1048.   Ordering Provider:  Denny Coy MD    3.375 g  over 4 Hours Intravenous Every 8 Hours 02/01/20 0630 02/08/20 0629 01/31/20 1949  vancomycin (VANCOCIN) 1,750 mg in sodium chloride 0.9 % 500 mL IVPB     Ordering Provider:  Waqas York MD    20 mg/kg × 93 kg Intravenous Once 01/31/20 1951 02/01/20 0000    01/31/20 1949  piperacillin-tazobactam (ZOSYN) 4.5 g/100 mL 0.9% NS IVPB (mbp)     Ordering Provider:  Waqas York MD    4.5 g Intravenous Once 01/31/20 1951 01/31/20 2129        Current Diuretic Therapy:  Diuretics (From admission, onward)    None        ----------------------------------------------------------------------------------------------------------------------  Vital Signs:  Temp:  [98.1 °F (36.7 °C)-99.5 °F (37.5 °C)] 99.5 °F (37.5 °C)  Heart Rate:  [116-144] 140  Resp:  [20-28] 23  BP: (105-139)/() 126/92  SpO2:  [93 %-99 %] 97 %  on  Flow (L/min):  [2] 2;   Device (Oxygen Therapy): nasal cannula  Body mass index is 38.44 kg/m².    Wt Readings from Last 3 Encounters:   02/03/20 98.4 kg (217 lb)   01/12/20 92.1 kg (203 lb)   01/09/20 91.6 kg (202 lb)     Intake & Output (last 3 days)       01/31 0701 - 02/01 0700 02/01 0701 - 02/02 0700 02/02 0701 - 02/03 0700 02/03 0701 - 02/04 0700    P.O.  919 1084     I.V. (mL/kg)  1193 (12.7)      Blood   300     IV Piggyback 200 2750 150 250    Total Intake(mL/kg) 200 (2.1) 4862 (51.6) 1534 (15.6) 250 (2.5)    Urine (mL/kg/hr) 225 685 (0.3) 350 (0.1)     Stool 375 425 400     Total Output 600 1110 750     Net -400 +3752 +784 +250                Diet Full Liquid  ----------------------------------------------------------------------------------------------------------------------  Physical  exam:  Constitutional:  Well-developed and well-nourished.  Mild distress  HENT:  Head:  Normocephalic and atraumatic.  Mouth:  dry mucous membranes.    Eyes:  Conjunctivae and EOM are normal. No scleral icterus.    Neck:  Neck supple.  No JVD present.    Cardiovascular:  tachycardic, regular rhythm and normal heart sounds with no murmur.  Pulmonary/Chest:  mild respiratory distress, no wheezes, no crackles, with normal breath sounds and good air movement.  Abdominal:  Distended Soft. Mildly tender in lower quadrants.   Musculoskeletal:  No tenderness, and no deformity.  No red or swollen joints anywhere.    Neurological:  Alert and oriented to person, place, and time.  No cranial nerve deficit.  No tongue deviation.  No facial droop.  No slurred speech.   Skin:  Skin is warm and dry. No rash noted. No pallor.   Peripheral vascular:  Pulses in all 4 extremities with no clubbing, no cyanosis, no edema.  ----------------------------------------------------------------------------------------------------------------------  Tele:    ----------------------------------------------------------------------------------------------------------------------  Results from last 7 days   Lab Units 02/03/20  0112 02/02/20  1903 02/02/20  1512 02/02/20  0120  02/01/20  0840  01/31/20  1955   CRP mg/dL  --   --   --   --   --  29.28*  --  30.94*   LACTATE mmol/L 3.1* 3.3* 3.7*  --    < > 3.8*   < > 5.2*   WBC 10*3/mm3 28.49* 26.99*  --  25.18*  --  27.93*  --  29.10*   HEMOGLOBIN g/dL 8.4* 8.2*  --  6.6*  --  7.9*  --  8.8*   HEMATOCRIT % 28.6* 27.0*  --  23.4*  --  26.7*  --  29.9*   MCV fL 82.4 80.8  --  81.5  --  80.4  --  79.5   MCHC g/dL 29.4* 30.4*  --  28.2*  --  29.6*  --  29.4*   PLATELETS 10*3/mm3 206 217  --  273  --  362  --  483*   INR   --   --   --   --   --  1.34*  --   --     < > = values in this interval not displayed.     Results from last 7 days   Lab Units 02/03/20  1205   PH, ARTERIAL pH units 7.218*   PO2 ART  mm Hg 85.4   PCO2, ARTERIAL mm Hg 24.0*   HCO3 ART mmol/L 9.8*     Results from last 7 days   Lab Units 02/03/20  0112 02/02/20  1903 02/02/20  0120 02/01/20  0840 01/31/20 1955   SODIUM mmol/L 130* 134* 132* 131* 130*   POTASSIUM mmol/L 4.2 4.3 4.6 4.8 5.0   MAGNESIUM mg/dL  --   --  2.5 1.5* 1.6   CHLORIDE mmol/L 100 100 100 99 94*   CO2 mmol/L 9.4* 11.1* 11.2* 12.3* 13.4*   BUN mg/dL 25* 24* 26* 26* 26*   CREATININE mg/dL 1.96* 1.91* 2.27* 2.10* 1.95*   EGFR IF NONAFRICN AM mL/min/1.73 28* 29* 24* 26* 28*   CALCIUM mg/dL 8.3* 8.0* 7.5* 7.8* 8.8   PHOSPHORUS mg/dL  --   --   --  3.9  --    GLUCOSE mg/dL 98 94 91 96 122*   ALBUMIN g/dL 3.77  --  3.06* 2.13* 2.83*   BILIRUBIN mg/dL 1.2  --  0.6 0.5 0.5   ALK PHOS U/L 509*  --  556* 753* 958*   AST (SGOT) U/L 63*  --  77* 103* 121*   ALT (SGPT) U/L 17  --  20 27 32   Estimated Creatinine Clearance: 41.8 mL/min (A) (by C-G formula based on SCr of 1.96 mg/dL (H)).  No results found for: AMMONIA  Results from last 7 days   Lab Units 02/01/20  0840 01/31/20 1955   TROPONIN T ng/mL <0.010 <0.010     Results from last 7 days   Lab Units 02/03/20 0112 01/31/20 1955   PROBNP pg/mL 1,467.0* 214.1         No results found for: HGBA1C, POCGLU  Lab Results   Component Value Date    TSH 9.270 (H) 01/31/2020    FREET4 0.97 02/01/2020     No results found for: PREGTESTUR, PREGSERUM, HCG, HCGQUANT  Pain Management Panel     Pain Management Panel Latest Ref Rng & Units 2/1/2020    CREATININE UR mg/dL 146.0        Brief Urine Lab Results  (Last result in the past 365 days)      Color   Clarity   Blood   Leuk Est   Nitrite   Protein   CREAT   Urine HCG        02/02/20 2053 Dark Yellow Turbid Large (3+) Negative Negative 100 mg/dL (2+)             Blood Culture   Date Value Ref Range Status   01/31/2020 No growth at 2 days  Preliminary   01/31/2020 No growth at 2 days  Preliminary     No results found for: URINECX  No results found for: WOUNDCX  No results found for: STOOLCX  No  results found for: RESPCX  No results found for: AFBCX  Results from last 7 days   Lab Units 02/03/20  0112 02/02/20  1903 02/02/20  1512 02/01/20  1948 02/01/20  1618 02/01/20  0840 02/01/20  0022 01/31/20 1955   LACTATE mmol/L 3.1* 3.3* 3.7* 3.8* 4.6* 3.8* 3.6* 5.2*   CRP mg/dL  --   --   --   --   --  29.28*  --  30.94*     I have personally looked at the labs and they are summarized above.  ----------------------------------------------------------------------------------------------------------------------  Detailed radiology reports for the last 24 hours:    Imaging Results (Last 24 Hours)     Procedure Component Value Units Date/Time    XR Chest 1 View [[id number]] Resulted:  02/03/20 1134     Updated:  02/03/20 1205    CT Abdomen Pelvis Without Contrast [[id number]] Collected:  02/02/20 2008     Updated:  02/02/20 2010    Narrative:       CT Abdomen Pelvis WO 2/2/2020    INDICATION:   Sepsis and abdominal pain beginning 1/31/2020. Colon carcinoma with liver metastases.    TECHNIQUE:   CT of the abdomen and pelvis without IV contrast. Coronal and sagittal reconstructions were obtained.  Radiation dose reduction techniques included automated exposure control or exposure modulation based on body size. Count of known CT and cardiac nuc  med studies performed in previous 12 months: 5.     COMPARISON:   1/31/2020    FINDINGS:  Abdomen: Exam is severely limited by lack of oral and intravenous contrast. Multiple ill-defined masses are seen throughout both hepatic lobes characteristic of the patient's known metastatic disease. Fatty infiltration of the liver is noted. The spleen,  pancreas, gallbladder and biliary tree and right adrenal gland are normal. Stable low-density lesion on the left adrenal gland compared with 1/31/2020. This may represent metastatic disease or possibly an adrenal adenoma. There is increased density  within the lower pole collecting system of the right kidney suggesting nonobstructing stones.  The left kidney is normal. Exam is limited by patient motion with resulting artifact.    Pelvis: Right lower quadrant ileostomy with no evidence of bowel obstruction. Ill-defined soft tissue mass involving the cecum characteristic of the patient's known colon carcinoma with extension into the surrounding mesentery. Extensive mesenteric and  retroperitoneal lymphadenopathy is unchanged. There is a small to moderate amount of ascites within the peritoneal cavity. No abscess is seen. Images of the lung bases demonstrate moderate right and small left pleural effusions with bibasilar  atelectasis.      Impression:         1. Exam severely limited by lack of oral and intravenous contrast. The exam is also limited by patient motion with resulting artifact.  2. Extensive hepatic metastatic disease as noted previously.  3. Ill-defined mass involving the cecum with involvement of the surrounding mesenteric. Mesenteric lymphadenopathy is unchanged compared with 1/31/2020.  4. Nonobstructing right renal stones.  5. Small to moderate amount of ascites.  6. Right lower quadrant ileostomy. No evidence of bowel obstruction.  6. Moderate right and small left pleural effusions with bibasilar atelectasis.  7. Stable low-density mass on the left adrenal gland. It could represent metastatic disease, or possibly an adrenal adenoma.          Signer Name: Bert Emerson MD   Signed: 2/2/2020 8:08 PM   Workstation Name: [id number]    [facility]    XR Chest 1 View [[id number]] Collected:  02/02/20 1926     Updated:  02/02/20 1928    Narrative:       CR Chest 1 Vw 2/2/2020    INDICATION:   Acute onset of shortness of breath and sepsis today.     COMPARISON:    None available.    FINDINGS:  Single portable AP view(s) of the chest.    Left subclavian central line tip is in the superior vena cava.    The heart is normal in size. Haziness is seen at the right lung base suggesting pleural effusion layering posteriorly with  bibasilar atelectasis or infiltrates. Poor inspiratory result. No pneumothorax.       Impression:       Poor inspiratory result and right pleural effusion with bibasilar atelectasis or infiltrates.    Signer Name: Bert Emerson MD   Signed: 2/2/2020 7:26 PM   Workstation Name: [id number]    [facility]        Assessment & Plan    #Septic shock 2/2 SBP  #Lactic acidosis   - On presentation (WBC 29,100, CRP 30.94, lactic acid 5.2, temperature 102.4, heart rate 152, blood pressure 72/60  - CT chest showed some bibasilar consolidations that are likely atelectasis   - Symptoms of abdominal pain more consistent with SBP  - Diagnostic paracentesis performed on 2/1 that revealed ANC: 3400. Confirming SBP. Culture NGTD  - Continue Zosyn, added back Vanc today as had been d/c'd for some reason  - Repeat CXR and ABG, continue to monitor in CCU as appears critically ill.    #Oliguric KODI c/b AGMA suspect possible RTA  - Baseline Cr 0.7-0.9, Cr on presentation 1.95=>2.10=>2.27; Today 1.96  - Potential etiology include prerenal, ATN 2/2 sepsis , hepatorenal syndrome  - CT abdomen/pelvis did not reveal urinary obstruction.  - Urine sodium <20, consistent with severe prerenal vs. Hepatorenal   - Did not appear to improve significantly w/ albumin challenge per previous MD, have consulted Nephrology and appreciate input, per nursing who spoke w/ Nephrology they are considering dialysis tomorrow if not improved, have started on Bicarb gtt due to bicarb of 9 and acidotic.    #Stage 4 colon cancer with bulky lymphadenopathy and widely disseminated liver metastases and adrenal masses  - Scheduled to f/u with oncology at  on 2/3. Expected to start chemo soon. Suspect treatments would be palliative in nature.   - CT Abd/Pelvis 2/3 showed extensive hepatic mets, ill-defined cecal mass involving surrounding mesentery, mesenteric LAD, small-mod ascites, stable low density mass L adrenal gland.  - F/u outpatient  pending improvement in sepsis    #Normocytic anemia   - Hemoglobin has been trending down. 10 on 1/2/20  - 8.8=>7.9=>6.6, s/p 1 unit of pRBCs; Today 8.4  - No signs of active bleeding, BUN/creatinine not elevated, suspect  hemoconcentrated on admission and fluids have diluted   - Iron studies consistent with SNEHAL and AOCD, B12 and folate normal.   - Continue IV PPI for now, monitor for signs of bleeding    #Respiratory Alkalosis  - Likely 2/2 above AGMA, compensatory, address as per above    #Euthyroid  - TSH 9, free T4 normal, f/u as outpatient.    #Hypomagnesemia   - Replaced    #Diverting loop ileostomy  - Performed 1/17 at  for SBO    #Obesity  - BMI 38, complicates all aspects of care.    F: Oral  E: Monitor & Replace PRN  N: Regular  Ppx: SQH  Code: Full Code    Dispo: Pending clinical improvement and workup    *This patient is considered high risk due to septic shock, KODI, underlying colon cancer    VTE Prophylaxis:   Mechanical Order History:     None      Pharmalogical Order History:     Ordered     Dose Route Frequency Stop    02/01/20 1455  heparin (porcine) 5000 UNIT/ML injection 5,000 Units      5,000 Units SC Every 8 Hours Scheduled --    02/01/20 0309  enoxaparin (LOVENOX) syringe 40 mg  Status:  Discontinued      40 mg SC Every 24 Hours 02/01/20 1455        Saad Garcia MD  [facility] Hospitalist  02/03/20  12:25 PM

## 2020-02-03 NOTE — PAYOR COMM NOTE
"  [facility]  NPI: [id number]    Utilization Review   Contact:Toyin Solorio MSN, APRN, NP-C  Phone: [phone]  Fax: [phone]    passport/Attn: jemma  Inpatient Auth Req/add clinical  REF: [id number]  DX: A41.9, J18.9, N17.9  Matt Norman (44 y.o. Female)     Date of Birth Social Security Number Address Home Phone MRN    1975  [address] [facility] [address]  [id number]    Holiness Marital Status          None        Admission Date Admission Type Admitting Provider Attending Provider Department, Room/Bed    20 Emergency Denny Coy MD Parks, Andrew, MD [facility] CRITICAL CARE, 10/1C    Discharge Date Discharge Disposition Discharge Destination                       Attending Provider:  Saad Garcia MD    Allergies:  Bactrim [Sulfamethoxazole-trimethoprim], Metronidazole, Sulfa Antibiotics    Isolation:  None   Infection:  None   Code Status:  CPR    Ht:  160 cm (63\")   Wt:  98.4 kg (217 lb)    Admission Cmt:  None   Principal Problem:  Sepsis (CMS/HCC) [A41.9]                 Active Insurance as of 2020     Primary Coverage     Payor Plan Insurance Group Employer/Plan Group    [employer]AN PASSPORT [id number]     Payor Plan Address Payor Plan Phone Number Payor Plan Fax Number Effective Dates    [address] [phone]  1997 - None Entered    [facility] [id number]       Subscriber Name Subscriber Birth Date Member ID       MATT NORMAN 1975 [id number]                 Emergency Contacts      (Rel.) Home Phone Work Phone Mobile Phone    Danilo Norman (Spouse) -- [phone]               History & Physical      Denny Coy MD at 20 0406              [facility] HOSPITALIST HISTORY AND PHYSICAL    Patient Identification:  Name:  Matt Norman  Age:  44 y.o.  Sex:  female  :  1975  MRN:  [id number]   Visit Number:  [id number]  Room number:  10/1C  Primary Care Physician:  Bertha" Almas Emerson MD    Date of Admission: 1/31/2020     Subjective     Chief complaint:    Chief Complaint   Patient presents with   • Weakness - Generalized   • Post-op Problem       History of presenting illness:  44 y.o. female who presented to [facility] emergency department with abdominal pain after drinking a sugary, carbonated drink.  She states that at about 3 PM on 1/31/2020 couple minutes after she drank the sugary drink, she had sudden onset of bilateral lower abdominal pain that was sharp and radiated to her bilateral lower back.  She denies fever, vomiting, shortness of air, chest pain, dysuria.  The patient states that she has had frequent kidney infections in the past but she does not feel that this is a problem at this time as she does not burn when she urinates.  She also states that her normal heart rate runs 100-120.  She does have a cough but she thought this was due to allergies as she has been producing some mucus that is nonbloody.  In the emergency department, patient had signs of sepsis and thus she was admitted to the critical care unit because she possibly had septic shock based on labs and vital signs.    The patient was previously healthy until 1/1/2020.  She came to [facility] emergency department at that time with nausea, vomiting, and fatigue.  A CT scan of her abdomen and pelvis found a mass in her cecum that measured 7.5 cm x 5.3 cm.  There was also lots of liver metastases and extensive lymphadenopathy.  She underwent outpatient liver biopsy on 1/2/2020 and pathology came back as poorly differentiated adenocarcinoma of colon origin.  She then presented to our emergency department again on 1/12/2020 and was diagnosed with a partial small bowel obstruction.  She was transferred to the [facility] so that she could be evaluated by a larger surgical team.  She underwent a diverting loop ileostomy on 1/17/2020.  She then was discharged home from the  [facility] on 1/20/2020.  She saw Dr. Burton in our oncology clinic 1 time and she has chosen to follow-up at [facility].  In fact, she has an appointment on Monday, February 3, 2020, at which time she is expected to start chemotherapy.  Please note that she had a left chest port placed by our surgeons on 1/2/2020.  ---------------------------------------------------------------------------------------------------------------------   Review of Systems   Constitutional: Negative for chills, fatigue and fever.   HENT: Negative for congestion and rhinorrhea.    Eyes: Negative for pain, discharge and redness.   Respiratory: Positive for cough. Negative for shortness of breath and wheezing.    Cardiovascular: Negative for chest pain, palpitations and leg swelling.   Gastrointestinal: Positive for abdominal distention. Negative for diarrhea, nausea and vomiting.   Genitourinary: Negative for enuresis and hematuria.   Musculoskeletal: Positive for back pain. Negative for arthralgias, joint swelling and myalgias.   Skin: Negative for color change, pallor, rash and wound.   Neurological: Negative for seizures, syncope, speech difficulty, weakness and light-headedness.   Psychiatric/Behavioral: Negative for agitation, behavioral problems and confusion.     ---------------------------------------------------------------------------------------------------------------------   Past Medical History:   Diagnosis Date   • Adenocarcinoma of cecum, stage 4b (CMS/HCC) 01/2020   • Anxiety    • GERD (gastroesophageal reflux disease)    • Headache    • Obesity (BMI 30-39.9)    • Snoring      Past Surgical History:   Procedure Laterality Date   • LIVER BIOPSY N/A 1/2/2020    Procedure: LIVER BIOPSY LAPAROSCOPIC;  Surgeon: Sophie Grimes MD;  Location: [facility];  Service: General   • [place] ILEOSTOMY/JEJUNOSTOMY,NONTUBE     • TUBAL ABDOMINAL LIGATION     • VENOUS ACCESS DEVICE (PORT) INSERTION N/A 1/2/2020     Procedure: INSERTION VENOUS ACCESS DEVICE;  Surgeon: Sophie Grimes MD;  Location: [facility];  Service: General     Family History   Problem Relation Age of Onset   • Hypertension Mother    • Diabetes Mother    • Cancer Father         stomach   • Breast cancer Neg Hx      Social History     Socioeconomic History   • Marital status:    Tobacco Use   • Smoking status: Never Smoker   • Smokeless tobacco: Never Used   Substance and Sexual Activity   • Alcohol use: No   • Drug use: No   • Sexual activity: Defer     ---------------------------------------------------------------------------------------------------------------------   Allergies:  Bactrim [sulfamethoxazole-trimethoprim]; Metronidazole; and Sulfa antibiotics  ---------------------------------------------------------------------------------------------------------------------   Medications below are reported home medications pulling from within the system; at this time, these medications have not been reconciled unless otherwise specified and are in the verification process for further verifcation as current home medications.    Prior to Admission Medications     Prescriptions Last Dose Informant Patient Reported? Taking?    amoxicillin-clavulanate (AUGMENTIN) 875-125 MG per tablet 1/31/2020 Medication Bottle Yes Yes    Take 1 tablet by mouth 2 (Two) Times a Day.    cyclobenzaprine (FLEXERIL) 5 MG tablet 1/31/2020 Medication Bottle Yes Yes    Take 5 mg by mouth 2 (Two) Times a Day.    enoxaparin (LOVENOX) 40 MG/0.4ML solution syringe 1/31/2020 Medication Bottle Yes Yes    Inject 40 mg under the skin into the appropriate area as directed Daily.    omeprazole (priLOSEC) 20 MG capsule 1/31/2020 Medication Bottle Yes Yes    Take 20 mg by mouth Daily.    acetaminophen (TYLENOL) 325 MG tablet Unknown Medication Bottle Yes No    Take 650 mg by mouth Every 6 (Six) Hours As Needed for Mild Pain .    diazePAM (VALIUM) 2 MG tablet Unknown Medication Bottle  Yes No    Take 2 mg by mouth Every 8 (Eight) Hours As Needed for Anxiety.    ondansetron ODT (ZOFRAN-ODT) 4 MG disintegrating tablet Unknown Medication Bottle Yes No    Take 4 mg by mouth Every 6 (Six) Hours As Needed for Nausea or Vomiting.    oxyCODONE (ROXICODONE) 5 MG immediate release tablet Unknown Medication Bottle Yes No    Take 5 mg by mouth Every 6 (Six) Hours As Needed for Moderate Pain .        Objective     Vital Signs:  Temp:  [98.4 °F (36.9 °C)-102.4 °F (39.1 °C)] 98.4 °F (36.9 °C)  Heart Rate:  [117-152] 117  Resp:  [20] 20  BP: ()/(55-96) 129/93    Mean Arterial Pressure (Non-Invasive) for the past 24 hrs (Last 3 readings):   Noninvasive MAP (mmHg)   02/01/20 0242 106   02/01/20 0232 94   02/01/20 0221 104     SpO2:  [94 %-100 %] 98 %  Device (Oxygen Therapy): room air  Body mass index is 36.31 kg/m².    Wt Readings from Last 3 Encounters:   01/31/20 93 kg (205 lb)   01/12/20 92.1 kg (203 lb)   01/09/20 91.6 kg (202 lb)      ---------------------------------------------------------------------------------------------------------------------   Physical Exam:  General: She is sitting up in bed.  She does not appear to be acutely ill nor chronically ill.  She is in no acute respiratory distress and is able to talk in complete sentences.  HENT: Normocephalic, atraumatic.  Tacky mucous membranes.  Eyes: Pupils are equal, round, and reactive to light.  Extraocular movements are intact.  No icterus and no conjunctivitis.  Cardiac: Tachycardic, no murmurs.  Lungs: She has some faint crackles in her bilateral lower lobes.  There is no wheezing.  No prolonged expiratory phase.  Good air movement throughout all lung fields.  Abdomen: She has a fresh ileostomy on the right side of her abdomen with liquid and brown-colored stool in the ostomy bag.  She has active bowel sounds.  She is tender to palpation but it is mainly where her surgical site is located.  There is no rebound tenderness.  Genitourinary:  No Christine catheter in place.  Neurologic: Cranial nerves II through XII are intact.  She is able to follow all commands.  She does not have slurred speech.  She does not have a facial droop.  She has normal and equal strength in her bilateral arms and legs.  Peripheral vascular: No edema.  Strong pulses in all 4 extremities.  No mottling and no cyanosis.  Musculoskeletal: No edema.  No obvious deformities to her arms and legs.  She is not missing any digits.  Her ankles, knees, wrists, and elbows are without any edema or erythema.  Skin: There are no rashes, open sores, or skin discoloration.  Psych: She has a normal thought process with no pressured speech.  She appears to have good decision skills.  ---------------------------------------------------------------------------------------------------------------------  EKG:  Sinus tachycardia 146, QTc 536 ms, flat Twaves in the inferior and lateral leads with no comparison EKGs in our system.      Telemetry:  Sinus tachycardia in the 125's.    I have personally looked at both the EKG and the telemetry strips.  --------------------------------------------------------------------------------------------------------------------  LABS:    CBC and coagulation:  Results from last 7 days   Lab Units 02/01/20 0022 01/31/20 1955   LACTATE mmol/L 3.6* 5.2*   CRP mg/dL  --  30.94*   WBC 10*3/mm3  --  29.10*   HEMOGLOBIN g/dL  --  8.8*   HEMATOCRIT %  --  29.9*   MCV fL  --  79.5   MCHC g/dL  --  29.4*   PLATELETS 10*3/mm3  --  483*     Acid/base balance:  Results from last 7 days   Lab Units 01/31/20 2005   PH, ARTERIAL pH units 7.417   PO2 ART mm Hg 85.7   PCO2, ARTERIAL mm Hg 20.0*   HCO3 ART mmol/L 12.9*     Renal and electrolytes:  Results from last 7 days   Lab Units 01/31/20 1955   SODIUM mmol/L 130*   POTASSIUM mmol/L 5.0   MAGNESIUM mg/dL 1.6   CHLORIDE mmol/L 94*   CO2 mmol/L 13.4*   BUN mg/dL 26*   CREATININE mg/dL 1.95*   EGFR IF NONAFRICN AM mL/min/1.73 28*      CALCIUM mg/dL 8.8   GLUCOSE mg/dL 122*     Estimated Creatinine Clearance: 40.7 mL/min (A) (by C-G formula based on SCr of 1.95 mg/dL (H)).    Liver and pancreatic function:  Results from last 7 days   Lab Units 01/31/20 1955   ALBUMIN g/dL 2.83*   BILIRUBIN mg/dL 0.5   ALK PHOS U/L 958*   AST (SGOT) U/L 121*   ALT (SGPT) U/L 32   AMYLASE U/L 35   LIPASE U/L 49     Endocrine function:  Lab Results   Component Value Date    TSH 9.270 (H) 01/31/2020     Cardiac:  Results from last 7 days   Lab Units 01/31/20 1955   TROPONIN T ng/mL <0.010   PROBNP pg/mL 214.1       Cultures:  Brief Urine Lab Results  (Last result in the past 365 days)      Color   Clarity   Blood   Leuk Est   Nitrite   Protein   CREAT   Urine HCG        01/31/20 2028 Dark Yellow Turbid Negative Negative Negative 30 mg/dL (1+)             Lab Results   Component Value Date    URINECX 25,000 CFU/mL Mixed Razia Isolated 01/01/2020     Lab Results   Component Value Date    BLOODCX No growth at 5 days 01/01/2020    BLOODCX No growth at 5 days 01/01/2020       I have personally looked at the labs and they are summarized above.  ----------------------------------------------------------------------------------------------------------------------  Detailed radiology reports for the last 24 hours:    Imaging Results (Last 24 Hours)     Procedure Component Value Units Date/Time    CT Chest Without Contrast [[id number]] Collected:  01/31/20 2149     Updated:  01/31/20 2151    Narrative:       CT Chest WO    INDICATION:   Sepsis with recent abdominal surgery for advanced colon cancer.    TECHNIQUE:   CT of the thorax without IV contrast. Coronal and sagittal reconstructions were obtained.  Radiation dose reduction techniques included automated exposure control or exposure modulation based on body size. Count of known CT and cardiac nuc med studies  performed in previous 12 months: 2.     COMPARISON:   None available.    FINDINGS:  Right pleural effusion  layering to a depth of 2.5 cm and a small left pleural effusion layering to a depth of less than a centimeter. Right-sided volume loss with atelectasis right lung base. Minimal left basilar atelectasis. No suspicious nodules or  masses. No focal consolidation. Small amount of vertical atelectasis anterior right upper lobe.    Heart size normal. Extensive anterior mediastinal lymphadenopathy which in the setting of known malignancy is concerning for metastases. Venous access port noted over the left upper chest with distal tip in the SVC. Widely disseminated liver metastases.  Please see CT abdomen and pelvis for remainder of findings within the abdomen and pelvis. Osseous structures and thoracic inlet unremarkable.      Impression:       Small bilateral pleural effusions right greater than left with bibasilar volume loss right greater than left. Atelectasis favored over focal consolidation.    Multiple enlarged anterior mediastinal lymph nodes largest measuring 1.2 x 1.5 cm and in the setting of known malignancy is highly concerning for metastatic disease. No definite pulmonary metastasis.    Signer Name: BUSTER Olivares MD   Signed: 1/31/2020 9:49 PM   Workstation Name: [facility]    [facility]    CT Abdomen Pelvis Without Contrast [[id number]] Collected:  01/31/20 2131     Updated:  01/31/20 2133    Narrative:       CT Abdomen Pelvis WO    INDICATION:   Abdominal pain with fever and sepsis. Recent surgery. Reported colon cancer and pain around surgical area.    TECHNIQUE:   CT of the abdomen and pelvis without IV contrast. Coronal and sagittal reconstructions were obtained.  Radiation dose reduction techniques included automated exposure control or exposure modulation based on body size. Count of known CT and cardiac nuc  med studies performed in previous 12 months: 2.     COMPARISON:   CT abdomen and pelvis 1/12/2020    FINDINGS:  Abdomen: Small bilateral pleural effusions right  greater than left with bibasilar atelectasis. Multiple hypodense and hyperdense liver lesions compatible with widely disseminated liver metastases. No ductal dilatation. Spleen and gallbladder are  unremarkable. Pancreas, kidneys and adrenal glands are unremarkable except for a small nonobstructing right renal stone.    Patient has undergone apparent diverting ileostomy. Soft tissue mass within the right lower quadrant measuring 8.69 6.8 x 8.9 cm most compatible with colon carcinoma involving the cecum and right colon with extension into the mesentery. Extensive  mesenteric and retroperitoneal lymphadenopathy. Increasing ascites when compared to 1/12/2020. Stomach and small bowel unremarkable.    Pelvis: Bladder decompressed. Uterus and adnexa are unremarkable. Moderate amount of induration and edema within the subcutaneous tissues along the lateral abdomen which may be related to third spacing of fluid. No drainable fluid collection or abscess.      Impression:       Postoperative changes from apparent diverting ileostomy with a normal-appearing right lower anterior abdominal wall ostomy.    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 Extensive retroperitoneal and mesenteric lymphadenopathy concerning for metastatic adenopathy.    Moderate amount of ascites which has increased from January 12.    Extensive liver metastases.    Small bilateral pleural effusions with bibasilar atelectasis right greater than left.    Moderate amount of induration and edema within the subcutaneous tissues along the flank regions bilaterally most likely related to third spacing of fluid and recent operative intervention. No definitive abscess.          Signer Name: BUSTER Olivares MD   Signed: 1/31/2020 9:31 PM   [facility]station [facility]: [email or web address]    [facility]    XR Chest 1 View [[id number]] Collected:  01/31/20 2046     Updated:   01/31/20 2048    Narrative:       XR CHEST 1 VW-     CLINICAL INDICATION: sepsis        COMPARISON: 01/12/2020      TECHNIQUE: Single frontal view of the chest.     FINDINGS:     Right basilar airspace disease  The cardiac silhouette is normal. The pulmonary vasculature is  unremarkable.  There is no evidence of an acute osseous abnormality.   There are no suspicious-appearing parenchymal soft tissue nodules.          Impression:       Appearance compatible with right basilar pneumonia     This report was finalized on 1/31/2020 8:46 PM by Dr. Colton Salgado MD.           Final impressions for the last 30 days of radiology reports:    Xr Abdomen 2+ Vw With Chest 1 Vw  Result Date: 1/12/2020  No acute findings. Nonobstructive bowel gas pattern. Signer Name: Hang Gutierrez MD  Signed: 1/12/2020 4:39 AM  Workstation Name: [facility]    Ct Abdomen Pelvis With Contrast  Result Date: 1/12/2020  1. Again noted is a large mass within the cecum and proximal ascending colon, most consistent with colon carcinoma. There is extensive metastatic lymphadenopathy surrounding the cecum and throughout the retroperitoneum, small bowel mesentery, and upper abdomen. Worsening nodular thickening throughout the omentum, consistent with peritoneal carcinomatosis. 2. Extensive hepatic metastatic disease. 3. 2.7 cm left adrenal mass, highly suspicious for metastatic disease. 4. Small amount of abdominal and pelvic ascites. 5. Increasing dilatation of the terminal ileum with fecalization of the ileal contents and air-fluid levels in the distal ileum, likely representing developing small bowel obstruction secondary to the colonic mass. Signer Name: Hang Gutierrez MD  Signed: 1/12/2020 5:50 AM  Workstation Name: [facility]  [facility]    Ct Abdomen Pelvis With Contrast  Result Date: 1/1/2020  1. Markedly abnormal examination. There is an approximate 7.5 cm x 5.3 cm cecal mass  suggesting colon carcinoma. There is inflammatory stranding in the surrounding mesenteric fat which could reflect neoplastic spread as well as multiple small lymph nodes in the mesentery of the right lower quadrant concerning for metastatic disease. 2. Slight dilatation of the terminal ileum which demonstrates an air-fluid level which could reflect a degree of partial obstruction of the terminal ileum presumably due to the mass. However, the remainder of the small bowel is normal in course and caliber. 3. Extensive metastatic disease seen throughout the liver as detailed above. 4. Adenopathy in the periportal region characteristic of metastatic disease. Retroperitoneal lymph nodes are borderline in size and could also reflect metastatic disease. The largest of which measures 1.3 cm at the level of the left renal vein. 5. 2.6 cm low-density mass on the left adrenal gland could represent metastatic disease or possibly an adrenal adenoma. 6. Haziness in the anterior mesentery of the left upper quadrant in the region of the omentum with subtle nodularity concerning for metastatic disease. 7. Diverticulosis. 8. Cortical scarring right kidney. Signer Name: Bert Emerson MD  Signed: 1/1/2020 5:39 PM  Workstation Name: [id number]  [facility]    Xr Chest 1 View  Result Date: 1/12/2020  Orogastric tube in satisfactory position. Signer Name: Alex Burris MD  Signed: 1/12/2020 7:28 AM  Workstation Name: [email or web address]  [facility]    Xr Chest Ap  Result Date: 1/2/2020  Left Port-A-Cath is noted with tip in SVC. LEFT LUNG BASE ATELECTASIS OR MINIMAL AIRSPACE DISEASE. TINY PNEUMOTHORAX PERITONEUM PRESUMABLY POSTSURGICAL.  This report was finalized on 1/2/2020 3:35 PM by Dr. Karlos Mendiola MD.      Fl Surgery Fluoro  Result Date: 1/2/2020  As above  This report was finalized on 1/2/2020 2:53 PM by Dr. Karlos Mendiola MD.        I have personally looked at the radiology images and I have  read the available final reports.    Assessment & Plan        -Septic shock that was present on admission (WBC 29,100, CRP 30.94, lactic acid 5.2, temperature 102.4, heart rate 152, blood pressure 72/60) due to either right sided pneumonia or spontaneous bacterial peritonitis  -Stage 4 colon cancer with bulky lymphadenopathy and liver lesions and adrenal masses, with no chemotherapy started yet  -Lactic acidosis  -Prolonged QTC of 536 ms  -Acute kidney injury with baseline creatinine 0.7 and admission creatinine 1.95  -Elevated TSH with no history of hypothyroidism  -Acute hypomagnesemia  -Normocytic anemia    Admitted to the critical care unit as she has septic shock with low blood pressures in the emergency department.  The goal mean arterial blood pressure will be 65 mmHg or above.  If her blood pressures drop then we will start Levophed.  Cultures were obtained in the emergency department.  She is empirically been started on vancomycin, Zosyn, and doxycycline.  I am not sure if she has pneumonia but with her report of cough and possible atelectasis versus infiltrate on the imaging I have opted to treat her for possible pneumonia.  I will order sputum culture, atypical testing, a viral respiratory panel, and streptococcal testing.  I have discussed the patient with the daytime hospitalist, Dr. Riggs.  Dr. Riggs will evaluate the patient for possible spontaneous bacterial peritonitis; currently, the broad-spectrum antibiotics that we have her on would cover for this type of infection.  I will give her IV fluids for the acute kidney injury and continue to monitor her creatinine.  We will avoid QT prolonging agents.  We will replace the magnesium per our protocol and continue to monitor the levels.  We will also trend the hemoglobin levels and if she continues to drop then we will consider a blood transfusion if the hemoglobin is less than 7.  We will also trend the lactic acid level as it is high.  The patient  has outpatient follow-up with [facility] already scheduled for February 3, 2020.    VTE Prophylaxis:   Mechanical Order History:     None      Pharmalogical Order History:     Ordered     Dose Route Frequency Stop    02/01/20 0309  enoxaparin (LOVENOX) syringe 40 mg      40 mg SC Every 24 Hours --        The patient is high risk due to the following diagnoses/reasons:  Septic shock.    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  She presents with a one-day history of increasing diffuse right-sided abdominal pain that extends into her right flank and fever associated with increasing generalized weakness.  She denies headaches, neck pain, chest pain, shortness of breath, vomiting, syncope or near syncope, focal numbness or weakness, other symptoms or other complaints.          Review of Systems   Constitutional: Positive for fever. Negative for diaphoresis.   HENT: Negative for congestion, ear pain, sore throat and trouble swallowing.    Eyes: Negative for photophobia and pain.   Respiratory: Negative for shortness of breath, wheezing and stridor.    Cardiovascular: Negative for chest pain and palpitations.   Gastrointestinal: Positive for abdominal pain. Negative for abdominal distention, blood in stool, diarrhea and vomiting.   Endocrine: Negative for polydipsia and polyphagia.   Genitourinary: Positive for flank pain. Negative for difficulty urinating.   Musculoskeletal: Negative for neck pain and neck stiffness.   Skin: Negative for color change and pallor.    Neurological: Negative for seizures, syncope and speech difficulty.   Psychiatric/Behavioral: Negative for confusion.   All other systems reviewed and are negative.      Past Medical History:   Diagnosis Date   • Anxiety    • Cancer (CMS/HCC)     colon   • GERD (gastroesophageal reflux disease)    • Headache    • Snoring        Allergies   Allergen Reactions   • Bactrim [Sulfamethoxazole-Trimethoprim] Swelling     Throat swells    • Metronidazole Swelling     Throat swelling and facial rash    • Sulfa Antibiotics Anaphylaxis       Past Surgical History:   Procedure Laterality Date   • LIVER BIOPSY N/A 1/2/2020    Procedure: LIVER BIOPSY LAPAROSCOPIC;  Surgeon: Sophie Grimes MD;  Location: [facility];  Service: General   • TUBAL ABDOMINAL LIGATION     • VENOUS ACCESS DEVICE (PORT) INSERTION N/A 1/2/2020    Procedure: INSERTION VENOUS ACCESS DEVICE;  Surgeon: Sophie Grimes MD;  Location: [facility];  Service: General       Family History   Problem Relation Age of Onset   • Hypertension Mother    • Diabetes Mother    • Cancer Father         stomach   • Breast cancer Neg Hx        Social History     Socioeconomic History   • Marital status:      Spouse name: Not on file   • Number of children: Not on file   • Years of education: Not on file   • Highest education level: Not on file   Tobacco Use   • Smoking status: Never Smoker   • Smokeless tobacco: Never Used   Substance and Sexual Activity   • Alcohol use: No   • Drug use: No   • Sexual activity: Defer           Objective   Physical Exam   Constitutional: She is oriented to person, place, and time. She appears well-developed and well-nourished. She appears distressed.   Pleasant young female, appears acutely ill.   HENT:   Head: Normocephalic and atraumatic.   Eyes: Pupils are equal, round, and reactive to light. EOM are normal. No scleral icterus.   Neck: Normal range of motion. Neck supple. No neck rigidity. No tracheal deviation present.    Cardiovascular: Normal rate, regular rhythm and intact distal pulses.   Pulmonary/Chest: Effort normal and breath sounds normal. No respiratory distress. She exhibits no tenderness.   Abdominal: Soft. Bowel sounds are normal. There is tenderness (There is moderate diffuse right abdominal and right flank tenderness, no other tenderness, no acute peritoneal signs.). There is no rebound and no guarding.   Musculoskeletal: Normal range of motion. She exhibits no tenderness.   Neurological: She is alert and oriented to person, place, and time. She has normal strength. No sensory deficit. GCS eye subscore is 4. GCS verbal subscore is 5. GCS motor subscore is 6.   Skin: Skin is warm and dry. Capillary refill takes less than 2 seconds. She is not diaphoretic. No cyanosis. No pallor.   Psychiatric: She has a normal mood and affect. Her behavior is normal.   Nursing note and vitals reviewed.      Procedures  EKG shows sinus tachycardia with a rate of 146.  Nonspecific ST-T changes.  No apparent acute ischemia.  CT Abdomen Pelvis Without Contrast   Final Result   Postoperative changes from apparent diverting ileostomy with a normal-appearing right lower anterior abdominal wall ostomy.      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 Extensive retroperitoneal and mesenteric lymphadenopathy concerning for metastatic adenopathy.      Moderate amount of ascites which has increased from January 12.      Extensive liver metastases.      Small bilateral pleural effusions with bibasilar atelectasis right greater than left.      Moderate amount of induration and edema within the subcutaneous tissues along the flank regions bilaterally most likely related to third spacing of fluid and recent operative intervention. No definitive abscess.               Signer Name: BUSTER Olivares MD    Signed: 1/31/2020 9:31 PM    Workstation Name: [id number]      [facility]      CT Chest Without Contrast   Final Result   Small bilateral pleural effusions right greater than left with bibasilar volume loss right greater than left. Atelectasis favored over focal consolidation.      Multiple enlarged anterior mediastinal lymph nodes largest measuring 1.2 x 1.5 cm and in the setting of known malignancy is highly concerning for metastatic disease. No definite pulmonary metastasis.      Signer Name: BUSTER Olivares MD    Signed: 1/31/2020 9:49 PM    Workstation Name: [id number]     [facility]      XR Chest 1 View   Final Result   Appearance compatible with right basilar pneumonia       This report was finalized on 1/31/2020 8:46 PM by Dr. Colton Salgado MD.            Results for orders placed or performed during the hospital encounter of 01/31/20   Influenza Antigen, Rapid - Swab, Nasopharynx   Result Value Ref Range    Influenza A Ag, EIA Negative Negative    Influenza B Ag, EIA Negative Negative   Comprehensive Metabolic Panel   Result Value Ref Range    Glucose 122 (H) 65 - 99 mg/dL    BUN 26 (H) 6 - 20 mg/dL    Creatinine 1.95 (H) 0.57 - 1.00 mg/dL    Sodium 130 (L) 136 - 145 mmol/L    Potassium 5.0 3.5 - 5.2 mmol/L    Chloride 94 (L) 98 - 107 mmol/L    CO2 13.4 (L) 22.0 - 29.0 mmol/L    Calcium 8.8 8.6 - 10.5 mg/dL    Total Protein 7.2 6.0 - 8.5 g/dL    Albumin 2.83 (L) 3.50 - 5.20 g/dL    ALT (SGPT) 32 1 - 33 U/L    AST (SGOT) 121 (H) 1 - 32 U/L    Alkaline Phosphatase 958 (H) 39 - 117 U/L    Total Bilirubin 0.5 0.2 - 1.2 mg/dL    eGFR Non African Amer 28 (L) >60 mL/min/1.73    Globulin 4.4 gm/dL    A/G Ratio 0.6 g/dL    BUN/Creatinine Ratio 13.3 7.0 - 25.0    Anion Gap 22.6 (H) 5.0 - 15.0 mmol/L   Lipase   Result Value Ref Range    Lipase 49 13 - 60 U/L   Amylase   Result Value Ref Range    Amylase 35 28 - 100 U/L   Urinalysis With Culture If Indicated - Urine, Catheter   Result Value Ref Range    Color, UA Dark Yellow  (A) Yellow, Straw    Appearance, UA Turbid (A) Clear    pH, UA <=5.0 5.0 - 8.0    Specific Gravity, UA 1.023 1.005 - 1.030    Glucose, UA Negative Negative    Ketones, UA Trace (A) Negative    Bilirubin, UA Small (1+) (A) Negative    Blood, UA Negative Negative    Protein, UA 30 mg/dL (1+) (A) Negative    Leuk Esterase, UA Negative Negative    Nitrite, UA Negative Negative    Urobilinogen, UA 0.2 E.U./dL 0.2 - 1.0 E.U./dL   Troponin   Result Value Ref Range    Troponin T <0.010 0.000 - 0.030 ng/mL   Lactic Acid, Plasma   Result Value Ref Range    Lactate 5.2 (C) 0.5 - 2.0 mmol/L   C-reactive Protein   Result Value Ref Range    C-Reactive Protein 30.94 (H) 0.00 - 0.50 mg/dL   TSH   Result Value Ref Range    TSH 9.270 (H) 0.270 - 4.200 uIU/mL   Magnesium   Result Value Ref Range    Magnesium 1.6 1.6 - 2.6 mg/dL   BNP   Result Value Ref Range    proBNP 214.1 5.0 - 450.0 pg/mL   CBC Auto Differential   Result Value Ref Range    WBC 29.10 (H) 3.40 - 10.80 10*3/mm3    RBC 3.76 (L) 3.77 - 5.28 10*6/mm3    Hemoglobin 8.8 (L) 12.0 - 15.9 g/dL    Hematocrit 29.9 (L) 34.0 - 46.6 %    MCV 79.5 79.0 - 97.0 fL    MCH 23.4 (L) 26.6 - 33.0 pg    MCHC 29.4 (L) 31.5 - 35.7 g/dL    RDW 16.5 (H) 12.3 - 15.4 %    RDW-SD 46.3 37.0 - 54.0 fl    MPV 10.9 6.0 - 12.0 fL    Platelets 483 (H) 140 - 450 10*3/mm3   Blood Gas, Arterial With Co-Ox   Result Value Ref Range    Site Left Radial     Rashaun's Test Positive     pH, Arterial 7.417 7.350 - 7.450 pH units    pCO2, Arterial 20.0 (L) 35.0 - 45.0 mm Hg    pO2, Arterial 85.7 83.0 - 108.0 mm Hg    HCO3, Arterial 12.9 (L) 20.0 - 26.0 mmol/L    Base Excess, Arterial -10.2 (L) 0.0 - 2.0 mmol/L    O2 Saturation, Arterial 97.0 94.0 - 99.0 %    Hemoglobin, Blood Gas 8.5 (L) 13.5 - 17.5 g/dL    Hematocrit, Blood Gas 26.1 (L) 38.0 - 51.0 %    Oxyhemoglobin 95.8 94 - 99 %    Methemoglobin 0.30 0.00 - 3.00 %    Carboxyhemoglobin 0.9 0 - 5 %    A-a Gradiant 34.8 0.0 - 300.0 mmHg    CO2 Content 13.5 (L) 22  - 33 mmol/L    Temperature 0.0 C    Barometric Pressure for Blood Gas 729 mmHg    Modality Room Air     FIO2 21 %    Ventilator Mode NA     Note      Collected by [id number]     pH, Temp Corrected      pCO2, Temperature Corrected      pO2, Temperature Corrected     Urinalysis, Microscopic Only - Urine, Catheter   Result Value Ref Range    RBC, UA 0-2 None Seen, 0-2 /HPF    WBC, UA 0-2 None Seen, 0-2 /HPF    Bacteria, UA 2+ (A) None Seen /HPF    Squamous Epithelial Cells, UA 3-6 (A) None Seen, 0-2 /HPF    Hyaline Casts, UA 0-2 None Seen /LPF    Uric Acid Crystals, UA Large/3+ None Seen /HPF    Amorphous Crystals, UA Moderate/2+ None Seen /HPF    Methodology Manual Light Microscopy    Light Blue Top   Result Value Ref Range    Extra Tube hold for add-on    Green Top (Gel)   Result Value Ref Range    Extra Tube Hold for add-ons.    Lavender Top   Result Value Ref Range    Extra Tube hold for add-on    Gold Top - SST   Result Value Ref Range    Extra Tube Hold for add-ons.    Manual Differential   Result Value Ref Range    Neutrophil % 71.0 42.7 - 76.0 %    Lymphocyte % 6.0 (L) 19.6 - 45.3 %    Monocyte % 9.0 5.0 - 12.0 %    Eosinophil % 2.0 0.3 - 6.2 %    Bands %  12.0 (H) 0.0 - 5.0 %    Neutrophils Absolute 24.15 (H) 1.70 - 7.00 10*3/mm3    Lymphocytes Absolute 1.75 0.70 - 3.10 10*3/mm3    Monocytes Absolute 2.62 (H) 0.10 - 0.90 10*3/mm3    Eosinophils Absolute 0.58 (H) 0.00 - 0.40 10*3/mm3    Anisocytosis Slight/1+ None Seen    Hypochromia Mod/2+ None Seen    Microcytes Slight/1+ None Seen    Platelet Morphology Normal Normal               ED Course  ED Course as of Jan 31 2305 Fri Jan 31, 2020 2239 Case discussed with ALLY Frey with the hospitalist service.  She will discussed the case with Dr. Coy and make arrangements to admit patient to the hospital.    [CM]      ED Course User Index  [CM] Waqas York MD                                               [facility]    Final diagnoses:   Sepsis, due to  unspecified organism, unspecified whether acute organ dysfunction present (CMS/HCC)             Please note that portions of this note were completed with a voice recognition program.        Waqas York MD  01/31/20 2305      Electronically signed by Waqas York MD at 01/31/20 2305     Steph Ibrahim at 01/31/20 2008        EKG performed @2008, given to Steph Randle  01/31/20 2014      Electronically signed by Steph Ibrahim at 01/31/20 2014     Berenice Méndez, RN at 01/31/20 2045        Pt resting quietly on stretcher with complaints of abdominal pain, HR tachycardic and provider aware.  Pt AAOx4 with no resp distress noted, respirations even and unlabored.  Pt denies any needs at this time.  Skin PWD.  Pt family at bedside. Will continue to monitor and follow plan of care.  Bed rails up x2, bed in lowest position, call light in reach.           Berenice Méndez RN  01/31/20 2355      Electronically signed by Berenice Méndez RN at 01/31/20 2355     Berenice Méndez, RN at 01/31/20 2145        Pt resting quietly on stretcher with complaints of abdominal pain, HR tachycardic and provider aware.  Pt AAOx4 with no resp distress noted, respirations even and unlabored.  Pt denies any needs at this time.  Skin PWD.  Pt family at bedside. Will continue to monitor and follow plan of care.  Bed rails up x2, bed in lowest position, call light in reach.         Berenice Méndez RN  01/31/20 2355      Electronically signed by Berenice Méndez, RN at 01/31/20 2355     Fatmata Keen at 01/31/20 2153        Paged  @this time     Fatmata Keen  01/31/20 2154      Electronically signed by Fatmata Keen at 01/31/20 2154     Berenice Méndez, RN at 01/31/20 2245        Pt resting quietly on stretcher with complaints of abdominal pain, HR tachycardic and provider aware.  Pt AAOx4 with no resp distress noted, respirations even and unlabored.  Pt denies any  needs at this time.  Skin PWD.  Pt family at bedside. Will continue to monitor and follow plan of care.  Bed rails up x2, bed in lowest position, call light in reach.         Berenice Méndez RN  01/31/20 2356      Electronically signed by Berenice Méndez RN at 01/31/20 2356     Berenice Méndez RN at 01/31/20 2345        Pt resting quietly on stretcher with complaints of abdominal pain, HR tachycardic and provider aware.  Pt AAOx4 with no resp distress noted, respirations even and unlabored.  Pt denies any needs at this time.  Skin PWD.  Pt family at bedside. Will continue to monitor and follow plan of care.  Bed rails up x2, bed in lowest position, call light in reach.         Berenice Méndez RN  01/31/20 2357      Electronically signed by Berenice Médnez RN at 01/31/20 2357     Berenice Méndez RN at 02/01/20 0045        Pt resting quietly on stretcher with improving complaints of abdominal pain, HR tachycardic and provider aware.  Pt AAOx4 with no resp distress noted, respirations even and unlabored.  Pt denies any needs at this time.  Skin PWD.  Pt family at bedside. Will continue to monitor and follow plan of care.  Bed rails up x2, bed in lowest position, call light in reach.       Berenice Méndez RN  02/01/20 0317      Electronically signed by Berenice Méndez RN at 02/01/20 0317     Berenice Méndez RN at 02/01/20 0145        Pt resting quietly on stretcher with improving complaints of abdominal pain, HR tachycardic and provider aware.  Pt AAOx4 with no resp distress noted, respirations even and unlabored.  Pt denies any needs at this time.  Skin PWD.  Pt family at bedside. Will continue to monitor and follow plan of care.  Bed rails up x2, bed in lowest position, call light in reach.       Berenice Méndez RN  02/01/20 0317      Electronically signed by Berenice Méndez RN at 02/01/20 0317     Berenice Méndez RN at 02/01/20 0213        Report called to Larissa GARCIA at  this time.      Berenice Méndez RN  20      Electronically signed by Berenice Méndez RN at 20     Berneice Méndez, RN at 20 0250        Pt transferred to inpatient floor at this time. NADN, skin PWD, no resp distress noted. IV intact with no signs of infiltration. All pt belongings transferred with pt. Pt transferred via stretcher with ED nurse and ED tech, zols monitor in place.        Berenice Méndez RN  20      Electronically signed by Berenice Méndez, RN at 20            Physician Progress Notes (last 7 days) (Notes from 20 1204 through 20 1204)      Max Riggs MD at 20        Called bedside around 6 pm that patient was more dyspneic and tachypnic on 2L NC. Saturating 98%. Patient did appear to be more uncomfortable than prior exams. ABG 7.268/ consistent with metabolic acidosis. Chest x-ray did not show overt volume overload from all the volume she has received. Suspect dyspnea from tying to compensate from metabolic acidosis. Lactate at 3 pm was slightly better at 3.7. Repeat this evening 3.3. Ordered CT abdomen/pelvis. Ordered repeat BMP/CBC. Ordered for nephrology consult. 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 I suspect kidney function may get worse with the ATN before getting better. Dr. Coy to call overnight pending clinical course.       Electronically signed by Max Riggs MD at 20     Max Riggs MD at 20 1417              [facility] PROGRESS NOTE     Patient Identification:  Name:  Gracy Norman  Age:  44 y.o.  Sex:  female  :  1975  MRN:  [id number]  Visit Number:  [id number]  ROOM: [address]     Primary Care Provider:  Almas Stone MD    Length of stay in inpatient status:  2    Subjective     Chief Compliant:    Chief Complaint   Patient presents with   • Weakness - Generalized   • Post-op  Problem       History of Presenting Illness:    Patient reports feeling better today. Still having some abdominal pain when she moves that wraps around her right side. Requested her home SSRI. Patient still tachycardic. Net positive around 4L yesterday.     ROS:  Denies fevers/chills. Denies chest pain.   Otherwise 10 point ROS negative other than documented above in HPI.     Objective     Current Hospital Meds:  cyclobenzaprine 5 mg Oral BID   FLUoxetine 20 mg Oral Daily   heparin (porcine) 5,000 Units Subcutaneous Q8H   pantoprazole 40 mg Oral QAM   piperacillin-tazobactam 3.375 g Intravenous Q8H   sodium chloride 1,000 mL Intravenous Once   sodium chloride 10 mL Intravenous Q12H   sodium chloride 10 mL Intravenous Q12H   sodium chloride 10 mL Intravenous Q12H   Vancomycin Pharmacy Intermittent Dosing  Does not apply Daily     sodium chloride 75 mL/hr Last Rate: 75 mL/hr (02/02/20 0639)       Current Antimicrobial Therapy:  Anti-Infectives (From admission, onward)    Ordered     Dose/Rate Route Frequency Start Stop    02/01/20 1601  vancomycin (VANCOCIN) 1,000 mg in sodium chloride 0.9 % 250 mL IVPB     Ordering Provider:  Denny Coy MD    1,000 mg  over 60 Minutes Intravenous Once 02/01/20 1800 02/01/20 1812    02/01/20 1447  Vancomycin Pharmacy Intermittent Dosing  Review   Ordering Provider:  Denny Coy MD     Does not apply Daily 02/01/20 1545 02/05/20 0859    02/01/20 0517  piperacillin-tazobactam (ZOSYN) 3.375 g/100 mL 0.9% NS IVPB (mbp)     Dorothy Salvador, [facility] reviewed the order on 02/01/20 1048.   Ordering Provider:  Denny Coy MD    3.375 g  over 4 Hours Intravenous Every 8 Hours 02/01/20 0630 02/08/20 0629    01/31/20 1949  vancomycin (VANCOCIN) 1,750 mg in sodium chloride 0.9 % 500 mL IVPB     Ordering Provider:  Waqas York MD    20 mg/kg × 93 kg Intravenous Once 01/31/20 1951 02/01/20 0000    01/31/20 1949  piperacillin-tazobactam (ZOSYN) 4.5 g/100 mL 0.9% NS IVPB  (mbp)     Ordering Provider:  Waqas York MD    4.5 g Intravenous Once 01/31/20 1951 01/31/20 2129        Current Diuretic Therapy:  Diuretics (From admission, onward)    None        ----------------------------------------------------------------------------------------------------------------------  Vital Signs:  Temp:  [97.4 °F (36.3 °C)-98.8 °F (37.1 °C)] 98.4 °F (36.9 °C)  Heart Rate:  [114-128] 122  Resp:  [21-31] 28  BP: ()/() 125/89  SpO2:  [96 %-98 %] 96 %  on   ;   Device (Oxygen Therapy): room air  Body mass index is 36.83 kg/m².    Wt Readings from Last 3 Encounters:   02/01/20 94.3 kg (207 lb 14.3 oz)   01/12/20 92.1 kg (203 lb)   01/09/20 91.6 kg (202 lb)     Intake & Output (last 3 days)       01/30 0701 - 01/31 0700 01/31 0701 - 02/01 0700 02/01 0701 - 02/02 0700 02/02 0701 - 02/03 0700    P.O.   919     I.V. (mL/kg)   1193 (12.7)     Blood    300    IV Piggyback  200 2750 50    Total Intake(mL/kg)  200 (2.1) 4862 (51.6) 350 (3.7)    Urine (mL/kg/hr)  225 685 (0.3)     Stool  375 425     Total Output  600 1110     Net  -400 +3752 +350                Diet Full Liquid  ----------------------------------------------------------------------------------------------------------------------  Physical exam:  Constitutional:  Well-developed and well-nourished.  No respiratory distress. Obese.   HENT:  Head:  Normocephalic and atraumatic.  Mouth:  Moist mucous membranes.    Eyes:  Conjunctivae and EOM are normal. No scleral icterus.    Neck:  Neck supple.  No JVD present.    Cardiovascular:  Normal rate, regular rhythm and normal heart sounds with no murmur.  Pulmonary/Chest:  No respiratory distress, no wheezes, no crackles, with normal breath sounds and good air movement.  Abdominal:  Disten tion improved. Soft. Mildly tender in lower quadrants.   Musculoskeletal:  No edema, no tenderness, and no deformity.  No red or swollen joints anywhere.    Neurological:  Alert and oriented to  person, place, and time.  No cranial nerve deficit.  No tongue deviation.  No facial droop.  No slurred speech.   Skin:  Skin is warm and dry. No rash noted. No pallor.   Peripheral vascular:  Pulses in all 4 extremities with no clubbing, no cyanosis, no edema.  ----------------------------------------------------------------------------------------------------------------------  Tele:    ----------------------------------------------------------------------------------------------------------------------  Results from last 7 days   Lab Units 02/02/20  0120 02/01/20  1948 02/01/20  1618 02/01/20  0840  01/31/20 1955   CRP mg/dL  --   --   --  29.28*  --  30.94*   LACTATE mmol/L  --  3.8* 4.6* 3.8*   < > 5.2*   WBC 10*3/mm3 25.18*  --   --  27.93*  --  29.10*   HEMOGLOBIN g/dL 6.6*  --   --  7.9*  --  8.8*   HEMATOCRIT % 23.4*  --   --  26.7*  --  29.9*   MCV fL 81.5  --   --  80.4  --  79.5   MCHC g/dL 28.2*  --   --  29.6*  --  29.4*   PLATELETS 10*3/mm3 273  --   --  362  --  483*   INR   --   --   --  1.34*  --   --     < > = values in this interval not displayed.     Results from last 7 days   Lab Units 01/31/20 2005   PH, ARTERIAL pH units 7.417   PO2 ART mm Hg 85.7   PCO2, ARTERIAL mm Hg 20.0*   HCO3 ART mmol/L 12.9*     Results from last 7 days   Lab Units 02/02/20  0120 02/01/20  0840 01/31/20  1955   SODIUM mmol/L 132* 131* 130*   POTASSIUM mmol/L 4.6 4.8 5.0   MAGNESIUM mg/dL 2.5 1.5* 1.6   CHLORIDE mmol/L 100 99 94*   CO2 mmol/L 11.2* 12.3* 13.4*   BUN mg/dL 26* 26* 26*   CREATININE mg/dL 2.27* 2.10* 1.95*   EGFR IF NONAFRICN AM mL/min/1.73 24* 26* 28*   CALCIUM mg/dL 7.5* 7.8* 8.8   PHOSPHORUS mg/dL  --  3.9  --    GLUCOSE mg/dL 91 96 122*   ALBUMIN g/dL 3.06* 2.13* 2.83*   BILIRUBIN mg/dL 0.6 0.5 0.5   ALK PHOS U/L 556* 753* 958*   AST (SGOT) U/L 77* 103* 121*   ALT (SGPT) U/L 20 27 32   Estimated Creatinine Clearance: 35.3 mL/min (A) (by C-G formula based on SCr of 2.27 mg/dL (H)).  No results found  for: AMMONIA  Results from last 7 days   Lab Units 02/01/20  0840 01/31/20 1955   TROPONIN T ng/mL <0.010 <0.010     Results from last 7 days   Lab Units 01/31/20 1955   PROBNP pg/mL 214.1         No results found for: HGBA1C, POCGLU  Lab Results   Component Value Date    TSH 9.270 (H) 01/31/2020    FREET4 0.97 02/01/2020     No results found for: PREGTESTUR, PREGSERUM, HCG, HCGQUANT  Pain Management Panel     Pain Management Panel Latest Ref Rng & Units 2/1/2020    CREATININE UR mg/dL 146.0        Brief Urine Lab Results  (Last result in the past 365 days)      Color   Clarity   Blood   Leuk Est   Nitrite   Protein   CREAT   Urine HCG        02/01/20 1701             146.0           Blood Culture   Date Value Ref Range Status   01/31/2020 No growth at 24 hours  Preliminary   01/31/2020 No growth at 24 hours  Preliminary     No results found for: URINECX  No results found for: WOUNDCX  No results found for: STOOLCX  No results found for: RESPCX  No results found for: AFBCX  Results from last 7 days   Lab Units 02/01/20  1948 02/01/20  1618 02/01/20  0840 02/01/20  0022 01/31/20 1955   LACTATE mmol/L 3.8* 4.6* 3.8* 3.6* 5.2*   CRP mg/dL  --   --  29.28*  --  30.94*       I have personally looked at the labs and they are summarized above.  ----------------------------------------------------------------------------------------------------------------------  Detailed radiology reports for the last 24 hours:    Imaging Results (Last 24 Hours)     ** No results found for the last 24 hours. **        Assessment & Plan    #Septic shock 2/2 SBP  #Lactic acidosis   - On presentation (WBC 29,100, CRP 30.94, lactic acid 5.2, temperature 102.4, heart rate 152, blood pressure 72/60  - CT chest showed some bibasilar consolidations that are likely atelectasis   - Symptoms of abdominal pain more consistent with SBP  - Diagnostic paracentesis performed on 2/1 that revealed ANC: 3400. Confirming SBP. Culture NGTD  - Continue  Vanc/Zosyn. Discontinue doxy on 2/1 as I do not suspect atypical infections.   - Blood culture NGTD   - Still tachycardic. Will continue volume resuscitation as needed.     #KODI  - Baseline Cr 0.7-0.9.   - Cr on presentation 1.95=>2.10=>2.27  - Potential causes include prerenal, ATN 2/2 sepsis , hepatorenal syndrome  - CT abdomen/pelvis did not reveal urinary obstruction.  - Urine sodium <20, consistent with severe prerenal vs. Hepatorenal   - Continue to fluid resuscitate as needed. Day 2 of albumin challenge.     #Stage 4 colon cancer with bulky lymphadenopathy and widely disseminated liver metastases and adrenal masses  - Scheduled to f/u with oncology at  on 2/3. Expects that she will start chemo soon. Suspect treatments would be palliative in nature.   - Suspect patient has developed some level of liver dysfunction for disseminated liver metastases   - PRN opiates for cancer related pain     #Normocytic anemia   - Hemoglobin has been trending down. 10 on 1/2/20  - 8.8=>7.9=>6.6, Transfused 1 unit of pRBCs  - No signs of active bleeding, BUN/creatinine not elevated, suspect patient was very hemoconcentrated on admission and fluids have diluted blood   - Iron studies consistent with SNEHAL and AOCD, B12 and folate normal.   - Will start on IV PPI and monitor for overt signs of bleeding     #Mildly elevated TSH  - TSH 9, free T4 normal   - Hold on starting synthroid. Can f/u outpatient.     #Hypomagnesemia   - Replaced    #Diverting loop ileostomy  - Performed 1/17 at  for SBO    F: Regular   A: PRN dilaudid   S: None  T: SubQ heparin   H: HOB elevated   U: PPI  G: PRN  S: N/A  B: PRN  I: PIVs, Port 1/2  D: Vanc/Zosyn     Code status: Full     Dispo: Continue CCU care.     40 minutes of critical care used on patient. Patient critically ill from septic shock 2/2 SBP. >50% of time spent providing bedside care and discussing care with staff.        VTE Prophylaxis:   Mechanical Order History:     None         Pharmalogical Order History:     Ordered     Dose Route Frequency Stop    20 1455  heparin (porcine) 5000 UNIT/ML injection 5,000 Units      5,000 Units SC Every 8 Hours Scheduled --    20 0309  enoxaparin (LOVENOX) syringe 40 mg  Status:  Discontinued      40 mg SC Every 24 Hours 20 1455          Max Riggs MD  [facility] Hospitalist  20  2:17 PM    Electronically signed by Max Riggs MD at 20 1430     Max Riggs MD at 20 1504              [facility]     Patient Identification:  Name:  Gracy Norman  Age:  44 y.o.  Sex:  female  :  1975  MRN:  [id number]  Visit Number:  [id number]  ROOM: [address]     Primary Care Provider:  Almas Stone MD    Length of stay in inpatient status:  1    Subjective     Chief Compliant:    Chief Complaint   Patient presents with   • Weakness - Generalized   • Post-op Problem       History of Presenting Illness:    Patient reports feeling better this morning but still complaining of abdominal and back pain. Tolerated bedside paracentesis well. Abdominal discomfort improved after procedure. Still requiring PRN dilaudid regularly.     ROS:  Denies fevers/chills. Denies chest pain.   Otherwise 10 point ROS negative other than documented above in HPI.     Objective     Current Hospital Meds:  cyclobenzaprine 5 mg Oral BID   heparin (porcine) 5,000 Units Subcutaneous Q8H   magnesium sulfate 4 g Intravenous Once   pantoprazole 40 mg Oral QAM   piperacillin-tazobactam 3.375 g Intravenous Q8H   sodium chloride 1,000 mL Intravenous Once   sodium chloride 10 mL Intravenous Q12H   sodium chloride 10 mL Intravenous Q12H   sodium chloride 10 mL Intravenous Q12H   Vancomycin Pharmacy Intermittent Dosing  Does not apply Daily     sodium chloride 75 mL/hr Last Rate: 75 mL/hr (20 0328)       Current Antimicrobial Therapy:  Anti-Infectives (From admission, onward)    Ordered      Dose/Rate Route Frequency Start Stop    02/01/20 1447  Vancomycin Pharmacy Intermittent Dosing     Ordering Provider:  Denny Coy MD     Does not apply Daily 02/01/20 1545 02/05/20 0859    02/01/20 0517  piperacillin-tazobactam (ZOSYN) 3.375 g/100 mL 0.9% NS IVPB (mbp)     Dorothy Salvador, [facility] reviewed the order on 02/01/20 1048.   Ordering Provider:  Denny Coy MD    3.375 g  over 4 Hours Intravenous Every 8 Hours 02/01/20 0630 02/08/20 0629    01/31/20 1949  vancomycin (VANCOCIN) 1,750 mg in sodium chloride 0.9 % 500 mL IVPB     Ordering Provider:  Waqas York MD    20 mg/kg × 93 kg Intravenous Once 01/31/20 1951 02/01/20 0000    01/31/20 1949  piperacillin-tazobactam (ZOSYN) 4.5 g/100 mL 0.9% NS IVPB (mbp)     Ordering Provider:  Waqas York MD    4.5 g Intravenous Once 01/31/20 1951 01/31/20 2129        Current Diuretic Therapy:  Diuretics (From admission, onward)    None        ----------------------------------------------------------------------------------------------------------------------  Vital Signs:  Temp:  [97.4 °F (36.3 °C)-102.4 °F (39.1 °C)] 98 °F (36.7 °C)  Heart Rate:  [117-152] 123  Resp:  [20-28] 26  BP: ()/(55-96) 125/83  SpO2:  [94 %-100 %] 98 %  on   ;   Device (Oxygen Therapy): room air  Body mass index is 36.83 kg/m².    Wt Readings from Last 3 Encounters:   02/01/20 94.3 kg (207 lb 14.3 oz)   01/12/20 92.1 kg (203 lb)   01/09/20 91.6 kg (202 lb)     Intake & Output (last 3 days)       01/29 0701 - 01/30 0700 01/30 0701 - 01/31 0700 01/31 0701 - 02/01 0700 02/01 0701 - 02/02 0700    P.O.    565    I.V. (mL/kg)    100 (1.1)    IV Piggyback   200 1100    Total Intake(mL/kg)   200 (2.1) 1765 (18.7)    Urine (mL/kg/hr)   225 125 (0.2)    Stool   375 225    Total Output   600 350    Net   -400 +1415                Diet Full  Liquid  ----------------------------------------------------------------------------------------------------------------------  Physical exam:  Constitutional:  Well-developed and well-nourished.  No respiratory distress. Obese.   HENT:  Head:  Normocephalic and atraumatic.  Mouth:  Moist mucous membranes.    Eyes:  Conjunctivae and EOM are normal. No scleral icterus.    Neck:  Neck supple.  No JVD present.    Cardiovascular:  Normal rate, regular rhythm and normal heart sounds with no murmur.  Pulmonary/Chest:  No respiratory distress, no wheezes, no crackles, with normal breath sounds and good air movement.  Abdominal:  Distended. Soft. Mildly tender in lower quadrants.   Musculoskeletal:  No edema, no tenderness, and no deformity.  No red or swollen joints anywhere.    Neurological:  Alert and oriented to person, place, and time.  No cranial nerve deficit.  No tongue deviation.  No facial droop.  No slurred speech.   Skin:  Skin is warm and dry. No rash noted. No pallor.   Peripheral vascular:  Pulses in all 4 extremities with no clubbing, no cyanosis, no edema.  ----------------------------------------------------------------------------------------------------------------------  Tele:    ----------------------------------------------------------------------------------------------------------------------  Results from last 7 days   Lab Units 02/01/20 0840 02/01/20 0022 01/31/20 1955   CRP mg/dL 29.28*  --  30.94*   LACTATE mmol/L 3.8* 3.6* 5.2*   WBC 10*3/mm3 27.93*  --  29.10*   HEMOGLOBIN g/dL 7.9*  --  8.8*   HEMATOCRIT % 26.7*  --  29.9*   MCV fL 80.4  --  79.5   MCHC g/dL 29.6*  --  29.4*   PLATELETS 10*3/mm3 362  --  483*   INR  1.34*  --   --      Results from last 7 days   Lab Units 01/31/20 2005   PH, ARTERIAL pH units 7.417   PO2 ART mm Hg 85.7   PCO2, ARTERIAL mm Hg 20.0*   HCO3 ART mmol/L 12.9*     Results from last 7 days   Lab Units 02/01/20 0840 01/31/20 1955   SODIUM mmol/L 131* 130*    POTASSIUM mmol/L 4.8 5.0   MAGNESIUM mg/dL 1.5* 1.6   CHLORIDE mmol/L 99 94*   CO2 mmol/L 12.3* 13.4*   BUN mg/dL 26* 26*   CREATININE mg/dL 2.10* 1.95*   EGFR IF NONAFRICN AM mL/min/1.73 26* 28*   CALCIUM mg/dL 7.8* 8.8   PHOSPHORUS mg/dL 3.9  --    GLUCOSE mg/dL 96 122*   ALBUMIN g/dL 2.13* 2.83*   BILIRUBIN mg/dL 0.5 0.5   ALK PHOS U/L 753* 958*   AST (SGOT) U/L 103* 121*   ALT (SGPT) U/L 27 32   Estimated Creatinine Clearance: 38.1 mL/min (A) (by C-G formula based on SCr of 2.1 mg/dL (H)).  No results found for: AMMONIA  Results from last 7 days   Lab Units 02/01/20  0840 01/31/20 1955   TROPONIN T ng/mL <0.010 <0.010     Results from last 7 days   Lab Units 01/31/20 1955   PROBNP pg/mL 214.1         No results found for: HGBA1C, POCGLU  Lab Results   Component Value Date    TSH 9.270 (H) 01/31/2020     No results found for: PREGTESTUR, PREGSERUM, HCG, HCGQUANT  Pain Management Panel     There is no flowsheet data to display.        Brief Urine Lab Results  (Last result in the past 365 days)      Color   Clarity   Blood   Leuk Est   Nitrite   Protein   CREAT   Urine HCG        01/31/20 2028 Dark Yellow Turbid Negative Negative Negative 30 mg/dL (1+)             Blood Culture   Date Value Ref Range Status   01/31/2020 No growth at less than 24 hours  Preliminary   01/31/2020 No growth at less than 24 hours  Preliminary     No results found for: URINECX  No results found for: WOUNDCX  No results found for: STOOLCX  No results found for: RESPCX  No results found for: AFBCX  Results from last 7 days   Lab Units 02/01/20  0840 02/01/20  0022 01/31/20 1955   LACTATE mmol/L 3.8* 3.6* 5.2*   CRP mg/dL 29.28*  --  30.94*       I have personally looked at the labs and they are summarized above.  ----------------------------------------------------------------------------------------------------------------------  Detailed radiology reports for the last 24 hours:    Imaging Results (Last 24 Hours)     Procedure  Component Value Units Date/Time    CT Chest Without Contrast [[id number]] Collected:  01/31/20 2149     Updated:  01/31/20 2151    Narrative:       CT Chest WO    INDICATION:   Sepsis with recent abdominal surgery for advanced colon cancer.    TECHNIQUE:   CT of the thorax without IV contrast. Coronal and sagittal reconstructions were obtained.  Radiation dose reduction techniques included automated exposure control or exposure modulation based on body size. Count of known CT and cardiac nuc med studies  performed in previous 12 months: 2.     COMPARISON:   None available.    FINDINGS:  Right pleural effusion layering to a depth of 2.5 cm and a small left pleural effusion layering to a depth of less than a centimeter. Right-sided volume loss with atelectasis right lung base. Minimal left basilar atelectasis. No suspicious nodules or  masses. No focal consolidation. Small amount of vertical atelectasis anterior right upper lobe.    Heart size normal. Extensive anterior mediastinal lymphadenopathy which in the setting of known malignancy is concerning for metastases. Venous access port noted over the left upper chest with distal tip in the SVC. Widely disseminated liver metastases.  Please see CT abdomen and pelvis for remainder of findings within the abdomen and pelvis. Osseous structures and thoracic inlet unremarkable.      Impression:       Small bilateral pleural effusions right greater than left with bibasilar volume loss right greater than left. Atelectasis favored over focal consolidation.    Multiple enlarged anterior mediastinal lymph nodes largest measuring 1.2 x 1.5 cm and in the setting of known malignancy is highly concerning for metastatic disease. No definite pulmonary metastasis.    Signer Name: BUSTER Olivares MD   Signed: 1/31/2020 9:49 PM   Workstation Name: [id number]    [facility]    CT Abdomen Pelvis Without Contrast [[id number]] Collected:  01/31/20 2131     Updated:   01/31/20 2133    Narrative:       CT Abdomen Pelvis WO    INDICATION:   Abdominal pain with fever and sepsis. Recent surgery. Reported colon cancer and pain around surgical area.    TECHNIQUE:   CT of the abdomen and pelvis without IV contrast. Coronal and sagittal reconstructions were obtained.  Radiation dose reduction techniques included automated exposure control or exposure modulation based on body size. Count of known CT and cardiac nuc  med studies performed in previous 12 months: 2.     COMPARISON:   CT abdomen and pelvis 1/12/2020    FINDINGS:  Abdomen: Small bilateral pleural effusions right greater than left with bibasilar atelectasis. Multiple hypodense and hyperdense liver lesions compatible with widely disseminated liver metastases. No ductal dilatation. Spleen and gallbladder are  unremarkable. Pancreas, kidneys and adrenal glands are unremarkable except for a small nonobstructing right renal stone.    Patient has undergone apparent diverting ileostomy. Soft tissue mass within the right lower quadrant measuring 8.69 6.8 x 8.9 cm most compatible with colon carcinoma involving the cecum and right colon with extension into the mesentery. Extensive  mesenteric and retroperitoneal lymphadenopathy. Increasing ascites when compared to 1/12/2020. Stomach and small bowel unremarkable.    Pelvis: Bladder decompressed. Uterus and adnexa are unremarkable. Moderate amount of induration and edema within the subcutaneous tissues along the lateral abdomen which may be related to third spacing of fluid. No drainable fluid collection or abscess.      Impression:       Postoperative changes from apparent diverting ileostomy with a normal-appearing right lower anterior abdominal wall ostomy.    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 Extensive retroperitoneal and mesenteric lymphadenopathy concerning for metastatic  adenopathy.    Moderate amount of ascites which has increased from January 12.    Extensive liver metastases.    Small bilateral pleural effusions with bibasilar atelectasis right greater than left.    Moderate amount of induration and edema within the subcutaneous tissues along the flank regions bilaterally most likely related to third spacing of fluid and recent operative intervention. No definitive abscess.          Signer Name: BUSTER Olivares MD   Signed: 1/31/2020 9:31 PM   Workstation Name: [id number]    [facility]    XR Chest 1 View [[id number]] Collected:  01/31/20 2046     Updated:  01/31/20 2048    Narrative:       XR CHEST 1 VW-     CLINICAL INDICATION: sepsis        COMPARISON: 01/12/2020      TECHNIQUE: Single frontal view of the chest.     FINDINGS:     Right basilar airspace disease  The cardiac silhouette is normal. The pulmonary vasculature is  unremarkable.  There is no evidence of an acute osseous abnormality.   There are no suspicious-appearing parenchymal soft tissue nodules.          Impression:       Appearance compatible with right basilar pneumonia     This report was finalized on 1/31/2020 8:46 PM by Dr. Colton Salgado MD.           Assessment & Plan    #Septic shock 2/2 SBP  #Lactic acidosis   - On presentation (WBC 29,100, CRP 30.94, lactic acid 5.2, temperature 102.4, heart rate 152, blood pressure 72/60  - CT chest showed some bibasilar consolidations that are likely   - Symptoms of abdominal pain more consistent with SBP  - Diagnostic paracentesis performed on 2/1 that revealed ANC: 3400. Confirming SBP. Culture pending  - Continue Vanc/Zosyn. Discontinue doxy as I do not suspect atypical infections.   - Blood culture NGTD   - Still tachycardic. Will continue PRN boluses.     #KODI  - Baseline Cr 0.7-0.9.   - Cr on presentation 1.95=>2.10  - Potential causes include prerenal, ATN 2/2 sepsis   - CT abdomen/pelvis did not reveal urinary obstruction.  - Will get  FENA  - Continue to fluid resuscitate. Received 2 additional fluid bolus today.     #Stage 4 colon cancer with bulky lymphadenopathy and liver lesions and adrenal masses  - Scheduled to f/u with oncology at  on 2/3. Expects that she will start chemo soon. Suspect treatments would be palliative in nature.     #Mildly elevated TSH  - TSH 9  - Will get free T3, T4    #Hypomagnesemia   - Replaced    #Normocytic anemia   - Hemoglobin has been trending down. 10 on 1/2/20, now down to 7.9.   - Unclear etiology. Will get iron studies, vitamin B12, folate.   - Monitor for overt signs of bleeding     #Diverting loop ileostomy  - Performed 1/17 at  for SBO    F: Regular   A: PRN dilaudid   S: None  T: SubQ heparin   H: HOB elevated   U: PPI  G: PRN  S: N/A  B: PRN  I: PIVs, Port 1/2  D: Vanc/Zosyn     Code status: Full     Dispo: Continue CCU care.     50 minutes of critical care used on patient. Patient critically ill from septic shock 2/2 SBP. >50% of time spent providing bedside care and discussing care with staff.     VTE Prophylaxis:   Mechanical Order History:     None      Pharmalogical Order History:     Ordered     Dose Route Frequency Stop    02/01/20 1455  heparin (porcine) 5000 UNIT/ML injection 5,000 Units      5,000 Units SC Every 8 Hours Scheduled --    02/01/20 0309  enoxaparin (LOVENOX) syringe 40 mg  Status:  Discontinued      40 mg SC Every 24 Hours 02/01/20 1455          Max Riggs MD  [facility] Hospitalist  02/01/20  3:06 PM    Electronically signed by Max Riggs MD at 02/01/20 1509     Progress Notes signed by Isauro Calvo MD at 02/01/20 0703         Nurse Practitioner - Brief Progress Note  PERMANENT  02/01/2020 05:36    [facility]  [facility] - CCU - 10 - ELKE GARCIA ([facility])    MATT HAGAN    Date of Service 02/01/2020 05:36    HPI/Events of Note AICU Provider Assessment Note    Tele AICU focused assessment note: Information obtained from patient's  chart    45 y/o female with PMH of 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   CT abdomen/ pelvis reported moderate amount of ascites/ extensive liver metastases/ small bilateral pleural effusion with bibasilar atelectasis right greater than right/ no definitive abscess.   Treatments in the ED included: 30 ml/kg NS bolus ( 1572 ml's), zofran, dilaudid, and Zosyn/ Vancomcyin. Transferred to ICU for management .    Pertinent labs: LA 5.2, WBC 29.10, CRP 30.94, H&H 8.8/ 29.9, sodium 130, BUN/ creatine 26/ 1.95, and AB.41/ 20/ 85.7/ 12.9.     , BP 98/77, RR 24, temperature 37.7, and oxygen saturations are 96% on room air.     Assessment and Plan:  1. Sepsis  -Blood and urine cultures pending  -Influenza A&B negative  -Doxycycline/ Zosyn  -Received Vancomycin  -NS at 75 ml/hr  -Trend lactic acid till clear     2. Acute on chronic kidney injury  -Strict I&O  -Avoid nephrotoxic medications  -Monitor bmp  -Renal dose medications        __y___   Video Assessment performed  __y___   Most recent labs reviewed  __y___   Vital Signs reviewed  __y___   Best Practices addressed:                 VTE prophylaxis: Lovenox sq                 SUP (when indicated): N/A                  Glycemic control: Blood sugar 122                      Please notify bedside physician when present or [facility] if glc > 180 X 2                 Sepsis guidelines: Yes                  Lung protective strategy: N/A                  Targeted Temperature Management: N/A     __y___     Spoke with bedside RN  __n___     Orders written      Contact [facility] for any needs if bedside physician is not present.    This note is drafted by TANVIR Sharp- BC      Interventions Major-Sepsis - evaluation and  management  Intermediate-Diagnostic test evaluation        Electronically Signed by: Martina Win (NP) on 02/01/2020 06:44    Annotated By: Isauro Calvo)    Date: 02/01/20 07:03  Reviewed  the patient's chart and the note above agree with above    Electronically signed by Isauro Calvo MD at 02/01/20 0703       Scheduled Meds Sorted by Name   for Gracy Norman as of 02/03/20 1154     1 Day 3 Days 7 Days 10 Days < Today >    Legend:                           Discontinued    Completed    Future    MAR Hold     Other Encounter    Linked           Medications 01/25 01/26 01/27 01/28 01/29 01/30 01/31 02/01 02/02 02/03   acetaminophen (TYLENOL) 160 MG/5ML solution 650 mg   Dose: 650 mg  Freq: Once Route: PO  Start: 01/31/20 2016 End: 01/31/20 2017    Admin Instructions:   Do not exceed 4 grams of acetaminophen in a 24 hr period.    If given for pain, use the following pain scale:   Mild Pain = Pain Score of 1-3, CPOT 1-2  Moderate Pain = Pain Score of 4-6, CPOT 3-4  Severe Pain = Pain Score of 7-10, CPOT 5-8          2017           acetaminophen (TYLENOL) tablet 975 mg   Dose: 975 mg  Freq: Once Route: PO  Start: 01/31/20 1951 End: 01/31/20 2014          (2012) [C]     2014-D/C'd         albumin human 25 % IV SOLN 12.5 g   Dose: 12.5 g  Freq: Every 15 Minutes Route: IV  Indications of Use: HEPATORENAL SYNDROME  Start: 02/02/20 1000 End: 02/02/20 1332    Admin Instructions:   Infuse total of 7 bags of 12.5gm -- will equal total dose of 87.5gm            1033     1037     1038     1039     1315     1316     1317         albumin human 25 % IV SOLN 12.5 g   Dose: 12.5 g  Freq: Every 15 Minutes Route: IV  Indications of Use: HEPATORENAL SYNDROME  Start: 02/01/20 1900 End: 02/01/20 2023           1835     1853     1909     1924     1941 1954 2008          cyclobenzaprine (FLEXERIL) tablet 5 mg   Dose: 5 mg  Freq: 2 Times Daily Route: PO  Start: 02/01/20 0900           0949     2129      5905 1537       0828     2100        doxycycline (VIBRAMYCIN) 100 mg/100 mL 0.9% NS MBP   Dose: 100 mg  Freq: Every 12 Hours Route: IV  Indications of Use: SEPSIS  Start: 02/01/20 0600 End: 02/01/20 1455    Admin Instructions:   Protect from light. Break seal and mix to activate vial before use.           0633     1455-D/C'd        enoxaparin (LOVENOX) syringe 40 mg   Dose: 40 mg  Freq: Every 24 Hours Route: SC  Indications of Use: PROPHYLAXIS OF VENOUS THROMBOEMBOLISM  Start: 02/01/20 0900 End: 02/01/20 1455    Admin Instructions:   Give subcutaneous in abdomen only. Do not massage site after injection.           1257 [C]     1455-D/C'd        FLUoxetine (PROzac) capsule 20 mg   Dose: 20 mg  Freq: Daily Route: PO  Start: 02/02/20 1100    Admin Instructions:   Caution: Look alike/sound alike drug alert            1032      0868        heparin (porcine) 5000 UNIT/ML injection 5,000 Units   Dose: 5,000 Units  Freq: Every 8 Hours Scheduled Route: SC  Indications of Use: PROPHYLAXIS OF VENOUS THROMBOEMBOLISM  Start: 02/01/20 1545           (1548) [C]     (2148) [C]      0638     1459     2103 [C]      0627     1400     2200        HYDROmorphone (DILAUDID) injection 0.5 mg   Dose: 0.5 mg  Freq: Once Route: IV  Start: 01/31/20 2118 End: 01/31/20 2128    Admin Instructions:   If given for pain, use the following pain scale:  Mild Pain = Pain Score of 1-3, CPOT 1-2  Moderate Pain = Pain Score of 4-6, CPOT 3-4  Severe Pain = Pain Score of 7-10, CPOT 5-8          2128           lactobacillus acidophilus (RISAQUAD) capsule 1 capsule   Dose: 1 capsule  Freq: Daily Route: PO  Start: 02/03/20 1000 End: 02/03/20 1023             1023-D/C'd  1111        magnesium sulfate 4g/100mL (PREMIX) infusion   Dose: 4 g  Freq: Once Route: IV  Start: 02/01/20 1000 End: 02/01/20 1601    Admin Instructions:   Per protocol for Mg of 1.6           1201          ondansetron (ZOFRAN) injection 4 mg   Dose: 4 mg  Freq: Once Route: IV  Start: 01/31/20 2118 End:  01/31/20 2128 2128           pantoprazole (PROTONIX) EC tablet 40 mg   Dose: 40 mg  Freq: Every Morning Route: PO  Start: 02/01/20 1200    Admin Instructions:   Swallow whole; do not crush, split, or chew.           1258      0639      0627        piperacillin-tazobactam (ZOSYN) 3.375 g/100 mL 0.9% NS IVPB (mbp)   Dose: 3.375 g  Freq: Every 8 Hours Route: IV  Indications of Use: SEPSIS  Start: 02/01/20 0630 End: 02/08/20 0629    Admin Instructions:   Break seal and mix to activate vial before use           0634     1417     2148      0638     1500      0004     0627     1430     2230        piperacillin-tazobactam (ZOSYN) 4.5 g/100 mL 0.9% NS IVPB (mbp)   Dose: 4.5 g  Freq: Once Route: IV  Indications of Use: SEPSIS  Last Dose: Stopped (01/31/20 2129)  Start: 01/31/20 1951 End: 01/31/20 2129    Admin Instructions:   Refrigerate. Break seal and mix to activate vial before use.          2020 2129           sodium chloride 0.9 % bolus 1,000 mL   Dose: 1,000 mL  Freq: Once Route: IV  Start: 02/02/20 1500            (1741) [C]         sodium chloride 0.9 % bolus 1,000 mL   Dose: 1,000 mL  Freq: Once Route: IV  Last Dose: 1,000 mL (02/01/20 1549)  Start: 02/01/20 1530 End: 02/01/20 1649           1549          sodium chloride 0.9 % bolus 1,000 mL   Dose: 1,000 mL  Freq: Once Route: IV  Last Dose: 1,000 mL (02/01/20 1038)  Start: 02/01/20 1030 End: 02/01/20 1138           1038          sodium chloride 0.9 % flush 10 mL   Dose: 10 mL  Freq: Every 12 Hours Scheduled Route: IV  Start: 02/01/20 0900    Admin Instructions:   If Port Accessed But Not In Use           0950     2009 0820 2102 0827     2100        sodium chloride 0.9 % flush 10 mL   Dose: 10 mL  Freq: Every 12 Hours Scheduled Route: IV  Start: 02/01/20 0900           (0950)     2009 0821 2102 0827     2100        sodium chloride 0.9 % flush 10 mL   Dose: 10 mL  Freq: Every 12 Hours Scheduled Route: IV  Start: 02/01/20 0900  "          (1308)     2009      4946     2107      0827 2100        sodium chloride 0.9% - IBW for BMI > 30 bolus 1,572 mL   Dose: 30 mL/kg  Weight Dosing Info: 52.4 kg (Ideal)  Freq: Once Route: IV  Last Dose: Stopped (01/31/20 2116)  Start: 01/31/20 1951 End: 01/31/20 2116    Admin Instructions:   You may need to adjust the volume of this order to account for fluids already given. The total of the bolus(es) given should be as close to 30 mL/kg without going under.          2016 2116           vancomycin (VANCOCIN) 1,000 mg in sodium chloride 0.9 % 250 mL IVPB   Dose: 1,000 mg  Freq: Once Route: IV  Indications of Use: PNEUMONIA  Start: 02/03/20 1000 End: 02/03/20 1133             1112     1133-D/C'd      vancomycin (VANCOCIN) 1,000 mg in sodium chloride 0.9 % 250 mL IVPB   Dose: 1,000 mg  Freq: Once Route: IV  Indications of Use: PNEUMONIA  Start: 02/01/20 1800 End: 02/01/20 1812           1712          vancomycin (VANCOCIN) 1,750 mg in sodium chloride 0.9 % 500 mL IVPB   Dose: 20 mg/kg  Weight Dosing Info: 93 kg  Freq: Once Route: IV  Indications of Use: SEPSIS  Last Dose: Stopped (02/01/20 0000)  Start: 01/31/20 1951 End: 02/01/20 0000          2154      0000          Vancomycin Pharmacy Intermittent Dosing   Freq: Daily Route: XX  Indications of Use: PNEUMONIA  Start: 02/01/20 1545 End: 02/03/20 1133    Admin Instructions:   Patient is on intermittent Vancomycin dosing. This is an informational \"Pharmacy Dosing\" note only. Please xu MAR as \"Not given\".           [phone] (9236) [C]     1133-D/C'd      Medications 01/25 01/26 01/27 01/28 01/29 01/30 01/31 02/01 02/02 02/03       Continuous Meds Sorted by Name   for Gracy Norman as of 02/03/20 7738    Legend:                           Discontinued    Completed    Future    MAR Hold     Other Encounter    Linked           Medications 01/25 01/26 01/27 01/28 01/29 01/30 01/31 02/01 02/02 02/03   Pharmacy to dose vancomycin   Freq: Continuous " PRN Route: XX  PRN Reason: Consult  Indications of Use: SEPSIS  Start: 02/01/20 0508 End: 02/01/20 1048           1048-D/C'd        Pharmacy to Dose Zosyn   Freq: Continuous PRN Route: XX  PRN Reason: Consult  Indications of Use: SEPSIS  Start: 02/01/20 0509 End: 02/01/20 1048           1048-D/C'd        sodium bicarbonate 8.4 % 125 mEq in dextrose (D5W) 5 % 1,000 mL infusion (greater than 75 mEq)   Rate: 75 mL/hr Dose: 125 mEq  Freq: Continuous Route: IV  Indications of Use: Systemic Acidosis  Last Dose: 125 mEq (02/03/20 0826)  Start: 02/03/20 0830 0826        sodium chloride 0.9 % infusion   Rate: 75 mL/hr Dose: 75 mL/hr  Freq: Continuous Route: IV  Last Dose: Stopped (02/02/20 1000)  Start: 01/31/20 1951 End: 02/03/20 0735 2017     2234      0201     0328     1835      0639     1000      0735-D/C'd          PRN Meds Sorted by Name   for Gracy Norman as of 02/03/20 1154    Legend:                           Discontinued    Completed    Future    MAR Hold     Other Encounter    Linked           Medications 01/25 01/26 01/27 01/28 01/29 01/30 01/31 02/01 02/02 02/03   diazePAM (VALIUM) tablet 2.5 mg   Dose: 2.5 mg  Freq: Every 8 Hours PRN Route: PO  PRN Reason: Anxiety  Start: 02/01/20 0458    Admin Instructions:    Caution: Look alike/sound alike drug alert.  Avoid use with Mershon's Wort.  Avoid grapefruit juice.                heparin injection 500 Units   Dose: 5 mL  Freq: As Needed Route: IV  PRN Reason: Line Care  PRN Comment: Prior to De-Accessing Port  Start: 02/01/20 0559                HYDROmorphone (DILAUDID) injection 0.5 mg   Dose: 0.5 mg  Freq: Every 4 Hours PRN Route: IV  PRN Reason: Severe Pain   Start: 02/01/20 0458    Admin Instructions:   If given for pain, use the following pain scale:  Mild Pain = Pain Score of 1-3, CPOT 1-2  Moderate Pain = Pain Score of 4-6, CPOT 3-4  Severe Pain = Pain Score of 7-10, CPOT 5-8           0501     1089 6312 9722 4491 6275      0639     1032     1459     1934      0004     0415     0822        Magnesium Sulfate 2 gram Bolus, followed by 8 gram infusion (total Mg dose 10 grams)- Mg less than or equal to 1mg/dL   Dose: 2 g  Freq: As Needed Route: IV  PRN Comment: See Administration Instructions  Start: 02/01/20 0827    Admin Instructions:   Mg less than or equal to 1mg/dL. Give 2 gm over 30 minutes as bolus, then infuse 2 gm over 2 hours for 4 doses (8 grams) for total dose of 10 grams.  Recheck Mg levels in the AM.           1148-Not Given:  See Alt          Or  Magnesium Sulfate 2 gram / 50mL Infusion (GIVE X 3 BAGS TO EQUAL 6GM TOTAL DOSE) - Mg 1.1 - 1.5 mg/dl   Dose: 2 g  Freq: As Needed Route: IV  PRN Comment: See Administration Instructions  Start: 02/01/20 0827    Admin Instructions:   Mg 1.1 -1.5 mg/dL. Infuse 2 grams over 2 hours for 3 doses (for a total Mg dose of 6 grams).  Recheck Mg level in the AM.           1148-Not Given:  See Alt          Or  Magnesium Sulfate 4 gram infusion- Mg 1.6-1.9 mg/dL   Dose: 4 g  Freq: As Needed Route: IV  PRN Comment: See Administration Instructions  Start: 02/01/20 0827    Admin Instructions:   Mg 1.6-1.9 mg/dL. Recheck Mg level in the AM.           (1148)          ondansetron (ZOFRAN) injection 4 mg   Dose: 4 mg  Freq: Every 6 Hours PRN Route: IV  PRN Reasons: Nausea,Vomiting  Start: 02/01/20 0937           0942      1506         oxyCODONE (ROXICODONE) immediate release tablet 5 mg   Dose: 5 mg  Freq: Every 6 Hours PRN Route: PO  PRN Reason: Moderate Pain   Start: 02/01/20 1100    Admin Instructions:       If given for pain, use the following pain scale:  Mild Pain = Pain Score of 1-3, CPOT 1-2  Moderate Pain = Pain Score of 4-6, CPOT 3-4  Severe Pain = Pain Score of 7-10, CPOT 5-8           1148      2043      0605        Pharmacy to dose vancomycin   Freq: Continuous PRN Route: XX  PRN Reason: Consult  Indications of Use: SEPSIS  Start: 02/01/20 0508 End: 02/01/20 1048           1048-D/C'd         Pharmacy to Dose Zosyn   Freq: Continuous PRN Route: XX  PRN Reason: Consult  Indications of Use: SEPSIS  Start: 02/01/20 0509 End: 02/01/20 1048           1048-D/C'd        sodium chloride 0.9 % flush 10 mL   Dose: 10 mL  Freq: As Needed Route: IV  PRN Reason: Line Care  PRN Comment: After Medication Administration & Prior to De-Accessing Port  Start: 02/01/20 0559                sodium chloride 0.9 % flush 10 mL   Dose: 10 mL  Freq: As Needed Route: IV  PRN Reason: Line Care  PRN Comment: After Medication Administration or Blood Draw  Start: 02/01/20 0559                sodium chloride 0.9 % flush 10 mL   Dose: 10 mL  Freq: As Needed Route: IV  PRN Reason: Line Care  Start: 02/01/20 0309                sodium chloride 0.9 % flush 10 mL   Dose: 10 mL  Freq: As Needed Route: IV  PRN Reason: Line Care  Start: 01/31/20 1947                sodium chloride 0.9 % flush 20 mL   Dose: 20 mL  Freq: As Needed Route: IV  PRN Reason: Line Care  PRN Comment: After Blood Draws or Blood Product Administration  Start: 02/01/20 0559                Medications 01/25 01/26 01/27 01/28 01/29 01/30 01/31 02/01 02/02 02/03

## 2020-02-03 NOTE — CONSULTS
Nephrology Consult Note    Referring Provider: Max Riggs  Reason for Consultation: KODI    Subjective       History of present illness:  Gracy Norman is a 44 y.o. female who presented to [facility] emergency department with chief complaint of abdominal pain and feeling weak and tired. She was admitted with sever sepsis, septic shock due to SBP.   Pt had KODI on admission with progressively worsening renal functions since admission. She was give IV albumin chalange without any improvement. Nephrology was consulted for management of KODI  Unable to obtain history from patient.       History  Past Medical History:   Diagnosis Date   • Adenocarcinoma of cecum, stage 4b (CMS/HCC) 01/2020   • Anxiety    • GERD (gastroesophageal reflux disease)    • Headache    • Obesity (BMI 30-39.9)    • Snoring    , Past Surgical History:   Procedure Laterality Date   • LIVER BIOPSY N/A 1/2/2020    Procedure: LIVER BIOPSY LAPAROSCOPIC;  Surgeon: Sophie Grimes MD;  Location: [facility];  Service: General   • MN ILEOSTOMY/JEJUNOSTOMY,NONTUBE     • TUBAL ABDOMINAL LIGATION     • VENOUS ACCESS DEVICE (PORT) INSERTION N/A 1/2/2020    Procedure: INSERTION VENOUS ACCESS DEVICE;  Surgeon: Sophie Grimes MD;  Location: [facility];  Service: General   , Family History   Problem Relation Age of Onset   • Hypertension Mother    • Diabetes Mother    • Cancer Father         stomach   • Breast cancer Neg Hx    , Social History     Tobacco Use   • Smoking status: Never Smoker   • Smokeless tobacco: Never Used   Substance Use Topics   • Alcohol use: No   • Drug use: No   , Medications Prior to Admission   Medication Sig Dispense Refill Last Dose   • amoxicillin-clavulanate (AUGMENTIN) 875-125 MG per tablet Take 1 tablet by mouth 2 (Two) Times a Day.   1/31/2020 at AM   • cyclobenzaprine (FLEXERIL) 5 MG tablet Take 5 mg by mouth 2 (Two) Times a Day.   1/31/2020 at AM   • enoxaparin (LOVENOX) 40 MG/0.4ML solution syringe Inject 40 mg under  the skin into the appropriate area as directed Daily.   1/31/2020 at Unknown time   • omeprazole (priLOSEC) 20 MG capsule Take 20 mg by mouth Daily.   1/31/2020 at Unknown time   • acetaminophen (TYLENOL) 325 MG tablet Take 650 mg by mouth Every 6 (Six) Hours As Needed for Mild Pain .   Unknown at Unknown time   • diazePAM (VALIUM) 2 MG tablet Take 2 mg by mouth Every 8 (Eight) Hours As Needed for Anxiety.   Unknown at Unknown time   • ondansetron ODT (ZOFRAN-ODT) 4 MG disintegrating tablet Take 4 mg by mouth Every 6 (Six) Hours As Needed for Nausea or Vomiting.   Unknown at Unknown time   • oxyCODONE (ROXICODONE) 5 MG immediate release tablet Take 5 mg by mouth Every 6 (Six) Hours As Needed for Moderate Pain .   Unknown at Unknown time   , Scheduled Meds:    cyclobenzaprine 5 mg Oral BID   FLUoxetine 20 mg Oral Daily   heparin (porcine) 5,000 Units Subcutaneous Q8H   lactobacillus acidophilus 1 capsule Oral Daily   pantoprazole 40 mg Oral QAM   piperacillin-tazobactam 3.375 g Intravenous Q8H   sodium chloride 1,000 mL Intravenous Once   sodium chloride 10 mL Intravenous Q12H   sodium chloride 10 mL Intravenous Q12H   sodium chloride 10 mL Intravenous Q12H   vancomycin 1,000 mg Intravenous Once   Vancomycin Pharmacy Intermittent Dosing  Does not apply Daily   , Continuous Infusions:    sodium bicarbonate drip (greater than 75 mEq/bag) 125 mEq Last Rate: 125 mEq (02/03/20 0826)   , PRN Meds:  diazePAM  •  heparin  •  HYDROmorphone  •  magnesium sulfate **OR** magnesium sulfate **OR** magnesium sulfate  •  ondansetron  •  oxyCODONE  •  [COMPLETED] Insert peripheral IV **AND** sodium chloride  •  sodium chloride  •  sodium chloride  •  sodium chloride  •  sodium chloride and Allergies:  Bactrim [sulfamethoxazole-trimethoprim]; Metronidazole; and Sulfa antibiotics    Review of Systems  Unable to obtain    Objective     Vital Signs  Temp:  [97.4 °F (36.3 °C)-99 °F (37.2 °C)] 99 °F (37.2 °C)  Heart Rate:  [116-134]  134  Resp:  [20-31] 23  BP: ()/() 116/78    No intake/output data recorded.  I/O last 3 completed shifts:  In: 1984 [P.O.:1084; Blood:300; IV Piggyback:600]  Out: 1450 [Urine:850; Stool:600]    Physical Examination:  General Appearance: alert, in mild respiratory distress  Lungs: B/L lower zone crackles, with decreased intensity of breath sounds  Heart: Regular rhythm & normal rate, normal S1, S2, no murmur, no gallop, no rub   Abdomen: Normal bowel sounds, no masses and soft non-tender  Extremities: Moves extremities well, no redness and trace edema  Neurologic: Orientated to person, place, time, grossly no focal deficitis    Laboratory Data :      WBC WBC   Date Value Ref Range Status   02/03/2020 28.49 (H) 3.40 - 10.80 10*3/mm3 Final   02/02/2020 26.99 (H) 3.40 - 10.80 10*3/mm3 Final   02/02/2020 25.18 (H) 3.40 - 10.80 10*3/mm3 Final   02/01/2020 27.93 (H) 3.40 - 10.80 10*3/mm3 Final   01/31/2020 29.10 (H) 3.40 - 10.80 10*3/mm3 Final      HGB Hemoglobin   Date Value Ref Range Status   02/03/2020 8.4 (L) 12.0 - 15.9 g/dL Final   02/02/2020 8.2 (L) 12.0 - 15.9 g/dL Final   02/02/2020 6.6 (C) 12.0 - 15.9 g/dL Final   02/01/2020 7.9 (L) 12.0 - 15.9 g/dL Final   01/31/2020 8.8 (L) 12.0 - 15.9 g/dL Final      HCT Hematocrit   Date Value Ref Range Status   02/03/2020 28.6 (L) 34.0 - 46.6 % Final   02/02/2020 27.0 (L) 34.0 - 46.6 % Final   02/02/2020 23.4 (L) 34.0 - 46.6 % Final   02/01/2020 26.7 (L) 34.0 - 46.6 % Final   01/31/2020 29.9 (L) 34.0 - 46.6 % Final      Platlets No results found for: LABPLAT   MCV MCV   Date Value Ref Range Status   02/03/2020 82.4 79.0 - 97.0 fL Final   02/02/2020 80.8 79.0 - 97.0 fL Final   02/02/2020 81.5 79.0 - 97.0 fL Final   02/01/2020 80.4 79.0 - 97.0 fL Final   01/31/2020 79.5 79.0 - 97.0 fL Final          Sodium Sodium   Date Value Ref Range Status   02/03/2020 130 (L) 136 - 145 mmol/L Final   02/02/2020 134 (L) 136 - 145 mmol/L Final   02/02/2020 132 (L) 136 - 145  mmol/L Final   02/01/2020 131 (L) 136 - 145 mmol/L Final   01/31/2020 130 (L) 136 - 145 mmol/L Final      Potassium Potassium   Date Value Ref Range Status   02/03/2020 4.2 3.5 - 5.2 mmol/L Final   02/02/2020 4.3 3.5 - 5.2 mmol/L Final   02/02/2020 4.6 3.5 - 5.2 mmol/L Final   02/01/2020 4.8 3.5 - 5.2 mmol/L Final   01/31/2020 5.0 3.5 - 5.2 mmol/L Final      Chloride Chloride   Date Value Ref Range Status   02/03/2020 100 98 - 107 mmol/L Final   02/02/2020 100 98 - 107 mmol/L Final   02/02/2020 100 98 - 107 mmol/L Final   02/01/2020 99 98 - 107 mmol/L Final   01/31/2020 94 (L) 98 - 107 mmol/L Final      CO2 CO2   Date Value Ref Range Status   02/03/2020 9.4 (L) 22.0 - 29.0 mmol/L Final   02/02/2020 11.1 (L) 22.0 - 29.0 mmol/L Final   02/02/2020 11.2 (L) 22.0 - 29.0 mmol/L Final   02/01/2020 12.3 (L) 22.0 - 29.0 mmol/L Final   01/31/2020 13.4 (L) 22.0 - 29.0 mmol/L Final      BUN BUN   Date Value Ref Range Status   02/03/2020 25 (H) 6 - 20 mg/dL Final   02/02/2020 24 (H) 6 - 20 mg/dL Final   02/02/2020 26 (H) 6 - 20 mg/dL Final   02/01/2020 26 (H) 6 - 20 mg/dL Final   01/31/2020 26 (H) 6 - 20 mg/dL Final      Creatinine Creatinine   Date Value Ref Range Status   02/03/2020 1.96 (H) 0.57 - 1.00 mg/dL Final   02/02/2020 1.91 (H) 0.57 - 1.00 mg/dL Final   02/02/2020 2.27 (H) 0.57 - 1.00 mg/dL Final   02/01/2020 2.10 (H) 0.57 - 1.00 mg/dL Final   01/31/2020 1.95 (H) 0.57 - 1.00 mg/dL Final      Calcium Calcium   Date Value Ref Range Status   02/03/2020 8.3 (L) 8.6 - 10.5 mg/dL Final   02/02/2020 8.0 (L) 8.6 - 10.5 mg/dL Final   02/02/2020 7.5 (L) 8.6 - 10.5 mg/dL Final   02/01/2020 7.8 (L) 8.6 - 10.5 mg/dL Final   01/31/2020 8.8 8.6 - 10.5 mg/dL Final      PO4 No results found for: CAPO4   Albumin Albumin   Date Value Ref Range Status   02/03/2020 3.77 3.50 - 5.20 g/dL Final   02/02/2020 3.06 (L) 3.50 - 5.20 g/dL Final   02/01/2020 2.13 (L) 3.50 - 5.20 g/dL Final   01/31/2020 2.83 (L) 3.50 - 5.20 g/dL Final       Magnesium Magnesium   Date Value Ref Range Status   02/02/2020 2.5 1.6 - 2.6 mg/dL Final   02/01/2020 1.5 (L) 1.6 - 2.6 mg/dL Final   01/31/2020 1.6 1.6 - 2.6 mg/dL Final      Uric Acid No results found for: URICACID     Radiology results :     Imaging Results (Last 72 Hours)     Procedure Component Value Units Date/Time    CT Abdomen Pelvis Without Contrast [[id number]] Collected:  02/02/20 2008     Updated:  02/02/20 2010    Narrative:       CT Abdomen Pelvis WO 2/2/2020    INDICATION:   Sepsis and abdominal pain beginning 1/31/2020. Colon carcinoma with liver metastases.    TECHNIQUE:   CT of the abdomen and pelvis without IV contrast. Coronal and sagittal reconstructions were obtained.  Radiation dose reduction techniques included automated exposure control or exposure modulation based on body size. Count of known CT and cardiac nuc  med studies performed in previous 12 months: 5.     COMPARISON:   1/31/2020    FINDINGS:  Abdomen: Exam is severely limited by lack of oral and intravenous contrast. Multiple ill-defined masses are seen throughout both hepatic lobes characteristic of the patient's known metastatic disease. Fatty infiltration of the liver is noted. The spleen,  pancreas, gallbladder and biliary tree and right adrenal gland are normal. Stable low-density lesion on the left adrenal gland compared with 1/31/2020. This may represent metastatic disease or possibly an adrenal adenoma. There is increased density  within the lower pole collecting system of the right kidney suggesting nonobstructing stones. The left kidney is normal. Exam is limited by patient motion with resulting artifact.    Pelvis: Right lower quadrant ileostomy with no evidence of bowel obstruction. Ill-defined soft tissue mass involving the cecum characteristic of the patient's known colon carcinoma with extension into the surrounding mesentery. Extensive mesenteric and  retroperitoneal lymphadenopathy is unchanged. There is a small to  moderate amount of ascites within the peritoneal cavity. No abscess is seen. Images of the lung bases demonstrate moderate right and small left pleural effusions with bibasilar  atelectasis.      Impression:         1. Exam severely limited by lack of oral and intravenous contrast. The exam is also limited by patient motion with resulting artifact.  2. Extensive hepatic metastatic disease as noted previously.  3. Ill-defined mass involving the cecum with involvement of the surrounding mesenteric. Mesenteric lymphadenopathy is unchanged compared with 1/31/2020.  4. Nonobstructing right renal stones.  5. Small to moderate amount of ascites.  6. Right lower quadrant ileostomy. No evidence of bowel obstruction.  6. Moderate right and small left pleural effusions with bibasilar atelectasis.  7. Stable low-density mass on the left adrenal gland. It could represent metastatic disease, or possibly an adrenal adenoma.          Signer Name: Bert Emerson MD   Signed: 2/2/2020 8:08 PM   Workstation Name: [employer]    Radiology [facility]    XR Chest 1 View [[id number]] Collected:  02/02/20 1926     Updated:  02/02/20 1928    Narrative:       CR Chest 1 Vw 2/2/2020    INDICATION:   Acute onset of shortness of breath and sepsis today.     COMPARISON:    None available.    FINDINGS:  Single portable AP view(s) of the chest.    Left subclavian central line tip is in the superior vena cava.    The heart is normal in size. Haziness is seen at the right lung base suggesting pleural effusion layering posteriorly with bibasilar atelectasis or infiltrates. Poor inspiratory result. No pneumothorax.       Impression:       Poor inspiratory result and right pleural effusion with bibasilar atelectasis or infiltrates.    Signer Name: Bert Emerson MD   Signed: 2/2/2020 7:26 PM   Workstation Name: [employer]    Radiology [facility]    CT Chest Without Contrast [[id number]] Collected:  01/31/20 2149     Updated:   01/31/20 2151    Narrative:       CT Chest WO    INDICATION:   Sepsis with recent abdominal surgery for advanced colon cancer.    TECHNIQUE:   CT of the thorax without IV contrast. Coronal and sagittal reconstructions were obtained.  Radiation dose reduction techniques included automated exposure control or exposure modulation based on body size. Count of known CT and cardiac nuc med studies  performed in previous 12 months: 2.     COMPARISON:   None available.    FINDINGS:  Right pleural effusion layering to a depth of 2.5 cm and a small left pleural effusion layering to a depth of less than a centimeter. Right-sided volume loss with atelectasis right lung base. Minimal left basilar atelectasis. No suspicious nodules or  masses. No focal consolidation. Small amount of vertical atelectasis anterior right upper lobe.    Heart size normal. Extensive anterior mediastinal lymphadenopathy which in the setting of known malignancy is concerning for metastases. Venous access port noted over the left upper chest with distal tip in the SVC. Widely disseminated liver metastases.  Please see CT abdomen and pelvis for remainder of findings within the abdomen and pelvis. Osseous structures and thoracic inlet unremarkable.      Impression:       Small bilateral pleural effusions right greater than left with bibasilar volume loss right greater than left. Atelectasis favored over focal consolidation.    Multiple enlarged anterior mediastinal lymph nodes largest measuring 1.2 x 1.5 cm and in the setting of known malignancy is highly concerning for metastatic disease. No definite pulmonary metastasis.    Signer Name: BUSTER Olivares MD   Signed: 1/31/2020 9:49 PM   Workstation Name: [id number]    [facility]    CT Abdomen Pelvis Without Contrast [[id number]] Collected:  01/31/20 2131     Updated:  01/31/20 2133    Narrative:       CT Abdomen Pelvis WO    INDICATION:   Abdominal pain with fever and sepsis.  Recent surgery. Reported colon cancer and pain around surgical area.    TECHNIQUE:   CT of the abdomen and pelvis without IV contrast. Coronal and sagittal reconstructions were obtained.  Radiation dose reduction techniques included automated exposure control or exposure modulation based on body size. Count of known CT and cardiac nuc  med studies performed in previous 12 months: 2.     COMPARISON:   CT abdomen and pelvis 1/12/2020    FINDINGS:  Abdomen: Small bilateral pleural effusions right greater than left with bibasilar atelectasis. Multiple hypodense and hyperdense liver lesions compatible with widely disseminated liver metastases. No ductal dilatation. Spleen and gallbladder are  unremarkable. Pancreas, kidneys and adrenal glands are unremarkable except for a small nonobstructing right renal stone.    Patient has undergone apparent diverting ileostomy. Soft tissue mass within the right lower quadrant measuring 8.69 6.8 x 8.9 cm most compatible with colon carcinoma involving the cecum and right colon with extension into the mesentery. Extensive  mesenteric and retroperitoneal lymphadenopathy. Increasing ascites when compared to 1/12/2020. Stomach and small bowel unremarkable.    Pelvis: Bladder decompressed. Uterus and adnexa are unremarkable. Moderate amount of induration and edema within the subcutaneous tissues along the lateral abdomen which may be related to third spacing of fluid. No drainable fluid collection or abscess.      Impression:       Postoperative changes from apparent diverting ileostomy with a normal-appearing right lower anterior abdominal wall ostomy.    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 Extensive retroperitoneal and mesenteric lymphadenopathy concerning for metastatic adenopathy.    Moderate amount of ascites which has increased from January 12.    Extensive liver metastases.    Small bilateral  pleural effusions with bibasilar atelectasis right greater than left.    Moderate amount of induration and edema within the subcutaneous tissues along the flank regions bilaterally most likely related to third spacing of fluid and recent operative intervention. No definitive abscess.          Signer Name: BUSTER Olivares MD   Signed: 1/31/2020 9:31 PM   Workstation Name: [id number]    [facility]    XR Chest 1 View [[id number]] Collected:  01/31/20 2046     Updated:  01/31/20 2048    Narrative:       XR CHEST 1 VW-     CLINICAL INDICATION: sepsis        COMPARISON: 01/12/2020      TECHNIQUE: Single frontal view of the chest.     FINDINGS:     Right basilar airspace disease  The cardiac silhouette is normal. The pulmonary vasculature is  unremarkable.  There is no evidence of an acute osseous abnormality.   There are no suspicious-appearing parenchymal soft tissue nodules.          Impression:       Appearance compatible with right basilar pneumonia     This report was finalized on 1/31/2020 8:46 PM by Dr. Colton Salgado MD.               Medications:        cyclobenzaprine 5 mg Oral BID   FLUoxetine 20 mg Oral Daily   heparin (porcine) 5,000 Units Subcutaneous Q8H   lactobacillus acidophilus 1 capsule Oral Daily   pantoprazole 40 mg Oral QAM   piperacillin-tazobactam 3.375 g Intravenous Q8H   sodium chloride 1,000 mL Intravenous Once   sodium chloride 10 mL Intravenous Q12H   sodium chloride 10 mL Intravenous Q12H   sodium chloride 10 mL Intravenous Q12H   vancomycin 1,000 mg Intravenous Once   Vancomycin Pharmacy Intermittent Dosing  Does not apply Daily       sodium bicarbonate drip (greater than 75 mEq/bag) 125 mEq Last Rate: 125 mEq (02/03/20 0826)       Assessment/Plan       Sepsis (CMS/HCC)    1. Sever sepsis with septic shock likely from peritonitis  2. Metastatic poorly differentiated adenocarcinoma of colon origin s/p diverting loop ileostomy on 1/17/2020  3. KODI  4. AG metabolic  acidosis due to lactic acidosis, Non Gap MA likely 2/2 Type IV RTA with KODI    KODI likely ATN, oliguric due to septic shock  Baseline Cr 1, peaked to 2.2, now 1.96  -Repeat Ana Paula  -Repeat UA  -continue bicarb drip  -will give lasix to aid in improving non gap part of metabolic acidosis and volume overload.  -As patient has sever lactic acidosis and oliguric KODI, can worse to acute respiratory distress with bicarb infusion.  -its highly likely pt will need dialysis in next 24-48 hours    Thanks Dr Riggs for the consult. Nephrology will follow the patient.   I discussed the patient's findings and my recommendations with patient and primary care team    Alycia Metzger MD  02/03/20  8:34 AM

## 2020-02-03 NOTE — PROGRESS NOTES
Consult received to restart the vancomycin per Dr. Garcia.  Patient scheduled for prn dosing, level in am.  Will evaluate when available.

## 2020-02-03 NOTE — PLAN OF CARE
Problem: Patient Care Overview  Goal: Plan of Care Review  Outcome: Ongoing (interventions implemented as appropriate)  Flowsheets  Taken 2/3/2020 1317  Progress: no change  Outcome Summary: patient remains on 2L/NC; HR elevated; Nephrology on case;possible dialysis tomorrow  Taken 2/3/2020 0800  Plan of Care Reviewed With: lauren

## 2020-02-03 NOTE — NURSING NOTE
Assessment:  Diagnosis: Poor IV access    Allergies   Allergen Reactions   • Bactrim [Sulfamethoxazole-Trimethoprim] Swelling     Throat swells    • Metronidazole Swelling     Throat swelling and facial rash    • Sulfa Antibiotics Anaphylaxis       Order Date/Time: 2/3/2020 0830  Indications: Poor Access   LABS:  Lab Results   Component Value Date    INR 1.34 (H) 02/01/2020    PROTIME 17.2 (H) 02/01/2020     No results found for: PTT  Lab Results   Component Value Date    WBC 28.49 (H) 02/03/2020    HGB 8.4 (L) 02/03/2020    HCT 28.6 (L) 02/03/2020    MCV 82.4 02/03/2020     02/03/2020     Lab Results   Component Value Date    BUN 25 (H) 02/03/2020     Lab Results   Component Value Date    CREATININE 1.96 (H) 02/03/2020     Lab Results   Component Value Date    EGFRIFNONA 28 (L) 02/03/2020     Labs Reviewed: WNL    Contraindications for PICC/Midline:  No Contraindication      Recommendations:  Midline    Procedure Time Out:  Time out Time: 02/03/2020  Correct Patient Identity: Yes  Correct Surgical Side and Site Are Marked: Yes  Agreement on Procedure to be done: Yes  Antibiotic Given: N/A  RN:  RN:  Other:      Mary Carmen Fischer RN

## 2020-02-03 NOTE — PLAN OF CARE
Problem: Patient Care Overview  Goal: Plan of Care Review  Outcome: Ongoing (interventions implemented as appropriate)  Flowsheets  Taken 2/3/2020 0304  Progress: declining  Outcome Summary: Patient complaining of pain and being uncomfortable tonight. CT abdomen/pelvis done, labs reviewed. Patient VSS on 2 L NC.  Taken 2/3/2020 0200  Plan of Care Reviewed With: spouse

## 2020-02-03 NOTE — PROGRESS NOTES
Called bedside around 6 pm that patient was more dyspneic and tachypnic on 2L NC. Saturating 98%. Patient did appear to be more uncomfortable than prior exams. ABG 7.268/25/79 consistent with metabolic acidosis. Chest x-ray did not show overt volume overload from all the volume she has received. Suspect dyspnea from tying to compensate from metabolic acidosis. Lactate at 3 pm was slightly better at 3.7. Repeat this evening 3.3. Ordered CT abdomen/pelvis. Ordered repeat BMP/CBC. Ordered for nephrology consult. 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 I suspect kidney function may get worse with the ATN before getting better. Dr. Coy to call overnight pending clinical course.

## 2020-02-03 NOTE — NURSING NOTE
Progressive Mobility    Date: 02/03/20     Mobility Initiation Contradictions: Heart Rate <60, >120 beats.min    Day of Mobility Patient Alert and Oriented; currently refusing any type of PROM due to pain      Patient: did not tolerate; due to pain     Assisted by 1 person/persons    Device(s) used: N/A    Selam Pierce RN   02/03/20

## 2020-02-03 NOTE — CONSULTS
INFECTIOUS DISEASE CONSULTATION REPORT        Patient Identification:  Name:  Gracy Norman  Age:  44 y.o.  Sex:  female  :  1975  MRN:  [id number]   Visit Number:  [id number]  Primary Care Physician:  Almas Stone MD       LOS: 3 days        Subjective       Subjective     History of present illness:      Thank you Dr. Garcia for allowing us to participate in the care of your patient.  As you well know, Ms. Gracy Norman is a 44 y.o. female with past medical history significant for adenocarcinoma of the cecum stage IVb, anxiety, GERD, obesity, who presented to [facility] Department on 2020 for abdominal pain.  Lactic acid 5.2 on admission, now normalized.  WBC 28.49.  Mycoplasma negative.  Legionella negative.  Respiratory panel PCR negative.  Strep pneumo negative.  Influenza negative.  Urinalysis unremarkable.  Chest x-ray reports mild improvement in basilar airspace disease.  CT of the abdomen reports extensive hepatic metastatic disease, ill-defined mass involving the cecum the surrounding mesenteric, nonobstructing right renal stones, ascites, right lower quadrant ileostomy, moderate right and small left pleural effusions with bibasilar atelectasis, low density mass on the left adrenal gland.  Blood cultures show no growth thus far.      Infectious Disease consultation was requested for antimicrobial management.      ---------------------------------------------------------------------------------------------------------------------     Review Of Systems:    Constitutional: no fever, chills and night sweats. No appetite change or unexpected weight change. No fatigue.  Eyes: no eye drainage, itching or redness.  HEENT: no mouth sores, dysphagia or nose bleed.  Respiratory: no for shortness of breath, cough or production of sputum.  Cardiovascular: no chest pain, no palpitations, no orthopnea.  Gastrointestinal: no nausea, vomiting or diarrhea. Positive abdominal  tenderness, no hematemesis or rectal bleeding.  Genitourinary: no dysuria or polyuria.  Hematologic/lymphatic: no lymph node abnormalities, no easy bruising or easy bleeding.  Musculoskeletal: no muscle or joint pain.  Skin: No rash and no itching.  Neurological: no loss of consciousness, no seizure, no headache.  Behavioral/Psych: no depression or suicidal ideation.  Endocrine: no hot flashes.  Immunologic: negative.    ---------------------------------------------------------------------------------------------------------------------     Past Medical History    Past Medical History:   Diagnosis Date   • Adenocarcinoma of cecum, stage 4b (CMS/HCC) 01/2020   • Anxiety    • GERD (gastroesophageal reflux disease)    • Headache    • Obesity (BMI 30-39.9)    • Snoring        Past Surgical History    Past Surgical History:   Procedure Laterality Date   • LIVER BIOPSY N/A 1/2/2020    Procedure: LIVER BIOPSY LAPAROSCOPIC;  Surgeon: Sophie Grimes MD;  Location: [facility];  Service: General   • AZ ILEOSTOMY/JEJUNOSTOMY,NONTUBE     • TUBAL ABDOMINAL LIGATION     • VENOUS ACCESS DEVICE (PORT) INSERTION N/A 1/2/2020    Procedure: INSERTION VENOUS ACCESS DEVICE;  Surgeon: Sophie Grimes MD;  Location: [facility];  Service: General       Family History    Family History   Problem Relation Age of Onset   • Hypertension Mother    • Diabetes Mother    • Cancer Father         stomach   • Breast cancer Neg Hx        Social History    Social History     Tobacco Use   • Smoking status: Never Smoker   • Smokeless tobacco: Never Used   Substance Use Topics   • Alcohol use: No   • Drug use: No       Allergies    Bactrim [sulfamethoxazole-trimethoprim]; Metronidazole; and Sulfa antibiotics  ---------------------------------------------------------------------------------------------------------------------     Home Medications:    Prior to Admission Medications     Prescriptions Last Dose Informant Patient Reported? Taking?     amoxicillin-clavulanate (AUGMENTIN) 875-125 MG per tablet 1/31/2020 Medication Bottle Yes Yes    Take 1 tablet by mouth 2 (Two) Times a Day.    cyclobenzaprine (FLEXERIL) 5 MG tablet 1/31/2020 Medication Bottle Yes Yes    Take 5 mg by mouth 2 (Two) Times a Day.    enoxaparin (LOVENOX) 40 MG/0.4ML solution syringe 1/31/2020 Medication Bottle Yes Yes    Inject 40 mg under the skin into the appropriate area as directed Daily.    omeprazole (priLOSEC) 20 MG capsule 1/31/2020 Medication Bottle Yes Yes    Take 20 mg by mouth Daily.    acetaminophen (TYLENOL) 325 MG tablet Unknown Medication Bottle Yes No    Take 650 mg by mouth Every 6 (Six) Hours As Needed for Mild Pain .    diazePAM (VALIUM) 2 MG tablet Unknown Medication Bottle Yes No    Take 2 mg by mouth Every 8 (Eight) Hours As Needed for Anxiety.    ondansetron ODT (ZOFRAN-ODT) 4 MG disintegrating tablet Unknown Medication Bottle Yes No    Take 4 mg by mouth Every 6 (Six) Hours As Needed for Nausea or Vomiting.    oxyCODONE (ROXICODONE) 5 MG immediate release tablet Unknown Medication Bottle Yes No    Take 5 mg by mouth Every 6 (Six) Hours As Needed for Moderate Pain .        ---------------------------------------------------------------------------------------------------------------------    Objective       Objective     Hospital Scheduled Meds:    cyclobenzaprine 5 mg Oral BID   FLUoxetine 20 mg Oral Daily   heparin (porcine) 5,000 Units Subcutaneous Q8H   pantoprazole 40 mg Oral QAM   piperacillin-tazobactam 3.375 g Intravenous Q8H   sodium chloride 1,000 mL Intravenous Once   sodium chloride 10 mL Intravenous Q12H   sodium chloride 10 mL Intravenous Q12H   sodium chloride 10 mL Intravenous Q12H   Vancomycin Pharmacy Intermittent Dosing  Does not apply Daily       Pharmacy to dose vancomycin     sodium bicarbonate drip (greater than 75 mEq/bag) 125 mEq Last Rate: 125 mEq (02/03/20 1353)          ---------------------------------------------------------------------------------------------------------------------   Vital Signs:  Temp:  [98.1 °F (36.7 °C)-99.5 °F (37.5 °C)] 99.5 °F (37.5 °C)  Heart Rate:  [116-144] 140  Resp:  [20-24] 23  BP: (105-139)/() 126/92  Mean Arterial Pressure (Non-Invasive) for the past 24 hrs (Last 3 readings):   Noninvasive MAP (mmHg)   02/03/20 1145 106   02/03/20 1130 101   02/03/20 1115 105     SpO2 Percentage    02/03/20 1115 02/03/20 1130 02/03/20 1145   SpO2: 97% 97% 97%     SpO2:  [93 %-99 %] 97 %  on  Flow (L/min):  [2] 2;   Device (Oxygen Therapy): nasal cannula    Body mass index is 38.44 kg/m².  Wt Readings from Last 3 Encounters:   02/03/20 98.4 kg (217 lb)   01/12/20 92.1 kg (203 lb)   01/09/20 91.6 kg (202 lb)     ---------------------------------------------------------------------------------------------------------------------     Physical Exam:    Constitutional:  Well-developed and well-nourished.  No respiratory distress.      HENT:  Head: Normocephalic and atraumatic.  Mouth:  Moist mucous membranes.    Eyes:  Conjunctivae and EOM are normal.  No scleral icterus.  Neck:  Neck supple.  No JVD present.    Cardiovascular:  Normal rate, regular rhythm and normal heart sounds with no murmur. No edema.  Pulmonary/Chest:  No respiratory distress, no wheezes, no crackles, with normal breath sounds and good air movement.  Abdominal:  Soft.  Bowel sounds are normal.  Positive distention and tenderness. Ileostomy.   Musculoskeletal:  No edema, no tenderness, and no deformity.  No swelling or redness of joints.  Neurological:  Alert and oriented to person, place, and time.  No facial droop.  No slurred speech.   Skin:  Skin is warm and dry.  No rash noted.  No pallor.   Psychiatric:  Normal mood and affect.  Behavior is normal.    ---------------------------------------------------------------------------------------------------------------------    Results from  last 7 days   Lab Units 02/01/20 0840 01/31/20 1955   TROPONIN T ng/mL <0.010 <0.010     Results from last 7 days   Lab Units 02/03/20 0112 01/31/20 1955   PROBNP pg/mL 1,467.0* 214.1       Results from last 7 days   Lab Units 02/03/20  1205   PH, ARTERIAL pH units 7.218*   PO2 ART mm Hg 85.4   PCO2, ARTERIAL mm Hg 24.0*   HCO3 ART mmol/L 9.8*     Results from last 7 days   Lab Units 02/03/20 0112 02/02/20 1903 02/02/20  1512 02/02/20 0120 02/01/20 0840 01/31/20 1955   CRP mg/dL  --   --   --   --   --  29.28*  --  30.94*   LACTATE mmol/L 3.1* 3.3* 3.7*  --    < > 3.8*   < > 5.2*   WBC 10*3/mm3 28.49* 26.99*  --  25.18*  --  27.93*  --  29.10*   HEMOGLOBIN g/dL 8.4* 8.2*  --  6.6*  --  7.9*  --  8.8*   HEMATOCRIT % 28.6* 27.0*  --  23.4*  --  26.7*  --  29.9*   MCV fL 82.4 80.8  --  81.5  --  80.4  --  79.5   MCHC g/dL 29.4* 30.4*  --  28.2*  --  29.6*  --  29.4*   PLATELETS 10*3/mm3 206 217  --  273  --  362  --  483*   INR   --   --   --   --   --  1.34*  --   --     < > = values in this interval not displayed.     Results from last 7 days   Lab Units 02/03/20 0112 02/02/20 1903 02/02/20 0120 02/01/20 0840 01/31/20 1955   SODIUM mmol/L 130* 134* 132* 131* 130*   POTASSIUM mmol/L 4.2 4.3 4.6 4.8 5.0   MAGNESIUM mg/dL  --   --  2.5 1.5* 1.6   CHLORIDE mmol/L 100 100 100 99 94*   CO2 mmol/L 9.4* 11.1* 11.2* 12.3* 13.4*   BUN mg/dL 25* 24* 26* 26* 26*   CREATININE mg/dL 1.96* 1.91* 2.27* 2.10* 1.95*   EGFR IF NONAFRICN AM mL/min/1.73 28* 29* 24* 26* 28*   CALCIUM mg/dL 8.3* 8.0* 7.5* 7.8* 8.8   GLUCOSE mg/dL 98 94 91 96 122*   ALBUMIN g/dL 3.77  --  3.06* 2.13* 2.83*   BILIRUBIN mg/dL 1.2  --  0.6 0.5 0.5   ALK PHOS U/L 509*  --  556* 753* 958*   AST (SGOT) U/L 63*  --  77* 103* 121*   ALT (SGPT) U/L 17  --  20 27 32   Estimated Creatinine Clearance: 41.8 mL/min (A) (by C-G formula based on SCr of 1.96 mg/dL (H)).  No results found for: AMMONIA    No results found for: HGBA1C, POCGLU  No results found  for: HGBA1C  Lab Results   Component Value Date    TSH 9.270 (H) 01/31/2020    FREET4 0.97 02/01/2020       Blood Culture   Date Value Ref Range Status   01/31/2020 No growth at 2 days  Preliminary   01/31/2020 No growth at 2 days  Preliminary     No results found for: URINECX  No results found for: WOUNDCX  No results found for: STOOLCX  No results found for: RESPCX  Pain Management Panel     Pain Management Panel Latest Ref Rng & Units 2/1/2020    CREATININE UR mg/dL 146.0        I have personally reviewed the above laboratory results.   ---------------------------------------------------------------------------------------------------------------------  Imaging Results (Last 7 Days)     Procedure Component Value Units Date/Time    XR Chest 1 View [[id number]] Collected:  02/03/20 1259     Updated:  02/03/20 1302    Narrative:       EXAMINATION: XR CHEST 1 VW-      CLINICAL INDICATION:     shortness of breath; A41.9-Sepsis, unspecified  organism     TECHNIQUE:  XR CHEST 1 VW-      COMPARISON: 02/02/2020      FINDINGS:   Left Port-A-Cath stable. Elevation right hemidiaphragm stable. Airspace  disease improved. Small right pleural effusion stable. No pneumothorax.  No acute bony changes.       Impression:       Mild improvement in basilar airspace disease. Otherwise  stable chest.     This report was finalized on 2/3/2020 12:59 PM by Dr. Bert Butler MD.       CT Abdomen Pelvis Without Contrast [[id number]] Collected:  02/02/20 2008     Updated:  02/02/20 2010    Narrative:       CT Abdomen Pelvis WO 2/2/2020    INDICATION:   Sepsis and abdominal pain beginning 1/31/2020. Colon carcinoma with liver metastases.    TECHNIQUE:   CT of the abdomen and pelvis without IV contrast. Coronal and sagittal reconstructions were obtained.  Radiation dose reduction techniques included automated exposure control or exposure modulation based on body size. Count of known CT and cardiac nuc  med studies performed in previous 12  months: 5.     COMPARISON:   1/31/2020    FINDINGS:  Abdomen: Exam is severely limited by lack of oral and intravenous contrast. Multiple ill-defined masses are seen throughout both hepatic lobes characteristic of the patient's known metastatic disease. Fatty infiltration of the liver is noted. The spleen,  pancreas, gallbladder and biliary tree and right adrenal gland are normal. Stable low-density lesion on the left adrenal gland compared with 1/31/2020. This may represent metastatic disease or possibly an adrenal adenoma. There is increased density  within the lower pole collecting system of the right kidney suggesting nonobstructing stones. The left kidney is normal. Exam is limited by patient motion with resulting artifact.    Pelvis: Right lower quadrant ileostomy with no evidence of bowel obstruction. Ill-defined soft tissue mass involving the cecum characteristic of the patient's known colon carcinoma with extension into the surrounding mesentery. Extensive mesenteric and  retroperitoneal lymphadenopathy is unchanged. There is a small to moderate amount of ascites within the peritoneal cavity. No abscess is seen. Images of the lung bases demonstrate moderate right and small left pleural effusions with bibasilar  atelectasis.      Impression:         1. Exam severely limited by lack of oral and intravenous contrast. The exam is also limited by patient motion with resulting artifact.  2. Extensive hepatic metastatic disease as noted previously.  3. Ill-defined mass involving the cecum with involvement of the surrounding mesenteric. Mesenteric lymphadenopathy is unchanged compared with 1/31/2020.  4. Nonobstructing right renal stones.  5. Small to moderate amount of ascites.  6. Right lower quadrant ileostomy. No evidence of bowel obstruction.  6. Moderate right and small left pleural effusions with bibasilar atelectasis.  7. Stable low-density mass on the left adrenal gland. It could represent metastatic  disease, or possibly an adrenal adenoma.          Signer Name: Bert Emerson MD   Signed: 2/2/2020 8:08 PM   Workstation Name: [employer]    [facility]    XR Chest 1 View [[id number]] Collected:  02/02/20 1926     Updated:  02/02/20 1928    Narrative:       CR Chest 1 Vw 2/2/2020    INDICATION:   Acute onset of shortness of breath and sepsis today.     COMPARISON:    None available.    FINDINGS:  Single portable AP view(s) of the chest.    Left subclavian central line tip is in the superior vena cava.    The heart is normal in size. Haziness is seen at the right lung base suggesting pleural effusion layering posteriorly with bibasilar atelectasis or infiltrates. Poor inspiratory result. No pneumothorax.       Impression:       Poor inspiratory result and right pleural effusion with bibasilar atelectasis or infiltrates.    Signer Name: Bert Emerson MD   Signed: 2/2/2020 7:26 PM   Workstation Name: [facility]    [facility]    CT Chest Without Contrast [[id number]] Collected:  01/31/20 2149     Updated:  01/31/20 2151    Narrative:       CT Chest WO    INDICATION:   Sepsis with recent abdominal surgery for advanced colon cancer.    TECHNIQUE:   CT of the thorax without IV contrast. Coronal and sagittal reconstructions were obtained.  Radiation dose reduction techniques included automated exposure control or exposure modulation based on body size. Count of known CT and cardiac nuc med studies  performed in previous 12 months: 2.     COMPARISON:   None available.    FINDINGS:  Right pleural effusion layering to a depth of 2.5 cm and a small left pleural effusion layering to a depth of less than a centimeter. Right-sided volume loss with atelectasis right lung base. Minimal left basilar atelectasis. No suspicious nodules or  masses. No focal consolidation. Small amount of vertical atelectasis anterior right upper lobe.    Heart size normal. Extensive anterior mediastinal  lymphadenopathy which in the setting of known malignancy is concerning for metastases. Venous access port noted over the left upper chest with distal tip in the SVC. Widely disseminated liver metastases.  Please see CT abdomen and pelvis for remainder of findings within the abdomen and pelvis. Osseous structures and thoracic inlet unremarkable.      Impression:       Small bilateral pleural effusions right greater than left with bibasilar volume loss right greater than left. Atelectasis favored over focal consolidation.    Multiple enlarged anterior mediastinal lymph nodes largest measuring 1.2 x 1.5 cm and in the setting of known malignancy is highly concerning for metastatic disease. No definite pulmonary metastasis.    Signer Name: BUSTER Olivares MD   Signed: 1/31/2020 9:49 PM   Workstation Name: [facility]    [facility] [place]    CT Abdomen Pelvis Without Contrast [[id number]] Collected:  01/31/20 2131     Updated:  01/31/20 2133    Narrative:       CT Abdomen Pelvis WO    INDICATION:   Abdominal pain with fever and sepsis. Recent surgery. Reported colon cancer and pain around surgical area.    TECHNIQUE:   CT of the abdomen and pelvis without IV contrast. Coronal and sagittal reconstructions were obtained.  Radiation dose reduction techniques included automated exposure control or exposure modulation based on body size. Count of known CT and cardiac nuc  med studies performed in previous 12 months: 2.     COMPARISON:   CT abdomen and pelvis 1/12/2020    FINDINGS:  Abdomen: Small bilateral pleural effusions right greater than left with bibasilar atelectasis. Multiple hypodense and hyperdense liver lesions compatible with widely disseminated liver metastases. No ductal dilatation. Spleen and gallbladder are  unremarkable. Pancreas, kidneys and adrenal glands are unremarkable except for a small nonobstructing right renal stone.    Patient has undergone apparent diverting ileostomy. Soft  tissue mass within the right lower quadrant measuring 8.69 6.8 x 8.9 cm most compatible with colon carcinoma involving the cecum and right colon with extension into the mesentery. Extensive  mesenteric and retroperitoneal lymphadenopathy. Increasing ascites when compared to 1/12/2020. Stomach and small bowel unremarkable.    Pelvis: Bladder decompressed. Uterus and adnexa are unremarkable. Moderate amount of induration and edema within the subcutaneous tissues along the lateral abdomen which may be related to third spacing of fluid. No drainable fluid collection or abscess.      Impression:       Postoperative changes from apparent diverting ileostomy with a normal-appearing right lower anterior abdominal wall ostomy.    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 Extensive retroperitoneal and mesenteric lymphadenopathy concerning for metastatic adenopathy.    Moderate amount of ascites which has increased from January 12.    Extensive liver metastases.    Small bilateral pleural effusions with bibasilar atelectasis right greater than left.    Moderate amount of induration and edema within the subcutaneous tissues along the flank regions bilaterally most likely related to third spacing of fluid and recent operative intervention. No definitive abscess.          Signer Name: BUSTER Olivares MD   Signed: 1/31/2020 9:31 PM   Workstation Name: [email or web address]    [facility] [place]    XR Chest 1 View [[id number]] Collected:  01/31/20 2046     Updated:  01/31/20 2048    Narrative:       XR CHEST 1 VW-     CLINICAL INDICATION: sepsis        COMPARISON: 01/12/2020      TECHNIQUE: Single frontal view of the chest.     FINDINGS:     Right basilar airspace disease  The cardiac silhouette is normal. The pulmonary vasculature is  unremarkable.  There is no evidence of an acute osseous abnormality.   There are no suspicious-appearing  parenchymal soft tissue nodules.          Impression:       Appearance compatible with right basilar pneumonia     This report was finalized on 1/31/2020 8:46 PM by Dr. Colton Salgado MD.           I have personally reviewed the above radiology results.   ---------------------------------------------------------------------------------------------------------------------      Assessment & Plan        Assessment/Plan       ASSESSMENT:    1.  Septic shock with lactic acid greater than 4 on admission  2.  Peritonitis    PLAN:    Patient presents with abdominal pain.  Lactic acid 5.2 on admission, now normalized.  WBC 28.49.  Mycoplasma negative.  Legionella negative.  Respiratory panel PCR negative.  Strep pneumo negative.  Influenza negative.  Urinalysis unremarkable.  Chest x-ray reports mild improvement in basilar airspace disease.  CT of the abdomen reports extensive hepatic metastatic disease, ill-defined mass involving the cecum the surrounding mesenteric, nonobstructing right renal stones, ascites, right lower quadrant ileostomy, moderate right and small left pleural effusions with bibasilar atelectasis, low density mass on the left adrenal gland.  Blood cultures show no growth thus far.  Body fluid culture reports 4390 nucleated cells, Gram stain reports no organism.     For now vancomycin was discontinued due to high risk of nephrotoxicity when used in conjunction with Zosyn.  In the setting of concern for peritonitis Zosyn monotherapy is empirically continued if any further worsening antifungal coverage may be added.  We will continue to follow culture results and CRP trend and adjust antibiotic therapy appropriately.    Again, thank you Dr. Garcia for allowing us to participate in the care of your patient and please feel free to call for any questions you may have.        Code Status:   Code Status and Medical Interventions:   Ordered at: 01/31/20 2993     Level Of Support Discussed With:    Patient     Code  Status:    CPR     Medical Interventions (Level of Support Prior to Arrest):    Full           Noelle Helton, APRBRAYDON  02/03/20  2:02 PM     Physician Attestation:    I have personally performed a face-to-face evaluation on this patient. I have collected the review of systems and performed my own physical exam. I reviewed the patient's data including history of present illness, past medical history, past surgical history and allergy list. . The assessment and plan documented above are my own after discussing the case in detail with the APC. I have reviewed the laboratory and radiological pertinent results. I have reviewed and edited the note above after discussing the findings with the APC.    Keyona Donohue MD  Infectious Diseases  02/03/20  4:32 PM

## 2020-02-03 NOTE — PROGRESS NOTES
Continued Stay Note  KARLA Pozo     Patient Name: Gracy Norman  MRN: [id number]  Today's Date: 2/3/2020    Admit Date: 1/31/2020    Discharge Plan     Row Name 02/03/20 1344       Plan    Plan Comments  CM spoke with pt today.  Rodrigo at bedside.  Pt. states that she would like to go outside and get some fresh air.  Today was supposed to be her 1st chemo treatment at [facility]  Patient states that she may have to have HD tomorrow because her kidneys are not getting any better.  Patient still plans to return home at discharge and would like to have a RW.  No other issues or concerns are noted at this time.  CM will continue to follow and assist with any discharge needs.          Discharge Codes    No documentation.       Expected Discharge Date and Time     Expected Discharge Date Expected Discharge Time    Feb 5, 2020             Danya Esquivel RN

## 2020-02-04 NOTE — PROGRESS NOTES
Surgery    Patient known to me from port placement and liver biopsy.    Now currently critically ill and in need of dialysis.  Patient currently is having respiratory distress and is tachycardic.  On examination she has poor respiratory effort and left-sided wheezes.  Explained to the patient and family that the overwhelming likelihood is that she will be intubated in the operating room and remain intubated postop until her pulmonary status improves.    CCU nurses have also requested triple lumen as possible as they have been advised to try to avoid use of the patient's [id number]

## 2020-02-04 NOTE — PROGRESS NOTES
[facility] HOSPITALIST PROGRESS NOTE     Patient Identification:  Name:  Gracy Norman  Age:  44 y.o.  Sex:  female  :  1975  MRN:  [id number]  Visit Number:  [id number]  ROOM: [address]     Primary Care Provider:  Almas Stone MD    Length of stay in inpatient status:  4    Subjective     Chief Compliant:    Chief Complaint   Patient presents with   • Weakness - Generalized   • Post-op Problem     History of Presenting Illness:    Patient critically ill, remains severely tachycardic, lactate continues to rise, renal output has dropped off and Cr worsening, I have discussed case w/ Renal today and plan to start HD, surgery consulted for TDC, given she is now febrile and more diaphoretic have discussed w/ ID and broadening out Abx and adding micafungin, I checked bladder pressure and it is elevated at 21, abdomen more firm today, possibly plan to repeat paracentesis as nothing has grown out of initial tap or blood cultures but will await TDC catheter placement and dialysis, also have ordered CT Abd/Pelvis w/ IV and oral contrast to evaluate anastomosis of previous surgical procedure at  on , patient endorses some mild back pain but stable, she is concerned but understands she is very sick, her Temp was 101 today, HR remains around 140's, BP is stable though, her WBC count is now up to 38K, bicarb is up to 11-12 and pH has slightly improved, as a result PC02 is up as well and RR has improved, she denies any chest pain, cough, sputum production.  Objective     Current Hospital Meds:  cyclobenzaprine 5 mg Oral BID   FLUoxetine 20 mg Oral Daily   heparin (porcine) 5,000 Units Subcutaneous Q8H   micafungin (MYCAMINE)  mg Intravenous Q24H   pantoprazole 40 mg Oral QAM   piperacillin-tazobactam 3.375 g Intravenous Q12H   sodium chloride 1,000 mL Intravenous Once   sodium chloride 10 mL Intravenous Q12H   sodium chloride 10 mL Intravenous Q12H   sodium chloride 10 mL Intravenous  Q12H     Pharmacy to dose vancomycin     sodium bicarbonate drip (greater than 75 mEq/bag) 125 mEq Last Rate: 125 mEq (02/04/20 0512)   vasopressin (PITRESSIN) infusion 0.04 Units/min Last Rate: Stopped (02/03/20 2316)     Current Antimicrobial Therapy:  Anti-Infectives (From admission, onward)    Ordered     Dose/Rate Route Frequency Start Stop    02/04/20 0807  piperacillin-tazobactam (ZOSYN) 3.375 g/100 mL 0.9% NS IVPB (mbp)     Ordering Provider:  Saad Garcia MD    3.375 g  over 4 Hours Intravenous Every 12 Hours 02/04/20 1800 02/08/20 1759    02/04/20 1131  micafungin sodium (MYCAMINE) 100 mg in sodium chloride 0.9 % 100 mL IVPB     Ordering Provider:  Sonal Baker PA-C    100 mg  over 60 Minutes Intravenous Every 24 Hours 02/04/20 1300 02/11/20 1259    02/04/20 1151  Pharmacy to dose vancomycin     Ordering Provider:  Saad Garcia MD     Does not apply Continuous PRN 02/04/20 1151 02/11/20 1150    02/01/20 1601  vancomycin (VANCOCIN) 1,000 mg in sodium chloride 0.9 % 250 mL IVPB     Ordering Provider:  Denny Coy MD    1,000 mg  over 60 Minutes Intravenous Once 02/01/20 1800 02/01/20 1812    01/31/20 1949  vancomycin (VANCOCIN) 1,750 mg in sodium chloride 0.9 % 500 mL IVPB     Ordering Provider:  Waqas York MD    20 mg/kg × 93 kg Intravenous Once 01/31/20 1951 02/01/20 0000    01/31/20 1949  piperacillin-tazobactam (ZOSYN) 4.5 g/100 mL 0.9% NS IVPB (mbp)     Ordering Provider:  Waqas York MD    4.5 g Intravenous Once 01/31/20 1951 01/31/20 2129        Current Diuretic Therapy:  Diuretics (From admission, onward)    Ordered     Dose/Rate Route Frequency Start Stop    02/03/20 1610  furosemide (LASIX) injection 80 mg     Ordering Provider:  Alycia Metzger MD    80 mg Intravenous Once 02/03/20 1700 02/03/20 1632    02/03/20 1635  furosemide (LASIX) 10 MG/ML injection  - ADS Override Pull     Note to Pharmacy:  Created by cabinet override   Ordering Provider:  Selam Pierce  N, RN       02/03/20 1635 02/04/20 0444        ----------------------------------------------------------------------------------------------------------------------  Vital Signs:  Temp:  [98.5 °F (36.9 °C)-98.9 °F (37.2 °C)] 98.9 °F (37.2 °C)  Heart Rate:  [137-152] 140  Resp:  [17-26] 24  BP: (105-142)/() 132/96  SpO2:  [93 %-99 %] 97 %  on  Flow (L/min):  [2] 2;   Device (Oxygen Therapy): nasal cannula  Body mass index is 40.32 kg/m².    Wt Readings from Last 3 Encounters:   02/04/20 (S) 103 kg (227 lb 9.6 oz)   01/12/20 92.1 kg (203 lb)   01/09/20 91.6 kg (202 lb)     Intake & Output (last 3 days)       02/01 0701 - 02/02 0700 02/02 0701 - 02/03 0700 02/03 0701 - 02/04 0700 02/04 0701 - 02/05 0700    P.O. 919 1084 1740     I.V. (mL/kg) 1193 (12.7)  1918.8 (18.6)     Blood  300      IV Piggyback 2750 150 1950     Total Intake(mL/kg) 4862 (51.6) 1534 (15.6) 5608.8 (54.5)     Urine (mL/kg/hr) 685 (0.3) 350 (0.1) 275 (0.1)     Stool 425 400 400     Total Output 1110 750 675     Net +3752 +784 +4933.8             Urine Unmeasured Occurrence   1 x         NPO Diet NPO Except: Sips With Meds, Ice Chips  ----------------------------------------------------------------------------------------------------------------------  Physical exam:  Constitutional:  Well-developed and well-nourished.  Mod distress  HENT:  Head:  Normocephalic and atraumatic.  Mouth:  dry mucous membranes.    Eyes:  Conjunctivae and EOM are normal. No scleral icterus.    Neck:  Neck supple.  No JVD present.    Cardiovascular:  tachycardic, regular rhythm and normal heart sounds with no murmur.  Pulmonary/Chest:  mild respiratory distress, no wheezes, no crackles, with normal breath sounds and good air movement.  Abdominal:  Distended more firm. Mildly tender in lower quadrants.   Musculoskeletal:  No tenderness, and no deformity.  No red or swollen joints anywhere.    Neurological:  Alert and oriented to person, place, and time.  No cranial  nerve deficit. No facial droop.  No slurred speech.   Skin:  Skin is warm and diaphoretic. No rash noted. No pallor.   Peripheral vascular:  no clubbing, no cyanosis, no edema.  ----------------------------------------------------------------------------------------------------------------------  Tele:    ----------------------------------------------------------------------------------------------------------------------  Results from last 7 days   Lab Units 02/04/20  0356 02/04/20  0053 02/03/20 1908  02/03/20  0112 02/02/20  1903  02/01/20  0840  01/31/20  1955   CRP mg/dL  --  31.38*  --   --   --   --   --  29.28*  --  30.94*   LACTATE mmol/L 7.5* 7.1* 6.0*   < > 3.1* 3.3*   < > 3.8*   < > 5.2*   WBC 10*3/mm3  --  38.57*  --   --  28.49* 26.99*   < > 27.93*  --  29.10*   HEMOGLOBIN g/dL  --  9.2*  --   --  8.4* 8.2*   < > 7.9*  --  8.8*   HEMATOCRIT %  --  31.3*  --   --  28.6* 27.0*   < > 26.7*  --  29.9*   MCV fL  --  82.8  --   --  82.4 80.8   < > 80.4  --  79.5   MCHC g/dL  --  29.4*  --   --  29.4* 30.4*   < > 29.6*  --  29.4*   PLATELETS 10*3/mm3  --  178  --   --  206 217   < > 362  --  483*   INR   --   --   --   --   --   --   --  1.34*  --   --     < > = values in this interval not displayed.     Results from last 7 days   Lab Units 02/04/20  0504   PH, ARTERIAL pH units 7.223*   PO2 ART mm Hg 73.2*   PCO2, ARTERIAL mm Hg 30.4*   HCO3 ART mmol/L 12.5*     Results from last 7 days   Lab Units 02/04/20  0053 02/03/20  0112 02/02/20  1903 02/02/20  0120 02/01/20  0840 01/31/20  1955   SODIUM mmol/L 133* 130* 134* 132* 131* 130*   POTASSIUM mmol/L 4.3 4.2 4.3 4.6 4.8 5.0   MAGNESIUM mg/dL  --   --   --  2.5 1.5* 1.6   CHLORIDE mmol/L 102 100 100 100 99 94*   CO2 mmol/L 10.3* 9.4* 11.1* 11.2* 12.3* 13.4*   BUN mg/dL 26* 25* 24* 26* 26* 26*   CREATININE mg/dL 2.65* 1.96* 1.91* 2.27* 2.10* 1.95*   EGFR IF NONAFRICN AM mL/min/1.73 20* 28* 29* 24* 26* 28*   CALCIUM mg/dL 8.2* 8.3* 8.0* 7.5* 7.8* 8.8      PHOSPHORUS mg/dL  --   --   --   --  3.9  --    GLUCOSE mg/dL 141* 98 94 91 96 122*   ALBUMIN g/dL  --  3.77  --  3.06* 2.13* 2.83*   BILIRUBIN mg/dL  --  1.2  --  0.6 0.5 0.5   ALK PHOS U/L  --  509*  --  556* 753* 958*   AST (SGOT) U/L  --  63*  --  77* 103* 121*   ALT (SGPT) U/L  --  17 --  20 27 32   Estimated Creatinine Clearance: 31.7 mL/min (A) (by C-G formula based on SCr of 2.65 mg/dL (H)).  No results found for: AMMONIA  Results from last 7 days   Lab Units 02/01/20  0840 01/31/20 1955   TROPONIN T ng/mL <0.010 <0.010     Results from last 7 days   Lab Units 02/03/20  0112 01/31/20 1955   PROBNP pg/mL 1,467.0* 214.1         No results found for: HGBA1C, POCGLU  Lab Results   Component Value Date    TSH 9.270 (H) 01/31/2020    FREET4 0.97 02/01/2020     No results found for: PREGTESTUR, PREGSERUM, HCG, HCGQUANT  Pain Management Panel     Pain Management Panel Latest Ref Rng & Units 2/1/2020    CREATININE UR mg/dL 146.0        Brief Urine Lab Results  (Last result in the past 365 days)      Color   Clarity   Blood   Leuk Est   Nitrite   Protein   CREAT   Urine HCG        02/02/20 2053 Dark Yellow Turbid Large (3+) Negative Negative 100 mg/dL (2+)             Blood Culture   Date Value Ref Range Status   02/03/2020 No growth at less than 24 hours  Preliminary   02/03/2020 No growth at less than 24 hours  Preliminary   01/31/2020 No growth at 3 days  Preliminary   01/31/2020 No growth at 3 days  Preliminary     No results found for: URINECX  No results found for: WOUNDCX  No results found for: STOOLCX  No results found for: RESPCX  No results found for: AFBCX  Results from last 7 days   Lab Units 02/04/20  0356 02/04/20  0053 02/03/20  1908 02/03/20  1750 02/03/20  0112 02/02/20  1903 02/02/20  1512  02/01/20  0840  01/31/20  1955   PROCALCITONIN ng/mL  --   --   --   --  4.07*  --   --   --   --   --   --    LACTATE mmol/L 7.5* 7.1* 6.0* 5.8* 3.1* 3.3* 3.7*   < > 3.8*   < > 5.2*   CRP mg/dL  --  31.38*   --   --   --   --   --   --  29.28*  --  30.94*    < > = values in this interval not displayed.     I have personally looked at the labs and they are summarized above.  ----------------------------------------------------------------------------------------------------------------------  Detailed radiology reports for the last 24 hours:    Imaging Results (Last 24 Hours)     Procedure Component Value Units Date/Time    XR Chest 1 View [[id number]] Collected:  02/03/20 1259     Updated:  02/03/20 1302    Narrative:       EXAMINATION: XR CHEST 1 VW-      CLINICAL INDICATION:     shortness of breath; A41.9-Sepsis, unspecified  organism     TECHNIQUE:  XR CHEST 1 VW-      COMPARISON: 02/02/2020      FINDINGS:   Left Port-A-Cath stable. Elevation right hemidiaphragm stable. Airspace  disease improved. Small right pleural effusion stable. No pneumothorax.  No acute bony changes.       Impression:       Mild improvement in basilar airspace disease. Otherwise  stable chest.     This report was finalized on 2/3/2020 12:59 PM by Dr. Bert Butler MD.           Assessment & Plan    #Septic shock 2/2 SBP - Worse  #Lactic acidosis   - On presentation (WBC 29,100, CRP 30.94, lactic acid 5.2, temperature 102.4, heart rate 152, blood pressure 72/60  - CT chest showed some bibasilar consolidations that are likely atelectasis   - Symptoms of abdominal pain more consistent with SBP  - Diagnostic paracentesis performed on 2/1 that revealed ANC: 3400. Confirming SBP. Culture NGTD  - Having fevers now today, more diaphoretic, WBC count and lactate trending up, repeat Bcx's yesterday NGTD  - Considering repeating paracentesis today  - Continue Gaston Wesley, pending updated ID recs  - Have ordered repeat CT Abd/Pelvis w/ oral and IV contrast    #Oliguric KODI c/b AGMA suspect possible RTA - Worse  - Baseline Cr 0.7-0.9, Cr on presentation 1.95=>2.10=>2.27; Today 2.65  - Potential etiology include prerenal, ATN 2/2 sepsis , hepatorenal  syndrome  - CT abdomen/pelvis did not reveal urinary obstruction.  - Urine sodium <20, consistent with severe prerenal vs. Hepatorenal   - Did not appear to improve significantly w/ albumin challenge per previous MD  - Consulted Nephrology; Plan for dialysis today  - Consulted surgery for TDC  - Continue Bicarb gtt, trend labs and UOP    #Stage 4 colon cancer with bulky lymphadenopathy and widely disseminated liver metastases and adrenal masses s/p diverting loop ileostomy   - Scheduled to f/u with oncology at  on 2/3. Expected to start chemo soon. Suspect treatments would be palliative in nature.   - Underwent diverting loop ileostomy at  on 1/17  - CT Abd/Pelvis 2/3 showed extensive hepatic mets, ill-defined cecal mass involving surrounding mesentery, mesenteric LAD, small-mod ascites, stable low density mass L adrenal gland.  - F/u outpatient pending improvement in sepsis  - Bladder pressure today noted to be 21, abdomen more firm, may need to repeat paracentesis as can compromise anastomosis site leading to perforation or leak, repeat CT pending as per above.    #Normocytic anemia   - Hemoglobin has been trending down. 10 on 1/2/20  - 8.8=>7.9=>6.6, s/p 1 unit of pRBCs; Today 9.2  - No signs of active bleeding, BUN/creatinine not elevated, suspect  hemoconcentrated on admission and fluids have diluted   - Iron studies consistent with SNEHAL and AOCD, B12 and folate normal.   - Continue IV PPI for now, monitor for signs of bleeding    #Respiratory Alkalosis  - Likely 2/2 above AGMA, compensatory, address as per above    #Euthyroid  - TSH 9, free T4 normal, f/u as outpatient.    #Hypomagnesemia   - Replaced    #Diverting loop ileostomy  - Performed 1/17 at  for SBO    #Obesity  - BMI 38, complicates all aspects of care.    F: Oral  E: Monitor & Replace PRN  N: Regular  Ppx: SQH  Code: Full Code    Dispo: Pending clinical improvement and workup, start HD today    *This patient is considered high risk due to  septic shock, KODI, underlying colon cancer    *I have spent 60min (8:00AM-9:00AM) of critical care time w/ > 50% face to face with patient evaluating her at bedside given her recent decline, worsening lactic acidosis, leukocytosis, fevers and now w/ significant organ failure of renal failure now requiring urgent TDC placement to initiate new hemodialysis today, coordination of care and workup with Nephrology and Infectious Disease, as well as discussing GOC w/ patient and her , treating acid base disturbances w/ Bicarb gtt.    VTE Prophylaxis:   Mechanical Order History:     None      Pharmalogical Order History:     Ordered     Dose Route Frequency Stop    02/01/20 1455  heparin (porcine) 5000 UNIT/ML injection 5,000 Units      5,000 Units SC Every 8 Hours Scheduled --    02/01/20 0309  enoxaparin (LOVENOX) syringe 40 mg  Status:  Discontinued      40 mg SC Every 24 Hours 02/01/20 1455        Saad Garcia MD  [facility]  02/04/20  11:51 AM

## 2020-02-04 NOTE — PROGRESS NOTES
THC Physician - Brief Progress Note  PERMANENT  02/03/2020 20:52    [facility]  [facility] - CCU - 10 - [place], [place] ([facility])    MATT HAGAN    Date of Service 02/03/2020 20:52    HPI/Events of Note RN called: LA 6 (5.8). Pt recently dx with colon cancer and would have stated chemo today. HR up to 140s now. Hopitalist was managing pt but is now asking for AICU to help manage pt now. Earlier pt was given lasix because pt was very   edematous but then was given fluid for a rising LA. Pt on bicarb gtt @ 125 and is going to be dialyzed tomorrow.    Reviewed chart, camera'd into room and spoke w/ RN.   Pt w/ sinus tach 141.   Has metastatic colon CA w/ mets to liver, dx'd w/ SBP on abx, seen by ID today.   Pt on 2L NC. Pt appropriately tachypneic but not in distress.   ABG: pH 7.244/26/83/11.2    +3800cc today and total 7200 since admit on 1/31.     BMp Co2 9 this am.  Now 11 on ABG since bicarb gtt started.   LA 3.1 this am --> 5.8-->6.0  Hemodynamically stable.   PCT 4.07  Creat 1.96 but oliguria.      Pt complaining of back pain mostly 101/10.  No abdominal pain and no guarding according to RN.     1. Sinus tach is likely related to pain--> increase dilaudid to 1mg IV q2hr prn pain  2. cont w/ bicarb gtt.  f/u LA already ordered.  Increasing LA but no hypotension and no abdominal pain.  Likely component of difficult clearing given liver dysfunction/mets.   3. given worsening LA, will make pt NPO except po meds/ice chips  4. ordered prn phenylephrine and prn albumin if pt becomes hypotensive.  Currently  /85.      Interventions Major-Acid-Base disturbance - evaluation and management, Arrhythmia - evaluation and management  Intermediate-Communication with other healthcare providers and/or family        Electronically Signed by: Rita Doshi) on 02/03/2020 20:59

## 2020-02-04 NOTE — PROGRESS NOTES
PROGRESS NOTE         Patient Identification:  Name:  Gracy Norman  Age:  44 y.o.  Sex:  female  :  1975  MRN:  [id number]  Visit Number:  [id number]  Primary Care Provider:  Almas Stone MD         LOS: 4 days       ----------------------------------------------------------------------------------------------------------------------  Subjective       Chief Complaints:    Weakness - Generalized and Post-op Problem        Interval History:      Patient is critically ill and appears to be uncomfortable as she is tachypneic and tachycardic.  Lactic acidosis significantly worsened to 7.5 as well as leukocytosis and CRP level at 31.38.  Dialysis catheter to be placed today with plans to dialyze.  Case discussed with the patient's nurse, Selam, who reports low-grade fever overnight.  Patient discussed with Dr. Garcia.    Review of Systems:    Unable to obtain as the patient is very lethargic  ----------------------------------------------------------------------------------------------------------------------      Objective       Current Hospital Meds:    cyclobenzaprine 5 mg Oral BID   FLUoxetine 20 mg Oral Daily   heparin (porcine) 5,000 Units Subcutaneous Q8H   micafungin (MYCAMINE)  mg Intravenous Q24H   pantoprazole 40 mg Oral QAM   piperacillin-tazobactam 3.375 g Intravenous Q12H   sodium chloride 1,000 mL Intravenous Once   sodium chloride 10 mL Intravenous Q12H   sodium chloride 10 mL Intravenous Q12H   sodium chloride 10 mL Intravenous Q12H       Pharmacy to dose vancomycin     sodium bicarbonate drip (greater than 75 mEq/bag) 125 mEq Last Rate: 125 mEq (20 3383)   vasopressin (PITRESSIN) infusion 0.04 Units/min Last Rate: Stopped (20 8217)     ----------------------------------------------------------------------------------------------------------------------    Vital Signs:  Temp:  [98.5 °F (36.9 °C)-98.9 °F (37.2 °C)] 98.9 °F (37.2 °C)  Heart Rate:  [137-152]  140  Resp:  [17-26] 24  BP: (105-142)/() 132/96  Mean Arterial Pressure (Non-Invasive) for the past 24 hrs (Last 3 readings):   Noninvasive MAP (mmHg)   02/04/20 1050 108   02/04/20 1035 107   02/04/20 1020 114     SpO2 Percentage    02/04/20 1020 02/04/20 1035 02/04/20 1050   SpO2: 97% 94% 97%     SpO2:  [93 %-99 %] 97 %  on  Flow (L/min):  [2] 2;   Device (Oxygen Therapy): nasal cannula    Body mass index is 40.32 kg/m².  Wt Readings from Last 3 Encounters:   02/04/20 (S) 103 kg (227 lb 9.6 oz)   01/12/20 92.1 kg (203 lb)   01/09/20 91.6 kg (202 lb)        Intake/Output Summary (Last 24 hours) at 2/4/2020 1308  Last data filed at 2/4/2020 0600  Gross per 24 hour   Intake 4798.75 ml   Output 675 ml   Net 4123.75 ml     NPO Diet NPO Except: Sips With Meds, Ice Chips  NPO Diet  ----------------------------------------------------------------------------------------------------------------------      Physical Exam:    Constitutional:  Ill-appearing, uncomfortable, No respiratory distress.      HENT:  Head: Normocephalic and atraumatic.  Mouth:  Moist mucous membranes.    Eyes:  Conjunctivae and EOM are normal.  No scleral icterus.  Neck:  Neck supple.  No JVD present.     Cardiovascular:  tachycardic, regular rhythm and normal heart sounds with no murmur. No edema.  Pulmonary/Chest:  tachypneic, no wheezes, no crackles, with normal breath sounds and good air movement.  Abdominal: Distended and tense with diffuse tenderness. Ileostomy.   Musculoskeletal:  No edema, no tenderness, and no deformity.  No swelling or redness of joints.  Neurological:  Obtunded  No facial droop.  No slurred speech.   Skin:  Skin is warm and dry.  No rash noted.  No pallor.   Psychiatric:  Unable to assess    ----------------------------------------------------------------------------------------------------------------------  Results from last 7 days   Lab Units 02/01/20  0840 01/31/20  1955   TROPONIN T ng/mL <0.010 <0.010          Results from last 7 days   Lab Units 02/03/20 0112 01/31/20 1955   PROBNP pg/mL 1,467.0* 214.1       Results from last 7 days   Lab Units 02/04/20  0504   PH, ARTERIAL pH units 7.223*   PO2 ART mm Hg 73.2*   PCO2, ARTERIAL mm Hg 30.4*   HCO3 ART mmol/L 12.5*     Results from last 7 days   Lab Units 02/04/20  0356 02/04/20 0053 02/03/20 1908 02/03/20 0112 02/02/20 1903 02/01/20 0840 01/31/20 1955   CRP mg/dL  --  31.38*  --   --   --   --   --  29.28*  --  30.94*   LACTATE mmol/L 7.5* 7.1* 6.0*   < > 3.1* 3.3*   < > 3.8*   < > 5.2*   WBC 10*3/mm3  --  38.57*  --   --  28.49* 26.99*   < > 27.93*  --  29.10*   HEMOGLOBIN g/dL  --  9.2*  --   --  8.4* 8.2*   < > 7.9*  --  8.8*   HEMATOCRIT %  --  31.3*  --   --  28.6* 27.0*   < > 26.7*  --  29.9*   MCV fL  --  82.8  --   --  82.4 80.8   < > 80.4  --  79.5   MCHC g/dL  --  29.4*  --   --  29.4* 30.4*   < > 29.6*  --  29.4*   PLATELETS 10*3/mm3  --  178  --   --  206 217   < > 362  --  483*   INR   --   --   --   --   --   --   --  1.34*  --   --     < > = values in this interval not displayed.     Results from last 7 days   Lab Units 02/04/20 0053 02/03/20 0112 02/02/20 1903 02/02/20 0120 02/01/20 0840 01/31/20 1955   SODIUM mmol/L 133* 130* 134* 132* 131* 130*   POTASSIUM mmol/L 4.3 4.2 4.3 4.6 4.8 5.0   MAGNESIUM mg/dL  --   --   --  2.5 1.5* 1.6   CHLORIDE mmol/L 102 100 100 100 99 94*   CO2 mmol/L 10.3* 9.4* 11.1* 11.2* 12.3* 13.4*   BUN mg/dL 26* 25* 24* 26* 26* 26*   CREATININE mg/dL 2.65* 1.96* 1.91* 2.27* 2.10* 1.95*   EGFR IF NONAFRICN AM mL/min/1.73 20* 28* 29* 24* 26* 28*   CALCIUM mg/dL 8.2* 8.3* 8.0* 7.5* 7.8* 8.8   GLUCOSE mg/dL 141* 98 94 91 96 122*   ALBUMIN g/dL  --  3.77  --  3.06* 2.13* 2.83*   BILIRUBIN mg/dL  --  1.2  --  0.6 0.5 0.5   ALK PHOS U/L  --  509*  --  556* 753* 958*   AST (SGOT) U/L  --  63*  --  77* 103* 121*   ALT (SGPT) U/L  --  17  --  20 27 32   Estimated Creatinine Clearance: 31.7 mL/min (A) (by C-G formula  based on SCr of 2.65 mg/dL (H)).  No results found for: AMMONIA    No results found for: HGBA1C, POCGLU  No results found for: HGBA1C  Lab Results   Component Value Date    TSH 9.270 (H) 01/31/2020    FREET4 0.97 02/01/2020       Blood Culture   Date Value Ref Range Status   02/03/2020 No growth at less than 24 hours  Preliminary   02/03/2020 No growth at less than 24 hours  Preliminary   01/31/2020 No growth at 3 days  Preliminary   01/31/2020 No growth at 3 days  Preliminary     No results found for: URINECX  No results found for: WOUNDCX  No results found for: STOOLCX  No results found for: RESPCX  Pain Management Panel     Pain Management Panel Latest Ref Rng & Units 2/1/2020    CREATININE UR mg/dL 146.0            ----------------------------------------------------------------------------------------------------------------------  Imaging Results (Last 24 Hours)     ** No results found for the last 24 hours. **          ----------------------------------------------------------------------------------------------------------------------    Assessment/Plan       Assessment/Plan     ASSESSMENT:    1.  Septic shock with lactic acid greater than 4 on admission  2.  Peritonitis     PLAN:     Patient is critically ill and appears to be uncomfortable as she is tachypneic and tachycardic.  Lactic acidosis significantly worsened to 7.5 as well as leukocytosis and CRP level at 31.38.  Dialysis catheter to be placed today with plans to dialyze.  Case discussed with the patient's nurse, Selam, who reports low-grade fever overnight.  Patient discussed with Dr. Garcia.    Mycoplasma negative.  Legionella negative.  Respiratory panel PCR negative.  Strep pneumo negative.  Influenza negative.  Urinalysis unremarkable.  Chest x-ray reports mild improvement in basilar airspace disease.  CT of the abdomen reports extensive hepatic metastatic disease, ill-defined mass involving the cecum the surrounding mesenteric, nonobstructing  right renal stones, ascites, right lower quadrant ileostomy, moderate right and small left pleural effusions with bibasilar atelectasis, low density mass on the left adrenal gland.  Blood cultures show no growth thus far.  Body fluid culture reports 4390 nucleated cells, Gram stain reports no organism.      In the setting of worsening clinically and laboratory wise would recommend to escalate coverage to include Micafungin 100 mg IV q 24 hours and Zosyn was adjusted to 3.375 g IV q 12 hours based on renal function. Vancomycin was continued as well by primary team as the patient showed decompensation.     We will continue to follow culture results and CRP trend and adjust antibiotic therapy appropriately.    Current Antimicrobial:  Micafungin 100 mg IV q 24 hours  Zosyn 3.375 g IV q 12 hours  Vancomycin per pharmacy dosing     Code Status:   Code Status and Medical Interventions:   Ordered at: 01/31/20 7021     Level Of Support Discussed With:    Patient     Code Status:    CPR     Medical Interventions (Level of Support Prior to Arrest):    Full       Sonal Baker PA-C  02/04/20  1:08 PM

## 2020-02-04 NOTE — OP NOTE
Central line insertion jugular    Surgeon:  Sophie Grimes M.D., MODESTA Norman  2/4/2020    Pre and Post Procedure Diagnosis: poor Iv access    Anesthesia: generral    Procedure:  After obtaining informed consent patient was placed in the Trendelenburg position.  Ultrasound was utilized to confirm the patency of the jugular vein.  With use of the Seldinger technique the left internal jugular vein was cannulated.  Because of difficulty cannulating the vessel fluoroscopy was utilized to help guide placement and in particular passage of the dilator guidewire was passed without resistance.  The tract was dilated and the triple-lumen catheter was passed to 15 cm and sutured in place. Good blood return was obtained from all 3 ports.  Dressing was placed.    CXR result: Good position, no pneumothorax    Blood administered:  none    Complications:  none      Sophie Grimes MD     Date: 2/4/2020  Time: 3:51 PM

## 2020-02-04 NOTE — PLAN OF CARE
Problem: Patient Care Overview  Goal: Plan of Care Review  Outcome: Ongoing (interventions implemented as appropriate)  Flowsheets (Taken 2/4/2020 7050)  Progress: declining  Plan of Care Reviewed With: spouse; patient  Note:   Patient currently intubated; has new tep HD cath; plan to start hemodialysis; will take patient to CT once sedated appropriately; IAP 21 today; oliguria persists       Essential hypertension

## 2020-02-04 NOTE — CONSULTS
Referring Provider: Dr. Garcia    Reason for Consultation: severe sepsis and acute hypoxic respiratory failure management      Chief complaint:  Abdominal pain      History of present illness:   History of present illness is limited as patient is currently intubated and sedated.    44-year-old femaleWho presented to [facility] ER with a complaint of abdominal pain after drinking carbonated drinks on 2/1/2020.Patient was previously healthy till 1/1/2020.  She came to [facility] emergency with complaints of nausea vomiting and fatigue.  CT scan of the abdomen pelvis showed 7.5 cm X5 .3 cm cecal mass.  There were liver metastasis and extensive lymphadenopathy noted.  She underwent outpatient liver biopsy on 1/2/2020 which showed poorly differentiated adenocarcinoma of colonic origin.  She presented to the ER again on 1/12/2020 and diagnosed with partial small bowel obstruction and was transferred [facility] for further evaluation by surgical team.  She underwent diverting loop ileostomy on 1/17/2020 and was discharged home on 1/20/2020.  She was expected to start her chemotherapy at [facility] on February 3, 2020.  She had a left side chest port placed on 1/2/2020.  During this admission the clinical course was complicated by severe sepsis from culture-negative peritonitis, lactic acidosis, oliguric KODI likely septic ATN.  She received dialysis catheter for possible hemodialysis today.  Due to acute respiratory distress secondary to respiratory compensation for severe metabolic acidosis, she was intubated.  Pulmonary critical care team has been consulted for management of severe sepsis and acute hypoxic respiratory failure.    Review of Systems  Review of system could not be obtained as patient is currently intubated and sedated        History  Past Medical History:   Diagnosis Date   • Adenocarcinoma of cecum, stage 4b (CMS/HCC) 01/2020   • Anxiety    • GERD (gastroesophageal  reflux disease)    • Headache    • Obesity (BMI 30-39.9)    • Snoring    , Past Surgical History:   Procedure Laterality Date   • LIVER BIOPSY N/A 1/2/2020    Procedure: LIVER BIOPSY LAPAROSCOPIC;  Surgeon: Sophie Grimes MD;  Location: [facility];  Service: General   • MS ILEOSTOMY/JEJUNOSTOMY,NONTUBE     • TUBAL ABDOMINAL LIGATION     • VENOUS ACCESS DEVICE (PORT) INSERTION N/A 1/2/2020    Procedure: INSERTION VENOUS ACCESS DEVICE;  Surgeon: Sophie Grimes MD;  Location: [facility];  Service: General   , Family History   Problem Relation Age of Onset   • Hypertension Mother    • Diabetes Mother    • Cancer Father         stomach   • Breast cancer Neg Hx    , Social History     Tobacco Use   • Smoking status: Never Smoker   • Smokeless tobacco: Never Used   Substance Use Topics   • Alcohol use: No   • Drug use: No   , Medications Prior to Admission   Medication Sig Dispense Refill Last Dose   • amoxicillin-clavulanate (AUGMENTIN) 875-125 MG per tablet Take 1 tablet by mouth 2 (Two) Times a Day.   1/31/2020 at AM   • cyclobenzaprine (FLEXERIL) 5 MG tablet Take 5 mg by mouth 2 (Two) Times a Day.   1/31/2020 at AM   • enoxaparin (LOVENOX) 40 MG/0.4ML solution syringe Inject 40 mg under the skin into the appropriate area as directed Daily.   1/31/2020 at Unknown time   • omeprazole (priLOSEC) 20 MG capsule Take 20 mg by mouth Daily.   1/31/2020 at Unknown time   • acetaminophen (TYLENOL) 325 MG tablet Take 650 mg by mouth Every 6 (Six) Hours As Needed for Mild Pain .   Unknown at Unknown time   • diazePAM (VALIUM) 2 MG tablet Take 2 mg by mouth Every 8 (Eight) Hours As Needed for Anxiety.   Unknown at Unknown time   • ondansetron ODT (ZOFRAN-ODT) 4 MG disintegrating tablet Take 4 mg by mouth Every 6 (Six) Hours As Needed for Nausea or Vomiting.   Unknown at Unknown time   • oxyCODONE (ROXICODONE) 5 MG immediate release tablet Take 5 mg by mouth Every 6 (Six) Hours As Needed for Moderate Pain .   Unknown at  Unknown time   , Scheduled Meds:    chlorhexidine 15 mL Mouth/Throat Q12H   cyclobenzaprine 5 mg Oral BID   heparin (porcine) 5,000 Units Subcutaneous Q8H   micafungin (MYCAMINE)  mg Intravenous Q24H   pantoprazole 40 mg Intravenous Q12H   piperacillin-tazobactam 3.375 g Intravenous Q12H   sodium chloride 1,000 mL Intravenous Once   sodium chloride 1,000 mL Intravenous Once in Dialysis   sodium chloride 10 mL Intravenous Q12H   sodium chloride 10 mL Intravenous Q12H   sodium chloride 10 mL Intravenous Q12H   sodium chloride 10 mL Intravenous Q12H   sodium chloride 10 mL Intravenous Q12H   sodium chloride 10 mL Intravenous Q12H   vancomycin 1,250 mg Intravenous Once   Vancomycin Pharmacy Intermittent Dosing  Does not apply Daily   , Continuous Infusions:    dexmedetomidine 0.2-1.5 mcg/kg/hr Last Rate: 1.5 mcg/kg/hr (02/04/20 1614)   fentaNYL Citrate     Pharmacy to dose vancomycin     sodium bicarbonate drip (greater than 75 mEq/bag) 125 mEq Last Rate: 125 mEq (02/04/20 1603)   vasopressin (PITRESSIN) infusion 0.04 Units/min Last Rate: Stopped (02/03/20 2316)    and Allergies:  Bactrim [sulfamethoxazole-trimethoprim]; Metronidazole; and Sulfa antibiotics    Objective     Vital Signs   Temp:  [98.6 °F (37 °C)-101.9 °F (38.8 °C)] 101.3 °F (38.5 °C)  Heart Rate:  [111-152] 111  Resp:  [17-35] 21  BP: (100-143)/() 105/74  FiO2 (%):  [60 %] 60 %    Physical Exam:  General Appearance: Currently intubated and sedated.  Lungs: Intubated.  Positive for rales.  Negative for wheezing and rhonchi.  Heart: Tachycardic.  Normal sinus.  S1-S2 normal  Abdomen: Abdomen soft but distended.  Ileostomy site noted.  Liquidy stool noted in the pouch.  Extremities: +2 dependent edema edema noted could not be assessed as patient is currently intubated and sedated  Pulses: Distal pulses are intact.  There are no decreased pulses.    Neurological:  could not be assessed as patient is currently intubated and sedated.  Pupils:   Pupils are equal, round, and reactive to light.    Skin:  Warm and dry.            Results Review:  LABS:  Lab Results   Component Value Date    WBC 38.57 (C) 02/04/2020    HGB 9.2 (L) 02/04/2020    HCT 31.3 (L) 02/04/2020    MCV 82.8 02/04/2020     02/04/2020     Last Arterial Blood Gas  Lab Results   Component Value Date    PHART 7.205 (C) 02/04/2020    URK4CUF 35.4 02/04/2020    PO2ART 103.0 02/04/2020    MVU2DYX 14.0 (L) 02/04/2020    BASEEXCESS -12.9 (L) 02/04/2020    V3LQRAFY 98.0 02/04/2020          XR Chest AP  Narrative: EXAMINATION: XR CHEST AP-      CLINICAL INDICATION:     central line; A41.9-Sepsis, unspecified  organism     TECHNIQUE:  XR CHEST AP-      COMPARISON: 02/03/2020      FINDINGS:   Placement of a left IJ deep line with tip in the distal SVC. Left  Port-A-Cath is stable.  ET tube positioning with tip at level of clavicles.  Right dialysis catheter has been placed with tip at the cavoatrial  junction.  Perihilar airspace disease noted and may represent perihilar atelectasis  given low lung volumes.   Cardiomegaly noted.   No pneumothorax.   No effusions.   No acute osseous findings.        Impression: 1. Development of bilateral perihilar airspace disease with  considerations to include edema, pneumonia, or atelectasis.  2. Low lung volumes.  3. Support devices detailed above.  4. No pneumothorax.     This report was finalized on 2/4/2020 3:30 PM by Dr. Bert Butler MD.     FL Surgery Fluoro  Narrative: EXAMINATION: FL SURGERY FLUORO-      CLINICAL INDICATION:     central line placement ; A41.9-Sepsis,  unspecified organism     TECHNIQUE:  FL SURGERY FLUORO-      FLUOROSCOPY TIME: 117.5 seconds     FINDINGS:   Fluoroscopy images submitted from intraoperative fluoroscopy for  purposes of Port-A-Cath placement.      Impression: As above.     This report was finalized on 2/4/2020 3:21 PM by Dr. Bert Butler MD.     FL Surgery Fluoro  Narrative: EXAMINATION: FL SURGERY FLUORO-       CLINICAL INDICATION:     Dialysis cath placement; A41.9-Sepsis,  unspecified organism     TECHNIQUE:  FL SURGERY FLUORO-      FLUOROSCOPY TIME: 25.5 seconds     FINDINGS:   Fluoroscopy was provided intraoperatively for purposes of dialysis  catheter placement with submitted fluoroscopy images demonstrating  presence of a right-sided dialysis catheter.      Impression: As above.     This report was finalized on 2/4/2020 3:21 PM by Dr. Bert Butler MD.            I reviewed the patient's new clinical results.  I reviewed the patient's new imaging results and agree with the interpretation.       I have reviewed the past medical history, past surgical history, family history and social history the patient.    I have reviewed the labs.  ABG showed pH of 7.20, PCO2 of 35 and PO2 of 103 on FiO2 of 60% with a PEEP of 5 with tidal volume of 450 and set rate of 15.  Hyponatremia with low serum bicarbonate level.  High anion gap metabolic acidosis in the setting of elevated serum creatinine and serum lactate.  Worsening of leukocytosis.  Hepatitis panel negative.Blood cultures are negative till date.  Peritoneal fluid Showed 78% neutrophils.  Total nucleated cell 4390.Body fluid cultures negative till date.  Anaerobic culture negative till date.    I have reviewed the images of the chest x-ray that was performed on 2/4/2020  Which showed left Port-A-Cath.  Left IJ..  Development of bilateral perihilar airspace disease.  Low lung volumes.  No pneumothorax.    I have reviewed the report of the CT abdomen pelvis that was performed on 2/2/2020 which showed Extensive hepatic metastasis.  Ill-defined mass involving the cecum with involvement of surrounding mesentery's.  Mesenteric lymphadenopathy.  Nonobstructive renal stone.  Moderate amount of ascites.  Right lower quadrant ileostomy.  Bilateral effusion.  Low-density mass in the left adrenal.    I have reviewed the images of CT scan of the chest that was performed on  1/31/2020 which showed enlarged anterior mediastinal lymph node.  Small bilateral pleural effusion.    .I have reviewed the EKG report that was performed on 2/1/2020 Which showed sinus tachycardia with QTC of 451 ms    Assessment and plan         Central nervous system      Plan:  -On IV fentanyl drip.  -Started the patient on IV propofol.  We will try to titrate Precedex, RASS goal: 0 to -1  -She does not have Biot's type of breathing pattern while on opioids  - I highly recommend avoiding nighttime disturbances and light exposure during night to maintain normal circadian rhythms to avoid hypoactive or hyperactive delirium.  -I started her on IV thiamine       Cardiovascular system:    Plan:  -Crystalloids/Colloid challenge: Ordered 1 L NS, Goal CVP around 10 mmHg.  -CVP monitoring  -Targeting mean arterial pressure of 65 mmHg.  -Antibiotics: IV pantoprazole, IV vancomycin and IV micafungin  -Ordered serial lactates  -I started the patient on IV hydrocortisone 50 mg every 6 hours.  Pushed 1 dose of IV hydrocortisone 100 mg once.     -Ordered echo          Respiratory system:  Acute hypoxic respiratory failure on mechanical ventilator  Pulmonary edema due to KODI likely septic ATN    Plan:  - Mode of mechanical ventilation is assist control volume cycle.  Ventilator setting includes tidal volume of 450 mL with rate of 15, FiO2 of 60%  and PEEP of 5.    - Ventilator graphics : Flow time scalar was reviewed and auto PEEP is negative, volume time scalar was reviewed and leak is negative, pressure times scalar was reviewed and pressure stress index is 1 indicating normal pressure stress index.  Pressure volume loop was assessed.  Auto triggering is negative.  Missed triggering is negative.  Double triggering is negative.  Active expiration is negative.  -Based on ABG, chest x-ray and ventilator graphics, I have changed the rate to 20 and decrease the FiO2 to 40%.  I have increased the PEEP to 10.  We will repeat the ABG  in 30 minutes.  -Head of the bed elevated at 30 degrees  -Mouth care with oral chlorhexidine   -On IV Zosyn, IV vancomycin and IV micafungin           Liver, gallbladder, Pancreas and gastrointestinal system:  Culture-negative peritonitis  Stage IV colon cancer with liver mets and adrenal mets.  S/p diverting loop ileostomy    Plan:  -Ordered amylase and lipases  -On IV PPI  -Patient is scheduled for CT abdomen pelvis with IV and oral contrast to rule abdominal abscess versus leak.         Genitourinary system:   Oliguric KODI likely septic ATN  High anion gap metabolic acidosis due to lactic acidosis and acute kidney injury    Plan   -Avoiding nephrotoxic agent.  -Following serum creatinine and GFR closely with urine output daily.  -Plan for hemodialysis today.  -I have increased the rate and concentration of sodium bicarbonate drip.  -Ordered 4 amp of  sodium bicarbonate push.  -Ordered ionized calcium and serum phosphorus level           Endocrine system:      Plan:  -Started on correctional insulin.  I started the patient on IV hydrocortisone.  The goal glucose level is 140 to 180 mg/dL.         Infectious disease system:  Severe sepsis culture-negative peritonitis.  Concern of intra-abdominal abscess versus leak.      Plan:  - Current antibiotics: IV Zosyn, IV vancomycin and IV micafungin  - Foreign body: Single-lumen port that was placed on 1/2/2020 in left subclavian.  Midline catheter that was placed on 2/3/2020 in right basilic vein.  Central venous triple-lumen that was placed on 2/4/2020 tunneled left IJ.  - Cultures: Follow-up on final blood culture report  -Ordered C. difficile toxin assay.  Stool cultures           Hematological system:  Leukocytosis    Plan   - I recommend PRBC transfusion if hemoglobin is less than 7 g/dL unless active bleeding or cardiac ischemia.  -Currently on subcu heparin for DVT prophylaxis.  -Ordered peripheral smear       Rheumatological and dermatological system:    Plan:  -  Turn the patient every 2 hours to prevent pressure ulcers.  -Wound care team involvement as per primary team.  -Ordered CK levels           Activity:  - I recommend aggressive PT/OT while in hospital to avoid critical care neuropathy/myopathy.         VTE prophylaxis  Mechanical Order History:     None      Pharmalogical Order History:     Ordered     Dose Route Frequency Stop    02/04/20 1353  heparin (porcine) 5000 UNIT/ML injection  Status:  Discontinued      -- -- As Needed 02/04/20 1459    02/01/20 1455  heparin (porcine) 5000 UNIT/ML injection 5,000 Units      5,000 Units SC Every 8 Hours Scheduled --    02/01/20 0309  enoxaparin (LOVENOX) syringe 40 mg  Status:  Discontinued      40 mg SC Every 24 Hours 02/01/20 1455        Addendum:  Patient was started on IV norepinephrine drip.    The clinical plan was discussed with Dr Garcia.  The clinical plan was discussed extensively with the nurse, and respiratory therapist.   Critical Care time spent in direct patient care: 90 minutes (excluding procedure time).  Patient is critically ill and is at higher risk for further mortality/morbidity.          Henry Garcia M.D  Pulmonary and Critical Care Medicine

## 2020-02-04 NOTE — PROGRESS NOTES
Patient continued to be tachycardic today, now febrile up to 101, worsening lactate and WBC count, had TDC placed today and following her arrival back to the floor was intubated for procedure but unable to extubate due to impending respiratory failure and systemic acidosis, I spent 60min of critical care time (4:00PM-5:00PM) of direct critical care time stabilizing the patient at bedside, adjusting initial vent settings, starting precedex and Fentanyl gtts for sedation, giving versed to get patient calm as she was severely agitated and biting on ETT.  Currently have stabilized, blood pressures slightly lower but still stable and has not required pressors, she remains tachycardic in 140's, most recent lactate 8, she has STAT CT abd/pelvis w/ oral and IV contrast ordered and I have clinical suspicion for possible abscess or bowel leak but need CT imaging to confirm, have consulted pulmonary medicine and appreciate input, have discussed case w/ Dr. Garcia. Per my conversations w/ Dr. Metzger earlier today will plan for dialysis following CT scan tonight.  I have updated the family on patient's critical condition and discussed possibility of transfer to higher level of care if she does have bowel leak or surgical complication on CT.  They were amenable to plan.

## 2020-02-04 NOTE — PLAN OF CARE
Problem: Patient Care Overview  Goal: Plan of Care Review  Outcome: Ongoing (interventions implemented as appropriate)  Goal: Individualization and Mutuality  Outcome: Ongoing (interventions implemented as appropriate)  Goal: Discharge Needs Assessment  Outcome: Ongoing (interventions implemented as appropriate)  Goal: Interprofessional Rounds/Family Conf  Outcome: Ongoing (interventions implemented as appropriate)     Problem: Fall Risk (Adult)  Goal: Identify Related Risk Factors and Signs and Symptoms  Outcome: Ongoing (interventions implemented as appropriate)  Goal: Absence of Fall  Outcome: Ongoing (interventions implemented as appropriate)     Problem: Skin Injury Risk (Adult)  Goal: Identify Related Risk Factors and Signs and Symptoms  Outcome: Ongoing (interventions implemented as appropriate)  Goal: Skin Health and Integrity  Outcome: Ongoing (interventions implemented as appropriate)     Problem: Sepsis/Septic Shock (Adult)  Goal: Signs and Symptoms of Listed Potential Problems Will be Absent, Minimized or Managed (Sepsis/Septic Shock)  Outcome: Ongoing (interventions implemented as appropriate)

## 2020-02-04 NOTE — NURSING NOTE
Progressive Mobility    Date: 02/04/20     Mobility Initiation Contradictions: Unsecure airway device or difficult airway    Day of Mobility patient unstable on vent     Patient: did not tolerate     Assisted by 1 person/persons    Device(s) used: N/A    Selam Pierce RN   02/04/20

## 2020-02-04 NOTE — PLAN OF CARE
Problem: Restraint, Nonbehavioral (Nonviolent)  Goal: Rationale and Justification  Outcome: Ongoing (interventions implemented as appropriate)  Flowsheets (Taken 2/4/2020 1821)  Rationale and Justification: prevent line/tube removal     Problem: Restraint, Nonbehavioral (Nonviolent)  Goal: Nonbehavioral (Nonviolent) Restraint: Absence of Injury/Harm  Outcome: Ongoing (interventions implemented as appropriate)  Flowsheets (Taken 2/4/2020 1821)  Nonbehavioral (Nonviolent) Restraint: Absence of Injury/Harm: met     Problem: Restraint, Nonbehavioral (Nonviolent)  Goal: Nonbehavioral (Nonviolent) Restraint: Achievement of Discontinuation Criteria  Outcome: Ongoing (interventions implemented as appropriate)  Flowsheets (Taken 2/4/2020 1821)  Nonbehavioral (Nonviolent) Restraint: Achievement of Discontinuation Criteria: not met     Problem: Restraint, Nonbehavioral (Nonviolent)  Goal: Nonbehavioral (Nonviolent) Restraint: Preservation of Dignity and Wellbeing  Outcome: Ongoing (interventions implemented as appropriate)  Flowsheets (Taken 2/4/2020 1821)  Nonbehavioral (Nonviolent) Restraint: Preservation of Dignity and Wellbeing: met

## 2020-02-04 NOTE — OP NOTE
Insertion tunneled hemodialysis catheter right IJ    Surgeon:  Sophie Grimes M.D., F.A.C.S.    Assistant;  None    Pre-op:  End stage renal disease    Post-op:  End stage renal disease    Anesthesia: general       Indications: ESRD - nephrology recommends catheter placement       Procedure Details   After obtaining informed consent and receiving preoperative antibiotics and with venous compression boots in place, the patient was taken to the operating room and placed under anesthesia.  The right and left neck and chest were prepped and draped in a sterile fashion.  Ultrasound was utilized to evaluate the vein.  The right  neck was infiltrated with lidocaine and under ultrasound guidance the jugular vein was cannulated.  The guidewire was passed without resistance and position was confirmed under fluoroscopy.  Following this the catheter exit site was identified and the skin was infiltrated with lidocaine.  A 1 cm incision was made.  With use of the tunneling device the catheter was brought from the chest skin incision through the neck incision.  Under fluoroscopic guidance the tract was dilated and then the dilator/peel-away sheath was passed over the guidewire.  The peel-away sheath was removed and the catheter was passed.  Fluoroscopy demonstrated the catheter to be in good position.  There did not appear to be any kinking of the catheter.  There was good blood return through both ports.  The catheter was then sutured in place with 2-0 silk sutures.  The neck skin incision was closed with 3-0 Vicryl and 4-0 Vicryl sutures and dressing was placed.  Patient tolerated the procedure well and was taken to the recovery room in stable condition where chest x-ray was obtained to document catheter placement.    Findings:    Estimated Blood Loss:  Minimal    Blood administered:  none           Drains: none           Specimens: None     Grafts and Implants: 23 palindrone       Complications:  none           Disposition:  PACU - hemodynamically stable.           Condition: stable

## 2020-02-04 NOTE — PROGRESS NOTES
THC Physician - Brief Progress Note  PERMANENT  02/04/2020 01:45    Advanced ICU Care  [facility] - U - 10 - [place], [place] ([facility])    BENTLEYMATT    Date of Service 02/04/2020 01:45    HPI/Events of Note Spoke w/ RN  LA 7.1  HR remains 140-150.  Hemodynamically stable. -122/80-90  pt is more comfortable after dilaudid and has fallen asleep at times but still tachycardic.    albumin 25gm IV q1h X3 doses.   repeat LA in a few hrs.      Interventions Intermediate-Diagnostic test evaluation        Electronically Signed by: Rita Doshi) on 02/04/2020 01:47

## 2020-02-04 NOTE — PROGRESS NOTES
THC Physician - Brief Progress Note  PERMANENT  02/04/2020 05:06    Advanced ICU Care  [facility] - CCU - 10 - [place], [place] ([facility])    MATT HAGAN    Date of Service 02/04/2020 05:06    HPI/Events of Note RN reporting  LA 7.1 > 7.5  KODI happeing, 1.9 > 2.5 Cr      Interventions Minor-Communication with other healthcare providers and/or family, Routine modifications to care plan (e.g. PRN medications for pain, fever)        Electronically Signed by: Farrukh Donis) on 02/04/2020 05:09    Annotated By: Farrukh Donis)    Date: 02/04/20 05:31  ABG now is 7.22/30/73 on 3 L NC  Mainly metabolic, can consider BIPAP if work of breathing is more.

## 2020-02-04 NOTE — ANESTHESIA PREPROCEDURE EVALUATION
Anesthesia Evaluation     Patient summary reviewed and Nursing notes reviewed   no history of anesthetic complications:  NPO Solid Status: > 8 hours  NPO Liquid Status: > 8 hours           Airway   Mallampati: II  TM distance: >3 FB  Neck ROM: full  no difficulty expected  Dental - normal exam     Pulmonary    (+) shortness of breath, decreased breath sounds,   (-) asthma, not a smoker  Cardiovascular - normal exam  Exercise tolerance: good (4-7 METS)    NYHA Classification: II    (+) orthopnea, BEDOLLA,   (-) hypertension, past MI, dysrhythmias, angina, CHF      Neuro/Psych  (+) headaches, psychiatric history Anxiety and Depression,     (-) seizures, CVA  GI/Hepatic/Renal/Endo    (+) obesity,  GERD,  liver disease,   (-) no renal disease, diabetes, no thyroid disorder    Musculoskeletal (-) negative ROS    Abdominal  - normal exam    Bowel sounds: normal.   Substance History - negative use  (-) alcohol use, drug use     OB/GYN negative ob/gyn ROS         Other      history of cancer active    (-) arthritis                    Anesthesia Plan    ASA 3     general     intravenous induction     Anesthetic plan, all risks, benefits, and alternatives have been provided, discussed and informed consent has been obtained with: patient and spouse/significant other.  Use of blood products discussed with patient and spouse/significant other  Consented to blood products.

## 2020-02-04 NOTE — PAYOR COMM NOTE
"[facility]  NPI: [id number]    Utilization Review   Contact:Toyin Solorio MSN, APRN, NP-C  Phone: [phone]  Fax: [phone]    Passport / Attn: jemma  Continue Stay Update  REF# [id number]      Matt Norman (44 y.o. Female)     Date of Birth Social Security Number Address Home Phone MRN    1975  [address] [facility] [place] [address]  [id number]    Yarsani Marital Status          None        Admission Date Admission Type Admitting Provider Attending Provider Department, Room/Bed    1/31/20 Emergency Denny Coy MD Parks, Andrew, MD [facility] CRITICAL CARE, [facility]/    Discharge Date Discharge Disposition Discharge Destination                       Attending Provider:  Saad Garcia MD    Allergies:  Bactrim [Sulfamethoxazole-trimethoprim], Metronidazole, Sulfa Antibiotics    Isolation:  None   Infection:  None   Code Status:  CPR    Ht:  160 cm (63\")   Wt:  103 kg (227 lb 9.6 oz)    Admission Cmt:  None   Principal Problem:  Sepsis (CMS/HCC) [A41.9]                 Active Insurance as of 1/31/2020     Primary Coverage     Payor Plan Insurance Group Employer/Plan Group    [employer] PAS[employer]ORT [id number]     Payor Plan Address Payor Plan Phone Number Payor Plan Fax Number Effective Dates    [address] [phone]  11/1/1997 - None Entered    [facility] [id number]       Subscriber Name Subscriber Birth Date Member ID       MATT NROMAN 1975 [id number]                 Emergency Contacts      (Rel.) Home Phone Work Phone Mobile Phone    Danilo Norman (Spouse) -- [phone]          Saad Garcia MD   Physician   Medicine   Progress Notes   Addendum   Date of Service:  02/03/20 1223   Creation Time:  02/03/20 1223            Expand All Collapse All     Show:Clear all  [x]Manual[x]Template[x]Copied    Added by:  [x]Saad Garcia MD    []Jl for details       [facility] HOSPITALIST PROGRESS NOTE     Patient " "Identification:  Name:  Gracy Norman  Age:  44 y.o.  Sex:  female  :  1975  MRN:  [id number]  Visit Number:  [id number]  ROOM: [address]      Primary Care Provider:  Almas Stone MD     Length of stay in inpatient status:  3     Subjective      Chief Compliant:        Chief Complaint   Patient presents with   • Weakness - Generalized   • Post-op Problem      History of Presenting Illness:    Patient remains very ill, more tachycardic today, still acidotic, reports she feels \"okay\" overall, reports she thought her tachycardia was due to anxiety yesterday, I started her on bicarb gtt today given she clinically appears unwell, Bicarb was 9, have consulted Nephrology and appreciate recs, UOP has been minimal, ID stopped Vanc this AM but given overall clinical appearance I will continue for now along w/ Zosyn, no organisms have grown out of cultures, she denies any fevers or chills, WBC count slighly up at 28K, repeating CXR and ABG given slightly more tachypneic today.    Objective      Current Hospital Meds:  cyclobenzaprine 5 mg Oral BID   FLUoxetine 20 mg Oral Daily   heparin (porcine) 5,000 Units Subcutaneous Q8H   pantoprazole 40 mg Oral QAM   piperacillin-tazobactam 3.375 g Intravenous Q8H   sodium chloride 1,000 mL Intravenous Once   sodium chloride 10 mL Intravenous Q12H   sodium chloride 10 mL Intravenous Q12H   sodium chloride 10 mL Intravenous Q12H      Pharmacy to dose vancomycin     sodium bicarbonate drip (greater than 75 mEq/bag) 125 mEq         Current Antimicrobial Therapy:              Anti-Infectives (From admission, onward)     Ordered     Dose/Rate Route Frequency Start Stop     20 1212   Pharmacy to dose vancomycin     Ordering Provider:  Saad Garcia MD      Does not apply Continuous PRN 20 1211 02/10/20 1210     20 1601   vancomycin (VANCOCIN) 1,000 mg in sodium chloride 0.9 % 250 mL IVPB     Ordering Provider:  Denny Coy MD    1,000 mg  over 60 " Minutes Intravenous Once 02/01/20 1800 02/01/20 1812     02/01/20 0517   piperacillin-tazobactam (ZOSYN) 3.375 g/100 mL 0.9% NS IVPB (mbp)     Dorothy Salvador, [facility] reviewed the order on 02/01/20 1048.   Ordering Provider:  Denny Coy MD    3.375 g  over 4 Hours Intravenous Every 8 Hours 02/01/20 0630 02/08/20 0629 01/31/20 1949   vancomycin (VANCOCIN) 1,750 mg in sodium chloride 0.9 % 500 mL IVPB     Ordering Provider:  Waqas York MD    20 mg/kg × 93 kg Intravenous Once 01/31/20 1951 02/01/20 0000     01/31/20 1949   piperacillin-tazobactam (ZOSYN) 4.5 g/100 mL 0.9% NS IVPB (mbp)     Ordering Provider:  Waqas York MD    4.5 g Intravenous Once 01/31/20 1951 01/31/20 2129          Current Diuretic Therapy:      Diuretics (From admission, onward)     None          ----------------------------------------------------------------------------------------------------------------------  Vital Signs:  Temp:  [98.1 °F (36.7 °C)-99.5 °F (37.5 °C)] 99.5 °F (37.5 °C)  Heart Rate:  [116-144] 140  Resp:  [20-28] 23  BP: (105-139)/() 126/92  SpO2:  [93 %-99 %] 97 %  on  Flow (L/min):  [2] 2;   Device (Oxygen Therapy): nasal cannula  Body mass index is 38.44 kg/m².         Wt Readings from Last 3 Encounters:   02/03/20 98.4 kg (217 lb)   01/12/20 92.1 kg (203 lb)   01/09/20 91.6 kg (202 lb)               Intake & Output (last 3 days)        01/31 0701 - 02/01 0700 02/01 0701 - 02/02 0700 02/02 0701 - 02/03 0700 02/03 0701 - 02/04 0700     P.O.   919 1084       I.V. (mL/kg)   1193 (12.7)         Blood     300       IV Piggyback 200 2750 150 250     Total Intake(mL/kg) 200 (2.1) 4862 (51.6) 1534 (15.6) 250 (2.5)     Urine (mL/kg/hr) 225 685 (0.3) 350 (0.1)       Stool 375 425 400       Total Output 600 1110 750       Net -400 +3752 +784 +250                        Diet Full  Liquid  ----------------------------------------------------------------------------------------------------------------------  Physical exam:  Constitutional:  Well-developed and well-nourished.  Mild distress  HENT:  Head:  Normocephalic and atraumatic.  Mouth:  dry mucous membranes.    Eyes:  Conjunctivae and EOM are normal. No scleral icterus.    Neck:  Neck supple.  No JVD present.    Cardiovascular:  tachycardic, regular rhythm and normal heart sounds with no murmur.  Pulmonary/Chest:  mild respiratory distress, no wheezes, no crackles, with normal breath sounds and good air movement.  Abdominal:  Distended Soft. Mildly tender in lower quadrants.   Musculoskeletal:  No tenderness, and no deformity.  No red or swollen joints anywhere.    Neurological:  Alert and oriented to person, place, and time.  No cranial nerve deficit.  No tongue deviation.  No facial droop.  No slurred speech.   Skin:  Skin is warm and dry. No rash noted. No pallor.   Peripheral vascular:  Pulses in all 4 extremities with no clubbing, no cyanosis, no edema.  ----------------------------------------------------------------------------------------------------------------------  Tele:    ----------------------------------------------------------------------------------------------------------------------              Results from last 7 days   Lab Units 02/03/20  0112 02/02/20  1903 02/02/20  1512 02/02/20  0120   02/01/20  0840   01/31/20  1955   CRP mg/dL  --   --   --   --   --  29.28*  --  30.94*   LACTATE mmol/L 3.1* 3.3* 3.7*  --    < > 3.8*   < > 5.2*   WBC 10*3/mm3 28.49* 26.99*  --  25.18*  --  27.93*  --  29.10*   HEMOGLOBIN g/dL 8.4* 8.2*  --  6.6*  --  7.9*  --  8.8*   HEMATOCRIT % 28.6* 27.0*  --  23.4*  --  26.7*  --  29.9*   MCV fL 82.4 80.8  --  81.5  --  80.4  --  79.5   MCHC g/dL 29.4* 30.4*  --  28.2*  --  29.6*  --  29.4*   PLATELETS 10*3/mm3 206 217  --  273  --  362  --  483*   INR    --   --   --   --   --  1.34*  --    --     < > = values in this interval not displayed.           Results from last 7 days   Lab Units 02/03/20  1205   PH, ARTERIAL pH units 7.218*   PO2 ART mm Hg 85.4   PCO2, ARTERIAL mm Hg 24.0*   HCO3 ART mmol/L 9.8*               Results from last 7 days   Lab Units 02/03/20  0112 02/02/20  1903 02/02/20  0120 02/01/20  0840 01/31/20 1955   SODIUM mmol/L 130* 134* 132* 131* 130*   POTASSIUM mmol/L 4.2 4.3 4.6 4.8 5.0   MAGNESIUM mg/dL  --   --  2.5 1.5* 1.6   CHLORIDE mmol/L 100 100 100 99 94*   CO2 mmol/L 9.4* 11.1* 11.2* 12.3* 13.4*   BUN mg/dL 25* 24* 26* 26* 26*   CREATININE mg/dL 1.96* 1.91* 2.27* 2.10* 1.95*   EGFR IF NONAFRICN AM mL/min/1.73 28* 29* 24* 26* 28*   CALCIUM mg/dL 8.3* 8.0* 7.5* 7.8* 8.8   PHOSPHORUS mg/dL  --   --   --  3.9  --    GLUCOSE mg/dL 98 94 91 96 122*   ALBUMIN g/dL 3.77  --  3.06* 2.13* 2.83*   BILIRUBIN mg/dL 1.2  --  0.6 0.5 0.5   ALK PHOS U/L 509*  --  556* 753* 958*   AST (SGOT) U/L 63*  --  77* 103* 121*   ALT (SGPT) U/L 17  --  20 27 32   Estimated Creatinine Clearance: 41.8 mL/min (A) (by C-G formula based on SCr of 1.96 mg/dL (H)).  No results found for: AMMONIA        Results from last 7 days   Lab Units 02/01/20  0840 01/31/20 1955   TROPONIN T ng/mL <0.010 <0.010            Results from last 7 days   Lab Units 02/03/20  0112 01/31/20 1955   PROBNP pg/mL 1,467.0* 214.1          No results found for: HGBA1C, POCGLU        Lab Results   Component Value Date     TSH 9.270 (H) 01/31/2020     FREET4 0.97 02/01/2020      No results found for: PREGTESTUR, PREGSERUM, HCG, HCGQUANT         Pain Management Panel         Pain Management Panel Latest Ref Rng & Units 2/1/2020     CREATININE UR mg/dL 146.0                                   Brief Urine Lab Results  (Last result in the past 365 days)       Color   Clarity   Blood   Leuk Est   Nitrite   Protein   CREAT   Urine HCG         02/02/20 2053 Dark Yellow Turbid Large (3+) Negative Negative 100 mg/dL (2+)                           Blood Culture   Date Value Ref Range Status   01/31/2020 No growth at 2 days   Preliminary   01/31/2020 No growth at 2 days   Preliminary      No results found for: URINECX  No results found for: WOUNDCX  No results found for: STOOLCX  No results found for: RESPCX  No results found for: AFBCX              Results from last 7 days   Lab Units 02/03/20  0112 02/02/20  1903 02/02/20  1512 02/01/20  1948 02/01/20  1618 02/01/20  0840 02/01/20  0022 01/31/20  1955   LACTATE mmol/L 3.1* 3.3* 3.7* 3.8* 4.6* 3.8* 3.6* 5.2*   CRP mg/dL  --   --   --   --   --  29.28*  --  30.94*      I have personally looked at the labs and they are summarized above.  ----------------------------------------------------------------------------------------------------------------------  Detailed radiology reports for the last 24 hours:              Imaging Results (Last 24 Hours)      Procedure Component Value Units Date/Time     XR Chest 1 View [[id number]] Resulted:  02/03/20 1134       Updated:  02/03/20 1205     CT Abdomen Pelvis Without Contrast [[id number]] Collected:  02/02/20 2008       Updated:  02/02/20 2010     Narrative:        CT Abdomen Pelvis WO 2/2/2020     INDICATION:   Sepsis and abdominal pain beginning 1/31/2020. Colon carcinoma with liver metastases.     TECHNIQUE:   CT of the abdomen and pelvis without IV contrast. Coronal and sagittal reconstructions were obtained.  Radiation dose reduction techniques included automated exposure control or exposure modulation based on body size. Count of known CT and cardiac nuc  med studies performed in previous 12 months: 5.      COMPARISON:   1/31/2020     FINDINGS:  Abdomen: Exam is severely limited by lack of oral and intravenous contrast. Multiple ill-defined masses are seen throughout both hepatic lobes characteristic of the patient's known metastatic disease. Fatty infiltration of the liver is noted. The spleen,  pancreas, gallbladder and biliary tree and right adrenal gland  are normal. Stable low-density lesion on the left adrenal gland compared with 1/31/2020. This may represent metastatic disease or possibly an adrenal adenoma. There is increased density  within the lower pole collecting system of the right kidney suggesting nonobstructing stones. The left kidney is normal. Exam is limited by patient motion with resulting artifact.     Pelvis: Right lower quadrant ileostomy with no evidence of bowel obstruction. Ill-defined soft tissue mass involving the cecum characteristic of the patient's known colon carcinoma with extension into the surrounding mesentery. Extensive mesenteric and  retroperitoneal lymphadenopathy is unchanged. There is a small to moderate amount of ascites within the peritoneal cavity. No abscess is seen. Images of the lung bases demonstrate moderate right and small left pleural effusions with bibasilar  atelectasis.        Impression:           1. Exam severely limited by lack of oral and intravenous contrast. The exam is also limited by patient motion with resulting artifact.  2. Extensive hepatic metastatic disease as noted previously.  3. Ill-defined mass involving the cecum with involvement of the surrounding mesenteric. Mesenteric lymphadenopathy is unchanged compared with 1/31/2020.  4. Nonobstructing right renal stones.  5. Small to moderate amount of ascites.  6. Right lower quadrant ileostomy. No evidence of bowel obstruction.  6. Moderate right and small left pleural effusions with bibasilar atelectasis.  7. Stable low-density mass on the left adrenal gland. It could represent metastatic disease, or possibly an adrenal adenoma.              Signer Name: Bert Emerson MD   Signed: 2/2/2020 8:08 PM   Workstation Name: [id number]    [facility]     XR Chest 1 View [[id number]] Collected:  02/02/20 1926       Updated:  02/02/20 1928     Narrative:        CR Chest 1 Vw 2/2/2020     INDICATION:   Acute onset of shortness of breath and  sepsis today.      COMPARISON:    None available.     FINDINGS:  Single portable AP view(s) of the chest.     Left subclavian central line tip is in the superior vena cava.     The heart is normal in size. Haziness is seen at the right lung base suggesting pleural effusion layering posteriorly with bibasilar atelectasis or infiltrates. Poor inspiratory result. No pneumothorax.         Impression:        Poor inspiratory result and right pleural effusion with bibasilar atelectasis or infiltrates.     Signer Name: Bert Emerson MD   Signed: 2/2/2020 7:26 PM   Workstation Name: [id number]    [facility]          Assessment & Plan    #Septic shock 2/2 SBP  #Lactic acidosis   - On presentation (WBC 29,100, CRP 30.94, lactic acid 5.2, temperature 102.4, heart rate 152, blood pressure 72/60  - CT chest showed some bibasilar consolidations that are likely atelectasis   - Symptoms of abdominal pain more consistent with SBP  - Diagnostic paracentesis performed on 2/1 that revealed ANC: 3400. Confirming SBP. Culture NGTD  - Continue Zosyn, added back Vanc today as had been d/c'd for some reason  - Repeat CXR and ABG, continue to monitor in CCU as appears critically ill.     #Oliguric KODI c/b AGMA suspect possible RTA  - Baseline Cr 0.7-0.9, Cr on presentation 1.95=>2.10=>2.27; Today 1.96  - Potential etiology include prerenal, ATN 2/2 sepsis , hepatorenal syndrome  - CT abdomen/pelvis did not reveal urinary obstruction.  - Urine sodium <20, consistent with severe prerenal vs. Hepatorenal   - Did not appear to improve significantly w/ albumin challenge per previous MD, have consulted Nephrology and appreciate input, per nursing who spoke w/ Nephrology they are considering dialysis tomorrow if not improved, have started on Bicarb gtt due to bicarb of 9 and acidotic.     #Stage 4 colon cancer with bulky lymphadenopathy and widely disseminated liver metastases and adrenal masses  - Scheduled to f/u with  oncology at  on 2/3. Expected to start chemo soon. Suspect treatments would be palliative in nature.   - CT Abd/Pelvis 2/3 showed extensive hepatic mets, ill-defined cecal mass involving surrounding mesentery, mesenteric LAD, small-mod ascites, stable low density mass L adrenal gland.  - F/u outpatient pending improvement in sepsis     #Normocytic anemia   - Hemoglobin has been trending down. 10 on 1/2/20  - 8.8=>7.9=>6.6, s/p 1 unit of pRBCs; Today 8.4  - No signs of active bleeding, BUN/creatinine not elevated, suspect  hemoconcentrated on admission and fluids have diluted   - Iron studies consistent with SNEHAL and AOCD, B12 and folate normal.   - Continue IV PPI for now, monitor for signs of bleeding     #Respiratory Alkalosis  - Likely 2/2 above AGMA, compensatory, address as per above     #Euthyroid  - TSH 9, free T4 normal, f/u as outpatient.     #Hypomagnesemia   - Replaced     #Diverting loop ileostomy  - Performed 1/17 at  for SBO     #Obesity  - BMI 38, complicates all aspects of care.     F: Oral  E: Monitor & Replace PRN  N: Regular  Ppx: SQH  Code: Full Code     Dispo: Pending clinical improvement and workup     *This patient is considered high risk due to septic shock, KODI, underlying colon cancer     VTE Prophylaxis:              Mechanical Order History:                 None                             Pharmalogical Order History:      Ordered     Dose Route Frequency Stop     02/01/20 1455   heparin (porcine) 5000 UNIT/ML injection 5,000 Units      5,000 Units SC Every 8 Hours Scheduled --     02/01/20 0309   enoxaparin (LOVENOX) syringe 40 mg  Status:  Discontinued      40 mg SC Every 24 Hours 02/01/20 1455          Saad Garcia MD  [facility]  02/03/20  12:25 PM      Revision History                             Danya Esquivel, RN   Registered Nurse   Case Management   Progress Notes   Signed   Date of Service:  02/03/20 1344   Creation Time:  02/03/20 1344             Signed            Show:Clear all  []Manual[x]Template[]Copied    Added by:  [x]Danya Esquivel RN    []Jl for details  Continued Stay Note  KARLA Pozo     Patient Name: Gracy Norman                 MRN: [id number]  Today's Date: 2/3/2020                       Admit Date: 2020          Discharge Plan      Row Name 20 1344           Plan     Plan Comments  CM spoke with pt today.  Huaband at bedside.  Pt. states that she would like to go outside and get some fresh air.  Today was supposed to be her 1st chemo treatment at [facility]  Patient states that she may have to have HD tomorrow because her kidneys are not getting any better.  Patient still plans to return home at discharge and would like to have a RW.  No other issues or concerns are noted at this time.  CM will continue to follow and assist with any discharge needs.            Discharge Codes    No documentation.              Expected Discharge Date and Time      Expected Discharge Date Expected Discharge Time     2020                  RADHA Lozano Wajdi Samir, MD   Physician   Infectious Disease   Consults   Signed   Date of Service:  20   Creation Time:  20         Consult Orders   Inpatient Infectious Diseases Consult [[id number]] ordered by Denny Coy MD at 20 0209          Signed        Expand All Collapse All     Show:Clear all  [x]Manual[x]Template[]Copied    Added by:  [x]Noelle Helton APRN[x]Keyona Donohue MD    []Jl for details             INFECTIOUS DISEASE CONSULTATION REPORT           Patient Identification:  Name:  Gracy Norman  Age:  44 y.o.  Sex:  female  :  1975  MRN:  [id number]   Visit Number:  [id number]  Primary Care Physician:  Almas Stone MD         LOS: 3 days         Subjective            Subjective         History of present illness:       Thank you Dr. Garcia for allowing us to participate in the  care of your patient.  As you well know, Ms. Gracy Norman is a 44 y.o. female with past medical history significant for adenocarcinoma of the cecum stage IVb, anxiety, GERD, obesity, who presented to [facility] Emergency Department on 1/31/2020 for abdominal pain.  Lactic acid 5.2 on admission, now normalized.  WBC 28.49.  Mycoplasma negative.  Legionella negative.  Respiratory panel PCR negative.  Strep pneumo negative.  Influenza negative.  Urinalysis unremarkable.  Chest x-ray reports mild improvement in basilar airspace disease.  CT of the abdomen reports extensive hepatic metastatic disease, ill-defined mass involving the cecum the surrounding mesenteric, nonobstructing right renal stones, ascites, right lower quadrant ileostomy, moderate right and small left pleural effusions with bibasilar atelectasis, low density mass on the left adrenal gland.  Blood cultures show no growth thus far.        Infectious Disease consultation was requested for antimicrobial management.        ---------------------------------------------------------------------------------------------------------------------      Review Of Systems:     Constitutional: no fever, chills and night sweats. No appetite change or unexpected weight change. No fatigue.  Eyes: no eye drainage, itching or redness.  HEENT: no mouth sores, dysphagia or nose bleed.  Respiratory: no for shortness of breath, cough or production of sputum.  Cardiovascular: no chest pain, no palpitations, no orthopnea.  Gastrointestinal: no nausea, vomiting or diarrhea. Positive abdominal tenderness, no hematemesis or rectal bleeding.  Genitourinary: no dysuria or polyuria.  Hematologic/lymphatic: no lymph node abnormalities, no easy bruising or easy bleeding.  Musculoskeletal: no muscle or joint pain.  Skin: No rash and no itching.  Neurological: no loss of consciousness, no seizure, no headache.  Behavioral/Psych: no depression or suicidal ideation.  Endocrine: no  hot flashes.  Immunologic: negative.     ---------------------------------------------------------------------------------------------------------------------      Past Medical History     Medical History        Past Medical History:   Diagnosis Date   • Adenocarcinoma of cecum, stage 4b (CMS/HCC) 01/2020   • Anxiety     • GERD (gastroesophageal reflux disease)     • Headache     • Obesity (BMI 30-39.9)     • Snoring              Past Surgical History     Surgical History         Past Surgical History:   Procedure Laterality Date   • LIVER BIOPSY N/A 1/2/2020     Procedure: LIVER BIOPSY LAPAROSCOPIC;  Surgeon: Sophie Grimes MD;  Location: [facility];  Service: General   • WA ILEOSTOMY/JEJUNOSTOMY,NONTUBE       • TUBAL ABDOMINAL LIGATION       • VENOUS ACCESS DEVICE (PORT) INSERTION N/A 1/2/2020     Procedure: INSERTION VENOUS ACCESS DEVICE;  Surgeon: Sophie Grimes MD;  Location: [facility];  Service: General            Family History           Family History   Problem Relation Age of Onset   • Hypertension Mother     • Diabetes Mother     • Cancer Father           stomach   • Breast cancer Neg Hx           Social History     Social History           Tobacco Use   • Smoking status: Never Smoker   • Smokeless tobacco: Never Used   Substance Use Topics   • Alcohol use: No   • Drug use: No         Allergies     Bactrim [sulfamethoxazole-trimethoprim]; Metronidazole; and Sulfa antibiotics  ---------------------------------------------------------------------------------------------------------------------      Home Medications:              Prior to Admission Medications      Prescriptions Last Dose Informant Patient Reported? Taking?     amoxicillin-clavulanate (AUGMENTIN) 875-125 MG per tablet 1/31/2020 Medication Bottle Yes Yes     Take 1 tablet by mouth 2 (Two) Times a Day.     cyclobenzaprine (FLEXERIL) 5 MG tablet 1/31/2020 Medication Bottle Yes Yes     Take 5 mg by mouth 2 (Two) Times a Day.     enoxaparin  (LOVENOX) 40 MG/0.4ML solution syringe 1/31/2020 Medication Bottle Yes Yes     Inject 40 mg under the skin into the appropriate area as directed Daily.     omeprazole (priLOSEC) 20 MG capsule 1/31/2020 Medication Bottle Yes Yes     Take 20 mg by mouth Daily.     acetaminophen (TYLENOL) 325 MG tablet Unknown Medication Bottle Yes No     Take 650 mg by mouth Every 6 (Six) Hours As Needed for Mild Pain .     diazePAM (VALIUM) 2 MG tablet Unknown Medication Bottle Yes No     Take 2 mg by mouth Every 8 (Eight) Hours As Needed for Anxiety.     ondansetron ODT (ZOFRAN-ODT) 4 MG disintegrating tablet Unknown Medication Bottle Yes No     Take 4 mg by mouth Every 6 (Six) Hours As Needed for Nausea or Vomiting.     oxyCODONE (ROXICODONE) 5 MG immediate release tablet Unknown Medication Bottle Yes No     Take 5 mg by mouth Every 6 (Six) Hours As Needed for Moderate Pain .          ---------------------------------------------------------------------------------------------------------------------     Objective            Objective         Hospital Scheduled Meds:     cyclobenzaprine 5 mg Oral BID   FLUoxetine 20 mg Oral Daily   heparin (porcine) 5,000 Units Subcutaneous Q8H   pantoprazole 40 mg Oral QAM   piperacillin-tazobactam 3.375 g Intravenous Q8H   sodium chloride 1,000 mL Intravenous Once   sodium chloride 10 mL Intravenous Q12H   sodium chloride 10 mL Intravenous Q12H   sodium chloride 10 mL Intravenous Q12H   Vancomycin Pharmacy Intermittent Dosing   Does not apply Daily         Pharmacy to dose vancomycin       sodium bicarbonate drip (greater than 75 mEq/bag) 125 mEq Last Rate: 125 mEq (02/03/20 1353)      ---------------------------------------------------------------------------------------------------------------------   Vital Signs:  Temp:  [98.1 °F (36.7 °C)-99.5 °F (37.5 °C)] 99.5 °F (37.5 °C)  Heart Rate:  [116-144] 140  Resp:  [20-24] 23  BP: (105-139)/() 126/92  Mean Arterial Pressure (Non-Invasive)  for the past 24 hrs (Last 3 readings):    Noninvasive MAP (mmHg)   02/03/20 1145 106   02/03/20 1130 101   02/03/20 1115 105            SpO2 Percentage     02/03/20 1115 02/03/20 1130 02/03/20 1145   SpO2: 97% 97% 97%      SpO2:  [93 %-99 %] 97 %  on  Flow (L/min):  [2] 2;   Device (Oxygen Therapy): nasal cannula     Body mass index is 38.44 kg/m².      Wt Readings from Last 3 Encounters:   02/03/20 98.4 kg (217 lb)   01/12/20 92.1 kg (203 lb)   01/09/20 91.6 kg (202 lb)     ---------------------------------------------------------------------------------------------------------------------      Physical Exam:     Constitutional:  Well-developed and well-nourished.  No respiratory distress.      HENT:  Head: Normocephalic and atraumatic.  Mouth:  Moist mucous membranes.    Eyes:  Conjunctivae and EOM are normal.  No scleral icterus.  Neck:  Neck supple.  No JVD present.    Cardiovascular:  Normal rate, regular rhythm and normal heart sounds with no murmur. No edema.  Pulmonary/Chest:  No respiratory distress, no wheezes, no crackles, with normal breath sounds and good air movement.  Abdominal:  Soft.  Bowel sounds are normal.  Positive distention and tenderness. Ileostomy.   Musculoskeletal:  No edema, no tenderness, and no deformity.  No swelling or redness of joints.  Neurological:  Alert and oriented to person, place, and time.  No facial droop.  No slurred speech.   Skin:  Skin is warm and dry.  No rash noted.  No pallor.   Psychiatric:  Normal mood and affect.  Behavior is normal.     ---------------------------------------------------------------------------------------------------------------------           Results from last 7 days   Lab Units 02/01/20  0840 01/31/20 1955   TROPONIN T ng/mL <0.010 <0.010            Results from last 7 days   Lab Units 02/03/20  0112 01/31/20 1955   PROBNP pg/mL 1,467.0* 214.1            Results from last 7 days   Lab Units 02/03/20  1205   PH, ARTERIAL pH units 7.218*    PO2 ART mm Hg 85.4   PCO2, ARTERIAL mm Hg 24.0*   HCO3 ART mmol/L 9.8*                  Results from last 7 days   Lab Units 02/03/20  0112 02/02/20 1903 02/02/20  1512 02/02/20  0120 02/01/20 0840 01/31/20 1955   CRP mg/dL  --   --   --   --   --  29.28*  --  30.94*   LACTATE mmol/L 3.1* 3.3* 3.7*  --    < > 3.8*   < > 5.2*   WBC 10*3/mm3 28.49* 26.99*  --  25.18*  --  27.93*  --  29.10*   HEMOGLOBIN g/dL 8.4* 8.2*  --  6.6*  --  7.9*  --  8.8*   HEMATOCRIT % 28.6* 27.0*  --  23.4*  --  26.7*  --  29.9*   MCV fL 82.4 80.8  --  81.5  --  80.4  --  79.5   MCHC g/dL 29.4* 30.4*  --  28.2*  --  29.6*  --  29.4*   PLATELETS 10*3/mm3 206 217  --  273  --  362  --  483*   INR    --   --   --   --   --  1.34*  --   --     < > = values in this interval not displayed.               Results from last 7 days   Lab Units 02/03/20 0112 02/02/20 1903 02/02/20  0120 02/01/20 0840 01/31/20 1955   SODIUM mmol/L 130* 134* 132* 131* 130*   POTASSIUM mmol/L 4.2 4.3 4.6 4.8 5.0   MAGNESIUM mg/dL  --   --  2.5 1.5* 1.6   CHLORIDE mmol/L 100 100 100 99 94*   CO2 mmol/L 9.4* 11.1* 11.2* 12.3* 13.4*   BUN mg/dL 25* 24* 26* 26* 26*   CREATININE mg/dL 1.96* 1.91* 2.27* 2.10* 1.95*   EGFR IF NONAFRICN AM mL/min/1.73 28* 29* 24* 26* 28*   CALCIUM mg/dL 8.3* 8.0* 7.5* 7.8* 8.8   GLUCOSE mg/dL 98 94 91 96 122*   ALBUMIN g/dL 3.77  --  3.06* 2.13* 2.83*   BILIRUBIN mg/dL 1.2  --  0.6 0.5 0.5   ALK PHOS U/L 509*  --  556* 753* 958*   AST (SGOT) U/L 63*  --  77* 103* 121*   ALT (SGPT) U/L 17  --  20 27 32   Estimated Creatinine Clearance: 41.8 mL/min (A) (by C-G formula based on SCr of 1.96 mg/dL (H)).  No results found for: AMMONIA     No results found for: HGBA1C, POCGLU  No results found for: HGBA1C        Lab Results   Component Value Date     TSH 9.270 (H) 01/31/2020     FREET4 0.97 02/01/2020               Blood Culture   Date Value Ref Range Status   01/31/2020 No growth at 2 days   Preliminary   01/31/2020 No growth at 2 days    Preliminary      No results found for: URINECX  No results found for: WOUNDCX  No results found for: STOOLCX  No results found for: RESPCX         Pain Management Panel         Pain Management Panel Latest Ref Rng & Units 2/1/2020     CREATININE UR mg/dL 146.0             I have personally reviewed the above laboratory results.   ---------------------------------------------------------------------------------------------------------------------           Imaging Results (Last 7 Days)      Procedure Component Value Units Date/Time     XR Chest 1 View [[id number]] Collected:  02/03/20 1259       Updated:  02/03/20 1302     Narrative:        EXAMINATION: XR CHEST 1 VW-      CLINICAL INDICATION:     shortness of breath; A41.9-Sepsis, unspecified  organism     TECHNIQUE:  XR CHEST 1 VW-      COMPARISON: 02/02/2020      FINDINGS:   Left Port-A-Cath stable. Elevation right hemidiaphragm stable. Airspace  disease improved. Small right pleural effusion stable. No pneumothorax.  No acute bony changes.        Impression:        Mild improvement in basilar airspace disease. Otherwise  stable chest.     This report was finalized on 2/3/2020 12:59 PM by Dr. Bert Butler MD.        CT Abdomen Pelvis Without Contrast [[id number]] Collected:  02/02/20 2008       Updated:  02/02/20 2010     Narrative:        CT Abdomen Pelvis WO 2/2/2020     INDICATION:   Sepsis and abdominal pain beginning 1/31/2020. Colon carcinoma with liver metastases.     TECHNIQUE:   CT of the abdomen and pelvis without IV contrast. Coronal and sagittal reconstructions were obtained.  Radiation dose reduction techniques included automated exposure control or exposure modulation based on body size. Count of known CT and cardiac nuc  med studies performed in previous 12 months: 5.      COMPARISON:   1/31/2020     FINDINGS:  Abdomen: Exam is severely limited by lack of oral and intravenous contrast. Multiple ill-defined masses are seen throughout both hepatic  lobes characteristic of the patient's known metastatic disease. Fatty infiltration of the liver is noted. The spleen,  pancreas, gallbladder and biliary tree and right adrenal gland are normal. Stable low-density lesion on the left adrenal gland compared with 1/31/2020. This may represent metastatic disease or possibly an adrenal adenoma. There is increased density  within the lower pole collecting system of the right kidney suggesting nonobstructing stones. The left kidney is normal. Exam is limited by patient motion with resulting artifact.     Pelvis: Right lower quadrant ileostomy with no evidence of bowel obstruction. Ill-defined soft tissue mass involving the cecum characteristic of the patient's known colon carcinoma with extension into the surrounding mesentery. Extensive mesenteric and  retroperitoneal lymphadenopathy is unchanged. There is a small to moderate amount of ascites within the peritoneal cavity. No abscess is seen. Images of the lung bases demonstrate moderate right and small left pleural effusions with bibasilar  atelectasis.        Impression:           1. Exam severely limited by lack of oral and intravenous contrast. The exam is also limited by patient motion with resulting artifact.  2. Extensive hepatic metastatic disease as noted previously.  3. Ill-defined mass involving the cecum with involvement of the surrounding mesenteric. Mesenteric lymphadenopathy is unchanged compared with 1/31/2020.  4. Nonobstructing right renal stones.  5. Small to moderate amount of ascites.  6. Right lower quadrant ileostomy. No evidence of bowel obstruction.  6. Moderate right and small left pleural effusions with bibasilar atelectasis.  7. Stable low-density mass on the left adrenal gland. It could represent metastatic disease, or possibly an adrenal adenoma.              Signer Name: Bert Emerson MD   Signed: 2/2/2020 8:08 PM   Workstation Name: [id number]    [facility]  Chest 1 View [[id number]] Collected:  02/02/20 1926       Updated:  02/02/20 1928     Narrative:        CR Chest 1 Vw 2/2/2020     INDICATION:   Acute onset of shortness of breath and sepsis today.      COMPARISON:    None available.     FINDINGS:  Single portable AP view(s) of the chest.     Left subclavian central line tip is in the superior vena cava.     The heart is normal in size. Haziness is seen at the right lung base suggesting pleural effusion layering posteriorly with bibasilar atelectasis or infiltrates. Poor inspiratory result. No pneumothorax.         Impression:        Poor inspiratory result and right pleural effusion with bibasilar atelectasis or infiltrates.     Signer Name: Bert Emerson MD   Signed: 2/2/2020 7:26 PM   Workstation Name: [id number]    [facility]     CT Chest Without Contrast [[id number]] Collected:  01/31/20 2149       Updated:  01/31/20 2151     Narrative:        CT Chest WO     INDICATION:   Sepsis with recent abdominal surgery for advanced colon cancer.     TECHNIQUE:   CT of the thorax without IV contrast. Coronal and sagittal reconstructions were obtained.  Radiation dose reduction techniques included automated exposure control or exposure modulation based on body size. Count of known CT and cardiac nuc med studies  performed in previous 12 months: 2.      COMPARISON:   None available.     FINDINGS:  Right pleural effusion layering to a depth of 2.5 cm and a small left pleural effusion layering to a depth of less than a centimeter. Right-sided volume loss with atelectasis right lung base. Minimal left basilar atelectasis. No suspicious nodules or  masses. No focal consolidation. Small amount of vertical atelectasis anterior right upper lobe.     Heart size normal. Extensive anterior mediastinal lymphadenopathy which in the setting of known malignancy is concerning for metastases. Venous access port noted over the left upper chest with distal tip in the  SVC. Widely disseminated liver metastases.  Please see CT abdomen and pelvis for remainder of findings within the abdomen and pelvis. Osseous structures and thoracic inlet unremarkable.        Impression:        Small bilateral pleural effusions right greater than left with bibasilar volume loss right greater than left. Atelectasis favored over focal consolidation.     Multiple enlarged anterior mediastinal lymph nodes largest measuring 1.2 x 1.5 cm and in the setting of known malignancy is highly concerning for metastatic disease. No definite pulmonary metastasis.     Signer Name: BUSTER Olivares MD   Signed: 1/31/2020 9:49 PM   Workstation Name: [id number]    [facility]     CT Abdomen Pelvis Without Contrast [[id number]] Collected:  01/31/20 2131       Updated:  01/31/20 2133     Narrative:        CT Abdomen Pelvis WO     INDICATION:   Abdominal pain with fever and sepsis. Recent surgery. Reported colon cancer and pain around surgical area.     TECHNIQUE:   CT of the abdomen and pelvis without IV contrast. Coronal and sagittal reconstructions were obtained.  Radiation dose reduction techniques included automated exposure control or exposure modulation based on body size. Count of known CT and cardiac nuc  med studies performed in previous 12 months: 2.      COMPARISON:   CT abdomen and pelvis 1/12/2020     FINDINGS:  Abdomen: Small bilateral pleural effusions right greater than left with bibasilar atelectasis. Multiple hypodense and hyperdense liver lesions compatible with widely disseminated liver metastases. No ductal dilatation. Spleen and gallbladder are  unremarkable. Pancreas, kidneys and adrenal glands are unremarkable except for a small nonobstructing right renal stone.     Patient has undergone apparent diverting ileostomy. Soft tissue mass within the right lower quadrant measuring 8.69 6.8 x 8.9 cm most compatible with colon carcinoma involving the cecum and right colon with  extension into the mesentery. Extensive  mesenteric and retroperitoneal lymphadenopathy. Increasing ascites when compared to 1/12/2020. Stomach and small bowel unremarkable.     Pelvis: Bladder decompressed. Uterus and adnexa are unremarkable. Moderate amount of induration and edema within the subcutaneous tissues along the lateral abdomen which may be related to third spacing of fluid. No drainable fluid collection or abscess.        Impression:        Postoperative changes from apparent diverting ileostomy with a normal-appearing right lower anterior abdominal wall ostomy.     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 Extensive retroperitoneal and mesenteric lymphadenopathy concerning for metastatic adenopathy.     Moderate amount of ascites which has increased from January 12.     Extensive liver metastases.     Small bilateral pleural effusions with bibasilar atelectasis right greater than left.     Moderate amount of induration and edema within the subcutaneous tissues along the flank regions bilaterally most likely related to third spacing of fluid and recent operative intervention. No definitive abscess.              Signer Name: BUSTER Olivares MD   Signed: 1/31/2020 9:31 PM   Workstation Name: [facility]    [facility]     XR Chest 1 View [[id number]] Collected:  01/31/20 2046       Updated:  01/31/20 2048     Narrative:        XR CHEST 1 VW-     CLINICAL INDICATION: sepsis        COMPARISON: 01/12/2020      TECHNIQUE: Single frontal view of the chest.     FINDINGS:     Right basilar airspace disease  The cardiac silhouette is normal. The pulmonary vasculature is  unremarkable.  There is no evidence of an acute osseous abnormality.   There are no suspicious-appearing parenchymal soft tissue nodules.           Impression:        Appearance compatible with right basilar pneumonia     This report was  finalized on 1/31/2020 8:46 PM by Dr. Colton Salgado MD.             I have personally reviewed the above radiology results.   ---------------------------------------------------------------------------------------------------------------------        Assessment & Plan             Assessment/Plan            ASSESSMENT:     1.  Septic shock with lactic acid greater than 4 on admission  2.  Peritonitis     PLAN:     Patient presents with abdominal pain.  Lactic acid 5.2 on admission, now normalized.  WBC 28.49.  Mycoplasma negative.  Legionella negative.  Respiratory panel PCR negative.  Strep pneumo negative.  Influenza negative.  Urinalysis unremarkable.  Chest x-ray reports mild improvement in basilar airspace disease.  CT of the abdomen reports extensive hepatic metastatic disease, ill-defined mass involving the cecum the surrounding mesenteric, nonobstructing right renal stones, ascites, right lower quadrant ileostomy, moderate right and small left pleural effusions with bibasilar atelectasis, low density mass on the left adrenal gland.  Blood cultures show no growth thus far.  Body fluid culture reports 4390 nucleated cells, Gram stain reports no organism.      For now vancomycin was discontinued due to high risk of nephrotoxicity when used in conjunction with Zosyn.  In the setting of concern for peritonitis Zosyn monotherapy is empirically continued if any further worsening antifungal coverage may be added.  We will continue to follow culture results and CRP trend and adjust antibiotic therapy appropriately.     Again, thank you Dr. Garcia for allowing us to participate in the care of your patient and please feel free to call for any questions you may have.           Code Status:       Code Status and Medical Interventions:   Ordered at: 01/31/20 9417     Level Of Support Discussed With:     Patient     Code Status:     CPR     Medical Interventions (Level of Support Prior to Arrest):     Full                Noelle Helton, APRN  02/03/20  2:02 PM      Physician Attestation:     I have personally performed a face-to-face evaluation on this patient. I have collected the review of systems and performed my own physical exam. I reviewed the patient's data including history of present illness, past medical history, past surgical history and allergy list. . The assessment and plan documented above are my own after discussing the case in detail with the APC. I have reviewed the laboratory and radiological pertinent results. I have reviewed and edited the note above after discussing the findings with the APC.     Keyona Donohue MD  Infectious Diseases  02/03/20  4:32 PM                  Revision History                             Routing History                   Alycia Metzger MD   Physician   Medicine   Progress Notes   Signed   Date of Service:  02/04/20 0733   Creation Time:  02/04/20 0733            Signed        Expand All Collapse All     Show:Clear all  [x]Manual[x]Template[x]Copied    Added by:  [x]Alycia Metzger MD    []Espinozaver for details  Nephrology Progress Note           Subjective         Patient feels more short of breath today and has back pain        Objective            Vital signs :      Temp:  [98.3 °F (36.8 °C)-99.5 °F (37.5 °C)] 98.9 °F (37.2 °C)  Heart Rate:  [134-152] 137  Resp:  [17-24] 22  BP: (105-142)/() 132/73        Intake/Output Summary (Last 24 hours) at 2/4/2020 0733  Last data filed at 2/4/2020 0600      Gross per 24 hour   Intake 5608.75 ml   Output 675 ml   Net 4933.75 ml         Physical Exam:     General Appearance : in respiratory distress  Lungs : B/L lower zone crackles with decreased intensity of breath sounds  Heart :  regular rhythm & normal rate, normal S1, S2 and no murmur, no rub  Abdomen : normal bowel sounds, no masses, no hepatomegaly, no splenomegaly, soft non-tender and no guarding  Extremities : moves extremities well, 2+ edema, no cyanosis and no redness  Neurologic :    orientated to person, place, time and situation, Grossly no focal deficits  Acess :         Laboratory Data :      Albumin       Albumin   Date Value Ref Range Status   02/03/2020 3.77 3.50 - 5.20 g/dL Final   02/02/2020 3.06 (L) 3.50 - 5.20 g/dL Final   02/01/2020 2.13 (L) 3.50 - 5.20 g/dL Final       Magnesium       Magnesium   Date Value Ref Range Status   02/02/2020 2.5 1.6 - 2.6 mg/dL Final   02/01/2020 1.5 (L) 1.6 - 2.6 mg/dL Final             PTH               No results found for: PTH     CBC and coagulation:                Results from last 7 days   Lab Units 02/04/20  0356 02/04/20  0053 02/03/20  1908   02/03/20  0112 02/02/20  1903   02/01/20  0840   01/31/20  1955   PROCALCITONIN ng/mL  --   --   --   --  4.07*  --   --   --   --   --    LACTATE mmol/L 7.5* 7.1* 6.0*   < > 3.1* 3.3*   < > 3.8*   < > 5.2*   CRP mg/dL  --  31.38*  --   --   --   --   --  29.28*  --  30.94*   WBC 10*3/mm3  --  38.57*  --   --  28.49* 26.99*   < > 27.93*  --  29.10*   HEMOGLOBIN g/dL  --  9.2*  --   --  8.4* 8.2*   < > 7.9*  --  8.8*   HEMATOCRIT %  --  31.3*  --   --  28.6* 27.0*   < > 26.7*  --  29.9*   MCV fL  --  82.8  --   --  82.4 80.8   < > 80.4  --  79.5   MCHC g/dL  --  29.4*  --   --  29.4* 30.4*   < > 29.6*  --  29.4*   PLATELETS 10*3/mm3  --  178  --   --  206 217   < > 362  --  483*   INR    --   --   --   --   --   --   --  1.34*  --   --     < > = values in this interval not displayed.      Acid/base balance:         Results from last 7 days   Lab Units 02/04/20  0504 02/03/20  1751 02/03/20  1205   PH, ARTERIAL pH units 7.223* 7.244* 7.218*   PO2 ART mm Hg 73.2* 83.9 85.4   PCO2, ARTERIAL mm Hg 30.4* 26.2* 24.0*   HCO3 ART mmol/L 12.5* 11.3* 9.8*      Renal and electrolytes:            Results from last 7 days   Lab Units 02/04/20  0053 02/03/20  0112 02/02/20  1903 02/02/20  0120 02/01/20  0840 01/31/20  1955   SODIUM mmol/L 133* 130* 134* 132* 131* 130*   POTASSIUM mmol/L 4.3 4.2 4.3 4.6 4.8 5.0    MAGNESIUM mg/dL  --   --   --  2.5 1.5* 1.6   CHLORIDE mmol/L 102 100 100 100 99 94*   CO2 mmol/L 10.3* 9.4* 11.1* 11.2* 12.3* 13.4*   BUN mg/dL 26* 25* 24* 26* 26* 26*   CREATININE mg/dL 2.65* 1.96* 1.91* 2.27* 2.10* 1.95*   EGFR IF NONAFRICN AM mL/min/1.73 20* 28* 29* 24* 26* 28*   CALCIUM mg/dL 8.2* 8.3* 8.0* 7.5* 7.8* 8.8   PHOSPHORUS mg/dL  --   --   --   --  3.9  --       Estimated Creatinine Clearance: 31.7 mL/min (A) (by C-G formula based on SCr of 2.65 mg/dL (H)).     Liver and pancreatic function:          Results from last 7 days   Lab Units 02/03/20 0112 02/02/20 0120 02/01/20 0840 01/31/20 1955   ALBUMIN g/dL 3.77 3.06* 2.13* 2.83*   BILIRUBIN mg/dL 1.2 0.6 0.5 0.5   ALK PHOS U/L 509* 556* 753* 958*   AST (SGOT) U/L 63* 77* 103* 121*   ALT (SGPT) U/L 17 20 27 32   AMYLASE U/L  --   --   --  35   LIPASE U/L  --   --   --  49            Cardiac:        Results from last 7 days   Lab Units 02/03/20 0112 01/31/20 1955   PROBNP pg/mL 1,467.0* 214.1      Liver and pancreatic function:          Results from last 7 days   Lab Units 02/03/20 0112 02/02/20 0120 02/01/20 0840 01/31/20 1955   ALBUMIN g/dL 3.77 3.06* 2.13* 2.83*   BILIRUBIN mg/dL 1.2 0.6 0.5 0.5   ALK PHOS U/L 509* 556* 753* 958*   AST (SGOT) U/L 63* 77* 103* 121*   ALT (SGPT) U/L 17 20 27 32   AMYLASE U/L  --   --   --  35   LIPASE U/L  --   --   --  49         Medications :         cyclobenzaprine 5 mg Oral BID   FLUoxetine 20 mg Oral Daily   heparin (porcine) 5,000 Units Subcutaneous Q8H   pantoprazole 40 mg Oral QAM   piperacillin-tazobactam 3.375 g Intravenous Q8H   sodium chloride 1,000 mL Intravenous Once   sodium chloride 10 mL Intravenous Q12H   sodium chloride 10 mL Intravenous Q12H   sodium chloride 10 mL Intravenous Q12H         sodium bicarbonate drip (greater than 75 mEq/bag) 125 mEq Last Rate: 125 mEq (02/04/20 0512)   vasopressin (PITRESSIN) infusion 0.04 Units/min Last Rate: Stopped (02/03/20 5589)                Assessment/Plan         1. Sever sepsis with septic shock likely from peritonitis  2. Metastatic poorly differentiated adenocarcinoma of colon origin s/p diverting loop ileostomy on 1/17/2020  3. KODI  4. AG metabolic acidosis due to lactic acidosis, Non Gap MA likely 2/2 Type IV RTA with KODI     Continues to be oliguric, now with signs of volume overload  KODI likely ATN, oliguric due to septic shock  Baseline Cr 1, ->2.65  -Given severity of resistant metabolic acidosis, oliguric KODI and volume overload, will start her on SLED, will start with 4 hours today and 6 hours tomorrow.   -continue HCO3 drip until she gets on dialysis  -OK to get CT abd with contrast followed by dialysis     D/W with Dr Jose Metzger MD  02/04/20  7:33 AM

## 2020-02-04 NOTE — ANESTHESIA POSTPROCEDURE EVALUATION
Patient: Gracy Norman    Procedure Summary     Date:  02/04/20 Room / Location:   [facility] 02 /  [facility]    Anesthesia Start:  1328 Anesthesia Stop:  1506    Procedures:       HEMODIALYSIS CATHETER INSERTION (Right )      CENTRAL LINE PLACEMENT (Left ) Diagnosis:       Sepsis, due to unspecified organism, unspecified whether acute organ dysfunction present (CMS/HCC)      (Sepsis, due to unspecified organism, unspecified whether acute organ dysfunction present (CMS/[facility]) [A41.9])    Surgeon:  Sophie Grimes MD Provider:  Rich Lubin MD    Anesthesia Type:  general ASA Status:  3          Anesthesia Type: general    Vitals  Vitals Value Taken Time   /81 2/4/2020  3:11 PM   Temp     Pulse 140 2/4/2020  3:14 PM   Resp     SpO2 100 % 2/4/2020  3:14 PM   Vitals shown include unvalidated device data.        Post Anesthesia Care and Evaluation    Patient location during evaluation: ICU  Patient participation: complete - patient cannot participate  Level of consciousness: obtunded/minimal responses  Pain score: 1  Pain management: adequate  Airway patency: patent  Anesthetic complications: No anesthetic complications  PONV Status: controlled  Cardiovascular status: acceptable and hemodynamically stable  Respiratory status: acceptable, ETT and ventilator  Hydration status: acceptable  No anesthesia care post op

## 2020-02-04 NOTE — ANESTHESIA PROCEDURE NOTES
Airway  Urgency: elective    Date/Time: 2/4/2020 1:37 PM  End Time:2/4/2020 1:37 PM  Airway not difficult    General Information and Staff    Patient location during procedure: OR  CRNA: Nadeem Warren CRNA    Indications and Patient Condition  Indications for airway management: airway protection    Preoxygenated: yes  MILS maintained throughout  Mask difficulty assessment: 0 - not attempted    Final Airway Details  Final airway type: endotracheal airway      Successful airway: ETT  Cuffed: yes   Successful intubation technique: direct laryngoscopy  Endotracheal tube insertion site: oral  Blade: Fabi  Blade size: 3  ETT size (mm): 7.5  Cormack-Lehane Classification: grade IIa - partial view of glottis  Placement verified by: chest auscultation, capnometry and palpation of cuff   Cuff volume (mL): 8  Measured from: lips  ETT/EBT  to lips (cm): 21  Number of attempts at approach: 1  Assessment: lips, teeth, and gum same as pre-op and atraumatic intubation

## 2020-02-04 NOTE — PROGRESS NOTES
Nephrology Progress Note      Subjective     Patient feels more short of breath today and has back pain    Objective       Vital signs :     Temp:  [98.3 °F (36.8 °C)-99.5 °F (37.5 °C)] 98.9 °F (37.2 °C)  Heart Rate:  [134-152] 137  Resp:  [17-24] 22  BP: (105-142)/() 132/73      Intake/Output Summary (Last 24 hours) at 2/4/2020 0733  Last data filed at 2/4/2020 0600  Gross per 24 hour   Intake 5608.75 ml   Output 675 ml   Net 4933.75 ml       Physical Exam:    General Appearance : in respiratory distress  Lungs : B/L lower zone crackles with decreased intensity of breath sounds  Heart :  regular rhythm & normal rate, normal S1, S2 and no murmur, no rub  Abdomen : normal bowel sounds, no masses, no hepatomegaly, no splenomegaly, soft non-tender and no guarding  Extremities : moves extremities well, 2+ edema, no cyanosis and no redness  Neurologic :   orientated to person, place, time and situation, Grossly no focal deficits  Acess :       Laboratory Data :     Albumin Albumin   Date Value Ref Range Status   02/03/2020 3.77 3.50 - 5.20 g/dL Final   02/02/2020 3.06 (L) 3.50 - 5.20 g/dL Final   02/01/2020 2.13 (L) 3.50 - 5.20 g/dL Final      Magnesium Magnesium   Date Value Ref Range Status   02/02/2020 2.5 1.6 - 2.6 mg/dL Final   02/01/2020 1.5 (L) 1.6 - 2.6 mg/dL Final          PTH               No results found for: PTH    CBC and coagulation:  Results from last 7 days   Lab Units 02/04/20  0356 02/04/20  0053 02/03/20  1908  02/03/20  0112 02/02/20  1903  02/01/20  0840  01/31/20  1955   PROCALCITONIN ng/mL  --   --   --   --  4.07*  --   --   --   --   --    LACTATE mmol/L 7.5* 7.1* 6.0*   < > 3.1* 3.3*   < > 3.8*   < > 5.2*   CRP mg/dL  --  31.38*  --   --   --   --   --  29.28*  --  30.94*   WBC 10*3/mm3  --  38.57*  --   --  28.49* 26.99*   < > 27.93*  --  29.10*   HEMOGLOBIN g/dL  --  9.2*  --   --  8.4* 8.2*   < > 7.9*  --  8.8*   HEMATOCRIT %  --  31.3*  --   --  28.6* 27.0*   < > 26.7*  --  29.9*    MCV fL  --  82.8  --   --  82.4 80.8   < > 80.4  --  79.5   MCHC g/dL  --  29.4*  --   --  29.4* 30.4*   < > 29.6*  --  29.4*   PLATELETS 10*3/mm3  --  178  --   --  206 217   < > 362  --  483*   INR   --   --   --   --   --   --   --  1.34*  --   --     < > = values in this interval not displayed.     Acid/base balance:  Results from last 7 days   Lab Units 02/04/20  0504 02/03/20  1751 02/03/20  1205   PH, ARTERIAL pH units 7.223* 7.244* 7.218*   PO2 ART mm Hg 73.2* 83.9 85.4   PCO2, ARTERIAL mm Hg 30.4* 26.2* 24.0*   HCO3 ART mmol/L 12.5* 11.3* 9.8*     Renal and electrolytes:  Results from last 7 days   Lab Units 02/04/20  0053 02/03/20  0112 02/02/20  1903 02/02/20  0120 02/01/20  0840 01/31/20  1955   SODIUM mmol/L 133* 130* 134* 132* 131* 130*   POTASSIUM mmol/L 4.3 4.2 4.3 4.6 4.8 5.0   MAGNESIUM mg/dL  --   --   --  2.5 1.5* 1.6   CHLORIDE mmol/L 102 100 100 100 99 94*   CO2 mmol/L 10.3* 9.4* 11.1* 11.2* 12.3* 13.4*   BUN mg/dL 26* 25* 24* 26* 26* 26*   CREATININE mg/dL 2.65* 1.96* 1.91* 2.27* 2.10* 1.95*   EGFR IF NONAFRICN AM mL/min/1.73 20* 28* 29* 24* 26* 28*   CALCIUM mg/dL 8.2* 8.3* 8.0* 7.5* 7.8* 8.8   PHOSPHORUS mg/dL  --   --   --   --  3.9  --      Estimated Creatinine Clearance: 31.7 mL/min (A) (by C-G formula based on SCr of 2.65 mg/dL (H)).    Liver and pancreatic function:  Results from last 7 days   Lab Units 02/03/20  0112 02/02/20  0120 02/01/20 0840 01/31/20 1955   ALBUMIN g/dL 3.77 3.06* 2.13* 2.83*   BILIRUBIN mg/dL 1.2 0.6 0.5 0.5   ALK PHOS U/L 509* 556* 753* 958*   AST (SGOT) U/L 63* 77* 103* 121*   ALT (SGPT) U/L 17 20 27 32   AMYLASE U/L  --   --   --  35   LIPASE U/L  --   --   --  49         Cardiac:  Results from last 7 days   Lab Units 02/03/20 0112 01/31/20 1955   PROBNP pg/mL 1,467.0* 214.1     Liver and pancreatic function:  Results from last 7 days   Lab Units 02/03/20  0112 02/02/20  0120 02/01/20 0840 01/31/20 1955   ALBUMIN g/dL 3.77 3.06* 2.13* 2.83*   BILIRUBIN  mg/dL 1.2 0.6 0.5 0.5   ALK PHOS U/L 509* 556* 753* 958*   AST (SGOT) U/L 63* 77* 103* 121*   ALT (SGPT) U/L 17 20 27 32   AMYLASE U/L  --   --   --  35   LIPASE U/L  --   --   --  49       Medications :       cyclobenzaprine 5 mg Oral BID   FLUoxetine 20 mg Oral Daily   heparin (porcine) 5,000 Units Subcutaneous Q8H   pantoprazole 40 mg Oral QAM   piperacillin-tazobactam 3.375 g Intravenous Q8H   sodium chloride 1,000 mL Intravenous Once   sodium chloride 10 mL Intravenous Q12H   sodium chloride 10 mL Intravenous Q12H   sodium chloride 10 mL Intravenous Q12H       sodium bicarbonate drip (greater than 75 mEq/bag) 125 mEq Last Rate: 125 mEq (02/04/20 0512)   vasopressin (PITRESSIN) infusion 0.04 Units/min Last Rate: Stopped (02/03/20 2316)         Assessment/Plan     1. Sever sepsis with septic shock likely from peritonitis  2. Metastatic poorly differentiated adenocarcinoma of colon origin s/p diverting loop ileostomy on 1/17/2020  3. KODI  4. 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   -continue HCO3 drip until she gets on dialysis  -OK to get CT abd with contrast followed by dialysis    D/W with Dr Jose Metzger MD  02/04/20  7:33 AM

## 2020-02-05 NOTE — PROGRESS NOTES
[facility] HOSPITALIST PROGRESS NOTE     Patient Identification:  Name:  Gracy Norman  Age:  44 y.o.  Sex:  female  :  1975  MRN:  [id number]  Visit Number:  [id number]  ROOM: [address]     Primary Care Provider:  Almas Stone MD    Length of stay in inpatient status:  5    Subjective     Chief Compliant:    Chief Complaint   Patient presents with   • Weakness - Generalized   • Post-op Problem     History of Presenting Illness:    Patient remains critically ill today, no acute events overnight, got CT Abd/pelvis which did not demonstrate large leak but did show extensive metastatic disease and likely metastatic ascites w/ loculated fluid collection c/f infection, have consulted IR for repeat paracentesis/drainage, have consulted Heme/Onc to discuss overall prognosis of underlying cancer w/ family, I consulted Dr. Garcia yesterday evening and have discussed the case w/ him, he has been managing vent and acid base disturbances and her pH is much improved today, BP is still stable though on a little bit of levophed now, HR has much improved and now normal rate, I have discussed case w/ Dr. Metzger today and we initiated HD yesterday evening and planning for SLED today, her lactate continues to rise but WBC and CRP are flat, she has continued to be febrile but Bcx's and para Cx's have been NGTD, ID following and on broad spectrum Abx, added micafungin yesterday, I had long GOC w/ family yesterday including daughter, , sister, mother and would like to continue to pursue all interventions at this time.    Objective     Current Hospital Meds:  chlorhexidine 15 mL Mouth/Throat Q12H   heparin (porcine) 5,000 Units Subcutaneous Q8H   hydrocortisone sodium succinate 50 mg Intravenous Q6H   micafungin (MYCAMINE)  mg Intravenous Q24H   pantoprazole 40 mg Intravenous Q12H   piperacillin-tazobactam 3.375 g Intravenous Q12H   sodium chloride 1,000 mL Intravenous Once   sodium chloride  1,000 mL Intravenous Once   sodium chloride 1,000 mL Intravenous Once in Dialysis   sodium chloride 10 mL Intravenous Q12H   sodium chloride 10 mL Intravenous Q12H   sodium chloride 10 mL Intravenous Q12H   sodium chloride 10 mL Intravenous Q12H   sodium chloride 10 mL Intravenous Q12H   sodium chloride 10 mL Intravenous Q12H   thiamine (VITAMIN B1) IVPB 500 mg Intravenous Q8H   vancomycin 1,250 mg Intravenous Once   Vancomycin Pharmacy Intermittent Dosing  Does not apply Daily     DOBUTamine 5 mcg/kg/min Last Rate: Stopped (02/05/20 1024)   fentaNYL Citrate     norepinephrine 0.02-0.3 mcg/kg/min Last Rate: 0.3 mcg/kg/min (02/05/20 0827)   Pharmacy to dose vancomycin     propofol 5-50 mcg/kg/min Last Rate: 10 mcg/kg/min (02/05/20 0905)   vasopressin (PITRESSIN) infusion 0.04 Units/min Last Rate: 0.04 Units/min (02/05/20 1004)     Current Antimicrobial Therapy:  Anti-Infectives (From admission, onward)    Ordered     Dose/Rate Route Frequency Start Stop    02/05/20 0823  vancomycin (VANCOCIN) 1,250 mg in sodium chloride 0.9 % 250 mL IVPB     Ordering Provider:  Saad Garcia MD    1,250 mg  over 60 Minutes Intravenous Once 02/05/20 1800      02/04/20 1720  vancomycin (VANCOCIN) 1,250 mg in sodium chloride 0.9 % 250 mL IVPB     Ordering Provider:  Saad Garcia MD    1,250 mg  over 60 Minutes Intravenous Once 02/04/20 2200 02/04/20 2147    02/04/20 0807  piperacillin-tazobactam (ZOSYN) 3.375 g/100 mL 0.9% NS IVPB (mbp)  Review   Ordering Provider:  Sophie Grimes MD    3.375 g  over 4 Hours Intravenous Every 12 Hours 02/04/20 1800 02/08/20 1759    02/04/20 1326  Vancomycin Pharmacy Intermittent Dosing  Review   Ordering Provider:  Sophie Grimes MD     Does not apply Daily 02/04/20 1415 02/11/20 0859    02/04/20 1131  micafungin sodium (MYCAMINE) 100 mg in sodium chloride 0.9 % 100 mL IVPB  Review   Ordering Provider:  Sophie Grimes MD    100 mg  over 60 Minutes Intravenous Every 24 Hours 02/04/20 1300  02/11/20 1259    02/04/20 1151  Pharmacy to dose vancomycin  Review   Ordering Provider:  Sophie Grimes MD     Does not apply Continuous PRN 02/04/20 1151 02/11/20 1150    02/01/20 1601  vancomycin (VANCOCIN) 1,000 mg in sodium chloride 0.9 % 250 mL IVPB     Ordering Provider:  Denny Coy MD    1,000 mg  over 60 Minutes Intravenous Once 02/01/20 1800 02/01/20 1812 01/31/20 1949  vancomycin (VANCOCIN) 1,750 mg in sodium chloride 0.9 % 500 mL IVPB     Ordering Provider:  Waqas York MD    20 mg/kg × 93 kg Intravenous Once 01/31/20 1951 02/01/20 0000    01/31/20 1949  piperacillin-tazobactam (ZOSYN) 4.5 g/100 mL 0.9% NS IVPB (mbp)     Ordering Provider:  Waqas York MD    4.5 g Intravenous Once 01/31/20 1951 01/31/20 2129        Current Diuretic Therapy:  Diuretics (From admission, onward)    Ordered     Dose/Rate Route Frequency Start Stop    02/03/20 1610  furosemide (LASIX) injection 80 mg     Ordering Provider:  Alycia Metzger MD    80 mg Intravenous Once 02/03/20 1700 02/03/20 1632    02/03/20 1635  furosemide (LASIX) 10 MG/ML injection  - ADS Override Pull     Note to Pharmacy:  Created by cabinet override   Ordering Provider:  Selam Pierce RN       02/03/20 1635 02/04/20 0444        ----------------------------------------------------------------------------------------------------------------------  Vital Signs:  Temp:  [97.6 °F (36.4 °C)-104.9 °F (40.5 °C)] 97.6 °F (36.4 °C)  Heart Rate:  [] 81  Resp:  [18-35] 24  BP: ()/() 115/91  FiO2 (%):  [30 %-60 %] 30 %  SpO2:  [94 %-100 %] 100 %  on  Flow (L/min):  [8] 8;   Device (Oxygen Therapy): ventilator  Body mass index is 40.32 kg/m².    Wt Readings from Last 3 Encounters:   02/04/20 (S) 103 kg (227 lb 9.6 oz)   01/12/20 92.1 kg (203 lb)   01/09/20 91.6 kg (202 lb)     Intake & Output (last 3 days)       02/02 0701 - 02/03 0700 02/03 0701 - 02/04 0700 02/04 0701 - 02/05 0700 02/05 0701 - 02/06 0700    P.O.  1084 1740      I.V. (mL/kg)  1918.8 (18.6) 2548.9 (24.7)     Blood 300       IV Piggyback 150 1950 450     Total Intake(mL/kg) 1534 (15.6) 5608.8 (54.5) 2998.9 (29.1)     Urine (mL/kg/hr) 350 (0.1) 275 (0.1) 130 (0.1)     Other   1950     Stool 400 400 350     Total Output      Net +784 +4933.8 +568.9             Urine Unmeasured Occurrence  1 x          NPO Diet  ----------------------------------------------------------------------------------------------------------------------  Physical exam:  Constitutional:  Well-developed and well-nourished. Intubated/sedated  HENT:  Head:  Normocephalic and atraumatic.  Mouth:  dry mucous membranes.    Eyes:  Conjunctivae and EOM are normal. No scleral icterus.    Neck:  Neck supple.  No JVD present.    Cardiovascular: regular rate, regular rhythm and normal heart sounds with no murmur.  Pulmonary/Chest:  Intubated, minimal Fi02, clear BS  Abdominal:  Distended still soft  Musculoskeletal:  No deformity.  No red or swollen joints anywhere.    Neurological:  Unable to assess due to intubation/sedation  Skin:  Skin is warm and dry. No rash noted. No pallor.   Peripheral vascular:  no clubbing, no cyanosis, no edema.  ----------------------------------------------------------------------------------------------------------------------  Tele:    ----------------------------------------------------------------------------------------------------------------------  Results from last 7 days   Lab Units 02/05/20  0222 02/04/20  1931 02/04/20  1612  02/04/20  0053  02/03/20  0112  02/01/20  0840   CRP mg/dL 29.35*  --   --   --  31.38*  --   --   --  29.28*   LACTATE mmol/L 8.9* 7.6* 8.5*   < > 7.1*   < > 3.1*   < > 3.8*   WBC 10*3/mm3 33.02*  --   --   --  38.57*  --  28.49*   < > 27.93*   HEMOGLOBIN g/dL 7.6*  --   --   --  9.2*  --  8.4*   < > 7.9*   HEMATOCRIT % 26.1*  --   --   --  31.3*  --  28.6*   < > 26.7*   MCV fL 82.3  --   --   --  82.8  --  82.4   < > 80.4      MCHC g/dL 29.1*  --   --   --  29.4*  --  29.4*   < > 29.6*   PLATELETS 10*3/mm3 119*  --   --   --  178  --  206   < > 362   INR   --   --   --   --   --   --   --   --  1.34*    < > = values in this interval not displayed.     Results from last 7 days   Lab Units 02/05/20  0457   PH, ARTERIAL pH units 7.338*   PO2 ART mm Hg 96.6   PCO2, ARTERIAL mm Hg 37.2   HCO3 ART mmol/L 19.9*     Results from last 7 days   Lab Units 02/05/20  0222 02/04/20  1932 02/04/20  0053 02/03/20  0112  02/02/20  0120 02/01/20  0840   SODIUM mmol/L 133*  --  133* 130*   < > 132* 131*   POTASSIUM mmol/L 4.3  --  4.3 4.2   < > 4.6 4.8   MAGNESIUM mg/dL  --  2.1  --   --   --  2.5 1.5*   CHLORIDE mmol/L 90*  --  102 100   < > 100 99   CO2 mmol/L 18.4*  --  10.3* 9.4*   < > 11.2* 12.3*   BUN mg/dL 16  --  26* 25*   < > 26* 26*   CREATININE mg/dL 2.40*  --  2.65* 1.96*   < > 2.27* 2.10*   EGFR IF NONAFRICN AM mL/min/1.73 22*  --  20* 28*   < > 24* 26*   CALCIUM mg/dL 7.8*  --  8.2* 8.3*   < > 7.5* 7.8*   IONIZED CALCIUM mmol/L  --  1.08*  --   --   --   --   --    PHOSPHORUS mg/dL  --  3.6  --   --   --   --  3.9   GLUCOSE mg/dL 121*  --  141* 98   < > 91 96   ALBUMIN g/dL  --   --   --  3.77  --  3.06* 2.13*   BILIRUBIN mg/dL  --   --   --  1.2  --  0.6 0.5   ALK PHOS U/L  --   --   --  509*  --  556* 753*   AST (SGOT) U/L  --   --   --  63*  --  77* 103*   ALT (SGPT) U/L  --   --   --  17  --  20 27    < > = values in this interval not displayed.   Estimated Creatinine Clearance: 35 mL/min (A) (by C-G formula based on SCr of 2.4 mg/dL (H)).  No results found for: AMMONIA  Results from last 7 days   Lab Units 02/04/20  1932 02/01/20  0840 01/31/20 1955   CK TOTAL U/L 297*  --   --    TROPONIN T ng/mL  --  <0.010 <0.010     Results from last 7 days   Lab Units 02/03/20  0112 01/31/20 1955   PROBNP pg/mL 1,467.0* 214.1         No results found for: HGBA1C, POCGLU  Lab Results   Component Value Date    TSH 9.270 (H) 01/31/2020    FREET4  0.97 02/01/2020     No results found for: PREGTESTUR, PREGSERUM, HCG, HCGQUANT  Pain Management Panel     Pain Management Panel Latest Ref Rng & Units 2/1/2020    CREATININE UR mg/dL 146.0        Brief Urine Lab Results  (Last result in the past 365 days)      Color   Clarity   Blood   Leuk Est   Nitrite   Protein   CREAT   Urine HCG        02/02/20 2053 Dark Yellow Turbid Large (3+) Negative Negative 100 mg/dL (2+)             Blood Culture   Date Value Ref Range Status   02/03/2020 No growth at 24 hours  Preliminary   02/03/2020 No growth at 24 hours  Preliminary   01/31/2020 No growth at 4 days  Preliminary   01/31/2020 No growth at 4 days  Preliminary     No results found for: URINECX  No results found for: WOUNDCX  No results found for: STOOLCX  No results found for: RESPCX  No results found for: AFBCX  Results from last 7 days   Lab Units 02/05/20  0222 02/04/20  1931 02/04/20  1612 02/04/20  0356 02/04/20  0053 02/03/20  1908 02/03/20  1750 02/03/20  0112  02/01/20  0840  01/31/20  1955   PROCALCITONIN ng/mL  --   --   --   --   --   --   --  4.07*  --   --   --   --    LACTATE mmol/L 8.9* 7.6* 8.5* 7.5* 7.1* 6.0* 5.8* 3.1*   < > 3.8*   < > 5.2*   CRP mg/dL 29.35*  --   --   --  31.38*  --   --   --   --  29.28*  --  30.94*    < > = values in this interval not displayed.     I have personally looked at the labs and they are summarized above.  ----------------------------------------------------------------------------------------------------------------------  Detailed radiology reports for the last 24 hours:    Imaging Results (Last 24 Hours)     Procedure Component Value Units Date/Time    XR Chest 1 View [[id number]] Collected:  02/05/20 0930     Updated:  02/05/20 0933    Narrative:       EXAMINATION: XR CHEST 1 VW-      CLINICAL INDICATION:     Intubated Patient; A41.9-Sepsis, unspecified  organism     TECHNIQUE:  XR CHEST 1 VW-      COMPARISON: 02/04/2020      FINDINGS:   ET tube with tip at level of  clavicles. Left Port-A-Cath stable. Left IJ  deep line stable in positioning. Right tunneled dialysis catheter also  stable in positioning. NG tube extends into the stomach.     Some increase in left basilar airspace disease. Stable lung volumes.  Right perihilar airspace disease slightly improved. Trace effusions. No  pneumothorax. No acute bony findings.       Impression:       1. Support devices described above.  2. Increase in left basilar airspace disease with slight improvement in  right perihilar airspace disease.     This report was finalized on 2/5/2020 9:31 AM by Dr. Bert Butler MD.       CT Abdomen Pelvis With & Without Contrast [[id number]] Collected:  02/04/20 1915     Updated:  02/04/20 1922    Narrative:       EXAM: CT ABDOMEN PELVIS W WO CONTRAST-            TECHNIQUE: Multiple axial CT images were obtained from lung bases  through pubic symphysis with and without administration of IV contrast.  Reformatted images in the coronal and/or sagittal plane(s) were  generated from the axial data set to facilitate diagnostic accuracy  and/or surgical planning.     Radiation dose reduction techniques were utilized per ALARA protocol.  Automated exposure control was initiated through either or CareDose or  DoseRight software packages by  protocol.       DOSE:     CLINICAL INFORMATION: recent abdomenal surgery, SBP/shock, rising  lactate; A41.9-Sepsis, unspecified organism      COMPARISON: 02/02/2020     FINDINGS:     LOWER THORAX: INCREASE IN THE DEGREE OF CONSOLIDATION RIGHT GREATER THAN  LEFT LOWER LOBE REGIONS ALONG WITH PATCHY AIRSPACE DISEASE INVOLVING THE  LEFT LUNG WITH RIGHT GREATER THAN LEFT NONLOCULATED PLEURAL EFFUSIONS.     ABDOMEN:        LIVER: EXTENSIVE METASTATIC LIVER DISEASE NOTED WITH NUMEROUS LIVER  LESIONS PRESENT.        GALLBLADDER: No dilation or stone identified.        PANCREAS: Unremarkable. No mass or ductal dilatation.        SPLEEN: SOFT TISSUE IMPLANTS ARE SEEN IN  THE LEFT UPPER QUADRANT NEAR  THE SPLENIC FOSSA COMPATIBLE WITH METASTATIC DISEASE.        ADRENALS: LEFT ADRENAL NODULE IS NOTED LIKELY METASTATIC IN ETIOLOGY  AND IS APPROXIMATELY 2.6 CM.        KIDNEYS/URETERS: NONOBSTRUCTING RIGHT KIDNEY STONES. NO  HYDRONEPHROSIS IDENTIFIED.        GI TRACT: POST SURGICAL CHANGES INVOLVING THE GI TRACT WITH A RIGHT  LOWER QUADRANT DIVERTING ILEOSTOMY. NO BOWEL OBSTRUCTION IS IDENTIFIED.  ABNORMAL THICKENING WITH ENHANCEMENT OF THE ASCENDING COLON WITH WALL  THICKENING NOTED ALSO OF THE TERMINAL ILEUM LIKELY DUE TO PATIENT'S  KNOWN PRIMARY COLON MALIGNANCY. NO DILATED LOOPS OF BOWEL IDENTIFIED.        PERITONEUM: COMPLEX ASCITES IS NOTED THAT IS MILD TO MODERATE IN  EXTENT WITH A MORE FOCAL AREA OF PARTIALLY LOCULATED FLUID SEEN  POSTERIOR TO THE UTERUS AND IN THE PRERECTAL SPACE THAT IS 4.1 X 9.2 CM  EITHER REPRESENTING MALIGNANT ASCITES OR POSSIBLY EVOLVING INFECTION.  ABNORMAL SOFT TISSUE DENSITIES ARE SEEN WITHIN THE LOWER ABDOMINAL  ASCITES AND EXTENDING TO THE LEVEL OF THE OMENTUM ALONG THE OMENTAL  VASCULATURE MOST CONSISTENT WITH METASTATIC DISEASE.        MESENTERY: Unremarkable.        LYMPH NODES: BULKY METASTATIC ADENOPATHY OF THE RIGHT LOWER QUADRANT  MESENTERY. THERE ARE ENLARGED UPPER ABDOMINAL LYMPH NODES IN THE CELIAC  AXIS REGION AND ALONG THE RETROPERITONEAL STATIONS COMPATIBLE WITH  METASTATIC DISEASE.        VASCULATURE: No evidence of aneurysm.        ABDOMINAL WALL: BODY WALL EDEMA IS NOTED.        OTHER: None.     PELVIS:        BLADDER: SARABIA CATHETER DECOMPRESSES URINARY BLADDER.        REPRODUCTIVE: Unremarkable as visualized.        APPENDIX: NOT VISUALIZED.     BONES: No acute bony abnormality.       Impression:       Impression:  1. Bilateral lower lobe pneumonia with right greater than left small  nonloculated pleural effusions.  2. Extensive metastatic disease involving the liver and peritoneum with  omental caking also noted.  3. Metastatic  adenopathy predominantly in the right lower quadrant as  well as in the upper retroperitoneal stations.  4. Inhomogeneous abnormal thickening of the right colon with wall  thickening of the terminal ileum reflects patient's known primary colon  adenocarcinoma. No bowel dilatation or pneumatosis identified to suggest  obstruction or perforation on CT.  5. Complex ascites.  6. More focal area of loculated fluid in the prerectal space with  peripheral enhancement that may simply represent malignant ascites, but  the possibility of superimposed infection not excluded.  7. Other nonacute findings as above.     This report was finalized on 2/4/2020 7:20 PM by Dr. Bert Butler MD.       XR Chest AP [[id number]] Collected:  02/04/20 1529     Updated:  02/04/20 1542    Narrative:       EXAMINATION: XR CHEST AP-      CLINICAL INDICATION:     central line; A41.9-Sepsis, unspecified  organism     TECHNIQUE:  XR CHEST AP-      COMPARISON: 02/03/2020      FINDINGS:   Placement of a left IJ deep line with tip in the distal SVC. Left  Port-A-Cath is stable.  ET tube positioning with tip at level of clavicles.  Right dialysis catheter has been placed with tip at the cavoatrial  junction.  Perihilar airspace disease noted and may represent perihilar atelectasis  given low lung volumes.   Cardiomegaly noted.   No pneumothorax.   No effusions.   No acute osseous findings.          Impression:       1. Development of bilateral perihilar airspace disease with  considerations to include edema, pneumonia, or atelectasis.  2. Low lung volumes.  3. Support devices detailed above.  4. No pneumothorax.     This report was finalized on 2/4/2020 3:30 PM by Dr. Bert Butler MD.       FL Surgery Fluoro [[id number]] Collected:  02/04/20 1514     Updated:  02/04/20 1526    Narrative:       EXAMINATION: FL SURGERY FLUORO-      CLINICAL INDICATION:     central line placement ; A41.9-Sepsis,  unspecified organism     TECHNIQUE:  FL SURGERY FLUORO-       FLUOROSCOPY TIME: 117.5 seconds     FINDINGS:   Fluoroscopy images submitted from intraoperative fluoroscopy for  purposes of Port-A-Cath placement.        Impression:       As above.     This report was finalized on 2/4/2020 3:21 PM by Dr. Bert Butler MD.       FL Surgery Fluoro [[id number]] Collected:  02/04/20 1521     Updated:  02/04/20 1525    Narrative:       EXAMINATION: FL SURGERY FLUORO-      CLINICAL INDICATION:     Dialysis cath placement; A41.9-Sepsis,  unspecified organism     TECHNIQUE:  FL SURGERY FLUORO-      FLUOROSCOPY TIME: 25.5 seconds     FINDINGS:   Fluoroscopy was provided intraoperatively for purposes of dialysis  catheter placement with submitted fluoroscopy images demonstrating  presence of a right-sided dialysis catheter.        Impression:       As above.     This report was finalized on 2/4/2020 3:21 PM by Dr. Bert Butler MD.           Assessment & Plan    #Septic shock and Acute Respiratory Failure 2/2 SBP & PNA, Bacterial, treating for MDR organisms - Worse  #Lactic acidosis - Worse  - On presentation (WBC 29,100, CRP 30.94, lactic acid 5.2, temperature 102.4, heart rate 152, blood pressure 72/60  - CT chest on admission showed some bibasilar consolidations that are likely atelectasis   - CT Abd/Pelvis showed b/l lower lobe PNA, loculated fluid in pre-rectal space   - Diagnostic paracentesis performed on 2/1 that revealed ANC: 3400. Confirming SBP. Culture NGTD  - Started having fevers 2/4, lactate has been trending up to 8.9, WBC count 38K yesterday but now down to 33K, CRP 30, Bcx's and para Cx's NGTD, Cdiff negative  - ID consulted; Continue Vanc, Zosyn, Micafungin  - Pulmonary consulted; following, managing vent and acid base disturbances, has on stress dose steroids  - Consult IR for possible drainage of loculated acites  - Continue MV, wean as tolerated, on norepinephrine now but attempting to wean, trend lactate and labs, continue fluids have d/c'd Bicarb gtt    #Oliguric  KODI c/b AGMA suspect possible RTA - Worse  - Baseline Cr 0.7-0.9, Cr on presentation 1.95=>2.10=>2.27; Today 2.4  - Potential etiology include prerenal, ATN 2/2 sepsis , hepatorenal syndrome  - CT abdomen/pelvis did not reveal urinary obstruction.  - Urine sodium <20, consistent with severe prerenal vs. Hepatorenal   - Did not appear to improve significantly w/ albumin challenge per previous MD  - Consulted Surgery;  Placed TDC on 2/4  - Consulted Nephrology; Have initiated on HD, SLED today  - Continue Bicarb gtt, trend labs and UOP    #Stage 4 colon cancer with bulky lymphadenopathy and widely disseminated liver metastases and adrenal masses s/p diverting loop ileostomy   - Scheduled to f/u with oncology at  on 2/3. Expected to start chemo soon. Suspect treatments would be palliative in nature.   - Underwent diverting loop ileostomy at  on 1/17  - CT Abd/Pelvis 2/2 showed extensive hepatic mets, ill-defined cecal mass involving surrounding mesentery, mesenteric LAD, small-mod ascites, stable low density mass L adrenal gland.  - Repeat CT Abd/Pelvis 2/4 showed extensive metastatic dz involving liver and peritoneum w/ omental caking, metastatic adenopathy RLQ and upper RP space, abnormal thickening R colon wall of terminal ileum, complex ascites that may represent malignant ascites  - Consult Hematology/Oncology to discuss long term prognosis    #Mild Rhabdomyolysis  - CK elevated, fluids as per above    #Normocytic anemia   - Hemoglobin has been trending down. 10 on 1/2/20  - 8.8=>7.9=>6.6, s/p 1 unit of pRBCs; Today 7.6  - No signs of active bleeding, BUN/creatinine not elevated, suspect  hemoconcentrated on admission and fluids have diluted   - Iron studies consistent with SNEHAL and AOCD, B12 and folate normal.   - Continue IV PPI for now, monitor for signs of bleeding    #Respiratory Alkalosis  - Likely 2/2 above AGMA, compensatory, address as per above    #Euthyroid  - TSH 9, free T4 normal, f/u as  outpatient.    #Hypomagnesemia   - Replaced    #Diverting loop ileostomy  - Performed 1/17 at  for [facility]BO    #Obesity  - BMI 38, complicates all aspects of care.    F: Oral  E: Monitor & Replace PRN  N: Regular  Ppx: SQH  Code: Full Code    Dispo: Pending clinical improvement and workup    *This patient is considered high risk due to septic shock, KODI, underlying colon cancer    VTE Prophylaxis:   Mechanical Order History:     None      Pharmalogical Order History:     Ordered     Dose Route Frequency Stop    02/04/20 1353  heparin (porcine) 5000 UNIT/ML injection  Status:  Discontinued      -- -- As Needed 02/04/20 1459    02/01/20 1455  heparin (porcine) 5000 UNIT/ML injection 5,000 Units      5,000 Units SC Every 8 Hours Scheduled --    02/01/20 0309  enoxaparin (LOVENOX) syringe 40 mg  Status:  Discontinued      40 mg SC Every 24 Hours 02/01/20 1455        Saad Gracia MD  [facility]  02/05/20  11:00 AM

## 2020-02-05 NOTE — PROGRESS NOTES
Discharge Planning Assessment   S[facility]chez     Patient Name: Gracy Norman  MRN: [id number]  Today's Date: 2/5/2020    Admit Date: 1/31/2020      Discharge Plan     Row Name 02/05/20 1046       Plan    Plan  Pt was admitted on 01/31/2020. Pt is currently intubated. Pt lives at home with her spouse and other family members. Pt does not receive  services or use any DMe. Pt may need DMe at discharge, and requested a rolling walker in the past. Pt does not have a POA or a living will. Pt does not have a PCP. SS will follow up in regards to discharge plan once pt is extubated.     Patient/Family in Agreement with Plan  yes        Expected Discharge Date and Time     Expected Discharge Date Expected Discharge Time    Feb 5, 2020                 JASS Del Rio

## 2020-02-05 NOTE — NURSING NOTE
Progressive Mobility    Date: 02/04/20     Mobility Initiation Contradictions: Unsecure airway device or difficult airway     Activity Performed: PROM    Patient: tolerated    Assisted by 2 person/persons    Natty Fisher RN   02/04/20

## 2020-02-05 NOTE — CONSULTS
Gracy Norman  [id number]  1975 2/5/2020    Referring Provider:   Dr. LISSY Garcia    Reason for Consultation:   Metastatic colon cancer    Patient Care Team:  Almas Stone MD as PCP - General (Family Medicine)  CELSO Burton MD as Consulting Physician (Oncology)    Chief Complaint:  Intubated    History of Present Illness:    Gracy Norman is a 44 y.o. female seen today at the request of Dr. Garcia for evaluation and management of metastatic colon cancer.    Ms. Norman was recently diagnosed with metastatic colon cancer and was evaluated in our clinic by Dr. Burton earlier last month. Unfortunately since that evaluation she has declined and was re-evaluated at [facility] [place] ED on 1/12/20 in which she was transferred to  for partial small bowel obstruction. She underwent a loop ileostomy on 1/17/20 and was later discharged. She was to follow with an oncologist at  this week to further discuss treatment options. Unfortunately she presented to [facility] [place] ED again on 1/31/20 with complaints of abdominal pain and was admitted for sepsis and peritonitis. Her hospital course has been complicated by acute respiratory failure in which she is now intubated, as well as KODI and has become oliguric requiring HD.      Past Medical History:  Past Medical History:   Diagnosis Date   • Adenocarcinoma of cecum, stage 4b (CMS/HCC) 01/2020   • Anxiety    • GERD (gastroesophageal reflux disease)    • Headache    • Obesity (BMI 30-39.9)    • Snoring        Past Surgical History:  Past Surgical History:   Procedure Laterality Date   • INSERTION HEMODIALYSIS CATHETER Right 2/4/2020    Procedure: HEMODIALYSIS CATHETER INSERTION;  Surgeon: Sophie Grimes MD;  Location: [facility] OR;  Service: General;  Laterality: Right;   • INTERVENTIONAL RADIOLOGY PROCEDURE Left 2/4/2020    Procedure: CENTRAL LINE PLACEMENT;  Surgeon: Sophie Grimes MD;  Location: [facility] OR;  Service: General;  Laterality: Left;   • LIVER BIOPSY N/A 1/2/2020    Procedure:  LIVER BIOPSY LAPAROSCOPIC;  Surgeon: Sophie Grimes MD;  Location: [facility] OR;  Service: General   • MT ILEOSTOMY/JEJUNOSTOMY,NONTUBE     • TUBAL ABDOMINAL LIGATION     • VENOUS ACCESS DEVICE (PORT) INSERTION N/A 1/2/2020    Procedure: INSERTION VENOUS ACCESS DEVICE;  Surgeon: Sophie Grimes MD;  Location: [facility] OR;  Service: General       Family History:  Family History   Problem Relation Age of Onset   • Hypertension Mother    • Diabetes Mother    • Cancer Father         stomach   • Breast cancer Neg Hx        Social History:  Social History     Tobacco Use   • Smoking status: Never Smoker   • Smokeless tobacco: Never Used   Substance Use Topics   • Alcohol use: No   • Drug use: No       Allergies:    Bactrim [sulfamethoxazole-trimethoprim]; Metronidazole; and Sulfa antibiotics    Outpatient Medications:  Medications Prior to Admission   Medication Sig Dispense Refill Last Dose   • amoxicillin-clavulanate (AUGMENTIN) 875-125 MG per tablet Take 1 tablet by mouth 2 (Two) Times a Day.   1/31/2020 at AM   • cyclobenzaprine (FLEXERIL) 5 MG tablet Take 5 mg by mouth 2 (Two) Times a Day.   1/31/2020 at AM   • enoxaparin (LOVENOX) 40 MG/0.4ML solution syringe Inject 40 mg under the skin into the appropriate area as directed Daily.   1/31/2020 at Unknown time   • omeprazole (priLOSEC) 20 MG capsule Take 20 mg by mouth Daily.   1/31/2020 at Unknown time   • acetaminophen (TYLENOL) 325 MG tablet Take 650 mg by mouth Every 6 (Six) Hours As Needed for Mild Pain .   Unknown at Unknown time   • diazePAM (VALIUM) 2 MG tablet Take 2 mg by mouth Every 8 (Eight) Hours As Needed for Anxiety.   Unknown at Unknown time   • ondansetron ODT (ZOFRAN-ODT) 4 MG disintegrating tablet Take 4 mg by mouth Every 6 (Six) Hours As Needed for Nausea or Vomiting.   Unknown at Unknown time   • oxyCODONE (ROXICODONE) 5 MG immediate release tablet Take 5 mg by mouth Every 6 (Six) Hours As Needed for Moderate Pain .   Unknown at Unknown time            Inpatient Medications:  Scheduled Meds:    chlorhexidine 15 mL Mouth/Throat Q12H   heparin (porcine) 5,000 Units Subcutaneous Q8H   hydrocortisone sodium succinate 50 mg Intravenous Q6H   meropenem 500 mg Intravenous Q24H   micafungin (MYCAMINE)  mg Intravenous Q24H   pantoprazole 40 mg Intravenous Q12H   sodium chloride 1,000 mL Intravenous Once   sodium chloride 1,000 mL Intravenous Once   sodium chloride 10 mL Intravenous Q12H   sodium chloride 10 mL Intravenous Q12H   sodium chloride 10 mL Intravenous Q12H   sodium chloride 10 mL Intravenous Q12H   sodium chloride 10 mL Intravenous Q12H   sodium chloride 10 mL Intravenous Q12H   thiamine (VITAMIN B1) IVPB 500 mg Intravenous Q8H   vancomycin 1,250 mg Intravenous Once   Vancomycin Pharmacy Intermittent Dosing  Does not apply Daily       Continuous Infusions:    DOBUTamine 5 mcg/kg/min Last Rate: Stopped (02/05/20 1024)   fentaNYL Citrate     norepinephrine 0.02-0.3 mcg/kg/min Last Rate: 0.3 mcg/kg/min (02/05/20 0827)   Pharmacy Consult     Pharmacy to dose vancomycin     propofol 5-50 mcg/kg/min Last Rate: 50 mcg/kg/min (02/05/20 1333)   vasopressin (PITRESSIN) infusion 0.04 Units/min Last Rate: Stopped (02/05/20 1350)       PRN Meds:  •  acetaminophen  •  albumin human  •  artificial tears  •  diazePAM  •  heparin  •  HYDROmorphone  •  magnesium sulfate **OR** magnesium sulfate **OR** magnesium sulfate  •  midazolam  •  ondansetron  •  oxyCODONE  •  Pharmacy Consult  •  Pharmacy to dose vancomycin  •  [COMPLETED] Insert peripheral IV **AND** sodium chloride  •  sodium chloride  •  sodium chloride  •  sodium chloride  •  sodium chloride  •  sodium chloride  •  sodium chloride      Review of Systems:  Unable to obtain a full 14 point review of systems due to intubation and sedation.       Physical Exam:  Vital Signs: These were reviewed and listed as per patient’s electronic medical chart  Vitals:    02/05/20 1513   BP:    Pulse: 108   Resp:    Temp:     SpO2: 94%     General: Sedated on mechanical ventilation, in no distress  HEENT: Head is atraumatic, normocephalic, extraocular movements full, ET tube in place no scleral icterus  Neck: supple, no jvd, lymphadenopathy or masses  Cardiovascular: regular rate and rhythm without murmurs, rubs or gallops  Pulmonary: non-labored, clear to auscultation bilaterally, no wheezing  Abdomen: soft, non-tender, non-distended, normal active bowel sounds present  Extremities: No clubbing, cyanosis or edema  Neurologic: Mental status as above, grossly non-focal exam  Skin: warm, dry, intact, no bleeding from lines, central line in place - clean and intact  : velasquez in place, small amt of dark urine      Labs / Studies:  Lab Results (last 72 hours)     Procedure Component Value Units Date/Time    Blood Gas, Venous With Co-Ox [[id number]]  (Abnormal) Collected:  02/05/20 1309    Specimen:  Venous Blood Updated:  02/05/20 1316     Site Nurse/Dr Art     pH, Venous 7.358 pH Units      pCO2, Venous 42.9 mm Hg      pO2, Venous 37.0 mm Hg      HCO3, Venous 24.1 mmol/L      Base Excess, Venous -1.4 mmol/L      Hemoglobin, Blood Gas 8.6 g/dL      Comment: 84 Value below reference range        CO2 Content 25.4 mmol/L      Temperature 37.0 C      Barometric Pressure for Blood Gas 723 mmHg      Modality Ventilator     FIO2 30 %      Ventilator Mode VC/AC     Set Tidal Volume 450.00     Set Mech Resp Rate 24.0     PEEP 10.0     Notified Who Dr Figueroa     Notified By [id number]     Collected by [id number]     Comment: Meter: [id number]     :  [id number]        Oxyhemoglobin Venous 67.5 %      Carboxyhemoglobin Venous 1.6 %      Methemoglobin Venous 0.4 %     Lactic Acid, Plasma [[id number]]  (Abnormal) Collected:  02/05/20 1040    Specimen:  Blood Updated:  02/05/20 1138     Lactate 5.4 mmol/L     Fibrinogen [[id number]]  (Normal) Collected:  02/05/20 1040    Specimen:  Blood Updated:  02/05/20 1127     Fibrinogen 322 mg/dL     Protime-INR  [[id number]]  (Abnormal) Collected:  02/05/20 1040    Specimen:  Blood Updated:  02/05/20 1127     Protime 22.6 Seconds      INR 1.88    Narrative:       Suggested INR therapeutic range for stable oral anticoagulant therapy:    Low Intensity therapy:   1.5-2.0  Moderate Intensity therapy:   2.0-3.0  High Intensity therapy:   2.5-4.0    aPTT [[id number]]  (Abnormal) Collected:  02/05/20 1040    Specimen:  Blood Updated:  02/05/20 1127     PTT 44.0 seconds     Narrative:       PTT Heparin Therapeutic Range:  59 - 95 seconds      Body Fluid Culture - Body Fluid, Peritoneum [[id number]] Collected:  02/01/20 0947    Specimen:  Body Fluid from Peritoneum Updated:  02/05/20 1022     Body Fluid Culture No growth at 4 days     Gram Stain WBCs seen      No organisms seen    Anaerobic Culture - Body Fluid, Peritoneum [[id number]] Collected:  02/01/20 0947    Specimen:  Body Fluid from Peritoneum Updated:  02/05/20 1022     Anaerobic Culture No anaerobes isolated at 4 days    Vancomycin, Random [[id number]]  (Normal) Collected:  02/05/20 0640    Specimen:  Blood Updated:  02/05/20 0713     Vancomycin Random 23.20 mcg/mL     Blood Gas, Arterial With Co-Ox [[id number]]  (Abnormal) Collected:  02/05/20 0457    Specimen:  Arterial Blood Updated:  02/05/20 0503     Site Left Radial     Rashaun's Test Positive     pH, Arterial 7.338 pH units      Comment: 84 Value below reference range        pCO2, Arterial 37.2 mm Hg      pO2, Arterial 96.6 mm Hg      HCO3, Arterial 19.9 mmol/L      Comment: 84 Value below reference range        Base Excess, Arterial -5.4 mmol/L      O2 Saturation, Arterial 98.4 %      Hemoglobin, Blood Gas 8.2 g/dL      Comment: 84 Value below reference range        Hematocrit, Blood Gas 25.0 %      Comment: 84 Value below reference range        Oxyhemoglobin 96.8 %      Methemoglobin 0.30 %      Carboxyhemoglobin 1.3 %      A-a Gradiant 65.3 mmHg      CO2 Content 21.1 mmol/L      Temperature 0.0 C      Barometric Pressure  for Blood Gas 725 mmHg      Modality Ventilator     FIO2 30 %      Ventilator Mode VC/AC     Set Tidal Volume 450.00     Set Mech Resp Rate 20.0     PEEP 10.0     Note --     Collected by [id number]     Comment: Meter: [id number]     :  [id number]        pH, Temp Corrected --     pCO2, Temperature Corrected --     pO2, Temperature Corrected --    CBC & Differential [[id number]] Collected:  02/05/20 0222    Specimen:  Blood Updated:  02/05/20 0254    Narrative:       The following orders were created for panel order CBC & Differential.  Procedure                               Abnormality         Status                     ---------                               -----------         ------                     CBC Auto Differential[[id number]]        Abnormal            Final result                 Please view results for these tests on the individual orders.    CBC Auto Differential [[id number]]  (Abnormal) Collected:  02/05/20 0222    Specimen:  Blood Updated:  02/05/20 0254     WBC 33.02 10*3/mm3      RBC 3.17 10*6/mm3      Hemoglobin 7.6 g/dL      Hematocrit 26.1 %      MCV 82.3 fL      MCH 24.0 pg      MCHC 29.1 g/dL      RDW 17.8 %      RDW-SD 52.0 fl      MPV 11.2 fL      Platelets 119 10*3/mm3     Scan Slide [[id number]] Collected:  02/05/20 0222    Specimen:  Blood Updated:  02/05/20 0254     Scan Slide --     Comment: See Manual Differential Results       Manual Differential [[id number]]  (Abnormal) Collected:  02/05/20 0222    Specimen:  Blood Updated:  02/05/20 0254     Neutrophil % 83.8 %      Lymphocyte % 5.1 %      Monocyte % 5.1 %      Bands %  3.0 %      Metamyelocyte % 3.0 %      Neutrophils Absolute 28.68 10*3/mm3      Lymphocytes Absolute 1.68 10*3/mm3      Monocytes Absolute 1.68 10*3/mm3      Anisocytosis Slight/1+     Hypochromia Slight/1+     Platelet Estimate Adequate    Basic Metabolic Panel [[id number]]  (Abnormal) Collected:  02/05/20 0222    Specimen:  Blood Updated:  02/05/20 0254      Glucose 121 mg/dL      BUN 16 mg/dL      Creatinine 2.40 mg/dL      Sodium 133 mmol/L      Potassium 4.3 mmol/L      Chloride 90 mmol/L      CO2 18.4 mmol/L      Calcium 7.8 mg/dL      eGFR Non African Amer 22 mL/min/1.73      BUN/Creatinine Ratio 6.7     Anion Gap 24.6 mmol/L     Narrative:       GFR Normal >60  Chronic Kidney Disease <60  Kidney Failure <15      C-reactive Protein [[id number]]  (Abnormal) Collected:  02/05/20 0222    Specimen:  Blood Updated:  02/05/20 0250     C-Reactive Protein 29.35 mg/dL     Lactic Acid, Plasma [[id number]]  (Abnormal) Collected:  02/05/20 0222    Specimen:  Blood Updated:  02/05/20 0246     Lactate 8.9 mmol/L     Stool Culture (Reference Lab) - Stool, Per Stoma [[id number]] Collected:  02/04/20 2313    Specimen:  Stool from Per Stoma Updated:  02/04/20 2332    Hepatitis Panel, Acute [[id number]] Collected:  02/04/20 1012    Specimen:  Blood Updated:  02/04/20 2215    Narrative:       The following orders were created for panel order Hepatitis Panel, Acute.  Procedure                               Abnormality         Status                     ---------                               -----------         ------                     Hepatitis Panel, Acute[[id number]]       Normal              Final result               Hep B Confirmation Tube[[id number]]                          Final result                 Please view results for these tests on the individual orders.    Hep B Confirmation Tube [[id number]] Collected:  02/04/20 1012    Specimen:  Blood Updated:  02/04/20 2215     Extra Tube Hold for add-ons.     Comment: Auto resulted.       Blood Gas, Arterial With Co-Ox [[id number]]  (Abnormal) Collected:  02/04/20 2128    Specimen:  Arterial Blood Updated:  02/04/20 2133     Site Left Brachial     Rashaun's Test N/A     pH, Arterial 7.497 pH units      Comment: 83 Value above reference range        pCO2, Arterial 36.4 mm Hg      pO2, Arterial 149.0 mm Hg      Comment: 83 Value above  reference range        HCO3, Arterial 28.2 mmol/L      Comment: 83 Value above reference range        Base Excess, Arterial 4.7 mmol/L      O2 Saturation, Arterial >99.2 %      Comment: 93 Value above reportable range > 100.0        Hemoglobin, Blood Gas 8.3 g/dL      Comment: 84 Value below reference range        Hematocrit, Blood Gas 25.3 %      Comment: 84 Value below reference range        Oxyhemoglobin 98.5 %      Methemoglobin 0.30 %      Carboxyhemoglobin 1.4 %      A-a Gradiant 81.6 mmHg      CO2 Content 29.3 mmol/L      Temperature 0.0 C      Barometric Pressure for Blood Gas 723 mmHg      Modality Ventilator     FIO2 40 %      Ventilator Mode VC/AC     Set Tidal Volume 450.00     Set Mech Resp Rate 24.0     PEEP 10.0     Note --     Collected by [id number]     Comment: Meter: [id number]     :  [id number]        pH, Temp Corrected --     pCO2, Temperature Corrected --     pO2, Temperature Corrected --    Clostridium Difficile Toxin - Stool, Per Stoma [[id number]] Collected:  02/04/20 1954    Specimen:  Stool from Per Stoma Updated:  02/04/20 2120    Narrative:       The following orders were created for panel order Clostridium Difficile Toxin - Stool, Per Stoma.  Procedure                               Abnormality         Status                     ---------                               -----------         ------                     Clostridium Difficile To...[[id number]]                      Final result                 Please view results for these tests on the individual orders.    Clostridium Difficile Toxin, PCR - Stool, Per Stoma [[id number]] Collected:  02/04/20 1954    Specimen:  Stool from Per Stoma Updated:  02/04/20 2120     C. Difficile Toxins by PCR Negative     027 Toxin Presumptive Negative    Narrative:       For In Vitro Diagnostic use only.  027-NAP1-BI results are NOT intended to guide treatment. 027 typing is relative to PCR ribotyping and shown to be equivalent to B1 typing by  restriction endonuclease analysis or NAP1 typing by pulse field gel electrophoresis.    Blood Culture - Blood, Arm, Right [[id number]] Collected:  01/31/20 1955    Specimen:  Blood from Arm, Right Updated:  02/04/20 2030     Blood Culture No growth at 4 days    Blood Culture - Blood, Arm, Left [[id number]] Collected:  01/31/20 2004    Specimen:  Blood from Arm, Left Updated:  02/04/20 2030     Blood Culture No growth at 4 days    Calcium, Ionized [[id number]]  (Abnormal) Collected:  02/04/20 1932    Specimen:  Blood Updated:  02/04/20 2013     Ionized Calcium 1.08 mmol/L     Blood Gas, Arterial With Co-Ox [[id number]]  (Abnormal) Collected:  02/04/20 2006    Specimen:  Arterial Blood Updated:  02/04/20 2011     Site Left Brachial     Rashaun's Test N/A     pH, Arterial 7.296 pH units      Comment: 84 Value below reference range        pCO2, Arterial 39.5 mm Hg      pO2, Arterial 117.0 mm Hg      Comment: 83 Value above reference range        HCO3, Arterial 19.2 mmol/L      Comment: 84 Value below reference range        Base Excess, Arterial -6.7 mmol/L      O2 Saturation, Arterial >99.2 %      Hemoglobin, Blood Gas 7.3 g/dL      Comment: 84 Value below reference range        Hematocrit, Blood Gas 22.3 %      Comment: 84 Value below reference range        Oxyhemoglobin 97.6 %      Methemoglobin 0.40 %      Carboxyhemoglobin 1.4 %      A-a Gradiant 109.9 mmHg      CO2 Content 20.4 mmol/L      Temperature 0.0 C      Barometric Pressure for Blood Gas 724 mmHg      Modality Ventilator     FIO2 40 %      Ventilator Mode VC/AC     Set Tidal Volume 450.00     Set Mech Resp Rate 20.0     PEEP 10.0     Note --     Collected by [id number]     Comment: Meter: [id number]     :  [id number]        pH, Temp Corrected --     pCO2, Temperature Corrected --     pO2, Temperature Corrected --    Amylase [[id number]]  (Normal) Collected:  02/04/20 1932    Specimen:  Blood Updated:  02/04/20 2010     Amylase 61 U/L     Lactic Acid,  Plasma [[id number]]  (Abnormal) Collected:  02/04/20 1931    Specimen:  Blood Updated:  02/04/20 2010     Lactate 7.6 mmol/L     Lipase [[id number]]  (Abnormal) Collected:  02/04/20 1932    Specimen:  Blood Updated:  02/04/20 2003     Lipase 126 U/L     Phosphorus [[id number]]  (Normal) Collected:  02/04/20 1932    Specimen:  Blood Updated:  02/04/20 2003     Phosphorus 3.6 mg/dL     Magnesium [[id number]]  (Normal) Collected:  02/04/20 1932    Specimen:  Blood Updated:  02/04/20 2003     Magnesium 2.1 mg/dL     CK [[id number]]  (Abnormal) Collected:  02/04/20 1932    Specimen:  Blood Updated:  02/04/20 2003     Creatine Kinase 297 U/L     Peripheral Blood Smear [[id number]] Collected:  02/04/20 1932    Specimen:  Blood Updated:  02/04/20 1935    Blood Culture - Blood, Arm, Left [[id number]] Collected:  02/03/20 1909    Specimen:  Blood from Arm, Left Updated:  02/04/20 1930     Blood Culture No growth at 24 hours    Blood Culture - Blood, Arm, Left [[id number]] Collected:  02/03/20 1909    Specimen:  Blood from Arm, Left Updated:  02/04/20 1930     Blood Culture No growth at 24 hours    Lactic Acid, Plasma [[id number]]  (Abnormal) Collected:  02/04/20 1612    Specimen:  Blood Updated:  02/04/20 1649     Lactate 8.5 mmol/L     Blood Gas, Arterial With Co-Ox [[id number]]  (Abnormal) Collected:  02/04/20 1552    Specimen:  Arterial Blood Updated:  02/04/20 1553     Site Right Radial     Rashaun's Test N/A     pH, Arterial 7.205 pH units      Comment: 85 Value below critical limit        pCO2, Arterial 35.4 mm Hg      pO2, Arterial 103.0 mm Hg      Comment: 83 Value above reference range        HCO3, Arterial 14.0 mmol/L      Comment: 84 Value below reference range        Base Excess, Arterial -12.9 mmol/L      O2 Saturation, Arterial 98.0 %      Hemoglobin, Blood Gas 8.3 g/dL      Comment: 84 Value below reference range        Hematocrit, Blood Gas 25.5 %      Comment: 84 Value below reference range        Oxyhemoglobin 96.0 %         Methemoglobin 0.50 %      Carboxyhemoglobin 1.5 %      A-a Gradiant 264.8 mmHg      CO2 Content 15.1 mmol/L      Temperature 0.0 C      Barometric Pressure for Blood Gas 723 mmHg      Modality Ventilator     FIO2 60 %      Ventilator Mode VC/AC     Set Tidal Volume 450.00     Set Mech Resp Rate 15.0     PEEP 5.0     Note --     Notified Who DR HUGHES     Notified By [id number]     Notified Time 02/04/2020 15:57     Collected by [id number]     Comment: Meter: [id number]     :  [id number]        pH, Temp Corrected --     pCO2, Temperature Corrected --     pO2, Temperature Corrected --    Vancomycin, Random [[id number]]  (Normal) Collected:  02/04/20 1252    Specimen:  Blood Updated:  02/04/20 1320     Vancomycin Random 21.60 mcg/mL     Hepatitis Panel, Acute [[id number]]  (Normal) Collected:  02/04/20 1012    Specimen:  Blood Updated:  02/04/20 1122     Hepatitis B Surface Ag Non-Reactive     Hep A IgM Non-Reactive     Hep B C IgM Non-Reactive     Hepatitis C Ab Non-Reactive    Narrative:       Results may be falsely decreased if patient taking Biotin.     Blood Gas, Arterial With Co-Ox [[id number]]  (Abnormal) Collected:  02/04/20 0504    Specimen:  Arterial Blood Updated:  02/04/20 0514     Site Right Radial     Rashaun's Test Positive     pH, Arterial 7.223 pH units      Comment: 84 Value below reference range        pCO2, Arterial 30.4 mm Hg      Comment: 84 Value below reference range        pO2, Arterial 73.2 mm Hg      Comment: 84 Value below reference range        HCO3, Arterial 12.5 mmol/L      Comment: 84 Value below reference range        Base Excess, Arterial -13.9 mmol/L      O2 Saturation, Arterial 94.5 %      Hemoglobin, Blood Gas 8.1 g/dL      Comment: 84 Value below reference range        Hematocrit, Blood Gas 25.0 %      Comment: 84 Value below reference range        Oxyhemoglobin 92.7 %      Comment: 84 Value below reference range        Methemoglobin 0.70 %      Carboxyhemoglobin 1.2 %      A-a  Gradiant 110.2 mmHg      CO2 Content 13.5 mmol/L      Temperature 0.0 C      Barometric Pressure for Blood Gas 726 mmHg      Modality Nasal Cannula     FIO2 32 %      Ventilator Mode NA     Note Read back and acknowledge     Notified Who aicu     Collected by [id number]     Comment: Meter: [id number]     :  [id number]        pH, Temp Corrected --     pCO2, Temperature Corrected --     pO2, Temperature Corrected --    Lactic Acid, Plasma [[id number]]  (Abnormal) Collected:  02/04/20 0356    Specimen:  Blood Updated:  02/04/20 0449     Lactate 7.5 mmol/L     CBC (No Diff) [[id number]]  (Abnormal) Collected:  02/04/20 0053    Specimen:  Blood Updated:  02/04/20 0146     WBC 38.57 10*3/mm3      RBC 3.78 10*6/mm3      Hemoglobin 9.2 g/dL      Hematocrit 31.3 %      MCV 82.8 fL      MCH 24.3 pg      MCHC 29.4 g/dL      RDW 17.7 %      RDW-SD 52.2 fl      MPV 11.0 fL      Platelets 178 10*3/mm3     Lactic Acid, Plasma [[id number]]  (Abnormal) Collected:  02/04/20 0053    Specimen:  Blood Updated:  02/04/20 0130     Lactate 7.1 mmol/L     Basic Metabolic Panel [[id number]]  (Abnormal) Collected:  02/04/20 0053    Specimen:  Blood Updated:  02/04/20 0129     Glucose 141 mg/dL      BUN 26 mg/dL      Creatinine 2.65 mg/dL      Sodium 133 mmol/L      Potassium 4.3 mmol/L      Chloride 102 mmol/L      CO2 10.3 mmol/L      Calcium 8.2 mg/dL      eGFR Non African Amer 20 mL/min/1.73      BUN/Creatinine Ratio 9.8     Anion Gap 20.7 mmol/L     Narrative:       GFR Normal >60  Chronic Kidney Disease <60  Kidney Failure <15      C-reactive Protein [[id number]]  (Abnormal) Collected:  02/04/20 0053    Specimen:  Blood Updated:  02/04/20 0128     C-Reactive Protein 31.38 mg/dL     Lactic Acid, Plasma [[id number]]  (Abnormal) Collected:  02/03/20 1908    Specimen:  Blood Updated:  02/03/20 1950     Lactate 6.0 mmol/L     Lactic Acid, Plasma [[id number]]  (Abnormal) Collected:  02/03/20 4068    Specimen:  Blood Updated:  02/03/20 2612      Lactate 5.8 mmol/L     Blood Gas, Arterial With Co-Ox [[id number]]  (Abnormal) Collected:  02/03/20 1751    Specimen:  Arterial Blood Updated:  02/03/20 1757     Site Right Radial     Rashaun's Test Positive     pH, Arterial 7.244 pH units      Comment: 84 Value below reference range        pCO2, Arterial 26.2 mm Hg      Comment: 84 Value below reference range        pO2, Arterial 83.9 mm Hg      HCO3, Arterial 11.3 mmol/L      Comment: 84 Value below reference range        Base Excess, Arterial -14.6 mmol/L      O2 Saturation, Arterial 96.7 %      Hemoglobin, Blood Gas 9.2 g/dL      Comment: 84 Value below reference range        Hematocrit, Blood Gas 28.3 %      Comment: 84 Value below reference range        Oxyhemoglobin 95.2 %      Methemoglobin 0.30 %      Carboxyhemoglobin 1.3 %      A-a Gradiant 76.5 mmHg      CO2 Content 12.1 mmol/L      Temperature 0.0 C      Barometric Pressure for Blood Gas 725 mmHg      Modality Nasal Cannula     FIO2 28 %      Flow Rate 2.0 lpm      Ventilator Mode NA     Note Read back and acknowledge     Notified [facility] [id number]     Notified By [id number]     Collected by [id number]     Comment: Meter: [id number]     :  [id number]        pH, Temp Corrected --     pCO2, Temperature Corrected --     pO2, Temperature Corrected --    Procalcitonin [[id number]]  (Abnormal) Collected:  02/03/20 0112    Specimen:  Blood Updated:  02/03/20 1230     Procalcitonin 4.07 ng/mL     Narrative:       As a Marker for Sepsis (Non-Neonates):   1. <0.5 ng/mL represents a low risk of severe sepsis and/or septic shock.  1. >2 ng/mL represents a high risk of severe sepsis and/or septic shock.    As a Marker for Lower Respiratory Tract Infections that require antibiotic therapy:  PCT on Admission     Antibiotic Therapy             6-12 Hrs later  > 0.5                Strongly Recommended            >0.25 - <0.5         Recommended  0.1 - 0.25           Discouraged                   Remeasure/reassess PCT  <0.1    "              Strongly Discouraged          Remeasure/reassess PCT      As 28 day mortality risk marker: \"Change in Procalcitonin Result\" (> 80 % or <=80 %) if Day 0 (or Day 1) and Day 4 values are available. Refer to [email or web address]   Change in PCT <=80 %   A decrease of PCT levels below or equal to 80 % defines a positive change in PCT test result representing a higher risk for 28-day all-cause mortality of patients diagnosed with severe sepsis or septic shock.  Change in PCT > 80 %   A decrease of PCT levels of more than 80 % defines a negative change in PCT result representing a lower risk for 28-day all-cause mortality of patients diagnosed with severe sepsis or septic shock.                Results may be falsely decreased if patient taking Biotin.     Blood Gas, Arterial With Co-Ox [[id number]]  (Abnormal) Collected:  02/03/20 1205    Specimen:  Arterial Blood Updated:  02/03/20 1220     Site Right Radial     Rashaun's Test Positive     pH, Arterial 7.218 pH units      Comment: 85 Value below critical limit        pCO2, Arterial 24.0 mm Hg      Comment: 84 Value below reference range        pO2, Arterial 85.4 mm Hg      HCO3, Arterial 9.8 mmol/L      Comment: 84 Value below reference range        Base Excess, Arterial -16.4 mmol/L      O2 Saturation, Arterial 96.3 %      Hemoglobin, Blood Gas 9.2 g/dL      Comment: 84 Value below reference range        Hematocrit, Blood Gas 28.3 %      Comment: 84 Value below reference range        Oxyhemoglobin 94.6 %      Methemoglobin 0.60 %      Carboxyhemoglobin 1.2 %      A-a Gradiant 78.0 mmHg      CO2 Content 10.5 mmol/L      Temperature 0.0 C      Barometric Pressure for Blood Gas 726 mmHg      Modality Nasal Cannula     FIO2 28 %      Flow Rate 2.0 lpm      Ventilator Mode NA     Note --     Notified Who DR HUGHES AND JARAD RN     Notified By [id number]     Notified Time 02/03/2020 12:12     Collected by [id number]     Comment: Meter: [id number]    "  :  [id number]        pH, Temp Corrected --     pCO2, Temperature Corrected --     pO2, Temperature Corrected --    Blood Gas, Arterial With Co-Ox [[id number]]  (Abnormal) Collected:  02/03/20 0437    Specimen:  Arterial Blood Updated:  02/03/20 0458     Site Left Radial     Rashaun's Test Positive     pH, Arterial 7.237 pH units      Comment: 84 Value below reference range        pCO2, Arterial 24.6 mm Hg      Comment: 84 Value below reference range        pO2, Arterial 85.9 mm Hg      HCO3, Arterial 10.4 mmol/L      Comment: 84 Value below reference range        Base Excess, Arterial -15.5 mmol/L      O2 Saturation, Arterial 96.6 %      Hemoglobin, Blood Gas 9.1 g/dL      Comment: 84 Value below reference range        Hematocrit, Blood Gas 27.9 %      Comment: 84 Value below reference range        Oxyhemoglobin 94.9 %      Methemoglobin 0.50 %      Carboxyhemoglobin 1.3 %      A-a Gradiant 76.7 mmHg      CO2 Content 11.2 mmol/L      Temperature 0.0 C      Barometric Pressure for Blood Gas 726 mmHg      Modality Nasal Cannula     FIO2 28 %      Ventilator Mode NA     Note Read back and acknowledge     Notified Who dr. gilmore     Notified By [id number]     Collected by [id number]     Comment: Meter: [id number]     :  [id number]        pH, Temp Corrected --     pCO2, Temperature Corrected --     pO2, Temperature Corrected --    Lactic Acid, Plasma [[id number]]  (Abnormal) Collected:  02/03/20 0112    Specimen:  Blood Updated:  02/03/20 0226     Lactate 3.1 mmol/L     Vancomycin, Random [[id number]]  (Normal) Collected:  02/03/20 0112    Specimen:  Blood Updated:  02/03/20 0224     Vancomycin Random 15.40 mcg/mL     Comprehensive Metabolic Panel [[id number]]  (Abnormal) Collected:  02/03/20 0112    Specimen:  Blood Updated:  02/03/20 0224     Glucose 98 mg/dL      BUN 25 mg/dL      Creatinine 1.96 mg/dL      Sodium 130 mmol/L      Potassium 4.2 mmol/L      Chloride 100 mmol/L      CO2 9.4 mmol/L      Calcium 8.3  mg/dL      Total Protein 6.8 g/dL      Albumin 3.77 g/dL      ALT (SGPT) 17 U/L      AST (SGOT) 63 U/L      Alkaline Phosphatase 509 U/L      Total Bilirubin 1.2 mg/dL      eGFR Non African Amer 28 mL/min/1.73      Globulin 3.0 gm/dL      A/G Ratio 1.2 g/dL      BUN/Creatinine Ratio 12.8     Anion Gap 20.6 mmol/L     Narrative:       GFR Normal >60  Chronic Kidney Disease <60  Kidney Failure <15      Scan Slide [[id number]] Collected:  02/03/20 0112    Specimen:  Blood Updated:  02/03/20 0206     Scan Slide --     Comment: See Manual Differential Results       Manual Differential [[id number]]  (Abnormal) Collected:  02/03/20 0112    Specimen:  Blood Updated:  02/03/20 0206     Neutrophil % 76.0 %      Lymphocyte % 8.0 %      Monocyte % 4.0 %      Eosinophil % 1.0 %      Bands %  3.0 %      Metamyelocyte % 7.0 %      Myelocyte % 1.0 %      Neutrophils Absolute 22.51 10*3/mm3      Lymphocytes Absolute 2.28 10*3/mm3      Monocytes Absolute 1.14 10*3/mm3      Eosinophils Absolute 0.28 10*3/mm3      Anisocytosis Slight/1+     Hypochromia Mod/2+     Platelet Morphology Normal    CBC & Differential [[id number]] Collected:  02/03/20 0112    Specimen:  Blood Updated:  02/03/20 0206    Narrative:       The following orders were created for panel order CBC & Differential.  Procedure                               Abnormality         Status                     ---------                               -----------         ------                     CBC Auto Differential[[id number]]        Abnormal            Final result                 Please view results for these tests on the individual orders.    CBC Auto Differential [[id number]]  (Abnormal) Collected:  02/03/20 0112    Specimen:  Blood Updated:  02/03/20 0206     WBC 28.49 10*3/mm3      RBC 3.47 10*6/mm3      Hemoglobin 8.4 g/dL      Hematocrit 28.6 %      MCV 82.4 fL      MCH 24.2 pg      MCHC 29.4 g/dL      RDW 16.8 %      RDW-SD 49.4 fl      MPV 10.6 fL      Platelets 206  10*3/mm3     BNP [[id number]]  (Abnormal) Collected:  02/03/20 0112    Specimen:  Blood Updated:  02/03/20 0205     proBNP 1,467.0 pg/mL     Narrative:       Among patients with dyspnea, NT-proBNP is highly sensitive for the detection of acute congestive heart failure. In addition NT-proBNP of <300 pg/ml effectively rules out acute congestive heart failure with 99% negative predictive value.    Results may be falsely decreased if patient taking Biotin.      Urinalysis With Culture If Indicated - Urine, Clean Catch [[id number]]  (Abnormal) Collected:  02/02/20 2053    Specimen:  Urine, Clean Catch Updated:  02/02/20 2116     Color, UA Dark Yellow     Appearance, UA Turbid     pH, UA <=5.0     Specific Gravity, UA 1.020     Glucose, UA Negative     Ketones, UA Trace     Bilirubin, UA Negative     Blood, UA Large (3+)     Protein,  mg/dL (2+)     Leuk Esterase, UA Negative     Nitrite, UA Negative     Urobilinogen, UA 0.2 E.U./dL    Urinalysis, Microscopic Only - Urine, Clean Catch [[id number]]  (Abnormal) Collected:  02/02/20 2053    Specimen:  Urine, Clean Catch Updated:  02/02/20 2116     RBC, UA 21-30 /HPF      WBC, UA 0-2 /HPF      Bacteria, UA 1+ /HPF      Squamous Epithelial Cells, UA 3-6 /HPF      Hyaline Casts, UA None Seen /LPF      Uric Acid Crystals, UA Large/3+ /HPF      Methodology Automated Microscopy    CBC & Differential [[id number]] Collected:  02/02/20 1903    Specimen:  Blood Updated:  02/02/20 1942    Narrative:       The following orders were created for panel order CBC & Differential.  Procedure                               Abnormality         Status                     ---------                               -----------         ------                     CBC Auto Differential[[id number]]        Abnormal            Final result                 Please view results for these tests on the individual orders.    CBC Auto Differential [[id number]]  (Abnormal) Collected:  02/02/20 1903    Specimen:   Blood Updated:  02/02/20 1942     WBC 26.99 10*3/mm3      RBC 3.34 10*6/mm3      Hemoglobin 8.2 g/dL      Hematocrit 27.0 %      MCV 80.8 fL      MCH 24.6 pg      MCHC 30.4 g/dL      RDW 16.3 %      RDW-SD 47.2 fl      MPV 10.6 fL      Platelets 217 10*3/mm3     Scan Slide [[id number]] Collected:  02/02/20 1903    Specimen:  Blood Updated:  02/02/20 1942     Scan Slide --     Comment: See Manual Differential Results       Manual Differential [[id number]]  (Abnormal) Collected:  02/02/20 1903    Specimen:  Blood Updated:  02/02/20 1942     Neutrophil % 83.0 %      Lymphocyte % 5.0 %      Monocyte % 3.0 %      Eosinophil % 2.0 %      Bands %  4.0 %      Metamyelocyte % 2.0 %      Myelocyte % 1.0 %      Neutrophils Absolute 23.48 10*3/mm3      Lymphocytes Absolute 1.35 10*3/mm3      Monocytes Absolute 0.81 10*3/mm3      Eosinophils Absolute 0.54 10*3/mm3      nRBC 1.0 /100 WBC      Anisocytosis Slight/1+     Hypochromia Slight/1+     Platelet Morphology Normal    Lactic Acid, Plasma [[id number]]  (Abnormal) Collected:  02/02/20 1903    Specimen:  Blood Updated:  02/02/20 1930     Lactate 3.3 mmol/L     Basic Metabolic Panel [[id number]]  (Abnormal) Collected:  02/02/20 1903    Specimen:  Blood Updated:  02/02/20 1927     Glucose 94 mg/dL      BUN 24 mg/dL      Creatinine 1.91 mg/dL      Sodium 134 mmol/L      Potassium 4.3 mmol/L      Chloride 100 mmol/L      CO2 11.1 mmol/L      Calcium 8.0 mg/dL      eGFR Non African Amer 29 mL/min/1.73      BUN/Creatinine Ratio 12.6     Anion Gap 22.9 mmol/L     Narrative:       GFR Normal >60  Chronic Kidney Disease <60  Kidney Failure <15      Blood Gas, Arterial With Co-Ox [[id number]]  (Abnormal) Collected:  02/02/20 1827    Specimen:  Arterial Blood Updated:  02/02/20 1831     Site Right Radial     Rashaun's Test Positive     pH, Arterial 7.268 pH units      Comment: 84 Value below reference range        pCO2, Arterial 25.0 mm Hg      Comment: 84 Value below reference range         pO2, Arterial 78.9 mm Hg      Comment: 84 Value below reference range        HCO3, Arterial 11.4 mmol/L      Comment: 84 Value below reference range        Base Excess, Arterial -14.1 mmol/L      O2 Saturation, Arterial 95.9 %      Hemoglobin, Blood Gas 8.2 g/dL      Comment: 84 Value below reference range        Hematocrit, Blood Gas 25.3 %      Comment: 84 Value below reference range        Oxyhemoglobin 94.2 %      Methemoglobin 0.30 %      Carboxyhemoglobin 1.5 %      A-a Gradiant 82.6 mmHg      CO2 Content 12.2 mmol/L      Temperature 0.0 C      Barometric Pressure for Blood Gas 724 mmHg      Modality Nasal Cannula     FIO2 28 %      Ventilator Mode NA     Note Read back and acknowledge     Notified Who dr. hall     Notified By [id number]     Collected by [id number]     Comment: Meter: [id number]     :  [id number]        pH, Temp Corrected --     pCO2, Temperature Corrected --     pO2, Temperature Corrected --    Lactic Acid, Plasma [[id number]]  (Abnormal) Collected:  02/02/20 1512    Specimen:  Blood Updated:  02/02/20 1645     Lactate 3.7 mmol/L             Imaging Results (Last 72 Hours)     Procedure Component Value Units Date/Time    XR Chest 1 View [[id number]] Resulted:  02/05/20 1502     Updated:  02/05/20 1534    XR Chest 1 View [[id number]] Collected:  02/05/20 1449     Updated:  02/05/20 1452    Narrative:       EXAMINATION: XR CHEST 1 VW-      CLINICAL INDICATION:     s/p ET tube retraction; A41.9-Sepsis,  unspecified organism     TECHNIQUE:  XR CHEST 1 VW-      COMPARISON: 02/05/2020 5:40 AM      FINDINGS:   ET tube with tip just at level of clavicles. Left IJ deep line and left  Port-A-Cath are stable in positioning. Right tunneled dialysis catheter  stable. NG tube extends into stomach.     Improved aeration of both lung bases. Decrease in pleural effusions. No  pneumothorax. No acute bony findings.       Impression:       1. Improved aeration with decreased basilar airspace disease.  2.  Slight decrease in pleural effusions.  3. Support devices as above.     This report was finalized on 2/5/2020 2:50 PM by Dr. Bert Butler MD.       US Chest [[id number]] Collected:  02/05/20 1448     Updated:  02/05/20 1451    Narrative:       EXAMINATION: US CHEST-      CLINICAL INDICATION:     pleural effusion; A41.9-Sepsis, unspecified  organism     COMPARISON: X-ray same day     TECHNIQUE:  Multiple sonographic images obtained in transverse and  sagittal planes of the chest for fluid assessment.      FINDINGS:   Imaging of the right lung region demonstrates a small amount of pleural  fluid. Small amount of left pleural fluid is also noted.        Impression:       Small bilateral pleural effusions.      This report was finalized on 2/5/2020 2:49 PM by Dr. Bert Butler MD.       XR Chest 1 View [[id number]] Collected:  02/05/20 0930     Updated:  02/05/20 0933    Narrative:       EXAMINATION: XR CHEST 1 VW-      CLINICAL INDICATION:     Intubated Patient; A41.9-Sepsis, unspecified  organism     TECHNIQUE:  XR CHEST 1 VW-      COMPARISON: 02/04/2020      FINDINGS:   ET tube with tip at level of clavicles. Left Port-A-Cath stable. Left IJ  deep line stable in positioning. Right tunneled dialysis catheter also  stable in positioning. NG tube extends into the stomach.     Some increase in left basilar airspace disease. Stable lung volumes.  Right perihilar airspace disease slightly improved. Trace effusions. No  pneumothorax. No acute bony findings.       Impression:       1. Support devices described above.  2. Increase in left basilar airspace disease with slight improvement in  right perihilar airspace disease.     This report was finalized on 2/5/2020 9:31 AM by Dr. Bert Butler MD.       CT Abdomen Pelvis With & Without Contrast [[id number]] Collected:  02/04/20 1915     Updated:  02/04/20 1922    Narrative:       EXAM: CT ABDOMEN PELVIS W WO CONTRAST-            TECHNIQUE: Multiple axial CT images were  obtained from lung bases  through pubic symphysis with and without administration of IV contrast.  Reformatted images in the coronal and/or sagittal plane(s) were  generated from the axial data set to facilitate diagnostic accuracy  and/or surgical planning.     Radiation dose reduction techniques were utilized per ALARA protocol.  Automated exposure control was initiated through either or Shoop or  DoseRight software packages by  protocol.       DOSE:     CLINICAL INFORMATION: recent abdomenal surgery, SBP/shock, rising  lactate; A41.9-Sepsis, unspecified organism      COMPARISON: 02/02/2020     FINDINGS:     LOWER THORAX: INCREASE IN THE DEGREE OF CONSOLIDATION RIGHT GREATER THAN  LEFT LOWER LOBE REGIONS ALONG WITH PATCHY AIRSPACE DISEASE INVOLVING THE  LEFT LUNG WITH RIGHT GREATER THAN LEFT NONLOCULATED PLEURAL EFFUSIONS.     ABDOMEN:        LIVER: EXTENSIVE METASTATIC LIVER DISEASE NOTED WITH NUMEROUS LIVER  LESIONS PRESENT.        GALLBLADDER: No dilation or stone identified.        PANCREAS: Unremarkable. No mass or ductal dilatation.        SPLEEN: SOFT TISSUE IMPLANTS ARE SEEN IN THE LEFT UPPER QUADRANT NEAR  THE SPLENIC FOSSA COMPATIBLE WITH METASTATIC DISEASE.        ADRENALS: LEFT ADRENAL NODULE IS NOTED LIKELY METASTATIC IN ETIOLOGY  AND IS APPROXIMATELY 2.6 CM.        KIDNEYS/URETERS: NONOBSTRUCTING RIGHT KIDNEY STONES. NO  HYDRONEPHROSIS IDENTIFIED.        GI TRACT: POST SURGICAL CHANGES INVOLVING THE GI TRACT WITH A RIGHT  LOWER QUADRANT DIVERTING ILEOSTOMY. NO BOWEL OBSTRUCTION IS IDENTIFIED.  ABNORMAL THICKENING WITH ENHANCEMENT OF THE ASCENDING COLON WITH WALL  THICKENING NOTED ALSO OF THE TERMINAL ILEUM LIKELY DUE TO PATIENT'S  KNOWN PRIMARY COLON MALIGNANCY. NO DILATED LOOPS OF BOWEL IDENTIFIED.        PERITONEUM: COMPLEX ASCITES IS NOTED THAT IS MILD TO MODERATE IN  EXTENT WITH A MORE FOCAL AREA OF PARTIALLY LOCULATED FLUID SEEN  POSTERIOR TO THE UTERUS AND IN THE PRERECTAL SPACE  THAT IS 4.1 X 9.2 CM  EITHER REPRESENTING MALIGNANT ASCITES OR POSSIBLY EVOLVING INFECTION.  ABNORMAL SOFT TISSUE DENSITIES ARE SEEN WITHIN THE LOWER ABDOMINAL  ASCITES AND EXTENDING TO THE LEVEL OF THE OMENTUM ALONG THE OMENTAL  VASCULATURE MOST CONSISTENT WITH METASTATIC DISEASE.        MESENTERY: Unremarkable.        LYMPH NODES: BULKY METASTATIC ADENOPATHY OF THE RIGHT LOWER QUADRANT  MESENTERY. THERE ARE ENLARGED UPPER ABDOMINAL LYMPH NODES IN THE CELIAC  AXIS REGION AND ALONG THE RETROPERITONEAL STATIONS COMPATIBLE WITH  METASTATIC DISEASE.        VASCULATURE: No evidence of aneurysm.        ABDOMINAL WALL: BODY WALL EDEMA IS NOTED.        OTHER: None.     PELVIS:        BLADDER: SARABIA CATHETER DECOMPRESSES URINARY BLADDER.        REPRODUCTIVE: Unremarkable as visualized.        APPENDIX: NOT VISUALIZED.     BONES: No acute bony abnormality.       Impression:       Impression:  1. Bilateral lower lobe pneumonia with right greater than left small  nonloculated pleural effusions.  2. Extensive metastatic disease involving the liver and peritoneum with  omental caking also noted.  3. Metastatic adenopathy predominantly in the right lower quadrant as  well as in the upper retroperitoneal stations.  4. Inhomogeneous abnormal thickening of the right colon with wall  thickening of the terminal ileum reflects patient's known primary colon  adenocarcinoma. No bowel dilatation or pneumatosis identified to suggest  obstruction or perforation on CT.  5. Complex ascites.  6. More focal area of loculated fluid in the prerectal space with  peripheral enhancement that may simply represent malignant ascites, but  the possibility of superimposed infection not excluded.  7. Other nonacute findings as above.     This report was finalized on 2/4/2020 7:20 PM by Dr. Bert Butler MD.       XR Chest AP [[id number]] Collected:  02/04/20 1529     Updated:  02/04/20 1542    Narrative:       EXAMINATION: XR CHEST AP-      CLINICAL  INDICATION:     central line; A41.9-Sepsis, unspecified  organism     TECHNIQUE:  XR CHEST AP-      COMPARISON: 02/03/2020      FINDINGS:   Placement of a left IJ deep line with tip in the distal SVC. Left  Port-A-Cath is stable.  ET tube positioning with tip at level of clavicles.  Right dialysis catheter has been placed with tip at the cavoatrial  junction.  Perihilar airspace disease noted and may represent perihilar atelectasis  given low lung volumes.   Cardiomegaly noted.   No pneumothorax.   No effusions.   No acute osseous findings.          Impression:       1. Development of bilateral perihilar airspace disease with  considerations to include edema, pneumonia, or atelectasis.  2. Low lung volumes.  3. Support devices detailed above.  4. No pneumothorax.     This report was finalized on 2/4/2020 3:30 PM by Dr. Bert Butler MD.       FL Surgery Fluoro [[id number]] Collected:  02/04/20 1514     Updated:  02/04/20 1526    Narrative:       EXAMINATION: FL SURGERY FLUORO-      CLINICAL INDICATION:     central line placement ; A41.9-Sepsis,  unspecified organism     TECHNIQUE:  FL SURGERY FLUORO-      FLUOROSCOPY TIME: 117.5 seconds     FINDINGS:   Fluoroscopy images submitted from intraoperative fluoroscopy for  purposes of Port-A-Cath placement.        Impression:       As above.     This report was finalized on 2/4/2020 3:21 PM by Dr. Bert Butler MD.       FL Surgery Fluoro [[id number]] Collected:  02/04/20 1521     Updated:  02/04/20 1525    Narrative:       EXAMINATION: FL SURGERY FLUORO-      CLINICAL INDICATION:     Dialysis cath placement; A41.9-Sepsis,  unspecified organism     TECHNIQUE:  FL SURGERY FLUORO-      FLUOROSCOPY TIME: 25.5 seconds     FINDINGS:   Fluoroscopy was provided intraoperatively for purposes of dialysis  catheter placement with submitted fluoroscopy images demonstrating  presence of a right-sided dialysis catheter.        Impression:       As above.     This report was finalized  on 2/4/2020 3:21 PM by Dr. Bert Butler MD.       XR Chest 1 View [[id number]] Collected:  02/03/20 1259     Updated:  02/03/20 1302    Narrative:       EXAMINATION: XR CHEST 1 VW-      CLINICAL INDICATION:     shortness of breath; A41.9-Sepsis, unspecified  organism     TECHNIQUE:  XR CHEST 1 VW-      COMPARISON: 02/02/2020      FINDINGS:   Left Port-A-Cath stable. Elevation right hemidiaphragm stable. Airspace  disease improved. Small right pleural effusion stable. No pneumothorax.  No acute bony changes.       Impression:       Mild improvement in basilar airspace disease. Otherwise  stable chest.     This report was finalized on 2/3/2020 12:59 PM by Dr. Bert Butler MD.       CT Abdomen Pelvis Without Contrast [[id number]] Collected:  02/02/20 2008     Updated:  02/02/20 2010    Narrative:       CT Abdomen Pelvis WO 2/2/2020    INDICATION:   Sepsis and abdominal pain beginning 1/31/2020. Colon carcinoma with liver metastases.    TECHNIQUE:   CT of the abdomen and pelvis without IV contrast. Coronal and sagittal reconstructions were obtained.  Radiation dose reduction techniques included automated exposure control or exposure modulation based on body size. Count of known CT and cardiac nuc  med studies performed in previous 12 months: 5.     COMPARISON:   1/31/2020    FINDINGS:  Abdomen: Exam is severely limited by lack of oral and intravenous contrast. Multiple ill-defined masses are seen throughout both hepatic lobes characteristic of the patient's known metastatic disease. Fatty infiltration of the liver is noted. The spleen,  pancreas, gallbladder and biliary tree and right adrenal gland are normal. Stable low-density lesion on the left adrenal gland compared with 1/31/2020. This may represent metastatic disease or possibly an adrenal adenoma. There is increased density  within the lower pole collecting system of the right kidney suggesting nonobstructing stones. The left kidney is normal. Exam is limited  by patient motion with resulting artifact.    Pelvis: Right lower quadrant ileostomy with no evidence of bowel obstruction. Ill-defined soft tissue mass involving the cecum characteristic of the patient's known colon carcinoma with extension into the surrounding mesentery. Extensive mesenteric and  retroperitoneal lymphadenopathy is unchanged. There is a small to moderate amount of ascites within the peritoneal cavity. No abscess is seen. Images of the lung bases demonstrate moderate right and small left pleural effusions with bibasilar  atelectasis.      Impression:         1. Exam severely limited by lack of oral and intravenous contrast. The exam is also limited by patient motion with resulting artifact.  2. Extensive hepatic metastatic disease as noted previously.  3. Ill-defined mass involving the cecum with involvement of the surrounding mesenteric. Mesenteric lymphadenopathy is unchanged compared with 1/31/2020.  4. Nonobstructing right renal stones.  5. Small to moderate amount of ascites.  6. Right lower quadrant ileostomy. No evidence of bowel obstruction.  6. Moderate right and small left pleural effusions with bibasilar atelectasis.  7. Stable low-density mass on the left adrenal gland. It could represent metastatic disease, or possibly an adrenal adenoma.          Signer Name: Bert Emerson MD   Signed: 2/2/2020 8:08 PM   Workstation Name: [id number]    [facility]    XR Chest 1 View [[id number]] Collected:  02/02/20 1926     Updated:  02/02/20 1928    Narrative:       CR Chest 1 Vw 2/2/2020    INDICATION:   Acute onset of shortness of breath and sepsis today.     COMPARISON:    None available.    FINDINGS:  Single portable AP view(s) of the chest.    Left subclavian central line tip is in the superior vena cava.    The heart is normal in size. Haziness is seen at the right lung base suggesting pleural effusion layering posteriorly with bibasilar atelectasis or infiltrates. Poor  inspiratory result. No pneumothorax.       Impression:       Poor inspiratory result and right pleural effusion with bibasilar atelectasis or infiltrates.    Signer Name: Bert Emerson MD   Signed: 2/2/2020 7:26 PM   Workstation Name: [id number]    [facility]                Assessment & Plan:  Gracy Norman is a 44 y.o. female presenting with:    Metastatic colon cancer  - Please see Dr. Burton's full clinic note dated on 1/9/20 for full details. She was recently diagnosed with Stage IV colon cancer metastatic to the liver, mesenteric lymph nodes, and omentum.   - During that clinic visit he had a long discussion with the patient and her husbands in regards to her diagnosis and its prognosis. He also discussed that her disease was not curable; however, given her young age and excellent performance status at the time felt to be treatable. Unfortunately since that visit she has continued to decline and was recently admitted to  with partial SBO requiring a diverting loop ileostomy and was discharged on 1/17.   - She has recently been admitted for sepsis and her hospital course has been complicated and she is now intubated, on pressors, on was started on HD for oliguric KODI.  - I had a long discussion with her daughter as well as her sister (over the phone) in regards to her cancer diagnosis as well as its prognosis. They are understanding that she would have to recover from these acute events prior to starting treatment and treatment would not be curative in the setting of her metastatic malignancy. I answered their questions to their satisfaction.    Sepsis  - She is currently on broad spectrum antibiotics for peritonitis, PNA with ID following.    KODI   - Possibly secondary to ATN in the setting of sepsis. She is currently receiving HD.    Thank you for consult, please do not hesitate to call should any questions arise.     Martha Clay MD  02/05/20  3:37 PM

## 2020-02-05 NOTE — NURSING NOTE
Called [facility] r/t [facility] 4. Spoke to Alana Angeles who stated to call back, patient ruled out at this time related to cancer.

## 2020-02-05 NOTE — NURSING NOTE
US Paracentesis order noted. Primary nurse Selam notified that patient INR too high per protocol for procedure today per Dr. Butler (Radiologist) instructions.

## 2020-02-05 NOTE — NURSING NOTE
Progressive Mobility    Date: 02/05/20     Mobility Initiation Contradictions: Unsecure airway device or difficult airway    Day of Mobility PROM attempted     Patient: did not tolerate     Assisted by 1 person/persons    Device(s) used: N/A    Selam Pierce RN   02/05/20

## 2020-02-05 NOTE — PLAN OF CARE
Problem: Restraint, Nonbehavioral (Nonviolent)  Goal: Rationale and Justification  Outcome: Outcome(s) achieved  Goal: Nonbehavioral (Nonviolent) Restraint: Absence of Injury/Harm  Outcome: Outcome(s) achieved  Goal: Nonbehavioral (Nonviolent) Restraint: Achievement of Discontinuation Criteria  Outcome: Outcome(s) achieved  Goal: Nonbehavioral (Nonviolent) Restraint: Preservation of Dignity and Wellbeing  Outcome: Outcome(s) achieved

## 2020-02-05 NOTE — PROGRESS NOTES
Nephrology Progress Note      Subjective     Pt is intubated on MV    Objective       Vital signs :     Temp:  [98.1 °F (36.7 °C)-104.9 °F (40.5 °C)] 100.4 °F (38 °C)  Heart Rate:  [] 64  Resp:  [18-35] 20  BP: ()/() 90/46  FiO2 (%):  [30 %-60 %] 30 %      Intake/Output Summary (Last 24 hours) at 2/5/2020 0917  Last data filed at 2/5/2020 0604  Gross per 24 hour   Intake 2998.85 ml   Output 2430 ml   Net 568.85 ml       Physical Exam:    General Appearance : Intubated on MV  Lungs : B/L lower zone crackles with decreased intensity of breath sounds  Heart :  regular rhythm & normal rate, normal S1, S2 and no murmur, no rub  Abdomen : normal bowel sounds, no masses, no hepatomegaly, no splenomegaly, soft non-tender and no guarding  Extremities : moves extremities well, 2+ edema, no cyanosis and no redness  Neurologic :   Intubated on MV  Acess :       Laboratory Data :     Albumin Albumin   Date Value Ref Range Status   02/03/2020 3.77 3.50 - 5.20 g/dL Final      Magnesium Magnesium   Date Value Ref Range Status   02/04/2020 2.1 1.6 - 2.6 mg/dL Final          PTH               No results found for: PTH    CBC and coagulation:  Results from last 7 days   Lab Units 02/05/20  0222 02/04/20  1931 02/04/20  1612  02/04/20  0053  02/03/20  0112  02/01/20  0840   PROCALCITONIN ng/mL  --   --   --   --   --   --  4.07*  --   --    LACTATE mmol/L 8.9* 7.6* 8.5*   < > 7.1*   < > 3.1*   < > 3.8*   CRP mg/dL 29.35*  --   --   --  31.38*  --   --   --  29.28*   WBC 10*3/mm3 33.02*  --   --   --  38.57*  --  28.49*   < > 27.93*   HEMOGLOBIN g/dL 7.6*  --   --   --  9.2*  --  8.4*   < > 7.9*   HEMATOCRIT % 26.1*  --   --   --  31.3*  --  28.6*   < > 26.7*   MCV fL 82.3  --   --   --  82.8  --  82.4   < > 80.4   MCHC g/dL 29.1*  --   --   --  29.4*  --  29.4*   < > 29.6*   PLATELETS 10*3/mm3 119*  --   --   --  178  --  206   < > 362   INR   --   --   --   --   --   --   --   --  1.34*    < > = values in this  interval not displayed.     Acid/base balance:  Results from last 7 days   Lab Units 02/05/20 0457 02/04/20 2128 02/04/20 2006   PH, ARTERIAL pH units 7.338* 7.497* 7.296*   PO2 ART mm Hg 96.6 149.0* 117.0*   PCO2, ARTERIAL mm Hg 37.2 36.4 39.5   HCO3 ART mmol/L 19.9* 28.2* 19.2*     Renal and electrolytes:  Results from last 7 days   Lab Units 02/05/20 0222 02/04/20 1932 02/04/20 0053 02/03/20 0112 02/02/20  1903 02/02/20  0120 02/01/20  0840   SODIUM mmol/L 133*  --  133* 130* 134* 132* 131*   POTASSIUM mmol/L 4.3  --  4.3 4.2 4.3 4.6 4.8   MAGNESIUM mg/dL  --  2.1  --   --   --  2.5 1.5*   CHLORIDE mmol/L 90*  --  102 100 100 100 99   CO2 mmol/L 18.4*  --  10.3* 9.4* 11.1* 11.2* 12.3*   BUN mg/dL 16  --  26* 25* 24* 26* 26*   CREATININE mg/dL 2.40*  --  2.65* 1.96* 1.91* 2.27* 2.10*   EGFR IF NONAFRICN AM mL/min/1.73 22*  --  20* 28* 29* 24* 26*   CALCIUM mg/dL 7.8*  --  8.2* 8.3* 8.0* 7.5* 7.8*   IONIZED CALCIUM mmol/L  --  1.08*  --   --   --   --   --    PHOSPHORUS mg/dL  --  3.6  --   --   --   --  3.9     Estimated Creatinine Clearance: 35 mL/min (A) (by C-G formula based on SCr of 2.4 mg/dL (H)).    Liver and pancreatic function:  Results from last 7 days   Lab Units 02/04/20 1932 02/03/20 0112 02/02/20  0120 02/01/20  0840 01/31/20  1955   ALBUMIN g/dL  --  3.77 3.06* 2.13* 2.83*   BILIRUBIN mg/dL  --  1.2 0.6 0.5 0.5   ALK PHOS U/L  --  509* 556* 753* 958*   AST (SGOT) U/L  --  63* 77* 103* 121*   ALT (SGPT) U/L  --  17 20 27 32   AMYLASE U/L 61  --   --   --  35   LIPASE U/L 126*  --   --   --  49         Cardiac:  Results from last 7 days   Lab Units 02/03/20  0112 01/31/20 1955   PROBNP pg/mL 1,467.0* 214.1     Liver and pancreatic function:  Results from last 7 days   Lab Units 02/04/20  1932 02/03/20  0112 02/02/20  0120 02/01/20  0840 01/31/20 1955   ALBUMIN g/dL  --  3.77 3.06* 2.13* 2.83*   BILIRUBIN mg/dL  --  1.2 0.6 0.5 0.5   ALK PHOS U/L  --  509* 556* 753* 958*   AST (SGOT) U/L  --   63* 77* 103* 121*   ALT (SGPT) U/L  --  17 20 27 32   AMYLASE U/L 61  --   --   --  35   LIPASE U/L 126*  --   --   --  49       Medications :       chlorhexidine 15 mL Mouth/Throat Q12H   heparin (porcine) 5,000 Units Subcutaneous Q8H   hydrocortisone sodium succinate 50 mg Intravenous Q6H   micafungin (MYCAMINE)  mg Intravenous Q24H   pantoprazole 40 mg Intravenous Q12H   piperacillin-tazobactam 3.375 g Intravenous Q12H   sodium chloride 1,000 mL Intravenous Once   sodium chloride 1,000 mL Intravenous Once   sodium chloride 1,000 mL Intravenous Once in Dialysis   sodium chloride 10 mL Intravenous Q12H   sodium chloride 10 mL Intravenous Q12H   sodium chloride 10 mL Intravenous Q12H   sodium chloride 10 mL Intravenous Q12H   sodium chloride 10 mL Intravenous Q12H   sodium chloride 10 mL Intravenous Q12H   thiamine (VITAMIN B1) IVPB 500 mg Intravenous Daily   vancomycin 1,250 mg Intravenous Once   Vancomycin Pharmacy Intermittent Dosing  Does not apply Daily       dexmedetomidine 0.2-1.5 mcg/kg/hr Last Rate: Stopped (02/05/20 0438)   fentaNYL Citrate     norepinephrine 0.02-0.3 mcg/kg/min Last Rate: 0.3 mcg/kg/min (02/05/20 0827)   Pharmacy to dose vancomycin     propofol 5-50 mcg/kg/min Last Rate: 10 mcg/kg/min (02/05/20 0905)         Assessment/Plan     1. Sever sepsis with septic shock likely from peritonitis  2. Metastatic poorly differentiated adenocarcinoma of colon origin s/p diverting loop ileostomy on 1/17/2020  3. KODI  4. AG metabolic acidosis due to lactic acidosis, Non Gap MA likely 2/2 Type IV RTA with KODI     Pt was started on dialysis yesterday due to worsening volume overload leading to hypoxic respiratory failure in setting of oliguic KODI. Pt was intubated yesterday while getting vascath placed  Metabolic acidosis is getting better, all cultures are negative    KODI likely ATN, oliguric due to septic shock  Baseline Cr 1, ->2.65  -Pt had 3 hours HDx t/t yesterday, will start SLED today for 6  hours.   -recommend consulting IR for aspiration of loculated abdominal collection for CS    D/W with Dr Jose Metzger MD  02/05/20  9:17 AM

## 2020-02-05 NOTE — PROGRESS NOTES
PROGRESS NOTE         Patient Identification:  Name:  Gracy Norman  Age:  44 y.o.  Sex:  female  :  1975  MRN:  [id number]  Visit Number:  [id number]  Primary Care Provider:  Almas Stone MD         LOS: 5 days       ----------------------------------------------------------------------------------------------------------------------  Subjective       Chief Complaints:    Weakness - Generalized and Post-op Problem    Interval History:      The patient continues to show clinical and laboratory worsening with significant elevated lactic acid of 8.9.  Slight improvement in CRP level and leukocytosis but still significantly elevated. T-max overnight of 104.9 with persistent fever this morning.  Requiring Levophed and now intubated and sedated after procedure yesterday with tunneled dialysis catheter placed.  Chest x-ray on 2020 showing increase in left basilar airspace disease with slight improvement in right perihilar airspace disease.  CT Abd/pelvis which did not demonstrate large leak but did show extensive metastatic disease and likely metastatic ascites w/ loculated fluid collection c/f infection.      Review of Systems:    Unable to obtain as the patient is very lethargic  ----------------------------------------------------------------------------------------------------------------------      Objective       Current Hospital Meds:    chlorhexidine 15 mL Mouth/Throat Q12H   heparin (porcine) 5,000 Units Subcutaneous Q8H   hydrocortisone sodium succinate 50 mg Intravenous Q6H   meropenem 500 mg Intravenous Q24H   micafungin (MYCAMINE)  mg Intravenous Q24H   pantoprazole 40 mg Intravenous Q12H   sodium chloride 1,000 mL Intravenous Once   sodium chloride 1,000 mL Intravenous Once   sodium chloride 10 mL Intravenous Q12H   sodium chloride 10 mL Intravenous Q12H   sodium chloride 10 mL Intravenous Q12H   sodium chloride 10 mL Intravenous Q12H   sodium chloride 10 mL Intravenous  Q12H   sodium chloride 10 mL Intravenous Q12H   thiamine (VITAMIN B1) IVPB 500 mg Intravenous Q8H   vancomycin 1,250 mg Intravenous Once   Vancomycin Pharmacy Intermittent Dosing  Does not apply Daily       DOBUTamine 5 mcg/kg/min Last Rate: Stopped (02/05/20 1024)   fentaNYL Citrate     norepinephrine 0.02-0.3 mcg/kg/min Last Rate: 0.3 mcg/kg/min (02/05/20 0827)   Pharmacy Consult     Pharmacy to dose vancomycin     propofol 5-50 mcg/kg/min Last Rate: 10 mcg/kg/min (02/05/20 0905)   vasopressin (PITRESSIN) infusion 0.04 Units/min      ----------------------------------------------------------------------------------------------------------------------    Vital Signs:  Temp:  [97.6 °F (36.4 °C)-104.9 °F (40.5 °C)] 98.3 °F (36.8 °C)  Heart Rate:  [] 52  Resp:  [18-35] 24  BP: ()/() 103/82  FiO2 (%):  [30 %-60 %] 30 %  Mean Arterial Pressure (Non-Invasive) for the past 24 hrs (Last 3 readings):   Noninvasive MAP (mmHg)   02/05/20 1050 101   02/05/20 1020 110   02/05/20 1000 84     SpO2 Percentage    02/05/20 1015 02/05/20 1020 02/05/20 1050   SpO2: 100% 100% 100%     SpO2:  [94 %-100 %] 100 %  on  Flow (L/min):  [8] 8;   Device (Oxygen Therapy): ventilator    Body mass index is 40.32 kg/m².  Wt Readings from Last 3 Encounters:   02/04/20 (S) 103 kg (227 lb 9.6 oz)   01/12/20 92.1 kg (203 lb)   01/09/20 91.6 kg (202 lb)        Intake/Output Summary (Last 24 hours) at 2/5/2020 1328  Last data filed at 2/5/2020 1322  Gross per 24 hour   Intake 2998.85 ml   Output 5930 ml   Net -2931.15 ml     NPO Diet  ----------------------------------------------------------------------------------------------------------------------      Physical Exam:    Constitutional:  Ill-appearing, uncomfortable, No respiratory distress.      HENT:  Head: Normocephalic and atraumatic.  Mouth:  Moist mucous membranes.    Eyes:  Conjunctivae and EOM are normal.  No scleral icterus.  Neck:  Neck supple.  No JVD present.        Cardiovascular:  tachycardic, regular rhythm and normal heart sounds with no murmur. No edema.  Pulmonary/Chest:  tachypneic, no wheezes, no crackles, with normal breath sounds and good air movement.  Abdominal: Distended and tense with diffuse tenderness. Ileostomy.   Musculoskeletal:  No edema, no tenderness, and no deformity.  No swelling or redness of joints.  Neurological:  Obtunded  No facial droop.  No slurred speech.   Skin:  Skin is warm and dry.  No rash noted.  No pallor.   Psychiatric:  Unable to assess    ----------------------------------------------------------------------------------------------------------------------  Results from last 7 days   Lab Units 02/04/20 1932 02/01/20 0840 01/31/20 1955   CK TOTAL U/L 297*  --   --    TROPONIN T ng/mL  --  <0.010 <0.010     Results from last 7 days   Lab Units 02/03/20 0112 01/31/20 1955   PROBNP pg/mL 1,467.0* 214.1       Results from last 7 days   Lab Units 02/05/20  0457   PH, ARTERIAL pH units 7.338*   PO2 ART mm Hg 96.6   PCO2, ARTERIAL mm Hg 37.2   HCO3 ART mmol/L 19.9*     Results from last 7 days   Lab Units 02/05/20  1040 02/05/20  0222 02/04/20  1931 02/04/20  0053  02/03/20 0112 02/01/20 0840   CRP mg/dL  --  29.35*  --   --  31.38*  --   --   --  29.28*   LACTATE mmol/L 5.4* 8.9* 7.6*   < > 7.1*   < > 3.1*   < > 3.8*   WBC 10*3/mm3  --  33.02*  --   --  38.57*  --  28.49*   < > 27.93*   HEMOGLOBIN g/dL  --  7.6*  --   --  9.2*  --  8.4*   < > 7.9*   HEMATOCRIT %  --  26.1*  --   --  31.3*  --  28.6*   < > 26.7*   MCV fL  --  82.3  --   --  82.8  --  82.4   < > 80.4   MCHC g/dL  --  29.1*  --   --  29.4*  --  29.4*   < > 29.6*   PLATELETS 10*3/mm3  --  119*  --   --  178  --  206   < > 362   INR  1.88*  --   --   --   --   --   --   --  1.34*    < > = values in this interval not displayed.     Results from last 7 days   Lab Units 02/05/20  0222 02/04/20  1932 02/04/20  0053 02/03/20  0112  02/02/20  0120 02/01/20  0840   SODIUM mmol/L  133*  --  133* 130*   < > 132* 131*   POTASSIUM mmol/L 4.3  --  4.3 4.2   < > 4.6 4.8   MAGNESIUM mg/dL  --  2.1  --   --   --  2.5 1.5*   CHLORIDE mmol/L 90*  --  102 100   < > 100 99   CO2 mmol/L 18.4*  --  10.3* 9.4*   < > 11.2* 12.3*   BUN mg/dL 16  --  26* 25*   < > 26* 26*   CREATININE mg/dL 2.40*  --  2.65* 1.96*   < > 2.27* 2.10*   EGFR IF NONAFRICN AM mL/min/1.73 22*  --  20* 28*   < > 24* 26*   CALCIUM mg/dL 7.8*  --  8.2* 8.3*   < > 7.5* 7.8*   GLUCOSE mg/dL 121*  --  141* 98   < > 91 96   ALBUMIN g/dL  --   --   --  3.77  --  3.06* 2.13*   BILIRUBIN mg/dL  --   --   --  1.2  --  0.6 0.5   ALK PHOS U/L  --   --   --  509*  --  556* 753*   AST (SGOT) U/L  --   --   --  63*  --  77* 103*   ALT (SGPT) U/L  --   --   --  17  --  20 27    < > = values in this interval not displayed.   Estimated Creatinine Clearance: 35 mL/min (A) (by C-G formula based on SCr of 2.4 mg/dL (H)).  No results found for: AMMONIA    No results found for: HGBA1C, POCGLU  No results found for: HGBA1C  Lab Results   Component Value Date    TSH 9.270 (H) 01/31/2020    FREET4 0.97 02/01/2020       Blood Culture   Date Value Ref Range Status   02/03/2020 No growth at less than 24 hours  Preliminary   02/03/2020 No growth at less than 24 hours  Preliminary   01/31/2020 No growth at 3 days  Preliminary   01/31/2020 No growth at 3 days  Preliminary     No results found for: URINECX  No results found for: WOUNDCX  No results found for: STOOLCX  No results found for: RESPCX  Pain Management Panel     Pain Management Panel Latest Ref Rng & Units 2/1/2020    CREATININE UR mg/dL 146.0            ----------------------------------------------------------------------------------------------------------------------  Imaging Results (Last 24 Hours)     Procedure Component Value Units Date/Time    XR Chest 1 View [[id number]] Resulted:  02/05/20 1237     Updated:  02/05/20 1324    US Chest [[id number]] Resulted:  02/05/20 1242     Updated:  02/05/20  1242    XR Chest 1 View [[id number]] Collected:  02/05/20 0930     Updated:  02/05/20 0933    Narrative:       EXAMINATION: XR CHEST 1 VW-      CLINICAL INDICATION:     Intubated Patient; A41.9-Sepsis, unspecified  organism     TECHNIQUE:  XR CHEST 1 VW-      COMPARISON: 02/04/2020      FINDINGS:   ET tube with tip at level of clavicles. Left Port-A-Cath stable. Left IJ  deep line stable in positioning. Right tunneled dialysis catheter also  stable in positioning. NG tube extends into the stomach.     Some increase in left basilar airspace disease. Stable lung volumes.  Right perihilar airspace disease slightly improved. Trace effusions. No  pneumothorax. No acute bony findings.       Impression:       1. Support devices described above.  2. Increase in left basilar airspace disease with slight improvement in  right perihilar airspace disease.     This report was finalized on 2/5/2020 9:31 AM by Dr. Bert Butler MD.       CT Abdomen Pelvis With & Without Contrast [[id number]] Collected:  02/04/20 1915     Updated:  02/04/20 1922    Narrative:       EXAM: CT ABDOMEN PELVIS W WO CONTRAST-            TECHNIQUE: Multiple axial CT images were obtained from lung bases  through pubic symphysis with and without administration of IV contrast.  Reformatted images in the coronal and/or sagittal plane(s) were  generated from the axial data set to facilitate diagnostic accuracy  and/or surgical planning.     Radiation dose reduction techniques were utilized per ALARA protocol.  Automated exposure control was initiated through either or CareDose or  DoseRight software packages by  protocol.       DOSE:     CLINICAL INFORMATION: recent abdomenal surgery, SBP/shock, rising  lactate; A41.9-Sepsis, unspecified organism      COMPARISON: 02/02/2020     FINDINGS:     LOWER THORAX: INCREASE IN THE DEGREE OF CONSOLIDATION RIGHT GREATER THAN  LEFT LOWER LOBE REGIONS ALONG WITH PATCHY AIRSPACE DISEASE INVOLVING THE  LEFT LUNG  WITH RIGHT GREATER THAN LEFT NONLOCULATED PLEURAL EFFUSIONS.     ABDOMEN:        LIVER: EXTENSIVE METASTATIC LIVER DISEASE NOTED WITH NUMEROUS LIVER  LESIONS PRESENT.        GALLBLADDER: No dilation or stone identified.        PANCREAS: Unremarkable. No mass or ductal dilatation.        SPLEEN: SOFT TISSUE IMPLANTS ARE SEEN IN THE LEFT UPPER QUADRANT NEAR  THE SPLENIC FOSSA COMPATIBLE WITH METASTATIC DISEASE.        ADRENALS: LEFT ADRENAL NODULE IS NOTED LIKELY METASTATIC IN ETIOLOGY  AND IS APPROXIMATELY 2.6 CM.        KIDNEYS/URETERS: NONOBSTRUCTING RIGHT KIDNEY STONES. NO  HYDRONEPHROSIS IDENTIFIED.        GI TRACT: POST SURGICAL CHANGES INVOLVING THE GI TRACT WITH A RIGHT  LOWER QUADRANT DIVERTING ILEOSTOMY. NO BOWEL OBSTRUCTION IS IDENTIFIED.  ABNORMAL THICKENING WITH ENHANCEMENT OF THE ASCENDING COLON WITH WALL  THICKENING NOTED ALSO OF THE TERMINAL ILEUM LIKELY DUE TO PATIENT'S  KNOWN PRIMARY COLON MALIGNANCY. NO DILATED LOOPS OF BOWEL IDENTIFIED.        PERITONEUM: COMPLEX ASCITES IS NOTED THAT IS MILD TO MODERATE IN  EXTENT WITH A MORE FOCAL AREA OF PARTIALLY LOCULATED FLUID SEEN  POSTERIOR TO THE UTERUS AND IN THE PRERECTAL SPACE THAT IS 4.1 X 9.2 CM  EITHER REPRESENTING MALIGNANT ASCITES OR POSSIBLY EVOLVING INFECTION.  ABNORMAL SOFT TISSUE DENSITIES ARE SEEN WITHIN THE LOWER ABDOMINAL  ASCITES AND EXTENDING TO THE LEVEL OF THE OMENTUM ALONG THE OMENTAL  VASCULATURE MOST CONSISTENT WITH METASTATIC DISEASE.        MESENTERY: Unremarkable.        LYMPH NODES: BULKY METASTATIC ADENOPATHY OF THE RIGHT LOWER QUADRANT  MESENTERY. THERE ARE ENLARGED UPPER ABDOMINAL LYMPH NODES IN THE CELIAC  AXIS REGION AND ALONG THE RETROPERITONEAL STATIONS COMPATIBLE WITH  METASTATIC DISEASE.        VASCULATURE: No evidence of aneurysm.        ABDOMINAL WALL: BODY WALL EDEMA IS NOTED.        OTHER: None.     PELVIS:        BLADDER: SARABIA CATHETER DECOMPRESSES URINARY BLADDER.        REPRODUCTIVE: Unremarkable as visualized.          APPENDIX: NOT VISUALIZED.     BONES: No acute bony abnormality.       Impression:       Impression:  1. Bilateral lower lobe pneumonia with right greater than left small  nonloculated pleural effusions.  2. Extensive metastatic disease involving the liver and peritoneum with  omental caking also noted.  3. Metastatic adenopathy predominantly in the right lower quadrant as  well as in the upper retroperitoneal stations.  4. Inhomogeneous abnormal thickening of the right colon with wall  thickening of the terminal ileum reflects patient's known primary colon  adenocarcinoma. No bowel dilatation or pneumatosis identified to suggest  obstruction or perforation on CT.  5. Complex ascites.  6. More focal area of loculated fluid in the prerectal space with  peripheral enhancement that may simply represent malignant ascites, but  the possibility of superimposed infection not excluded.  7. Other nonacute findings as above.     This report was finalized on 2/4/2020 7:20 PM by Dr. Bert Butler MD.       XR Chest AP [[id number]] Collected:  02/04/20 1529     Updated:  02/04/20 1542    Narrative:       EXAMINATION: XR CHEST AP-      CLINICAL INDICATION:     central line; A41.9-Sepsis, unspecified  organism     TECHNIQUE:  XR CHEST AP-      COMPARISON: 02/03/2020      FINDINGS:   Placement of a left IJ deep line with tip in the distal SVC. Left  Port-A-Cath is stable.  ET tube positioning with tip at level of clavicles.  Right dialysis catheter has been placed with tip at the cavoatrial  junction.  Perihilar airspace disease noted and may represent perihilar atelectasis  given low lung volumes.   Cardiomegaly noted.   No pneumothorax.   No effusions.   No acute osseous findings.          Impression:       1. Development of bilateral perihilar airspace disease with  considerations to include edema, pneumonia, or atelectasis.  2. Low lung volumes.  3. Support devices detailed above.  4. No pneumothorax.     This report was  finalized on 2/4/2020 3:30 PM by Dr. Bert Butler MD.       FL Surgery Fluoro [[id number]] Collected:  02/04/20 1514     Updated:  02/04/20 1526    Narrative:       EXAMINATION: FL SURGERY FLUORO-      CLINICAL INDICATION:     central line placement ; A41.9-Sepsis,  unspecified organism     TECHNIQUE:  FL SURGERY FLUORO-      FLUOROSCOPY TIME: 117.5 seconds     FINDINGS:   Fluoroscopy images submitted from intraoperative fluoroscopy for  purposes of Port-A-Cath placement.        Impression:       As above.     This report was finalized on 2/4/2020 3:21 PM by Dr. Bert Butler MD.       FL Surgery Fluoro [[id number]] Collected:  02/04/20 1521     Updated:  02/04/20 1525    Narrative:       EXAMINATION: FL SURGERY FLUORO-      CLINICAL INDICATION:     Dialysis cath placement; A41.9-Sepsis,  unspecified organism     TECHNIQUE:  FL SURGERY FLUORO-      FLUOROSCOPY TIME: 25.5 seconds     FINDINGS:   Fluoroscopy was provided intraoperatively for purposes of dialysis  catheter placement with submitted fluoroscopy images demonstrating  presence of a right-sided dialysis catheter.        Impression:       As above.     This report was finalized on 2/4/2020 3:21 PM by Dr. Bert Butler MD.             ----------------------------------------------------------------------------------------------------------------------    Assessment/Plan       Assessment/Plan     ASSESSMENT:    1.  Septic shock with lactic acid greater than 4 on admission  2.  Peritonitis     PLAN:     The patient continues to show clinical and laboratory worsening with significant elevated lactic acid of 8.9.  Slight improvement in CRP level and leukocytosis but still significantly elevated. T-max overnight of 104.9 with persistent fever this morning.  Requiring Levophed and now intubated and sedated after procedure yesterday with tunneled dialysis catheter placed.  Chest x-ray on 2/5/2020 showing increase in left basilar airspace disease with slight  improvement in right perihilar airspace disease.  CT Abd/pelvis which did not demonstrate large leak but did show extensive metastatic disease and likely metastatic ascites w/ loculated fluid collection c/f infection.      Mycoplasma negative.  Legionella negative.  Respiratory panel PCR negative.  Strep pneumo negative.  Influenza negative.  Urinalysis unremarkable. Blood cultures show no growth thus far.  Body fluid culture reports 4390 nucleated cells, Gram stain reports no organism.      In the setting of worsening clinically and laboratory wise would recommend to escalate coverage from Zosyn to Meropenem and will continue vancomycin per pharmacy dosing and micafungin 100 mg IV q 24 hours.     We will continue to follow culture results and CRP trend and adjust antibiotic therapy appropriately.    Current Antimicrobial:  Micafungin 100 mg IV q 24 hours  Meropenem per pharmacy dosing  Vancomycin per pharmacy dosing     Code Status:   Code Status and Medical Interventions:   Ordered at: 01/31/20 5255     Level Of Support Discussed With:    Patient     Code Status:    CPR     Medical Interventions (Level of Support Prior to Arrest):    Full       Sonal Baker PA-C  02/05/20  1:28 PM

## 2020-02-05 NOTE — PLAN OF CARE
Problem: Patient Care Overview  Goal: Plan of Care Review  Flowsheets  Taken 2/5/2020 1530  Progress: no change  Taken 2/5/2020 0800  Plan of Care Reviewed With: family  Note:   Patient remains sedated on vent; SLED Hemodialysis completed; Echo done; US done; INR elevated radiology holding off on US guided paracentesis; levophed remains in use; oliguria persists

## 2020-02-05 NOTE — PROGRESS NOTES
" LOS: 5 days   Patient Care Team:  Almas Stone MD as PCP - General (Family Medicine)  CELSO Burton MD as Consulting Physician (Oncology)    Chief Complaint: shortness of breath    Subjective     History of Present Illness     Patient required intubation and mechanical ventilation yesterday following respiratory distress likely compensation for severe metabolic acidosis.  She is currently sedated with fentanyl and propofol infusions.  She is currently requiring norepinephrine and vasopressin for pressure support.  She is notable for bradycardia this morning with rate in the 50s.  RN reported fever noted that required cooling blanket earlier this morning.    Subjective    Unable to obtain review of systems due to intubation and sedation.       Objective     Vital Signs  Temp:  [98.1 °F (36.7 °C)-104.9 °F (40.5 °C)] 100.4 °F (38 °C)  Heart Rate:  [] 86  Resp:  [18-35] 20  BP: ()/() 90/46  FiO2 (%):  [30 %-60 %] 30 %  Body mass index is 40.32 kg/m².    Intake/Output Summary (Last 24 hours) at 2/5/2020 0900  Last data filed at 2/5/2020 0604  Gross per 24 hour   Intake 2998.85 ml   Output 2430 ml   Net 568.85 ml     No intake/output data recorded.    Objective:  General Appearance:  General patient appearance: Appearance.     Vital signs: (most recent): Blood pressure 106/87, pulse 111, temperature (!) 101.1 °F (38.4 °C), temperature source Bladder, resp. rate 24, height 160 cm (63\"), weight (S) 103 kg (227 lb 9.6 oz), last menstrual period 12/31/2019, SpO2 94 %, not currently breastfeeding.  No fever.    Output: Producing urine.    HEENT: (Normocephalic.  Atraumatic.  ET tube in place.)    Lungs:  There are rales.  No wheezes or rhonchi.  (Bilateral air entry positive.)  Heart: Bradycardia.  Regular rhythm.  S1 normal and S2 normal.  No murmur.   Abdomen: Abdomen is soft and distended.  Bowel sounds are normal.     Extremities: There is dependent edema (1+).  There is no deformity.    "   Pulses: Distal pulses are intact.    Neurological: (Unable to assess due to sedation status.).    Pupils:  Pupils are equal, round, and reactive to light.    Skin:  Warm and dry.  (Jaundice appearance.)            Results Review:     I reviewed the patient's new clinical results.       ABG was significant for metabolic acidosis.  Hypoxia has resolved.  BMP notable for persistent hyponatremia with hypochloremia, interval improvement of decreased serum bicarbonate level, worsening of anion gap elevation, hypocalcemia.  Ionized calcium level was notably low yesterday.  CRP remains significantly elevated but yesterday now.  Lactic acidosis is worsening.  Lipase was elevated with normal amylase level.  CBC shows interval improvement of leukocytosis although this remains significantly elevated.  Worsening of anemia noted along with new onset thrombocytopenia.  This is a downtrending of all 3 cell lines.  ANC count however has trended upward.    Hepatitis panel is negative.    Stool cultures pending.  Blood cultures are pending.  Anaerobic and body fluid cultures are pending.  Peripheral blood smear is pending.    Chest x-ray this morning shows low lung volumes with worsening pulmonary infiltrates of the left side.    CT abdomen yesterday was significant for        WBC WBC   Date Value Ref Range Status   02/05/2020 33.02 (C) 3.40 - 10.80 10*3/mm3 Final   02/04/2020 38.57 (C) 3.40 - 10.80 10*3/mm3 Final   02/03/2020 28.49 (H) 3.40 - 10.80 10*3/mm3 Final   02/02/2020 26.99 (H) 3.40 - 10.80 10*3/mm3 Final      HGB Hemoglobin   Date Value Ref Range Status   02/05/2020 7.6 (L) 12.0 - 15.9 g/dL Final   02/04/2020 9.2 (L) 12.0 - 15.9 g/dL Final   02/03/2020 8.4 (L) 12.0 - 15.9 g/dL Final   02/02/2020 8.2 (L) 12.0 - 15.9 g/dL Final      HCT Hematocrit   Date Value Ref Range Status   02/05/2020 26.1 (L) 34.0 - 46.6 % Final   02/04/2020 31.3 (L) 34.0 - 46.6 % Final   02/03/2020 28.6 (L) 34.0 - 46.6 % Final   02/02/2020 27.0 (L)  34.0 - 46.6 % Final      Platlets No results found for: LABPLAT     PT/INR:  No results found for: PROTIME/No results found for: INR    Sodium Sodium   Date Value Ref Range Status   02/05/2020 133 (L) 136 - 145 mmol/L Final   02/04/2020 133 (L) 136 - 145 mmol/L Final   02/03/2020 130 (L) 136 - 145 mmol/L Final   02/02/2020 134 (L) 136 - 145 mmol/L Final      Potassium Potassium   Date Value Ref Range Status   02/05/2020 4.3 3.5 - 5.2 mmol/L Final   02/04/2020 4.3 3.5 - 5.2 mmol/L Final   02/03/2020 4.2 3.5 - 5.2 mmol/L Final   02/02/2020 4.3 3.5 - 5.2 mmol/L Final      Chloride Chloride   Date Value Ref Range Status   02/05/2020 90 (L) 98 - 107 mmol/L Final   02/04/2020 102 98 - 107 mmol/L Final   02/03/2020 100 98 - 107 mmol/L Final   02/02/2020 100 98 - 107 mmol/L Final      Bicarbonate No results found for: PLASMABICARB   BUN BUN   Date Value Ref Range Status   02/05/2020 16 6 - 20 mg/dL Final   02/04/2020 26 (H) 6 - 20 mg/dL Final   02/03/2020 25 (H) 6 - 20 mg/dL Final   02/02/2020 24 (H) 6 - 20 mg/dL Final      Creatinine Creatinine   Date Value Ref Range Status   02/05/2020 2.40 (H) 0.57 - 1.00 mg/dL Final   02/04/2020 2.65 (H) 0.57 - 1.00 mg/dL Final   02/03/2020 1.96 (H) 0.57 - 1.00 mg/dL Final   02/02/2020 1.91 (H) 0.57 - 1.00 mg/dL Final      Calcium Calcium   Date Value Ref Range Status   02/05/2020 7.8 (L) 8.6 - 10.5 mg/dL Final   02/04/2020 8.2 (L) 8.6 - 10.5 mg/dL Final   02/03/2020 8.3 (L) 8.6 - 10.5 mg/dL Final   02/02/2020 8.0 (L) 8.6 - 10.5 mg/dL Final      Magnesium Magnesium   Date Value Ref Range Status   02/04/2020 2.1 1.6 - 2.6 mg/dL Final        pH pH, Arterial   Date Value Ref Range Status   02/05/2020 7.338 (L) 7.350 - 7.450 pH units Final     Comment:     84 Value below reference range   02/04/2020 7.497 (H) 7.350 - 7.450 pH units Final     Comment:     83 Value above reference range   02/04/2020 7.296 (L) 7.350 - 7.450 pH units Final     Comment:     84 Value below reference range    02/04/2020 7.205 (C) 7.350 - 7.450 pH units Final     Comment:     85 Value below critical limit   02/04/2020 7.223 (L) 7.350 - 7.450 pH units Final     Comment:     84 Value below reference range   02/03/2020 7.244 (L) 7.350 - 7.450 pH units Final     Comment:     84 Value below reference range   02/03/2020 7.218 (C) 7.350 - 7.450 pH units Final     Comment:     85 Value below critical limit   02/03/2020 7.237 (L) 7.350 - 7.450 pH units Final     Comment:     84 Value below reference range   02/02/2020 7.268 (L) 7.350 - 7.450 pH units Final     Comment:     84 Value below reference range      pO2 pO2, Arterial   Date Value Ref Range Status   02/05/2020 96.6 83.0 - 108.0 mm Hg Final   02/04/2020 149.0 (H) 83.0 - 108.0 mm Hg Final     Comment:     83 Value above reference range   02/04/2020 117.0 (H) 83.0 - 108.0 mm Hg Final     Comment:     83 Value above reference range   02/04/2020 103.0 83.0 - 108.0 mm Hg Final     Comment:     83 Value above reference range   02/04/2020 73.2 (L) 83.0 - 108.0 mm Hg Final     Comment:     84 Value below reference range   02/03/2020 83.9 83.0 - 108.0 mm Hg Final   02/03/2020 85.4 83.0 - 108.0 mm Hg Final   02/03/2020 85.9 83.0 - 108.0 mm Hg Final   02/02/2020 78.9 (L) 83.0 - 108.0 mm Hg Final     Comment:     84 Value below reference range      pCO2 pCO2, Arterial   Date Value Ref Range Status   02/05/2020 37.2 35.0 - 45.0 mm Hg Final   02/04/2020 36.4 35.0 - 45.0 mm Hg Final   02/04/2020 39.5 35.0 - 45.0 mm Hg Final   02/04/2020 35.4 35.0 - 45.0 mm Hg Final   02/04/2020 30.4 (L) 35.0 - 45.0 mm Hg Final     Comment:     84 Value below reference range   02/03/2020 26.2 (L) 35.0 - 45.0 mm Hg Final     Comment:     84 Value below reference range   02/03/2020 24.0 (L) 35.0 - 45.0 mm Hg Final     Comment:     84 Value below reference range   02/03/2020 24.6 (L) 35.0 - 45.0 mm Hg Final     Comment:     84 Value below reference range   02/02/2020 25.0 (L) 35.0 - 45.0 mm Hg Final      Comment:     84 Value below reference range      HCO3 HCO3, Arterial   Date Value Ref Range Status   02/05/2020 19.9 (L) 20.0 - 26.0 mmol/L Final     Comment:     84 Value below reference range   02/04/2020 28.2 (H) 20.0 - 26.0 mmol/L Final     Comment:     83 Value above reference range   02/04/2020 19.2 (L) 20.0 - 26.0 mmol/L Final     Comment:     84 Value below reference range   02/04/2020 14.0 (L) 20.0 - 26.0 mmol/L Final     Comment:     84 Value below reference range   02/04/2020 12.5 (L) 20.0 - 26.0 mmol/L Final     Comment:     84 Value below reference range   02/03/2020 11.3 (L) 20.0 - 26.0 mmol/L Final     Comment:     84 Value below reference range   02/03/2020 9.8 (L) 20.0 - 26.0 mmol/L Final     Comment:     84 Value below reference range   02/03/2020 10.4 (L) 20.0 - 26.0 mmol/L Final     Comment:     84 Value below reference range   02/02/2020 11.4 (L) 20.0 - 26.0 mmol/L Final     Comment:     84 Value below reference range          chlorhexidine 15 mL Mouth/Throat Q12H   heparin (porcine) 5,000 Units Subcutaneous Q8H   hydrocortisone sodium succinate 50 mg Intravenous Q6H   meropenem 500 mg Intravenous Q24H   micafungin (MYCAMINE)  mg Intravenous Q24H   pantoprazole 40 mg Intravenous Q12H   sodium chloride 1,000 mL Intravenous Once   sodium chloride 1,000 mL Intravenous Once   sodium chloride 10 mL Intravenous Q12H   sodium chloride 10 mL Intravenous Q12H   sodium chloride 10 mL Intravenous Q12H   sodium chloride 10 mL Intravenous Q12H   sodium chloride 10 mL Intravenous Q12H   sodium chloride 10 mL Intravenous Q12H   thiamine (VITAMIN B1) IVPB 500 mg Intravenous Q8H   Vancomycin Pharmacy Intermittent Dosing  Does not apply Daily       dexmedetomidine 0.2-1.5 mcg/kg/hr Last Rate: Stopped (02/05/20 0438)   fentaNYL Citrate  Last Rate: Stopped (02/05/20 0800)   norepinephrine 0.02-0.3 mcg/kg/min Last Rate: 0.3 mcg/kg/min (02/05/20 0827)   Pharmacy to dose vancomycin     propofol 5-50 mcg/kg/min  Last Rate: Stopped (02/05/20 0730)       Medication Review: I have reviewed the current medications.    Assessment/Plan     Patient Active Problem List   Diagnosis Code   • Malignant neoplasm of ascending colon (CMS/HCC) C18.2   • Port-A-Cath in place Z95.828   • Sepsis (CMS/HCC) A41.9       Assessment & Plan          Central nervous system    Plan:  - Pain management: fentanyl drip.   - Sedation: propofol , RASS goal: 0 to -1  - Patient does not have biot's type of breathing pattern while on opioids.   - I highly recommend avoiding nighttime disturbances and light exposure during night to maintain normal circadian rhythms to avoid hypoactive or hyperactive delirium.  -On Thiamine 500 mg IV, increase dosing from daily to every 8 hours       Cardiovascular system:    Plan:  -Ordered CVP assessment goal CVP around 10 mmHg  -Vasopressor: Norepinephrine, vasopressin,  Targeting mean atrial pressure of 65 mmHg   -Antibiotics: Meropenem, vancomycin.  On micafungin  -Bradycardia improved following adjustment of the ET tube as this was near the rob  -On hydrocortisone IV 50 mg every 6 hours  -Ordered a venous blood gas          Respiratory system:  Acute hypoxic respiratory failure on mechanical ventilator  Bilateral lower lobe pneumonia, due to unspecified organism  Bilateral pleural effusions due to KODI likely septic ATN    Plan:  - Mode of mechanical ventilation is assist control volume cycle.  Ventilator setting includes tidal volume of 450mL with rate of 20, FiO2 of 30 and PEEP of 10.  Patient has minute ventilation of 3 with peak pressure of 21, peak mean pressure 18.  Patient is currently on sedation with propofol and for analgesia patient is on fentanyl.      - Ventilator graphics : Flow time scalar was reviewed and auto PEEP is absent, volume time scalar was reviewed and leak is absent , pressure times scalar was reviewed and pressure stress index is 1 indicating normal pressure stress index.  Pressure volume  loop was assessed.  Auto triggering is absent.  Missed triggering is absent.  Double triggering is absent.  Active expiration is absent.  -Based on ABG, chest x-ray and ventilator graphics, I have changed the rate to 24.  Repeating ABG for ventilator setting assessment.   -Head of the bed elevated at 30 degrees  -Mouth care with oral chlorhexidine   -ET tube was pulled 2 cm.  Chest x-ray was repeated to confirm ET tube placement.  Bradycardia improved following adjustment.    -Ordered ultrasound the chest to assess bilateral pleural effusions.  -On meropenem, vancomycin, micafugin         Liver, gallbladder, Pancreas and gastrointestinal system:  Culture-negative peritonitis  Stage IV poorly differentiated adenocarcinoma of the colon with liver mets, possible splenic mets, and adrenal mass.  Status post diverting loop ileostomy    Plan:  -CT of the abdomen notable for 2.6 cm left adrenal nodule, thickening of the ascending colon and terminal ileum, no dilated bowel loops.  Complex ascites that is mild to moderate with a partially loculated.  Bulky metastatic adenopathy in the right lower quadrant and upper abdominal lymph nodes.  Extensive metastatic liver disease noted with soft tissue implants of the left upper quadrant near the splenic fossa concerning for mets.  Nonobstructing right kidney stone noted.  -Patient will require paracentesis under imaging guidance  - On with PPI for ulcer prophylaxis.    -On meropenem, vancomycin, micafungin  - Nutrition team on board.     -Receiving as needed IV iron replacement         Genitourinary system:   Oliguric KODI likely septic ATN  High anion gap metabolic acidosis due to lactic acidosis and acute kidney injury    Plan   -Avoiding nephrotoxic agent.  -Following serum creatinine and GFR closely with urine output daily.  -SLED performed this morning.  Continue recommendations per nephrology         Endocrine system:    Plan:  - Goal serum glucose level is 180 mg/dL while in  ICU.  I strongly recommend starting insulin drip if 2 consecutive serum glucose levels are greater than 200 mg/dL.           Infectious disease system:  Severe sepsis culture-negative peritonitis.  Concern for intra-abdominal abscess versus leak  Bilateral lower lobe pneumonia, due to unspecified organism      Plan:  - Current antibiotics: Meropenem, vancomycin  On micafugin  - Cultures:   Stool culture, blood cultures are pending.  Anaerobic and body fluid cultures pending from 2/1, currently negative.   C. difficile toxin test was negative.  Strep antigen and Legionella antigens were negative.  Ordered respiratory ET tube culture  Infectious disease team is on board         Hematological system:  Leukocytosis  Anemia  Thrombocytopenia    Plan   - I recommend PRBC transfusion if hemoglobin is less than 7 g/dL unless active bleeding or cardiac ischemia.  - Continue with heparin subcutaneous for DVT prophylaxis.  I recommend holding chemical anticoagulation if platelet count is less than 50,000.  -Ordered INR/PTT   -ordered fibrinogen level       Rheumatological and dermatological system:    Plan:  - Turn the patient every 2 hours to prevent pressure ulcers.  -Wound care team involvement as per primary team.  -CK level was mildly elevated.         Activity:  - I recommend aggressive PT/OT while in hospital to avoid critical care neuropathy/myopathy.              Nikki Myers PA-C  02/05/20  9:00 AM    Scribed for Dr. Garcia by Nikki Myers PA-C          The clinical plan was discussed extensively with the nurse, and respiratory therapist.   Critical Care time spent in direct patient care: 46 minutes (excluding procedure time).  Patient is critically ill and is at higher risk for further mortality/morbidity.     IHenry M.D. attest that the above note accurately reflects the work and decisions made by me. Patient was seen and evaluated by me, including history of present illness, physical exam,  assessment, and treatment plan.  The above note was reviewed and edited by me.    Henry Garcia M.D  Pulmonary and Critical Care Medicine

## 2020-02-05 NOTE — PAYOR COMM NOTE
"  [facility]  NPI: [id number]    Utilization Review   Contact:Toyin Lyndsey MSN, APRN, NP-C  Phone: [phone]  Fax: [phone]    passport/Attn: jemma Dacosta clinical for 2-3 and 2-4   REF: [id number]  Matt Norman (44 y.o. Female)     Date of Birth Social Security Number Address Home Phone MRN    1975  [address]  [place] [address]  [id number]    Mu-ism Marital Status          None        Admission Date Admission Type Admitting Provider Attending Provider Department, Room/Bed    1/31/20 Emergency Denny Coy MD Parks, Andrew, MD [facility] CRITICAL CARE, [facility]/    Discharge Date Discharge Disposition Discharge Destination                       Attending Provider:  Saad Garcia MD    Allergies:  Bactrim [Sulfamethoxazole-trimethoprim], Metronidazole, Sulfa Antibiotics    Isolation:  None   Infection:  None   Code Status:  CPR    Ht:  160 cm (63\")   Wt:  103 kg (227 lb 9.6 oz)    Admission Cmt:  None   Principal Problem:  Sepsis (CMS/HCC) [A41.9]                 Active Insurance as of 1/31/2020     Primary Coverage     Payor Plan Insurance Group Employer/Plan Group    [employer] PAS[employer]ORT [id number]     Payor Plan Address Payor Plan Phone Number Payor Plan Fax Number Effective Dates     [address] [phone]  11/1/1997 - None Entered    [facility] [id number]       Subscriber Name Subscriber Birth Date Member ID       MATT NORMAN 1975 [id number]                 Emergency Contacts      (Rel.) Home Phone Work Phone Mobile Phone    Danilo Norman (Spouse) -- [phone]          Saad Garcia MD   Physician   Medicine   Progress Notes   Addendum   Date of Service:  02/03/20 1223   Creation Time:  02/03/20 1223            Expand All Collapse All     Show:Clear all  [x]Manual[x]Template[x]Copied    Added by:  [x]Saad Garcia MD    []Espinozaver for details       [facility] HOSPITALIST PROGRESS NOTE     Patient " "Identification:  Name:  Gracy Norman  Age:  44 y.o.  Sex:  female  :  1975  MRN:  [id number]  Visit Number:  [id number]  ROOM: [address]      Primary Care Provider:  Almas Stone MD     Length of stay in inpatient status:  3     Subjective      Chief Compliant:        Chief Complaint   Patient presents with   • Weakness - Generalized   • Post-op Problem      History of Presenting Illness:    Patient remains very ill, more tachycardic today, still acidotic, reports she feels \"okay\" overall, reports she thought her tachycardia was due to anxiety yesterday, I started her on bicarb gtt today given she clinically appears unwell, Bicarb was 9, have consulted Nephrology and appreciate recs, UOP has been minimal, ID stopped Vanc this AM but given overall clinical appearance I will continue for now along w/ Zosyn, no organisms have grown out of cultures, she denies any fevers or chills, WBC count slighly up at 28K, repeating CXR and ABG given slightly more tachypneic today.    Objective      Current Hospital Meds:  cyclobenzaprine 5 mg Oral BID   FLUoxetine 20 mg Oral Daily   heparin (porcine) 5,000 Units Subcutaneous Q8H   pantoprazole 40 mg Oral QAM   piperacillin-tazobactam 3.375 g Intravenous Q8H   sodium chloride 1,000 mL Intravenous Once   sodium chloride 10 mL Intravenous Q12H   sodium chloride 10 mL Intravenous Q12H   sodium chloride 10 mL Intravenous Q12H      Pharmacy to dose vancomycin     sodium bicarbonate drip (greater than 75 mEq/bag) 125 mEq         Current Antimicrobial Therapy:              Anti-Infectives (From admission, onward)     Ordered     Dose/Rate Route Frequency Start Stop     20 1212   Pharmacy to dose vancomycin     Ordering Provider:  Saad Garcia MD      Does not apply Continuous PRN 20 1211 02/10/20 1210     20 1601   vancomycin (VANCOCIN) 1,000 mg in sodium chloride 0.9 % 250 mL IVPB     Ordering Provider:  Denny Coy MD    1,000 mg  over 60 " Minutes Intravenous Once 02/01/20 1800 02/01/20 1812     02/01/20 0517   piperacillin-tazobactam (ZOSYN) 3.375 g/100 mL 0.9% NS IVPB (mbp)     Dorothy Salvador, [facility] reviewed the order on 02/01/20 1048.   Ordering Provider:  Denny Coy MD    3.375 g  over 4 Hours Intravenous Every 8 Hours 02/01/20 0630 02/08/20 0629 01/31/20 1949   vancomycin (VANCOCIN) 1,750 mg in sodium chloride 0.9 % 500 mL IVPB     Ordering Provider:  Waqas York MD    20 mg/kg × 93 kg Intravenous Once 01/31/20 1951 02/01/20 0000     01/31/20 1949   piperacillin-tazobactam (ZOSYN) 4.5 g/100 mL 0.9% NS IVPB (mbp)     Ordering Provider:  Waqas York MD    4.5 g Intravenous Once 01/31/20 1951 01/31/20 2129          Current Diuretic Therapy:      Diuretics (From admission, onward)     None          ----------------------------------------------------------------------------------------------------------------------  Vital Signs:  Temp:  [98.1 °F (36.7 °C)-99.5 °F (37.5 °C)] 99.5 °F (37.5 °C)  Heart Rate:  [116-144] 140  Resp:  [20-28] 23  BP: (105-139)/() 126/92  SpO2:  [93 %-99 %] 97 %  on  Flow (L/min):  [2] 2;   Device (Oxygen Therapy): nasal cannula  Body mass index is 38.44 kg/m².         Wt Readings from Last 3 Encounters:   02/03/20 98.4 kg (217 lb)   01/12/20 92.1 kg (203 lb)   01/09/20 91.6 kg (202 lb)               Intake & Output (last 3 days)        01/31 0701 - 02/01 0700 02/01 0701 - 02/02 0700 02/02 0701 - 02/03 0700 02/03 0701 - 02/04 0700     P.O.   919 1084       I.V. (mL/kg)   1193 (12.7)         Blood     300       IV Piggyback 200 2750 150 250     Total Intake(mL/kg) 200 (2.1) 4862 (51.6) 1534 (15.6) 250 (2.5)     Urine (mL/kg/hr) 225 685 (0.3) 350 (0.1)       Stool 375 425 400       Total Output 600 1110 750       Net -400 +3752 +784 +250                        Diet Full  Liquid  ----------------------------------------------------------------------------------------------------------------------  Physical exam:  Constitutional:  Well-developed and well-nourished.  Mild distress  HENT:  Head:  Normocephalic and atraumatic.  Mouth:  dry mucous membranes.    Eyes:  Conjunctivae and EOM are normal. No scleral icterus.    Neck:  Neck supple.  No JVD present.    Cardiovascular:  tachycardic, regular rhythm and normal heart sounds with no murmur.  Pulmonary/Chest:  mild respiratory distress, no wheezes, no crackles, with normal breath sounds and good air movement.  Abdominal:  Distended Soft. Mildly tender in lower quadrants.   Musculoskeletal:  No tenderness, and no deformity.  No red or swollen joints anywhere.    Neurological:  Alert and oriented to person, place, and time.  No cranial nerve deficit.  No tongue deviation.  No facial droop.  No slurred speech.   Skin:  Skin is warm and dry. No rash noted. No pallor.   Peripheral vascular:  Pulses in all 4 extremities with no clubbing, no cyanosis, no edema.  ----------------------------------------------------------------------------------------------------------------------  Tele:    ----------------------------------------------------------------------------------------------------------------------              Results from last 7 days   Lab Units 02/03/20  0112          CRP mg/dL  --           LACTATE mmol/L 3.1*          WBC 10*3/mm3 28.49*          HEMOGLOBIN g/dL 8.4*          HEMATOCRIT % 28.6*          MCV fL 82.4          MCHC g/dL 29.4*          PLATELETS 10*3/mm3 206          INR    --            < > = values in this interval not displayed.           Results from last 7 days   Lab Units 02/03/20  1205   PH, ARTERIAL pH units 7.218*   PO2 ART mm Hg 85.4   PCO2, ARTERIAL mm Hg 24.0*   HCO3 ART mmol/L 9.8*               Results from last 7 days   Lab Units 02/03/20  0112       SODIUM mmol/L 130*       POTASSIUM mmol/L 4.2        MAGNESIUM mg/dL  --        CHLORIDE mmol/L 100       CO2 mmol/L 9.4*       BUN mg/dL 25*       CREATININE mg/dL 1.96*       EGFR IF NONAFRICN AM mL/min/1.73 28*       CALCIUM mg/dL 8.3*       PHOSPHORUS mg/dL  --        GLUCOSE mg/dL 98       ALBUMIN g/dL 3.77       BILIRUBIN mg/dL 1.2       ALK PHOS U/L 509*       AST (SGOT) U/L 63*       ALT (SGPT) U/L 17       Estimated Creatinine Clearance: 41.8 mL/min (A) (by C-G formula based on SCr of 1.96 mg/dL (H)).  No results found for: AMMONIA        Results from last 7 days                                   Blood Culture   Date Value Ref Range Status   01/31/2020 No growth at 2 days   Preliminary   01/31/2020 No growth at 2 days   Preliminary      No results found for: URINECX  No results found for: WOUNDCX  No results found for: STOOLCX  No results found for: RESPCX  No results found for: AFBCX              Results from last 7 days   Lab Units 02/03/20  0112          LACTATE mmol/L 3.1*          CRP mg/dL  --                         Assessment & Plan    #Septic shock 2/2 SBP  #Lactic acidosis   - On presentation (WBC 29,100, CRP 30.94, lactic acid 5.2, temperature 102.4, heart rate 152, blood pressure 72/60  - CT chest showed some bibasilar consolidations that are likely atelectasis   - Symptoms of abdominal pain more consistent with SBP  - Diagnostic paracentesis performed on 2/1 that revealed ANC: 3400. Confirming SBP. Culture NGTD  - Continue Zosyn, added back Vanc today as had been d/c'd for some reason  - Repeat CXR and ABG, continue to monitor in CCU as appears critically ill.     #Oliguric KODI c/b AGMA suspect possible RTA  - Baseline Cr 0.7-0.9, Cr on presentation 1.95=>2.10=>2.27; Today 1.96  - Potential etiology include prerenal, ATN 2/2 sepsis , hepatorenal syndrome  - CT abdomen/pelvis did not reveal urinary obstruction.  - Urine sodium <20, consistent with severe prerenal vs. Hepatorenal   - Did not appear to improve significantly w/ albumin challenge  per previous MD, have consulted Nephrology and appreciate input, per nursing who spoke w/ Nephrology they are considering dialysis tomorrow if not improved, have started on Bicarb gtt due to bicarb of 9 and acidotic.     #Stage 4 colon cancer with bulky lymphadenopathy and widely disseminated liver metastases and adrenal masses  - Scheduled to f/u with oncology at  on 2/3. Expected to start chemo soon. Suspect treatments would be palliative in nature.   - CT Abd/Pelvis 2/3 showed extensive hepatic mets, ill-defined cecal mass involving surrounding mesentery, mesenteric LAD, small-mod ascites, stable low density mass L adrenal gland.  - F/u outpatient pending improvement in sepsis     #Normocytic anemia   - Hemoglobin has been trending down. 10 on 1/2/20  - 8.8=>7.9=>6.6, s/p 1 unit of pRBCs; Today 8.4  - No signs of active bleeding, BUN/creatinine not elevated, suspect  hemoconcentrated on admission and fluids have diluted   - Iron studies consistent with SNEHAL and AOCD, B12 and folate normal.   - Continue IV PPI for now, monitor for signs of bleeding     #Respiratory Alkalosis  - Likely 2/2 above AGMA, compensatory, address as per above     #Euthyroid  - TSH 9, free T4 normal, f/u as outpatient.     #Hypomagnesemia   - Replaced     #Diverting loop ileostomy  - Performed 1/17 at  for SBO     #Obesity  - BMI 38, complicates all aspects of care.     F: Oral  E: Monitor & Replace PRN  N: Regular  Ppx: SQH  Code: Full Code     Dispo: Pending clinical improvement and workup     *This patient is considered high risk due to septic shock, KODI, underlying colon cancer     VTE Prophylaxis:              Mechanical Order History:                 None                             Pharmalogical Order History:      Ordered     Dose Route Frequency Stop     02/01/20 4127   heparin (porcine) 5000 UNIT/ML injection 5,000 Units      5,000 Units SC Every 8 Hours Scheduled --     02/01/20 0309   enoxaparin (LOVENOX) syringe 40 mg   Status:  Discontinued      40 mg SC Every 24 Hours 20 1455          Saad Garcia MD  [facility] Hospitalist  20  12:25 PM      Revision History                             Keyona Donohue MD   Physician   Infectious Disease   Consults   Signed   Date of Service:  20   Creation Time:  20         Consult Orders   Inpatient Infectious Diseases Consult [[id number]] ordered by Denny Coy MD at 20 0209          Signed        Expand All Collapse All     Show:Clear all  [x]Manual[x]Template[]Copied    Added by:  [x]Noelle Helton APRN[x]Keyona Donohue MD    []Hover for details             INFECTIOUS DISEASE CONSULTATION REPORT           Patient Identification:  Name:  Gracy Norman  Age:  44 y.o.  Sex:  female  :  1975  MRN:  [id number]   Visit Number:  [id number]  Primary Care Physician:  Almas Stone MD         LOS: 3 days         Subjective            Subjective         History of present illness:       Thank you Dr. Garcia for allowing us to participate in the care of your patient.  As you well know, Ms. Gracy Norman is a 44 y.o. female with past medical history significant for adenocarcinoma of the cecum stage IVb, anxiety, GERD, obesity, who presented to [facility] Emergency Department on 2020 for abdominal pain.  Lactic acid 5.2 on admission, now normalized.  WBC 28.49.  Mycoplasma negative.  Legionella negative.  Respiratory panel PCR negative.  Strep pneumo negative.  Influenza negative.  Urinalysis unremarkable.  Chest x-ray reports mild improvement in basilar airspace disease.  CT of the abdomen reports extensive hepatic metastatic disease, ill-defined mass involving the cecum the surrounding mesenteric, nonobstructing right renal stones, ascites, right lower quadrant ileostomy, moderate right and small left pleural effusions with bibasilar atelectasis, low density mass on the left adrenal gland.  Blood cultures  show no growth thus far.        Infectious Disease consultation was requested for antimicrobial management.        ---------------------------------------------------------------------------------------------------------------------      Review Of Systems:     Constitutional: no fever, chills and night sweats. No appetite change or unexpected weight change. No fatigue.  Eyes: no eye drainage, itching or redness.  HEENT: no mouth sores, dysphagia or nose bleed.  Respiratory: no for shortness of breath, cough or production of sputum.  Cardiovascular: no chest pain, no palpitations, no orthopnea.  Gastrointestinal: no nausea, vomiting or diarrhea. Positive abdominal tenderness, no hematemesis or rectal bleeding.  Genitourinary: no dysuria or polyuria.  Hematologic/lymphatic: no lymph node abnormalities, no easy bruising or easy bleeding.  Musculoskeletal: no muscle or joint pain.  Skin: No rash and no itching.  Neurological: no loss of consciousness, no seizure, no headache.  Behavioral/Psych: no depression or suicidal ideation.  Endocrine: no hot flashes.  Immunologic: negative.     ---------------------------------------------------------------------------------------------------------------------      Past Medical History        Objective            Objective         Hospital Scheduled Meds:     cyclobenzaprine 5 mg Oral BID   FLUoxetine 20 mg Oral Daily   heparin (porcine) 5,000 Units Subcutaneous Q8H   pantoprazole 40 mg Oral QAM   piperacillin-tazobactam 3.375 g Intravenous Q8H   sodium chloride 1,000 mL Intravenous Once   sodium chloride 10 mL Intravenous Q12H   sodium chloride 10 mL Intravenous Q12H   sodium chloride 10 mL Intravenous Q12H   Vancomycin Pharmacy Intermittent Dosing   Does not apply Daily         Pharmacy to dose vancomycin       sodium bicarbonate drip (greater than 75 mEq/bag) 125 mEq Last Rate: 125 mEq (02/03/20 1353)       ---------------------------------------------------------------------------------------------------------------------   Vital Signs:  Temp:  [98.1 °F (36.7 °C)-99.5 °F (37.5 °C)] 99.5 °F (37.5 °C)  Heart Rate:  [116-144] 140  Resp:  [20-24] 23  BP: (105-139)/() 126/92  Mean Arterial Pressure (Non-Invasive) for the past 24 hrs (Last 3 readings):               Assessment/Plan            ASSESSMENT:     1.  Septic shock with lactic acid greater than 4 on admission  2.  Peritonitis     PLAN:     Patient presents with abdominal pain.  Lactic acid 5.2 on admission, now normalized.  WBC 28.49.  Mycoplasma negative.  Legionella negative.  Respiratory panel PCR negative.  Strep pneumo negative.  Influenza negative.  Urinalysis unremarkable.  Chest x-ray reports mild improvement in basilar airspace disease.  CT of the abdomen reports extensive hepatic metastatic disease, ill-defined mass involving the cecum the surrounding mesenteric, nonobstructing right renal stones, ascites, right lower quadrant ileostomy, moderate right and small left pleural effusions with bibasilar atelectasis, low density mass on the left adrenal gland.  Blood cultures show no growth thus far.  Body fluid culture reports 4390 nucleated cells, Gram stain reports no organism.      For now vancomycin was discontinued due to high risk of nephrotoxicity when used in conjunction with Zosyn.  In the setting of concern for peritonitis Zosyn monotherapy is empirically continued if any further worsening antifungal coverage may be added.  We will continue to follow culture results and CRP trend and adjust antibiotic therapy appropriately.     Again, thank you Dr. Garcia for allowing us to participate in the care of your patient and please feel free to call for any questions you may have.           Code Status:       Code Status and Medical Interventions:   Ordered at: 01/31/20 9292     Level Of Support Discussed With:     Patient     Code Status:     CPR      Medical Interventions (Level of Support Prior to Arrest):     Full               Noelle Helton, APRN  02/03/20  2:02 PM      Physician Attestation:     I have personally performed a face-to-face evaluation on this patient. I have collected the review of systems and performed my own physical exam. I reviewed the patient's data including history of present illness, past medical history, past surgical history and allergy list. . The assessment and plan documented above are my own after discussing the case in detail with the APC. I have reviewed the laboratory and radiological pertinent results. I have reviewed and edited the note above after discussing the findings with the APC.     Keyona Donohue MD  Infectious Diseases  02/03/20  4:32 PM                  Revision History                             Routing History                 Alycia Metzger MD   Physician   Medicine   Progress Notes   Signed   Date of Service:  02/04/20 0733   Creation Time:  02/04/20 0733            Signed        Expand All Collapse All     Show:Clear all  [x]Manual[x]Template[x]Copied    Added by:  [x]Alycia Metzger MD    []Espinozaver for details  Nephrology Progress Note           Subjective         Patient feels more short of breath today and has back pain        Objective            Vital signs :      Temp:  [98.3 °F (36.8 °C)-99.5 °F (37.5 °C)] 98.9 °F (37.2 °C)  Heart Rate:  [134-152] 137  Resp:  [17-24] 22  BP: (105-142)/() 132/73        Intake/Output Summary (Last 24 hours) at 2/4/2020 0733  Last data filed at 2/4/2020 0600      Gross per 24 hour   Intake 5608.75 ml   Output 675 ml   Net 4933.75 ml         Physical Exam:     General Appearance : in respiratory distress  Lungs : B/L lower zone crackles with decreased intensity of breath sounds  Heart :  regular rhythm & normal rate, normal S1, S2 and no murmur, no rub  Abdomen : normal bowel sounds, no masses, no hepatomegaly, no splenomegaly, soft non-tender and no  guarding  Extremities : moves extremities well, 2+ edema, no cyanosis and no redness  Neurologic :   orientated to person, place, time and situation, Grossly no focal deficits  Acess :         Laboratory Data :   CBC and coagulation:                Results from last 7 days   Lab Units 02/04/20  0356 02/04/20  0053 02/03/20  1908   02/03/20  0112        PROCALCITONIN ng/mL  --   --   --   --  4.07*        LACTATE mmol/L 7.5* 7.1* 6.0*   < > 3.1*        CRP mg/dL  --  31.38*  --   --   --         WBC 10*3/mm3  --  38.57*  --   --  28.49*        HEMOGLOBIN g/dL  --  9.2*  --   --  8.4*        HEMATOCRIT %  --  31.3*  --   --  28.6*        MCV fL  --  82.8  --   --  82.4        MCHC g/dL  --  29.4*  --   --  29.4*        PLATELETS 10*3/mm3  --  178  --   --  206        INR    --   --   --   --   --          < > = values in this interval not displayed.      Acid/base balance:         Results from last 7 days   Lab Units 02/04/20  0504 02/03/20  1751 02/03/20  1205   PH, ARTERIAL pH units 7.223* 7.244* 7.218*   PO2 ART mm Hg 73.2* 83.9 85.4   PCO2, ARTERIAL mm Hg 30.4* 26.2* 24.0*   HCO3 ART mmol/L 12.5* 11.3* 9.8*      Renal and electrolytes:            Results from last 7 days   Lab Units 02/04/20  0053 02/03/20  0112       SODIUM mmol/L 133* 130*       POTASSIUM mmol/L 4.3 4.2       MAGNESIUM mg/dL  --   --        CHLORIDE mmol/L 102 100       CO2 mmol/L 10.3* 9.4*       BUN mg/dL 26* 25*       CREATININE mg/dL 2.65* 1.96*       EGFR IF NONAFRICN AM mL/min/1.73 20* 28*       CALCIUM mg/dL 8.2* 8.3*       PHOSPHORUS mg/dL  --   --           Estimated Creatinine Clearance: 31.7 mL/min (A) (by C-G formula based on SCr of 2.65 mg/dL (H)).     Liver and pancreatic function:          Results from last 7 days   Lab Units 02/03/20  0112      ALBUMIN g/dL 3.77      BILIRUBIN mg/dL 1.2      ALK PHOS U/L 509*      AST (SGOT) U/L 63*      ALT (SGPT) U/L 17      AMYLASE U/L  --       LIPASE U/L  --                Cardiac:         Results from last 7 days   Lab Units 02/03/20  0112 01/31/20 1955   PROBNP pg/mL 1,467.0* 214.1      Liver and pancreatic function:          Results from last 7 days   Lab Units 02/03/20  0112      ALBUMIN g/dL 3.77      BILIRUBIN mg/dL 1.2      ALK PHOS U/L 509*      AST (SGOT) U/L 63*      ALT (SGPT) U/L 17      AMYLASE U/L  --       LIPASE U/L  --             Medications :         cyclobenzaprine 5 mg Oral BID   FLUoxetine 20 mg Oral Daily   heparin (porcine) 5,000 Units Subcutaneous Q8H   pantoprazole 40 mg Oral QAM   piperacillin-tazobactam 3.375 g Intravenous Q8H   sodium chloride 1,000 mL Intravenous Once   sodium chloride 10 mL Intravenous Q12H   sodium chloride 10 mL Intravenous Q12H   sodium chloride 10 mL Intravenous Q12H         sodium bicarbonate drip (greater than 75 mEq/bag) 125 mEq Last Rate: 125 mEq (02/04/20 0512)   vasopressin (PITRESSIN) infusion 0.04 Units/min Last Rate: Stopped (02/03/20 2316)               Assessment/Plan         1. Sever sepsis with septic shock likely from peritonitis  2. Metastatic poorly differentiated adenocarcinoma of colon origin s/p diverting loop ileostomy on 1/17/2020  3. KODI  4. AG metabolic acidosis due to lactic acidosis, Non Gap MA likely 2/2 Type IV RTA with KODI     Continues to be oliguric, now with signs of volume overload  KODI likely ATN, oliguric due to septic shock  Baseline Cr 1, ->2.65  -Given severity of resistant metabolic acidosis, oliguric KODI and volume overload, will start her on SLED, will start with 4 hours today and 6 hours tomorrow.   -continue HCO3 drip until she gets on dialysis  -OK to get CT abd with contrast followed by dialysis     D/W with Dr Jose Metzger MD  02/04/20  7:33 AM                    Saad Garcia MD   Physician   Medicine   Progress Notes   Signed   Date of Service:  02/04/20 1150   Creation Time:  02/04/20 1150            Signed        Expand All Collapse All     Show:Clear  all  [x]Manual[x]Template[x]Copied    Added by:  [x]Saad Garcia MD    []Jl for details       [facility]     Patient Identification:  Name:  Gracy Norman  Age:  44 y.o.  Sex:  female  :  1975  MRN:  [id number]  Visit Number:  [id number]  ROOM: [address]      Primary Care Provider:  Almas Stone MD     Length of stay in inpatient status:  4     Subjective      Chief Compliant:        Chief Complaint   Patient presents with   • Weakness - Generalized   • Post-op Problem      History of Presenting Illness:    Patient critically ill, remains severely tachycardic, lactate continues to rise, renal output has dropped off and Cr worsening, I have discussed case w/ Renal today and plan to start HD, surgery consulted for TDC, given she is now febrile and more diaphoretic have discussed w/ ID and broadening out Abx and adding micafungin, I checked bladder pressure and it is elevated at 21, abdomen more firm today, possibly plan to repeat paracentesis as nothing has grown out of initial tap or blood cultures but will await TDC catheter placement and dialysis, also have ordered CT Abd/Pelvis w/ IV and oral contrast to evaluate anastomosis of previous surgical procedure at  on , patient endorses some mild back pain but stable, she is concerned but understands she is very sick, her Temp was 101 today, HR remains around 140's, BP is stable though, her WBC count is now up to 38K, bicarb is up to 11-12 and pH has slightly improved, as a result PC02 is up as well and RR has improved, she denies any chest pain, cough, sputum production.  Objective      Current Hospital Meds:  cyclobenzaprine 5 mg Oral BID   FLUoxetine 20 mg Oral Daily   heparin (porcine) 5,000 Units Subcutaneous Q8H   micafungin (MYCAMINE)  mg Intravenous Q24H   pantoprazole 40 mg Oral QAM   piperacillin-tazobactam 3.375 g Intravenous Q12H   sodium chloride 1,000 mL Intravenous Once   sodium  chloride 10 mL Intravenous Q12H   sodium chloride 10 mL Intravenous Q12H   sodium chloride 10 mL Intravenous Q12H      Pharmacy to dose vancomycin       sodium bicarbonate drip (greater than 75 mEq/bag) 125 mEq Last Rate: 125 mEq (02/04/20 0512)   vasopressin (PITRESSIN) infusion 0.04 Units/min Last Rate: Stopped (02/03/20 2316)      Current Antimicrobial Therapy:              Anti-Infectives (From admission, onward)     Ordered     Dose/Rate Route Frequency Start Stop     02/04/20 0807   piperacillin-tazobactam (ZOSYN) 3.375 g/100 mL 0.9% NS IVPB (mbp)     Ordering Provider:  Saad Garcia MD    3.375 g  over 4 Hours Intravenous Every 12 Hours 02/04/20 1800 02/08/20 1759     02/04/20 1131   micafungin sodium (MYCAMINE) 100 mg in sodium chloride 0.9 % 100 mL IVPB     Ordering Provider:  Sonal Baker PA-C    100 mg  over 60 Minutes Intravenous Every 24 Hours 02/04/20 1300 02/11/20 1259     02/04/20 1151   Pharmacy to dose vancomycin     Ordering Provider:  Saad Garcia MD      Does not apply Continuous PRN 02/04/20 1151 02/11/20 1150                                          Current Diuretic Therapy:              Diuretics (From admission, onward)     Ordered     Dose/Rate Route Frequency Start Stop     02/03/20 1610   furosemide (LASIX) injection 80 mg     Ordering Provider:  Alycia Metzger MD    80 mg Intravenous Once 02/03/20 1700 02/03/20 1632     02/03/20 1635   furosemide (LASIX) 10 MG/ML injection  - ADS Override Pull     Note to Pharmacy:  Created by cabinet override   Ordering Provider:  Selam Pierce RN          02/03/20 1635 02/04/20 0444          ----------------------------------------------------------------------------------------------------------------------  Vital Signs:  Temp:  [98.5 °F (36.9 °C)-98.9 °F (37.2 °C)] 98.9 °F (37.2 °C)  Heart Rate:  [137-152] 140  Resp:  [17-26] 24  BP: (105-142)/() 132/96  SpO2:  [93 %-99 %] 97 %  on  Flow (L/min):  [2] 2;   Device (Oxygen Therapy):  nasal cannula  Body mass index is 40.32 kg/m².         Wt Readings from Last 3 Encounters:   02/04/20 (S) 103 kg (227 lb 9.6 oz)   01/12/20 92.1 kg (203 lb)   01/09/20 91.6 kg (202 lb)               Intake & Output (last 3 days)        02/01 0701 - 02/02 0700 02/02 0701 - 02/03 0700 02/03 0701 - 02/04 0700 02/04 0701 - 02/05 0700     P.O. 919 1084 1740       I.V. (mL/kg) 1193 (12.7)   1918.8 (18.6)       Blood   300         IV Piggyback 2750 150 1950       Total Intake(mL/kg) 4862 (51.6) 1534 (15.6) 5608.8 (54.5)       Urine (mL/kg/hr) 685 (0.3) 350 (0.1) 275 (0.1)       Stool 425 400 400       Total Output 1110 750 675       Net +3752 +784 +4933.8                     Urine Unmeasured Occurrence     1 x            NPO Diet NPO Except: Sips With Meds, Ice Chips  ----------------------------------------------------------------------------------------------------------------------  Physical exam:  Constitutional:  Well-developed and well-nourished.  Mod distress  HENT:  Head:  Normocephalic and atraumatic.  Mouth:  dry mucous membranes.    Eyes:  Conjunctivae and EOM are normal. No scleral icterus.    Neck:  Neck supple.  No JVD present.    Cardiovascular:  tachycardic, regular rhythm and normal heart sounds with no murmur.  Pulmonary/Chest:  mild respiratory distress, no wheezes, no crackles, with normal breath sounds and good air movement.  Abdominal:  Distended more firm. Mildly tender in lower quadrants.   Musculoskeletal:  No tenderness, and no deformity.  No red or swollen joints anywhere.    Neurological:  Alert and oriented to person, place, and time.  No cranial nerve deficit. No facial droop.  No slurred speech.   Skin:  Skin is warm and diaphoretic. No rash noted. No pallor.   Peripheral vascular:  no clubbing, no cyanosis, no edema.  ----------------------------------------------------------------------------------------------------------------------  Tele:     ----------------------------------------------------------------------------------------------------------------------                Results from last 7 days                                                                 Assessment & Plan    #Septic shock 2/2 SBP - Worse  #Lactic acidosis   - On presentation (WBC 29,100, CRP 30.94, lactic acid 5.2, temperature 102.4, heart rate 152, blood pressure 72/60  - CT chest showed some bibasilar consolidations that are likely atelectasis   - Symptoms of abdominal pain more consistent with SBP  - Diagnostic paracentesis performed on 2/1 that revealed ANC: 3400. Confirming SBP. Culture NGTD  - Having fevers now today, more diaphoretic, WBC count and lactate trending up, repeat Bcx's yesterday NGTD  - Considering repeating paracentesis today  - Continue Vanc, Zosyn, pending updated ID recs  - Have ordered repeat CT Abd/Pelvis w/ oral and IV contrast     #Oliguric KODI c/b AGMA suspect possible RTA - Worse  - Baseline Cr 0.7-0.9, Cr on presentation 1.95=>2.10=>2.27; Today 2.65  - Potential etiology include prerenal, ATN 2/2 sepsis , hepatorenal syndrome  - CT abdomen/pelvis did not reveal urinary obstruction.  - Urine sodium <20, consistent with severe prerenal vs. Hepatorenal   - Did not appear to improve significantly w/ albumin challenge per previous MD  - Consulted Nephrology; Plan for dialysis today  - Consulted surgery for TDC  - Continue Bicarb gtt, trend labs and UOP     #Stage 4 colon cancer with bulky lymphadenopathy and widely disseminated liver metastases and adrenal masses s/p diverting loop ileostomy   - Scheduled to f/u with oncology at  on 2/3. Expected to start chemo soon. Suspect treatments would be palliative in nature.   - Underwent diverting loop ileostomy at  on 1/17  - CT Abd/Pelvis 2/3 showed extensive hepatic mets, ill-defined cecal mass involving surrounding mesentery, mesenteric LAD, small-mod ascites, stable low density mass L adrenal gland.  -  F/u outpatient pending improvement in sepsis  - Bladder pressure today noted to be 21, abdomen more firm, may need to repeat paracentesis as can compromise anastomosis site leading to perforation or leak, repeat CT pending as per above.     #Normocytic anemia   - Hemoglobin has been trending down. 10 on 1/2/20  - 8.8=>7.9=>6.6, s/p 1 unit of pRBCs; Today 9.2  - No signs of active bleeding, BUN/creatinine not elevated, suspect  hemoconcentrated on admission and fluids have diluted   - Iron studies consistent with SNEHAL and AOCD, B12 and folate normal.   - Continue IV PPI for now, monitor for signs of bleeding     #Respiratory Alkalosis  - Likely 2/2 above AGMA, compensatory, address as per above     #Euthyroid  - TSH 9, free T4 normal, f/u as outpatient.     #Hypomagnesemia   - Replaced     #Diverting loop ileostomy  - Performed 1/17 at  for SBO     #Obesity  - BMI 38, complicates all aspects of care.     F: Oral  E: Monitor & Replace PRN  N: Regular  Ppx: SQH  Code: Full Code     Dispo: Pending clinical improvement and workup, start HD today     *This patient is considered high risk due to septic shock, KODI, underlying colon cancer     *I have spent 60min (8:00AM-9:00AM) of critical care time w/ > 50% face to face with patient evaluating her at bedside given her recent decline, worsening lactic acidosis, leukocytosis, fevers and now w/ significant organ failure of renal failure now requiring urgent TDC placement to initiate new hemodialysis today, coordination of care and workup with Nephrology and Infectious Disease, as well as discussing GOC w/ patient and her , treating acid base disturbances w/ Bicarb gtt.     VTE Prophylaxis:              Mechanical Order History:                 None                             Pharmalogical Order History:      Ordered     Dose Route Frequency Stop     02/01/20 4506   heparin (porcine) 5000 UNIT/ML injection 5,000 Units      5,000 Units SC Every 8 Hours Scheduled --      20 0309   enoxaparin (LOVENOX) syringe 40 mg  Status:  Discontinued      40 mg SC Every 24 Hours 20 1455          Saad Garcia MD  [facility]  20  11:51 AM              Keyona Donohue MD   Physician   Infectious Disease   Progress Notes   Signed   Date of Service:  20   Creation Time:  20            Signed        Expand All Collapse All     Show:Clear all  [x]Manual[x]Template[x]Copied    Added by:  [x]Sonal Baker PA-C    []Jl for details                PROGRESS NOTE           Patient Identification:  Name:  Gracy Norman  Age:  44 y.o.  Sex:  female  :  1975  MRN:  [id number]  Visit Number:  [id number]  Primary Care Provider:  Almas Stone MD            LOS: 4 days         ----------------------------------------------------------------------------------------------------------------------  Subjective         Chief Complaints:     Weakness - Generalized and Post-op Problem           Interval History:       Patient is critically ill and appears to be uncomfortable as she is tachypneic and tachycardic.  Lactic acidosis significantly worsened to 7.5 as well as leukocytosis and CRP level at 31.38.  Dialysis catheter to be placed today with plans to dialyze.  Case discussed with the patient's nurse, Selam, who reports low-grade fever overnight.  Patient discussed with Dr. Garcia.     Review of Systems:     Unable to obtain as the patient is very lethargic  ----------------------------------------------------------------------------------------------------------------------        Objective         Current [facility] Meds:     cyclobenzaprine 5 mg Oral BID   FLUoxetine 20 mg Oral Daily   heparin (porcine) 5,000 Units Subcutaneous Q8H   micafungin (MYCAMINE)  mg Intravenous Q24H   pantoprazole 40 mg Oral QAM   piperacillin-tazobactam 3.375 g Intravenous Q12H   sodium chloride 1,000 mL Intravenous Once   sodium chloride  10 mL Intravenous Q12H   sodium chloride 10 mL Intravenous Q12H   sodium chloride 10 mL Intravenous Q12H         Pharmacy to dose vancomycin       sodium bicarbonate drip (greater than 75 mEq/bag) 125 mEq Last Rate: 125 mEq (02/04/20 0512)   vasopressin (PITRESSIN) infusion 0.04 Units/min Last Rate: Stopped (02/03/20 2316)      ----------------------------------------------------------------------------------------------------------------------     Vital Signs:  Temp:  [98.5 °F (36.9 °C)-98.9 °F (37.2 °C)] 98.9 °F (37.2 °C)  Heart Rate:  [137-152] 140  Resp:  [17-26] 24  BP: (105-142)/() 132/96  Mean Arterial Pressure (Non-Invasive) for the past 24 hrs (Last 3 readings):    Noninvasive MAP (mmHg)   02/04/20 1050 108   02/04/20 1035 107   02/04/20 1020 114            SpO2 Percentage     02/04/20 1020 02/04/20 1035 02/04/20 1050   SpO2: 97% 94% 97%      SpO2:  [93 %-99 %] 97 %  on  Flow (L/min):  [2] 2;   Device (Oxygen Therapy): nasal cannula     Body mass index is 40.32 kg/m².      Wt Readings from Last 3 Encounters:   02/04/20 (S) 103 kg (227 lb 9.6 oz)   01/12/20 92.1 kg (203 lb)   01/09/20 91.6 kg (202 lb)         Intake/Output Summary (Last 24 hours) at 2/4/2020 1308  Last data filed at 2/4/2020 0600      Gross per 24 hour   Intake 4798.75 ml   Output 675 ml   Net 4123.75 ml      NPO Diet NPO Except: Sips With Meds, Ice Chips  NPO Diet  ----------------------------------------------------------------------------------------------------------------------        Physical Exam:     Constitutional:  Ill-appearing, uncomfortable, No respiratory distress.      HENT:  Head: Normocephalic and atraumatic.  Mouth:  Moist mucous membranes.    Eyes:  Conjunctivae and EOM are normal.  No scleral icterus.  Neck:  Neck supple.  No JVD present.     Cardiovascular:  tachycardic, regular rhythm and normal heart sounds with no murmur. No edema.  Pulmonary/Chest:  tachypneic, no wheezes, no crackles, with normal breath sounds  and good air movement.  Abdominal: Distended and tense with diffuse tenderness. Ileostomy.   Musculoskeletal:  No edema, no tenderness, and no deformity.  No swelling or redness of joints.  Neurological:  Obtunded  No facial droop.  No slurred speech.   Skin:  Skin is warm and dry.  No rash noted.  No pallor.   Psychiatric:  Unable to assess     ----------------------------------------------------------------------------------------------------------------------        Results from last 7 days   Lab Units 02/01/20 0840 01/31/20 1955   TROPONIN T ng/mL <0.010 <0.010            Results from last 7 days   Lab Units 02/03/20  0112 01/31/20 1955   PROBNP pg/mL 1,467.0* 214.1            Results from last 7 days   Lab Units 02/04/20  0504   PH, ARTERIAL pH units 7.223*   PO2 ART mm Hg 73.2*   PCO2, ARTERIAL mm Hg 30.4*   HCO3 ART mmol/L 12.5*                    Results from last 7 days   Lab Units 02/04/20  0356 02/04/20  0053 02/03/20  1908   02/03/20  0112 02/02/20 1903 02/01/20 0840   01/31/20 1955   CRP mg/dL  --  31.38*  --   --   --   --   --  29.28*  --  30.94*   LACTATE mmol/L 7.5* 7.1* 6.0*   < > 3.1* 3.3*   < > 3.8*   < > 5.2*   WBC 10*3/mm3  --  38.57*  --   --  28.49* 26.99*   < > 27.93*  --  29.10*   HEMOGLOBIN g/dL  --  9.2*  --   --  8.4* 8.2*   < > 7.9*  --  8.8*   HEMATOCRIT %  --  31.3*  --   --  28.6* 27.0*   < > 26.7*  --  29.9*   MCV fL  --  82.8  --   --  82.4 80.8   < > 80.4  --  79.5   MCHC g/dL  --  29.4*  --   --  29.4* 30.4*   < > 29.6*  --  29.4*   PLATELETS 10*3/mm3  --  178  --   --  206 217   < > 362  --  483*   INR    --   --   --   --   --   --   --  1.34*  --   --     < > = values in this interval not displayed.                Results from last 7 days   Lab Units 02/04/20  0053 02/03/20  0112 02/02/20  1903 02/02/20  0120 02/01/20  0840 01/31/20  1955   SODIUM mmol/L 133* 130* 134* 132* 131* 130*   POTASSIUM mmol/L 4.3 4.2 4.3 4.6 4.8 5.0   MAGNESIUM mg/dL  --   --   --  2.5 1.5*  1.6   CHLORIDE mmol/L 102 100 100 100 99 94*   CO2 mmol/L 10.3* 9.4* 11.1* 11.2* 12.3* 13.4*   BUN mg/dL 26* 25* 24* 26* 26* 26*   CREATININE mg/dL 2.65* 1.96* 1.91* 2.27* 2.10* 1.95*   EGFR IF NONAFRICN AM mL/min/1.73 20* 28* 29* 24* 26* 28*   CALCIUM mg/dL 8.2* 8.3* 8.0* 7.5* 7.8* 8.8   GLUCOSE mg/dL 141* 98 94 91 96 122*   ALBUMIN g/dL  --  3.77  --  3.06* 2.13* 2.83*   BILIRUBIN mg/dL  --  1.2  --  0.6 0.5 0.5   ALK PHOS U/L  --  509*  --  556* 753* 958*   AST (SGOT) U/L  --  63*  --  77* 103* 121*   ALT (SGPT) U/L  --  17  --  20 27 32   Estimated Creatinine Clearance: 31.7 mL/min (A) (by C-G formula based on SCr of 2.65 mg/dL (H)).  No results found for: AMMONIA     No results found for: HGBA1C, POCGLU  No results found for: HGBA1C        Lab Results   Component Value Date     TSH 9.270 (H) 01/31/2020     FREET4 0.97 02/01/2020               Blood Culture   Date Value Ref Range Status   02/03/2020 No growth at less than 24 hours   Preliminary   02/03/2020 No growth at less than 24 hours   Preliminary   01/31/2020 No growth at 3 days   Preliminary   01/31/2020 No growth at 3 days   Preliminary      No results found for: URINECX  No results found for: WOUNDCX  No results found for: STOOLCX  No results found for: RESPCX         Pain Management Panel         Pain Management Panel Latest Ref Rng & Units 2/1/2020     CREATININE UR mg/dL 146.0                   ----------------------------------------------------------------------------------------------------------------------      Imaging Results (Last 24 Hours)      ** No results found for the last 24 hours. **             ----------------------------------------------------------------------------------------------------------------------     Assessment/Plan            Assessment/Plan         ASSESSMENT:     1.  Septic shock with lactic acid greater than 4 on admission  2.  Peritonitis     PLAN:     Patient is critically ill and appears to be uncomfortable as she  is tachypneic and tachycardic.  Lactic acidosis significantly worsened to 7.5 as well as leukocytosis and CRP level at 31.38.  Dialysis catheter to be placed today with plans to dialyze.  Case discussed with the patient's nurse, Selam, who reports low-grade fever overnight.  Patient discussed with Dr. Garcia.     Mycoplasma negative.  Legionella negative.  Respiratory panel PCR negative.  Strep pneumo negative.  Influenza negative.  Urinalysis unremarkable.  Chest x-ray reports mild improvement in basilar airspace disease.  CT of the abdomen reports extensive hepatic metastatic disease, ill-defined mass involving the cecum the surrounding mesenteric, nonobstructing right renal stones, ascites, right lower quadrant ileostomy, moderate right and small left pleural effusions with bibasilar atelectasis, low density mass on the left adrenal gland.  Blood cultures show no growth thus far.  Body fluid culture reports 4390 nucleated cells, Gram stain reports no organism.      In the setting of worsening clinically and laboratory wise would recommend to escalate coverage to include Micafungin 100 mg IV q 24 hours and Zosyn was adjusted to 3.375 g IV q 12 hours based on renal function. Vancomycin was continued as well by primary team as the patient showed decompensation.      We will continue to follow culture results and CRP trend and adjust antibiotic therapy appropriately.     Current Antimicrobial:  Micafungin 100 mg IV q 24 hours  Zosyn 3.375 g IV q 12 hours  Vancomycin per pharmacy dosing      Code Status:       Code Status and Medical Interventions:   Ordered at: 01/31/20 7591     Level Of Support Discussed With:     Patient     Code Status:     CPR     Medical Interventions (Level of Support Prior to Arrest):     Full         Sonal Baker PA-C  02/04/20  1:08 PM                  Revision History                             Sophie Grimes MD   Physician   Surgery   Progress Notes   Signed   Date of Service:   02/04/20 1341   Creation Time:  02/04/20 1341            Signed            Show:Clear all  [x]Manual[]Template[]Copied    Added by:  [x]Sophie Grimes MD    []Jl for details  Surgery     Patient known to me from port placement and liver biopsy.     Now currently critically ill and in need of dialysis.  Patient currently is having respiratory distress and is tachycardic.  On examination she has poor respiratory effort and left-sided wheezes.  Explained to the patient and family that the overwhelming likelihood is that she will be intubated in the operating room and remain intubated postop until her pulmonary status improves.     CCU nurses have also requested triple lumen as possible as they have been advised to try to avoid use of the patient's [id number]              Sophie Grimes MD   Physician   Surgery   Op Note   Signed   Date of Service:  02/04/20 1348   Creation Time:  02/04/20 1548         Procedure: HEMODIALYSIS CATHETER INSERTION Case Time: 02/04/20 1348 Surgeon: Sophie Grimes MD            Signed            Show:Clear all  [x]Manual[x]Template[]Copied    Added by:  [x]Sophie Grimes MD    []Jl for details  Insertion tunneled hemodialysis catheter right IJ     Surgeon:  Sophie Grimes M.D., F.A.C.S.     Assistant;  None     Pre-op:  End stage renal disease     Post-op:  End stage renal disease     Anesthesia: general        Indications: ESRD - nephrology recommends catheter placement       Procedure Details   After obtaining informed consent and receiving preoperative antibiotics and with venous compression boots in place, the patient was taken to the operating room and placed under anesthesia.  The right and left neck and chest were prepped and draped in a sterile fashion.  Ultrasound was utilized to evaluate the vein.  The right  neck was infiltrated with lidocaine and under ultrasound guidance the jugular vein was cannulated.  The guidewire was passed without resistance and  position was confirmed under fluoroscopy.  Following this the catheter exit site was identified and the skin was infiltrated with lidocaine.  A 1 cm incision was made.  With use of the tunneling device the catheter was brought from the chest skin incision through the neck incision.  Under fluoroscopic guidance the tract was dilated and then the dilator/peel-away sheath was passed over the guidewire.  The peel-away sheath was removed and the catheter was passed.  Fluoroscopy demonstrated the catheter to be in good position.  There did not appear to be any kinking of the catheter.  There was good blood return through both ports.  The catheter was then sutured in place with 2-0 silk sutures.  The neck skin incision was closed with 3-0 Vicryl and 4-0 Vicryl sutures and dressing was placed.  Patient tolerated the procedure well and was taken to the recovery room in stable condition where chest x-ray was obtained to document catheter placement.     Findings:     Estimated Blood Loss:  Minimal     Blood administered:  none           Drains: none           Specimens: None      Grafts and Implants: 23 palindrone       Complications:  none           Disposition: PACU - hemodynamically stable.           Condition: stable                 Sophie Grimes MD   Physician   Surgery   Op Note   Signed   Date of Service:  02/04/20 1348   Creation Time:  02/04/20 1551         Procedure: HEMODIALYSIS CATHETER INSERTION Case Time: 02/04/20 1348 Surgeon: Sophie Grimes MD            Signed            Show:Clear all  [x]Manual[x]Template[]Copied    Added by:  [x]Sophie Grimes MD    []Espinozaver for details  Central line insertion jugular     Surgeon:  Sophie Grimes M.D., JOSE MIGUEL.ALEI Norman  2/4/2020     Pre and Post Procedure Diagnosis: poor Iv access     Anesthesia: generral     Procedure:  After obtaining informed consent patient was placed in the Trendelenburg position.  Ultrasound was utilized to confirm the patency of  the jugular vein.  With use of the Seldinger technique the left internal jugular vein was cannulated.  Because of difficulty cannulating the vessel fluoroscopy was utilized to help guide placement and in particular passage of the dilator guidewire was passed without resistance.  The tract was dilated and the triple-lumen catheter was passed to 15 cm and sutured in place. Good blood return was obtained from all 3 ports.  Dressing was placed.     CXR result: Good position, no pneumothorax     Blood administered:  none     Complications:  none        Sophie Grimes MD      Date: 2/4/2020  Time: 3:51 PM                    Saad Garcia MD   Physician   Medicine   Progress Notes   Signed   Date of Service:  02/04/20 1846   Creation Time:  02/04/20 1846            Signed            Show:Clear all  [x]Manual[]Template[]Copied    Added by:  [x]Saad Garcia MD    []Hover for details  Patient continued to be tachycardic today, now febrile up to 101, worsening lactate and WBC count, had TDC placed today and following her arrival back to the floor was intubated for procedure but unable to extubate due to impending respiratory failure and systemic acidosis, I spent 60min of critical care time (4:00PM-5:00PM) of direct critical care time stabilizing the patient at bedside, adjusting initial vent settings, starting precedex and Fentanyl gtts for sedation, giving versed to get patient calm as she was severely agitated and biting on ETT.  Currently have stabilized, blood pressures slightly lower but still stable and has not required pressors, she remains tachycardic in 140's, most recent lactate 8, she has STAT CT abd/pelvis w/ oral and IV contrast ordered and I have clinical suspicion for possible abscess or bowel leak but need CT imaging to confirm, have consulted pulmonary medicine and appreciate input, have discussed case w/ Dr. Garcia. Per my conversations w/ Dr. Metzger earlier today will plan for dialysis following CT  scan tonight.  I have updated the family on patient's critical condition and discussed possibility of transfer to higher level of care if she does have bowel leak or surgical complication on CT.  They were amenable to plan.

## 2020-02-05 NOTE — PLAN OF CARE
Problem: Patient Care Overview  Goal: Plan of Care Review  2/4/2020 2349 by Natty Fisher RN  Outcome: Ongoing (interventions implemented as appropriate)  Flowsheets (Taken 2/4/2020 2349)  Outcome Summary: Patient currently intubated on several gtt's. Propofol, fenatynl levophed, precedex. Bicarb gtt discontinued. Temps continue, hooked internal temp probe on monitor. CVP monitoring in place. Dialysis removed approx 2 Liters.     Problem: Patient Care Overview  Goal: Individualization and Mutuality  2/4/2020 2349 by Natty Fisher RN  Outcome: Ongoing (interventions implemented as appropriate)     Problem: Patient Care Overview  Goal: Discharge Needs Assessment  2/4/2020 2349 by Natty Fisher RN  Outcome: Ongoing (interventions implemented as appropriate)     Problem: Patient Care Overview  Goal: Interprofessional Rounds/Family Conf  2/4/2020 2349 by Natty Fisher RN  Outcome: Ongoing (interventions implemented as appropriate)     Problem: Fall Risk (Adult)  Goal: Identify Related Risk Factors and Signs and Symptoms  2/4/2020 2349 by Natty Fisher RN  Outcome: Ongoing (interventions implemented as appropriate)     Problem: Fall Risk (Adult)  Goal: Absence of Fall  2/4/2020 2349 by Natty Fisher RN  Outcome: Ongoing (interventions implemented as appropriate)     Problem: Skin Injury Risk (Adult)  Goal: Identify Related Risk Factors and Signs and Symptoms  2/4/2020 2349 by Natty Fisher RN  Outcome: Ongoing (interventions implemented as appropriate)     Problem: Skin Injury Risk (Adult)  Goal: Skin Health and Integrity  2/4/2020 2349 by Natty Fisher RN  Outcome: Ongoing (interventions implemented as appropriate)     Problem: Sepsis/Septic Shock (Adult)  Goal: Signs and Symptoms of Listed Potential Problems Will be Absent, Minimized or Managed (Sepsis/Septic Shock)  2/4/2020 2349 by Natty Fisher RN  Outcome: Ongoing (interventions implemented as appropriate)     Problem:  Ventilation, Mechanical Invasive (Adult)  Goal: Signs and Symptoms of Listed Potential Problems Will be Absent, Minimized or Managed (Ventilation, Mechanical Invasive)  2/4/2020 2349 by Natty Fisher RN  Outcome: Ongoing (interventions implemented as appropriate)     Problem: Hemodialysis (Adult)  Goal: Signs and Symptoms of Listed Potential Problems Will be Absent, Minimized or Managed (Hemodialysis)  2/4/2020 2349 by Natty Fisher RN  Outcome: Ongoing (interventions implemented as appropriate)  Flowsheets (Taken 2/4/2020 2349)  Problems Assessed (Hemodialysis): all  Problems Present (Hemodialysis): fluid imbalance; infection

## 2020-02-06 NOTE — PLAN OF CARE
Pt currently receiving HD, rate on vent increased to 30 this am 2/2 ph 7.2; now 7.349 after changes. Levophed continues and rate has been increased this am during dialysis 2/2 lowering bp. US guided paracentesis ordered yesterday pending an INR less than  1.5 per  radiologist per report rec'd fromnight shift rn.

## 2020-02-06 NOTE — PLAN OF CARE
Problem: Ventilation, Mechanical Invasive (Adult)  Goal: Signs and Symptoms of Listed Potential Problems Will be Absent, Minimized or Managed (Ventilation, Mechanical Invasive)  Outcome: Ongoing (interventions implemented as appropriate)  Flowsheets (Taken 2/6/2020 0053)  Problems Assessed (Mechanical Ventilation, Invasive): all  Problems Present (Mech Vent, Invasive): none

## 2020-02-06 NOTE — PLAN OF CARE
Patient remains intubated/sedated on vent.  Patient on Diprivan, Fentanyl, and Levophed gtts.  Patient has only had 10 ml urine output this shift, MD aware.  Lactic and WBC remain critically elevated.

## 2020-02-06 NOTE — PROGRESS NOTES
PROGRESS NOTE         Patient Identification:  Name:  Gracy Norman  Age:  44 y.o.  Sex:  female  :  1975  MRN:  [id number]  Visit Number:  [id number]  Primary Care Provider:  Almas Stone MD         LOS: 6 days       ----------------------------------------------------------------------------------------------------------------------  Subjective       Chief Complaints:    Weakness - Generalized and Post-op Problem    Interval History:      Continues to be intubated and sedated on dialysis this morning.  Lactic acid improving with worsening CRP level and leukocytosis.  Low-grade fever reported this morning but overall improved fever trend.  Continues to require pressors.  Chest x-ray on 2020 showing improvement in bilateral airspace disease and otherwise stable lung volumes.    Review of Systems:    Unable to obtain as the patient is very lethargic  ----------------------------------------------------------------------------------------------------------------------      Objective       Current Hospital Meds:    chlorhexidine 15 mL Mouth/Throat Q12H   heparin (porcine) 5,000 Units Subcutaneous Q8H   hydrocortisone sodium succinate 50 mg Intravenous Q6H   insulin aspart 0-9 Units Subcutaneous 4x Daily AC & at Bedtime   meropenem 500 mg Intravenous Q24H   micafungin (MYCAMINE)  mg Intravenous Q24H   [START ON 2020] trace minerals + multivitamin IVPB  Intravenous Once per day on    pantoprazole 40 mg Intravenous Q12H   phytonadione (VITAMIN K) IVPB 10 mg Intravenous Daily   sodium chloride 1,000 mL Intravenous Once   sodium chloride 1,000 mL Intravenous Once   sodium chloride 250 mL Intravenous Once   sodium chloride 10 mL Intravenous Q12H   sodium chloride 10 mL Intravenous Q12H   sodium chloride 10 mL Intravenous Q12H   sodium chloride 10 mL Intravenous Q12H   sodium chloride 10 mL Intravenous Q12H   sodium chloride 10 mL Intravenous Q12H   thiamine (VITAMIN B1)  IVPB 500 mg Intravenous Q8H   vancomycin 1,000 mg Intravenous Once   Vancomycin Pharmacy Intermittent Dosing  Does not apply Daily       Adult Central Clinimix TPN     fentaNYL Citrate     norepinephrine 0.02-0.3 mcg/kg/min Last Rate: 0.2 mcg/kg/min (02/06/20 1019)   Pharmacy Consult     Pharmacy to dose vancomycin     propofol 5-50 mcg/kg/min Last Rate: 30 mcg/kg/min (02/06/20 1239)   sodium bicarbonate drip (greater than 75 mEq/bag) 150 mEq Last Rate: 150 mEq (02/06/20 1237)   sodium chloride 250 mL/hr Last Rate: 250 mL/hr (02/06/20 1257)   vasopressin (PITRESSIN) infusion 0.04 Units/min Last Rate: 0.04 Units/min (02/06/20 1233)     ----------------------------------------------------------------------------------------------------------------------    Vital Signs:  Temp:  [99 °F (37.2 °C)-101.1 °F (38.4 °C)] 99.1 °F (37.3 °C)  Heart Rate:  [] 81  Resp:  [24-30] 30  BP: ()/() 86/59  FiO2 (%):  [30 %] 30 %  Mean Arterial Pressure (Non-Invasive) for the past 24 hrs (Last 3 readings):   Noninvasive MAP (mmHg)   02/06/20 1021 70   02/06/20 1018 62   02/06/20 1000 62     SpO2 Percentage    02/06/20 1000 02/06/20 1018 02/06/20 1043   SpO2: 95% 96% 97%     SpO2:  [90 %-100 %] 97 %  on   ;   Device (Oxygen Therapy): ventilator    Body mass index is 40.65 kg/m².  Wt Readings from Last 3 Encounters:   02/06/20 104 kg (229 lb 8 oz)   01/12/20 92.1 kg (203 lb)   01/09/20 91.6 kg (202 lb)        Intake/Output Summary (Last 24 hours) at 2/6/2020 1300  Last data filed at 2/6/2020 1240  Gross per 24 hour   Intake 2289.66 ml   Output 3585 ml   Net -1295.34 ml     NPO Diet  Adult Central Clinimix TPN  ----------------------------------------------------------------------------------------------------------------------      Physical Exam:    Constitutional:  Intubated, sedated   HENT:  Head: Normocephalic and atraumatic.  Mouth:  Moist mucous membranes.    Eyes:  Conjunctivae and EOM are normal.  No scleral  icterus.  Neck:  Neck supple.  No JVD present.     Cardiovascular:  tachycardic, regular rhythm and normal heart sounds with no murmur. No edema.  Pulmonary/Chest:  tachypneic, no wheezes, no crackles, with normal breath sounds and good air movement.  Abdominal: Distended and tense with diffuse tenderness. Ileostomy.   Musculoskeletal:  No edema, no tenderness, and no deformity.  No swelling or redness of joints.  Neurological:  Sedated  Skin:  Skin is warm and dry.  No rash noted.  No pallor.   Psychiatric:  Unable to assess    ----------------------------------------------------------------------------------------------------------------------  Results from last 7 days   Lab Units 02/04/20  1932 02/01/20  0840 01/31/20 1955   CK TOTAL U/L 297*  --   --    TROPONIN T ng/mL  --  <0.010 <0.010     Results from last 7 days   Lab Units 02/03/20  0112 01/31/20 1955   PROBNP pg/mL 1,467.0* 214.1     Results from last 7 days   Lab Units 02/06/20  1117   TRIGLYCERIDES mg/dL 481*     Results from last 7 days   Lab Units 02/06/20  0715   PH, ARTERIAL pH units 7.349*   PO2 ART mm Hg 105.0   PCO2, ARTERIAL mm Hg 33.6*   HCO3 ART mmol/L 18.5*     Results from last 7 days   Lab Units 02/06/20  1028 02/06/20  0303 02/05/20  1939 02/05/20  1040 02/05/20  0222  02/04/20  0053  02/01/20  0840   CRP mg/dL  --  34.91*  --   --  29.35*  --  31.38*  --  29.28*   LACTATE mmol/L 4.6* 5.5* 5.7* 5.4* 8.9*   < > 7.1*   < > 3.8*   WBC 10*3/mm3  --  42.70*  --   --  33.02*  --  38.57*   < > 27.93*   HEMOGLOBIN g/dL  --  8.4*  --   --  7.6*  --  9.2*   < > 7.9*   HEMATOCRIT %  --  28.2*  --   --  26.1*  --  31.3*   < > 26.7*   MCV fL  --  83.4  --   --  82.3  --  82.8   < > 80.4   MCHC g/dL  --  29.8*  --   --  29.1*  --  29.4*   < > 29.6*   PLATELETS 10*3/mm3  --  104*  --   --  119*  --  178   < > 362   INR   --   --   --  1.88*  --   --   --   --  1.34*    < > = values in this interval not displayed.     Results from last 7 days   Lab  Units 02/06/20  0303 02/05/20  0222 02/04/20  1932 02/04/20  0053 02/03/20  0112  02/02/20  0120 02/01/20  0840   SODIUM mmol/L 131* 133*  --  133* 130*   < > 132* 131*   POTASSIUM mmol/L 3.9 4.3  --  4.3 4.2   < > 4.6 4.8   MAGNESIUM mg/dL  --   --  2.1  --   --   --  2.5 1.5*   CHLORIDE mmol/L 87* 90*  --  102 100   < > 100 99   CO2 mmol/L 15.3* 18.4*  --  10.3* 9.4*   < > 11.2* 12.3*   BUN mg/dL 21* 16  --  26* 25*   < > 26* 26*   CREATININE mg/dL 2.39* 2.40*  --  2.65* 1.96*   < > 2.27* 2.10*   EGFR IF NONAFRICN AM mL/min/1.73 22* 22*  --  20* 28*   < > 24* 26*   CALCIUM mg/dL 7.8* 7.8*  --  8.2* 8.3*   < > 7.5* 7.8*   GLUCOSE mg/dL 131* 121*  --  141* 98   < > 91 96   ALBUMIN g/dL  --   --   --   --  3.77  --  3.06* 2.13*   BILIRUBIN mg/dL  --   --   --   --  1.2  --  0.6 0.5   ALK PHOS U/L  --   --   --   --  509*  --  556* 753*   AST (SGOT) U/L  --   --   --   --  63*  --  77* 103*   ALT (SGPT) U/L  --   --   --   --  17  --  20 27    < > = values in this interval not displayed.   Estimated Creatinine Clearance: 35.3 mL/min (A) (by C-G formula based on SCr of 2.39 mg/dL (H)).  No results found for: AMMONIA    Glucose   Date/Time Value Ref Range Status   02/06/2020 1249 108 70 - 130 mg/dL Final     No results found for: HGBA1C  Lab Results   Component Value Date    TSH 9.270 (H) 01/31/2020    FREET4 0.97 02/01/2020       Blood Culture   Date Value Ref Range Status   02/03/2020 No growth at less than 24 hours  Preliminary   02/03/2020 No growth at less than 24 hours  Preliminary   01/31/2020 No growth at 3 days  Preliminary   01/31/2020 No growth at 3 days  Preliminary     No results found for: URINECX  No results found for: WOUNDCX  No results found for: STOOLCX  No results found for: RESPCX  Pain Management Panel     Pain Management Panel Latest Ref Rng & Units 2/1/2020    CREATININE UR mg/dL 146.0                     ----------------------------------------------------------------------------------------------------------------------  Imaging Results (Last 24 Hours)     Procedure Component Value Units Date/Time    XR Chest 1 View [[id number]] Resulted:  02/06/20 1153     Updated:  02/06/20 1234    XR Chest 1 View [[id number]] Collected:  02/06/20 0855     Updated:  02/06/20 0858    Narrative:       EXAMINATION: XR CHEST 1 VW-      CLINICAL INDICATION:     Intubated Patient; A41.9-Sepsis, unspecified  organism     TECHNIQUE:  XR CHEST 1 VW-      COMPARISON: 02/05/2020      FINDINGS:   NG tube extends into stomach. Left Port-A-Cath and left IJ deep line are  stable in positioning. Right tunneled dialysis catheter stable.     What may represent ET tube is situated above the level of the clavicles  similar to the prior exam.     Improvement in bilateral airspace disease. Low lung volumes. No effusion  or pneumothorax identified. No acute bony changes.       Impression:       1. Improvement in bilateral airspace disease. Otherwise stable lung  volumes.  2. Support devices as above. What may represent tip of ET tube is well  above the clavicles.     This report was finalized on 2/6/2020 8:56 AM by Dr. Bert Butler MD.       XR Chest 1 View [[id number]] Collected:  02/05/20 1619     Updated:  02/05/20 1624    Narrative:       EXAMINATION: XR CHEST 1 VW-      CLINICAL INDICATION:     ETT placement after tube advancement;  A41.9-Sepsis, unspecified organism     TECHNIQUE:  XR CHEST 1 VW-      COMPARISON: 02/05/2020 15:04      FINDINGS:   ET tube with tip well above the clavicles, approximately 4.5 cm above  the level of the clavicles. NG tube extends into the stomach. Tunneled  dialysis catheter, left Port-A-Cath, and left IJ deep line are stable.     Lung volumes are stable. Mild basilar airspace disease stable. No  pneumothorax. Effusions appear to have decreased.       Impression:       1. What appears represent tip of the ET  tube is approximately 4.5 cm  above the level of clavicles and should be advanced.  2. Other support devices as above.  3. Otherwise stable chest.     This report was finalized on 2/5/2020 4:20 PM by Dr. Bert Butler MD.       XR Chest 1 View [[id number]] Collected:  02/05/20 1618     Updated:  02/05/20 1621    Narrative:       EXAMINATION: XR CHEST 1 VW-      CLINICAL INDICATION:     ETT placement; A41.9-Sepsis, unspecified  organism     TECHNIQUE:  XR CHEST 1 VW-      COMPARISON: 02/05/2020 1:08 PM      FINDINGS:   ET tube is been pulled back with the tip in the region of the upper  airway. Consider advancing approximately 6.5-7.0 cm.     Left Port-A-Cath and left IJ deep line are stable. Right tunneled  dialysis catheter is stable. NG tube extends into the stomach.     Stable basilar airspace disease. Stable lung volumes. Heart size within  normal limits. Trace effusions are noted. No pneumothorax. No acute bony  findings.       Impression:       1. ET tube which on one image appears to be well above the clavicles and  should be advanced.  2. Otherwise stable appearance of support devices.  3. Remainder of chest is stable.     This report was finalized on 2/5/2020 4:19 PM by Dr. Bert Butler MD.       XR Chest 1 View [[id number]] Collected:  02/05/20 1449     Updated:  02/05/20 1452    Narrative:       EXAMINATION: XR CHEST 1 VW-      CLINICAL INDICATION:     s/p ET tube retraction; A41.9-Sepsis,  unspecified organism     TECHNIQUE:  XR CHEST 1 VW-      COMPARISON: 02/05/2020 5:40 AM      FINDINGS:   ET tube with tip just at level of clavicles. Left IJ deep line and left  Port-A-Cath are stable in positioning. Right tunneled dialysis catheter  stable. NG tube extends into stomach.     Improved aeration of both lung bases. Decrease in pleural effusions. No  pneumothorax. No acute bony findings.       Impression:       1. Improved aeration with decreased basilar airspace disease.  2. Slight decrease in pleural  effusions.  3. Support devices as above.     This report was finalized on 2/5/2020 2:50 PM by Dr. Bert Butler MD.       US Chest [[id number]] Collected:  02/05/20 1448     Updated:  02/05/20 1451    Narrative:       EXAMINATION: US CHEST-      CLINICAL INDICATION:     pleural effusion; A41.9-Sepsis, unspecified  organism     COMPARISON: X-ray same day     TECHNIQUE:  Multiple sonographic images obtained in transverse and  sagittal planes of the chest for fluid assessment.      FINDINGS:   Imaging of the right lung region demonstrates a small amount of pleural  fluid. Small amount of left pleural fluid is also noted.        Impression:       Small bilateral pleural effusions.      This report was finalized on 2/5/2020 2:49 PM by Dr. Bert Butler MD.             ----------------------------------------------------------------------------------------------------------------------    Assessment/Plan       Assessment/Plan     ASSESSMENT:    1.  Septic shock with lactic acid greater than 4 on admission  2.  Peritonitis     PLAN:     Continues to be intubated and sedated on dialysis this morning.  Lactic acid improving with worsening CRP level and leukocytosis.  Low-grade fever reported this morning but overall improved fever trend.  Continues to require pressors.  Chest x-ray on 2/6/2020 showing improvement in bilateral airspace disease and otherwise stable lung volumes.    CT Abd/pelvis which did not demonstrate large leak but did show extensive metastatic disease and likely metastatic ascites w/ loculated fluid collection c/f infection.      Mycoplasma negative.  Legionella negative.  Respiratory panel PCR negative.  Strep pneumo negative.  Influenza negative.  Urinalysis unremarkable. Blood cultures show no growth thus far.  Body fluid culture reports 4390 nucleated cells, Gram stain reports no organism.      Would recommend to continue Meropenem, Vancomycin per pharmacy dosing and Micafungin 100 mg IV q 24 hours.      We will continue to follow culture results and CRP trend and adjust antibiotic therapy appropriately.    Current Antimicrobial:  Micafungin 100 mg IV q 24 hours  Meropenem per pharmacy dosing  Vancomycin per pharmacy dosing     Code Status:   Code Status and Medical Interventions:   Ordered at: 01/31/20 3903     Level Of Support Discussed With:    Patient     Code Status:    CPR     Medical Interventions (Level of Support Prior to Arrest):    Full       Sonal Baker PA-C  02/06/20  1:00 PM    Physician Attestation:    I have personally performed a face-to-face evaluation on this patient. I have collected the review of systems and performed my own physical exam. I reviewed the patient's data including history of present illness, past medical history, past surgical history and allergy list. . The assessment and plan documented above are my own after discussing the case in detail with the APC. I have reviewed the laboratory and radiological pertinent results. I have reviewed and edited the note above after discussing the findings with the APC.    Keyona Donohue MD  Infectious Diseases  02/06/20  1:47 PM

## 2020-02-06 NOTE — TELEPHONE ENCOUNTER
Attempted to call patient. This is the only phone # on file for her. Voice mail is not set up. Unable to leave a message.

## 2020-02-06 NOTE — NURSING NOTE
Progressive Mobility    Date: 02/06/20     Mobility Initiation Contradictions: Patient agitation requiring increased sedation in last 30 minutes and Unsecure airway device or difficult airway    Day of Mobility 1 Activity Performed: PROM     Patient: tolerated    Assisted by 1 person/persons    Device(s) used: N/A    Loreto Huggins RN   02/06/20

## 2020-02-06 NOTE — PROGRESS NOTES
Nutrition Services    Patient Name:  Gracy Norman  YOB: 1975  MRN: [id number]  Admit Date:  1/31/2020    Consult received for TPN.  Noted central line.  Propofol @ 18.54 ml/hr to provide 489 kcal.Rec TPN @ 60 ml/hr Clinimix 20% Dextrose, 5% AA and no lipids due to propofol.  TPN will provide 1440 ml fluid,  1267 kcal with TPN, 72 gm protein (1.2 gm / kg std wt), 1756 total kcal with propofol.    Electronically signed by:  Michelle Ring RD  02/06/20 10:18 AM

## 2020-02-06 NOTE — NURSING NOTE
Progressive Mobility    Date: 02/06/20     Mobility Initiation Contradictions: Actively undergoing a procedure    Day of Mobility Activity Performed: PROM with am assessment prior to HD    Patient: tolerated    Assisted by 1 person/persons    Device(s) used:none    Ely Dutton RN   02/06/20

## 2020-02-06 NOTE — PROGRESS NOTES
[facility] HOSPITALIST PROGRESS NOTE     Patient Identification:  Name:  Gracy Norman  Age:  44 y.o.  Sex:  female  :  1975  MRN:  [id number]  Visit Number:  [id number]  ROOM: [address]     Primary Care Provider:  Almas Stone MD    Length of stay in inpatient status:  6    Subjective     Chief Compliant:    Chief Complaint   Patient presents with   • Weakness - Generalized   • Post-op Problem     History of Presenting Illness:    Patient remains critically ill, no acute events overnight, getting dialysis again this AM, HR is normal, BP is in normal range but still on norepinephrine, WBC count is trending up and now 42K, I have ordered peripheral smear, Heme/Onc reports disease is non-curable and has had prognosis conversations w/ family, pulmonary and ID continue to follow, on stress steroids and ID has broadened out ABx, I discussed possibly sampling loculated fluid collection w/ radiology but will be difficult to access that site, will still get repeat paracentesis to sample/test fluid for malignancy and infection but are awaiting improved INR value.  Cr stable at 2.39, CRP slightly up, lactate now downtrending.  Has not been febrile since 4PM yesterday.   Objective     Current Hospital Meds:  chlorhexidine 15 mL Mouth/Throat Q12H   heparin (porcine) 5,000 Units Subcutaneous Q8H   hydrocortisone sodium succinate 50 mg Intravenous Q6H   insulin aspart 0-9 Units Subcutaneous 4x Daily AC & at Bedtime   meropenem 500 mg Intravenous Q24H   micafungin (MYCAMINE)  mg Intravenous Q24H   [START ON 2020] trace minerals + multivitamin IVPB  Intravenous Once per day on    pantoprazole 40 mg Intravenous Q12H   phytonadione (VITAMIN K) IVPB 10 mg Intravenous Daily   sodium chloride 1,000 mL Intravenous Once   sodium chloride 1,000 mL Intravenous Once   sodium chloride 10 mL Intravenous Q12H   sodium chloride 10 mL Intravenous Q12H   sodium chloride 10 mL Intravenous Q12H      sodium chloride 10 mL Intravenous Q12H   sodium chloride 10 mL Intravenous Q12H   sodium chloride 10 mL Intravenous Q12H   thiamine (VITAMIN B1) IVPB 500 mg Intravenous Q8H   vancomycin 1,000 mg Intravenous Once   Vancomycin Pharmacy Intermittent Dosing  Does not apply Daily     Adult [facility] [employer] TPN     fentaNYL Citrate     norepinephrine 0.02-0.3 mcg/kg/min Last Rate: 0.2 mcg/kg/min (02/06/20 1019)   Pharmacy Consult     Pharmacy to dose vancomycin     propofol 5-50 mcg/kg/min Last Rate: 30 mcg/kg/min (02/06/20 0721)   sodium bicarbonate drip (greater than 75 mEq/bag) 150 mEq    sodium chloride 250 mL/hr    vasopressin (PITRESSIN) infusion 0.04 Units/min        Current Antimicrobial Therapy:  Anti-Infectives (From admission, onward)    Ordered     Dose/Rate Route Frequency Start Stop    02/06/20 0835  vancomycin (VANCOCIN) 1,000 mg in sodium chloride 0.9 % 250 mL IVPB     Ordering Provider:  Saad Garcia MD    1,000 mg  over 60 Minutes Intravenous Once 02/06/20 1800      02/05/20 0823  vancomycin (VANCOCIN) 1,250 mg in sodium chloride 0.9 % 250 mL IVPB     Ordering Provider:  Saad Garcia MD    1,250 mg  over 60 Minutes Intravenous Once 02/05/20 1800 02/05/20 1821    02/05/20 1234  meropenem (MERREM) 500mg/100 mL 0.9% NS IVPB (mbp)  Review   Ordering Provider:  Keyona Donohue MD    500 mg  over 3 Hours Intravenous Every 24 Hours 02/05/20 1500 02/12/20 1459    02/04/20 1720  vancomycin (VANCOCIN) 1,250 mg in sodium chloride 0.9 % 250 mL IVPB     Ordering Provider:  Saad Garcia MD    1,250 mg  over 60 Minutes Intravenous Once 02/04/20 2200 02/04/20 2147    02/04/20 1326  Vancomycin Pharmacy Intermittent Dosing  Review   Ordering Provider:  Sophie Grimes MD     Does not apply Daily 02/04/20 1415 02/11/20 0859    02/04/20 1131  micafungin sodium (MYCAMINE) 100 mg in sodium chloride 0.9 % 100 mL IVPB  Review   Ordering Provider:  Sophie Grimes MD    100 mg  over 60 Minutes Intravenous  Every 24 Hours 02/04/20 1300 02/11/20 1259    02/04/20 1151  Pharmacy to dose vancomycin  Review   Ordering Provider:  Sophie Grimes MD     Does not apply Continuous PRN 02/04/20 1151 02/11/20 1150    02/01/20 1601  vancomycin (VANCOCIN) 1,000 mg in sodium chloride 0.9 % 250 mL IVPB     Ordering Provider:  Denny Coy MD    1,000 mg  over 60 Minutes Intravenous Once 02/01/20 1800 02/01/20 1812 01/31/20 1949  vancomycin (VANCOCIN) 1,750 mg in sodium chloride 0.9 % 500 mL IVPB     Ordering Provider:  Waqas York MD    20 mg/kg × 93 kg Intravenous Once 01/31/20 1951 02/01/20 0000    01/31/20 1949  piperacillin-tazobactam (ZOSYN) 4.5 g/100 mL 0.9% NS IVPB (mbp)     Ordering Provider:  Waqas York MD    4.5 g Intravenous Once 01/31/20 1951 01/31/20 2129        Current Diuretic Therapy:  Diuretics (From admission, onward)    Ordered     Dose/Rate Route Frequency Start Stop    02/03/20 1610  furosemide (LASIX) injection 80 mg     Ordering Provider:  Alycia Metzger MD    80 mg Intravenous Once 02/03/20 1700 02/03/20 1632    02/03/20 1635  furosemide (LASIX) 10 MG/ML injection  - ADS Override Pull     Note to Pharmacy:  Created by cabinet override   Ordering Provider:  Selam Pierce RN       02/03/20 1635 02/04/20 0444        ----------------------------------------------------------------------------------------------------------------------  Vital Signs:  Temp:  [98.3 °F (36.8 °C)-101.1 °F (38.4 °C)] 99.1 °F (37.3 °C)  Heart Rate:  [] 81  Resp:  [24-30] 30  BP: ()/() 86/59  FiO2 (%):  [30 %] 30 %  SpO2:  [90 %-100 %] 97 %  on   ;   Device (Oxygen Therapy): ventilator  Body mass index is 40.65 kg/m².    Wt Readings from Last 3 Encounters:   02/06/20 104 kg (229 lb 8 oz)   01/12/20 92.1 kg (203 lb)   01/09/20 91.6 kg (202 lb)     Intake & Output (last 3 days)       02/03 0701 - 02/04 0700 02/04 0701 - 02/05 0700 02/05 0701 - 02/06 0700 02/06 0701 - 02/07 0700    P.O. 1740        I.V. (mL/kg) 1918.8 (18.6) 2548.9 (24.7) 1389.7 (13.4)     Blood        IV Piggyback 1950 450 750     Total Intake(mL/kg) 5608.8 (54.5) 2998.9 (29.1) 2139.7 (20.6)     Urine (mL/kg/hr) 275 (0.1) 130 (0.1) 25 (0)     Other  1950 3500     Stool 400 350 60     Total Output 675 2430 3585     Net +4933.8 +568.9 -1445.3             Urine Unmeasured Occurrence 1 x           NPO Diet  Adult Central [employer] TPN  ----------------------------------------------------------------------------------------------------------------------  Physical exam:  Constitutional:  Well-developed and well-nourished. Intubated/sedated  HENT:  Head:  Normocephalic and atraumatic.  Mouth:  dry mucous membranes.    Eyes:  Conjunctivae and EOM are normal. No scleral icterus.    Neck:  Neck supple.  No JVD present.    Cardiovascular: regular rate, regular rhythm and normal heart sounds with no murmur.  Pulmonary/Chest:  Intubated, minimal Fi02, clear BS  Abdominal:  Distended, slightly more firm  Musculoskeletal:  No deformity.  No red or swollen joints anywhere.    Neurological:  Unable to assess due to intubation/sedation  Skin:  Skin is warm and dry. No rash noted. No pallor.   Peripheral vascular:  no clubbing, no cyanosis, no edema.  ----------------------------------------------------------------------------------------------------------------------  Tele:    ----------------------------------------------------------------------------------------------------------------------  Results from last 7 days   Lab Units 02/06/20  1028 02/06/20  0303 02/05/20  1939 02/05/20  1040 02/05/20  0222  02/04/20  0053  02/01/20  0840   CRP mg/dL  --  34.91*  --   --  29.35*  --  31.38*  --  29.28*   LACTATE mmol/L 4.6* 5.5* 5.7* 5.4* 8.9*   < > 7.1*   < > 3.8*   WBC 10*3/mm3  --  42.70*  --   --  33.02*  --  38.57*   < > 27.93*   HEMOGLOBIN g/dL  --  8.4*  --   --  7.6*  --  9.2*   < > 7.9*   HEMATOCRIT %  --  28.2*  --   --  26.1*  --  31.3*   < > 26.7*    MCV fL  --  83.4  --   --  82.3  --  82.8   < > 80.4   MCHC g/dL  --  29.8*  --   --  29.1*  --  29.4*   < > 29.6*   PLATELETS 10*3/mm3  --  104*  --   --  119*  --  178   < > 362   INR   --   --   --  1.88*  --   --   --   --  1.34*    < > = values in this interval not displayed.     Results from last 7 days   Lab Units 02/06/20  0715   PH, ARTERIAL pH units 7.349*   PO2 ART mm Hg 105.0   PCO2, ARTERIAL mm Hg 33.6*   HCO3 ART mmol/L 18.5*     Results from last 7 days   Lab Units 02/06/20  0303 02/05/20  0222 02/04/20  1932 02/04/20  0053 02/03/20  0112  02/02/20  0120 02/01/20  0840   SODIUM mmol/L 131* 133*  --  133* 130*   < > 132* 131*   POTASSIUM mmol/L 3.9 4.3  --  4.3 4.2   < > 4.6 4.8   MAGNESIUM mg/dL  --   --  2.1  --   --   --  2.5 1.5*   CHLORIDE mmol/L 87* 90*  --  102 100   < > 100 99   CO2 mmol/L 15.3* 18.4*  --  10.3* 9.4*   < > 11.2* 12.3*   BUN mg/dL 21* 16  --  26* 25*   < > 26* 26*   CREATININE mg/dL 2.39* 2.40*  --  2.65* 1.96*   < > 2.27* 2.10*   EGFR IF NONAFRICN AM mL/min/1.73 22* 22*  --  20* 28*   < > 24* 26*   CALCIUM mg/dL 7.8* 7.8*  --  8.2* 8.3*   < > 7.5* 7.8*   IONIZED CALCIUM mmol/L  --   --  1.08*  --   --   --   --   --    PHOSPHORUS mg/dL  --   --  3.6  --   --   --   --  3.9   GLUCOSE mg/dL 131* 121*  --  141* 98   < > 91 96   ALBUMIN g/dL  --   --   --   --  3.77  --  3.06* 2.13*   BILIRUBIN mg/dL  --   --   --   --  1.2  --  0.6 0.5   ALK PHOS U/L  --   --   --   --  509*  --  556* 753*   AST (SGOT) U/L  --   --   --   --  63*  --  77* 103*   ALT (SGPT) U/L  --   --   --   --  17  --  20 27    < > = values in this interval not displayed.   Estimated Creatinine Clearance: 35.3 mL/min (A) (by C-G formula based on SCr of 2.39 mg/dL (H)).  No results found for: AMMONIA  Results from last 7 days   Lab Units 02/04/20  1932 02/01/20 0840 01/31/20 1955   CK TOTAL U/L 297*  --   --    TROPONIN T ng/mL  --  <0.010 <0.010     Results from last 7 days   Lab Units 02/03/20  0112  01/31/20 1955   PROBNP pg/mL 1,467.0* 214.1         No results found for: HGBA1C, POCGLU  Lab Results   Component Value Date    TSH 9.270 (H) 01/31/2020    FREET4 0.97 02/01/2020     No results found for: PREGTESTUR, PREGSERUM, HCG, HCGQUANT  Pain Management Panel     Pain Management Panel Latest Ref Rng & Units 2/1/2020    CREATININE UR mg/dL 146.0        Brief Urine Lab Results  (Last result in the past 365 days)      Color   Clarity   Blood   Leuk Est   Nitrite   Protein   CREAT   Urine HCG        02/02/20 2053 Dark Yellow Turbid Large (3+) Negative Negative 100 mg/dL (2+)             Blood Culture   Date Value Ref Range Status   02/03/2020 No growth at 2 days  Preliminary   02/03/2020 No growth at 2 days  Preliminary   01/31/2020 No growth at 5 days  Final   01/31/2020 No growth at 5 days  Final     No results found for: URINECX  No results found for: WOUNDCX  No results found for: STOOLCX  No results found for: RESPCX  No results found for: AFBCX  Results from last 7 days   Lab Units 02/06/20  1028 02/06/20  0303 02/05/20  1939 02/05/20  1040 02/05/20  0222 02/04/20  1931 02/04/20  1612  02/04/20  0053  02/03/20  0112  02/01/20  0840  01/31/20 1955   PROCALCITONIN ng/mL  --   --   --   --   --   --   --   --   --   --  4.07*  --   --   --   --    LACTATE mmol/L 4.6* 5.5* 5.7* 5.4* 8.9* 7.6* 8.5*   < > 7.1*   < > 3.1*   < > 3.8*   < > 5.2*   CRP mg/dL  --  34.91*  --   --  29.35*  --   --   --  31.38*  --   --   --  29.28*  --  30.94*    < > = values in this interval not displayed.     I have personally looked at the labs and they are summarized above.  ----------------------------------------------------------------------------------------------------------------------  Detailed radiology reports for the last 24 hours:    Imaging Results (Last 24 Hours)     Procedure Component Value Units Date/Time    XR Chest 1 View [[id number]] Collected:  02/06/20 0855     Updated:  02/06/20 0858    Narrative:        EXAMINATION: XR CHEST 1 VW-      CLINICAL INDICATION:     Intubated Patient; A41.9-Sepsis, unspecified  organism     TECHNIQUE:  XR CHEST 1 VW-      COMPARISON: 02/05/2020      FINDINGS:   NG tube extends into stomach. Left Port-A-Cath and left IJ deep line are  stable in positioning. Right tunneled dialysis catheter stable.     What may represent ET tube is situated above the level of the clavicles  similar to the prior exam.     Improvement in bilateral airspace disease. Low lung volumes. No effusion  or pneumothorax identified. No acute bony changes.       Impression:       1. Improvement in bilateral airspace disease. Otherwise stable lung  volumes.  2. Support devices as above. What may represent tip of ET tube is well  above the clavicles.     This report was finalized on 2/6/2020 8:56 AM by Dr. Bert Butler MD.       XR Chest 1 View [[id number]] Collected:  02/05/20 1619     Updated:  02/05/20 1624    Narrative:       EXAMINATION: XR CHEST 1 VW-      CLINICAL INDICATION:     ETT placement after tube advancement;  A41.9-Sepsis, unspecified organism     TECHNIQUE:  XR CHEST 1 VW-      COMPARISON: 02/05/2020 15:04      FINDINGS:   ET tube with tip well above the clavicles, approximately 4.5 cm above  the level of the clavicles. NG tube extends into the stomach. Tunneled  dialysis catheter, left Port-A-Cath, and left IJ deep line are stable.     Lung volumes are stable. Mild basilar airspace disease stable. No  pneumothorax. Effusions appear to have decreased.       Impression:       1. What appears represent tip of the ET tube is approximately 4.5 cm  above the level of clavicles and should be advanced.  2. Other support devices as above.  3. Otherwise stable chest.     This report was finalized on 2/5/2020 4:20 PM by Dr. Bert Butler MD.       XR Chest 1 View [[id number]] Collected:  02/05/20 1618     Updated:  02/05/20 1621    Narrative:       EXAMINATION: XR CHEST 1 VW-      CLINICAL INDICATION:     ETT  placement; A41.9-Sepsis, unspecified  organism     TECHNIQUE:  XR CHEST 1 VW-      COMPARISON: 02/05/2020 1:08 PM      FINDINGS:   ET tube is been pulled back with the tip in the region of the upper  airway. Consider advancing approximately 6.5-7.0 cm.     Left Port-A-Cath and left IJ deep line are stable. Right tunneled  dialysis catheter is stable. NG tube extends into the stomach.     Stable basilar airspace disease. Stable lung volumes. Heart size within  normal limits. Trace effusions are noted. No pneumothorax. No acute bony  findings.       Impression:       1. ET tube which on one image appears to be well above the clavicles and  should be advanced.  2. Otherwise stable appearance of support devices.  3. Remainder of chest is stable.     This report was finalized on 2/5/2020 4:19 PM by Dr. Bert Butler MD.       XR Chest 1 View [[id number]] Collected:  02/05/20 1449     Updated:  02/05/20 1452    Narrative:       EXAMINATION: XR CHEST 1 VW-      CLINICAL INDICATION:     s/p ET tube retraction; A41.9-Sepsis,  unspecified organism     TECHNIQUE:  XR CHEST 1 VW-      COMPARISON: 02/05/2020 5:40 AM      FINDINGS:   ET tube with tip just at level of clavicles. Left IJ deep line and left  Port-A-Cath are stable in positioning. Right tunneled dialysis catheter  stable. NG tube extends into stomach.     Improved aeration of both lung bases. Decrease in pleural effusions. No  pneumothorax. No acute bony findings.       Impression:       1. Improved aeration with decreased basilar airspace disease.  2. Slight decrease in pleural effusions.  3. Support devices as above.     This report was finalized on 2/5/2020 2:50 PM by Dr. Bert Butler MD.       US Chest [[id number]] Collected:  02/05/20 1448     Updated:  02/05/20 1451    Narrative:       EXAMINATION: US CHEST-      CLINICAL INDICATION:     pleural effusion; A41.9-Sepsis, unspecified  organism     COMPARISON: X-ray same day     TECHNIQUE:  Multiple sonographic  images obtained in transverse and  sagittal planes of the chest for fluid assessment.      FINDINGS:   Imaging of the right lung region demonstrates a small amount of pleural  fluid. Small amount of left pleural fluid is also noted.        Impression:       Small bilateral pleural effusions.      This report was finalized on 2/5/2020 2:49 PM by Dr. Bert Butler MD.           Assessment & Plan    #Septic shock and Acute Respiratory Failure 2/2 SBP & PNA, Bacterial, treating for MDR organisms   #Lactic acidosis - Improved  - On presentation (WBC 29,100, CRP 30.94, lactic acid 5.2, temperature 102.4, heart rate 152, blood pressure 72/60  - CT chest on admission showed some bibasilar consolidations that are likely atelectasis   - CT Abd/Pelvis showed b/l lower lobe PNA, loculated fluid in pre-rectal space   - Diagnostic paracentesis performed on 2/1 that revealed ANC: 3400. Confirming SBP. Culture NGTD  - Started having fevers 2/4, lactate has been trending up to 8.9, WBC count 38K yesterday but now down to 33K, CRP 30, Bcx's and para Cx's NGTD, Cdiff negative  - ID consulted; Continue Vanc, Nickolas, Micafungin  - Pulmonary consulted; following, managing vent and acid base disturbances, has on stress dose steroids  - Consult IR for possible drainage of loculated ascites; I discussed w/ Dr. Butler and will be technically difficult to access that particular fluid collection, will however perform repeat bedside paracentesis when INR improved  - Continue MV, wean as tolerated, on norepinephrine still but attempting to wean, trend lactate and labs, continue fluids    #Oliguric KODI c/b AGMA suspect possible RTA   - Baseline Cr 0.7-0.9, Cr on presentation 1.95=>2.10=>2.27; Today 2.39  - Potential etiology include prerenal, ATN 2/2 sepsis , hepatorenal syndrome  - CT abdomen/pelvis did not reveal urinary obstruction.  - Urine sodium <20, consistent with severe prerenal vs. Hepatorenal   - Did not appear to improve significantly  w/ albumin challenge per previous MD  - Consulted Surgery;  Placed TDC on 2/4  - Consulted Nephrology; Have initiated on HD, SLED again today, trend labs and UOP    #Stage 4 colon cancer with bulky lymphadenopathy and widely disseminated liver metastases and adrenal masses s/p diverting loop ileostomy   - Scheduled to f/u with oncology at  on 2/3. Expected to start chemo soon. Suspect treatments would be palliative in nature.   - Underwent diverting loop ileostomy at  on 1/17  - CT Abd/Pelvis 2/2 showed extensive hepatic mets, ill-defined cecal mass involving surrounding mesentery, mesenteric LAD, small-mod ascites, stable low density mass L adrenal gland.  - Repeat CT Abd/Pelvis 2/4 showed extensive metastatic dz involving liver and peritoneum w/ omental caking, metastatic adenopathy RLQ and upper RP space, abnormal thickening R colon wall of terminal ileum, complex ascites that may represent malignant ascites  - Consult Hematology/Oncology; Discussed w/ family that patient's disease is non curable, discussed overall prognosis and appreciate their input and conversations w/ family.    #Leukocytosis - Worse  - WBC count up to 42K today, on steroids still, ID has adjusted Abx, I have ordered peripheral smear    #Mild Rhabdomyolysis  - CK elevated, fluids as per above    #Normocytic anemia   - Hemoglobin has been trending down. 10 on 1/2/20  - 8.8=>7.9=>6.6, s/p 1 unit of pRBCs; Today 8.4  - No signs of active bleeding, BUN/creatinine not elevated, suspect  hemoconcentrated on admission and fluids have diluted   - Iron studies consistent with SNEHAL and AOCD, B12 and folate normal.   - Continue IV PPI, monitor for signs of bleeding    #Respiratory Alkalosis  - Likely 2/2 above AGMA, compensatory, address as per above    #Euthyroid  - TSH 9, free T4 normal, f/u as outpatient.    #Hypomagnesemia   - Replaced    #Diverting loop ileostomy  - Performed 1/17 at  for SBO    #Morbid Obesity  - BMI 40, complicates all  aspects of care.    F: Oral  E: Monitor & Replace PRN  N: TFs  Ppx: SQH  Code: Full Code    Dispo: Pending clinical improvement and workup    *This patient is considered high risk due to septic shock, KODI, underlying colon cancer    VTE Prophylaxis:   Mechanical Order History:     None      Pharmalogical Order History:     Ordered     Dose Route Frequency Stop    02/04/20 1353  heparin (porcine) 5000 UNIT/ML injection  Status:  Discontinued      -- -- As Needed 02/04/20 1459    02/01/20 1455  heparin (porcine) 5000 UNIT/ML injection 5,000 Units      5,000 Units SC Every 8 Hours Scheduled --    02/01/20 0309  enoxaparin (LOVENOX) syringe 40 mg  Status:  Discontinued      40 mg SC Every 24 Hours 02/01/20 1455        Saad Garcia MD  [facility]  02/06/20  11:44 AM

## 2020-02-06 NOTE — PROGRESS NOTES
" LOS: 6 days   Patient Care Team:  Almas Stone MD as PCP - General (Family Medicine)  CELSO Burton MD as Consulting Physician (Oncology)    Chief Complaint: shortness of breath     Subjective     History of Present Illness     Patient continues to undergo dialysis therapy. She is currently on ventilator settings of rate 30, tidal volume 450, FIO2 30%, and PEEP 10. Currently on sedation with propofol and fentanyl. Norepinephrine is running at 0.16 mcg/kg/min. Chest xray shows persistent of bibasilar infiltrates.      Subjective    Unable to obtain review of systems due to intubation and sedation.       Objective     Vital Signs  Temp:  [98.3 °F (36.8 °C)-101.1 °F (38.4 °C)] 99.1 °F (37.3 °C)  Heart Rate:  [] 80  Resp:  [20-30] 30  BP: ()/() 112/85  FiO2 (%):  [30 %] 30 %  Body mass index is 40.65 kg/m².    Intake/Output Summary (Last 24 hours) at 2/6/2020 1005  Last data filed at 2/6/2020 0441  Gross per 24 hour   Intake 2139.66 ml   Output 3585 ml   Net -1445.34 ml     No intake/output data recorded.    Objective:  General Appearance:  General patient appearance: Intubated and sedated.  Appears to be resting comfortably.    Vital signs: (most recent): Blood pressure (!) 86/59, pulse 81, temperature 99.1 °F (37.3 °C), temperature source Temporal, resp. rate (!) 30, height 160 cm (63\"), weight 104 kg (229 lb 8 oz), last menstrual period 12/31/2019, SpO2 97 %, not currently breastfeeding.  No fever.    Output: Producing urine.    HEENT: (Normocephalic.  Atraumatic.  ET tube in place.  )    Lungs:  There are rales.  No wheezes or rhonchi.  (Bilateral air entry positive.)  Heart: Normal rate.  Regular rhythm.  S1 normal and S2 normal.  No murmur.   Abdomen: Abdomen is soft and non-distended.  Bowel sounds are normal.     Extremities: There is dependent edema (diffuse).  There is no deformity.    Pulses: Distal pulses are intact.    Neurological: (Unable to assess due to sedation " status.).    Pupils:  Pupils are equal, round, and reactive to light.    Skin:  Warm and dry.             Results Review:     I reviewed the patient's new clinical results.    ABG this morning showed metabolic acidosis with compensated respiratory alkalosis. PO2 is appropriate.   BMP showed hyponatremia, hypochloremia, hypocalcemia.   Anion gap elevation continues to worsen in the setting of metabolic acidosis.    BUN elevation worsened stay with persistent elevation of creatinine.   CRP elevation worsening.  Lactate remains elevated but shows downward trend to 5.5 today.  Leukocytosis is worsening along with thrombocytopenia.  Anemia showed slight interval improvement today.  Hepatitis panel is negative.    Respiratory ET tube culture from this morning is pending.  Stool culture pending.  Blood cultures are pending.  Other cultures thus far been nonsignificant.  Peripheral blood smear pending.  Chest x-ray today shows persistence of bilateral infiltrates, left greater than right with bibasilar effusions.    Ultrasound of the chest yesterday showed small bilateral pleural effusions.    Echo yesterday showed EF of 56 to 60%.  Normal left ventricular diastolic function.  RV systolic pressure was normal.  No pericardial effusion.    WBC WBC   Date Value Ref Range Status   02/06/2020 42.70 (C) 3.40 - 10.80 10*3/mm3 Final   02/05/2020 33.02 (C) 3.40 - 10.80 10*3/mm3 Final   02/04/2020 38.57 (C) 3.40 - 10.80 10*3/mm3 Final      HGB Hemoglobin   Date Value Ref Range Status   02/06/2020 8.4 (L) 12.0 - 15.9 g/dL Final   02/05/2020 7.6 (L) 12.0 - 15.9 g/dL Final   02/04/2020 9.2 (L) 12.0 - 15.9 g/dL Final      HCT Hematocrit   Date Value Ref Range Status   02/06/2020 28.2 (L) 34.0 - 46.6 % Final   02/05/2020 26.1 (L) 34.0 - 46.6 % Final   02/04/2020 31.3 (L) 34.0 - 46.6 % Final      Platlets No results found for: LABPLAT     PT/INR:    Protime   Date Value Ref Range Status   02/05/2020 22.6 (H) 11.0 - 15.4 Seconds Final    /  INR   Date Value Ref Range Status   02/05/2020 1.88 (H) 0.90 - 1.10 Final       Sodium Sodium   Date Value Ref Range Status   02/06/2020 131 (L) 136 - 145 mmol/L Final   02/05/2020 133 (L) 136 - 145 mmol/L Final   02/04/2020 133 (L) 136 - 145 mmol/L Final      Potassium Potassium   Date Value Ref Range Status   02/06/2020 3.9 3.5 - 5.2 mmol/L Final   02/05/2020 4.3 3.5 - 5.2 mmol/L Final   02/04/2020 4.3 3.5 - 5.2 mmol/L Final      Chloride Chloride   Date Value Ref Range Status   02/06/2020 87 (L) 98 - 107 mmol/L Final   02/05/2020 90 (L) 98 - 107 mmol/L Final   02/04/2020 102 98 - 107 mmol/L Final      Bicarbonate No results found for: PLASMABICARB   BUN BUN   Date Value Ref Range Status   02/06/2020 21 (H) 6 - 20 mg/dL Final   02/05/2020 16 6 - 20 mg/dL Final   02/04/2020 26 (H) 6 - 20 mg/dL Final      Creatinine Creatinine   Date Value Ref Range Status   02/06/2020 2.39 (H) 0.57 - 1.00 mg/dL Final   02/05/2020 2.40 (H) 0.57 - 1.00 mg/dL Final   02/04/2020 2.65 (H) 0.57 - 1.00 mg/dL Final      Calcium Calcium   Date Value Ref Range Status   02/06/2020 7.8 (L) 8.6 - 10.5 mg/dL Final   02/05/2020 7.8 (L) 8.6 - 10.5 mg/dL Final   02/04/2020 8.2 (L) 8.6 - 10.5 mg/dL Final      Magnesium Magnesium   Date Value Ref Range Status   02/04/2020 2.1 1.6 - 2.6 mg/dL Final        pH pH, Arterial   Date Value Ref Range Status   02/06/2020 7.349 (L) 7.350 - 7.450 pH units Final     Comment:     84 Value below reference range   02/06/2020 7.265 (L) 7.350 - 7.450 pH units Final     Comment:     84 Value below reference range   02/05/2020 7.338 (L) 7.350 - 7.450 pH units Final     Comment:     84 Value below reference range   02/04/2020 7.497 (H) 7.350 - 7.450 pH units Final     Comment:     83 Value above reference range   02/04/2020 7.296 (L) 7.350 - 7.450 pH units Final     Comment:     84 Value below reference range   02/04/2020 7.205 (C) 7.350 - 7.450 pH units Final     Comment:     85 Value below critical limit    02/04/2020 7.223 (L) 7.350 - 7.450 pH units Final     Comment:     84 Value below reference range   02/03/2020 7.244 (L) 7.350 - 7.450 pH units Final     Comment:     84 Value below reference range   02/03/2020 7.218 (C) 7.350 - 7.450 pH units Final     Comment:     85 Value below critical limit      pO2 pO2, Arterial   Date Value Ref Range Status   02/06/2020 105.0 83.0 - 108.0 mm Hg Final     Comment:     83 Value above reference range   02/06/2020 88.4 83.0 - 108.0 mm Hg Final   02/05/2020 96.6 83.0 - 108.0 mm Hg Final   02/04/2020 149.0 (H) 83.0 - 108.0 mm Hg Final     Comment:     83 Value above reference range   02/04/2020 117.0 (H) 83.0 - 108.0 mm Hg Final     Comment:     83 Value above reference range   02/04/2020 103.0 83.0 - 108.0 mm Hg Final     Comment:     83 Value above reference range   02/04/2020 73.2 (L) 83.0 - 108.0 mm Hg Final     Comment:     84 Value below reference range   02/03/2020 83.9 83.0 - 108.0 mm Hg Final   02/03/2020 85.4 83.0 - 108.0 mm Hg Final      pCO2 pCO2, Arterial   Date Value Ref Range Status   02/06/2020 33.6 (L) 35.0 - 45.0 mm Hg Final     Comment:     84 Value below reference range   02/06/2020 37.8 35.0 - 45.0 mm Hg Final   02/05/2020 37.2 35.0 - 45.0 mm Hg Final   02/04/2020 36.4 35.0 - 45.0 mm Hg Final   02/04/2020 39.5 35.0 - 45.0 mm Hg Final   02/04/2020 35.4 35.0 - 45.0 mm Hg Final   02/04/2020 30.4 (L) 35.0 - 45.0 mm Hg Final     Comment:     84 Value below reference range   02/03/2020 26.2 (L) 35.0 - 45.0 mm Hg Final     Comment:     84 Value below reference range   02/03/2020 24.0 (L) 35.0 - 45.0 mm Hg Final     Comment:     84 Value below reference range      HCO3 HCO3, Arterial   Date Value Ref Range Status   02/06/2020 18.5 (L) 20.0 - 26.0 mmol/L Final   02/06/2020 17.1 (L) 20.0 - 26.0 mmol/L Final     Comment:     84 Value below reference range   02/05/2020 19.9 (L) 20.0 - 26.0 mmol/L Final     Comment:     84 Value below reference range   02/04/2020 28.2 (H)  20.0 - 26.0 mmol/L Final     Comment:     83 Value above reference range   02/04/2020 19.2 (L) 20.0 - 26.0 mmol/L Final     Comment:     84 Value below reference range   02/04/2020 14.0 (L) 20.0 - 26.0 mmol/L Final     Comment:     84 Value below reference range   02/04/2020 12.5 (L) 20.0 - 26.0 mmol/L Final     Comment:     84 Value below reference range   02/03/2020 11.3 (L) 20.0 - 26.0 mmol/L Final     Comment:     84 Value below reference range   02/03/2020 9.8 (L) 20.0 - 26.0 mmol/L Final     Comment:     84 Value below reference range          chlorhexidine 15 mL Mouth/Throat Q12H   heparin (porcine) 5,000 Units Subcutaneous Q8H   hydrocortisone sodium succinate 50 mg Intravenous Q6H   meropenem 500 mg Intravenous Q24H   micafungin (MYCAMINE)  mg Intravenous Q24H   pantoprazole 40 mg Intravenous Q12H   sodium chloride 1,000 mL Intravenous Once   sodium chloride 1,000 mL Intravenous Once   sodium chloride 1,000 mL Intravenous Once in Dialysis   sodium chloride 10 mL Intravenous Q12H   sodium chloride 10 mL Intravenous Q12H   sodium chloride 10 mL Intravenous Q12H   sodium chloride 10 mL Intravenous Q12H   sodium chloride 10 mL Intravenous Q12H   sodium chloride 10 mL Intravenous Q12H   thiamine (VITAMIN B1) IVPB 500 mg Intravenous Q8H   vancomycin 1,000 mg Intravenous Once   Vancomycin Pharmacy Intermittent Dosing  Does not apply Daily       DOBUTamine 5 mcg/kg/min Last Rate: Stopped (02/05/20 1024)   fentaNYL Citrate     norepinephrine 0.02-0.3 mcg/kg/min Last Rate: 0.16 mcg/kg/min (02/06/20 0822)   Pharmacy Consult     Pharmacy to dose vancomycin     propofol 5-50 mcg/kg/min Last Rate: 30 mcg/kg/min (02/06/20 0721)   vasopressin (PITRESSIN) infusion 0.04 Units/min Last Rate: Stopped (02/05/20 1350)       Medication Review: I reviewed the current medications.    Assessment/Plan     Patient Active Problem List   Diagnosis Code   • Malignant neoplasm of ascending colon (CMS/HCC) C18.2   • Port-A-Cath in  place Z95.828   • Sepsis (CMS/[facility]) A41.9       Assessment & Plan          Central nervous system    Plan:  - Pain management: fentanyl drip.   - Sedation: Propofol, RASS goal: 0 to -1  - Patient does not have biot's type of breathing pattern while on opioids.   - I highly recommend avoiding nighttime disturbances and light exposure during night to maintain normal circadian rhythms to avoid hypoactive or hyperactive delirium.  -On thiamine 500 mg every 8 hours       Cardiovascular system:  Septic shock    Plan:  -Goal CVP around 10 mmHg  -Patient is receiving 250 mL NaCl every hour while undergoing dialysis per nephrology  -Vasopressor: Norepinephrine,  Targeting mean atrial pressure of 65 mmHg   Restarted vasopressin  Will titrate down pressors as tolerated.   -Antibiotics: Meropenem, vancomycin.  On micafungin  -On hydrocortisone IV 50 mg every 6 hours  -Echo showed no systolic or diastolic dysfunction.  -Continuing to monitor lactate trends.  Remains significantly elevated but trending downward.          Respiratory system:  Acute hypoxic respiratory failure on mechanical ventilator  Bilateral lower lobe pneumonia, due to unspecified organism  Bilateral pleural effusions due to KODI likely septic ATN    Plan:  - Mode of mechanical ventilation is assist control volume cycle.  Ventilator setting includes tidal volume of 30mL with rate of 450, FiO2 of 30 and PEEP of 10.  Patient has minute ventilation of 13 with peak pressure of 33, peak mean pressure 19.  Patient is currently on sedation with propofol and for analgesia patient is on fentanyl.      - Ventilator graphics : Flow time scalar was reviewed and auto PEEP is absent, volume time scalar was reviewed and leak is absent , pressure times scalar was reviewed and pressure stress index is 1 indicating normal  pressure stress index.  Pressure volume loop was assessed.  Auto triggering is absent.  Missed triggering is absent.  Double triggering is absent.  Active  expiration is absent.  -Based on ABG, chest x-ray and ventilator graphics, I have advance ET tube 5 cm.  ET tube appears to be 8 cm from the rob.  Chest x-ray repeated to assess positioning.   -Head of the bed elevated at 30 degrees  -Mouth care with oral chlorhexidine   -Undergoing dialysis to reduce volume overload   -ultrasound chest showed bilateral small pleural effusions, however patient is not a candidate for thoracentesis at this time due to coagulopathy  Vitamin K was ordered.   -On meropenem, vancomycin, micafungin.         Liver, gallbladder, Pancreas and gastrointestinal system:  Culture negative peritonitis with concern for intra-abdominal abscess versus leak  Stage IV poorly differentiated adenocarcinoma of the colon with liver mets, possible splenic mass, and adrenal mass status post diverting loop ileostomy    Plan:  -Paracentesis recommended for complex ascites status mild to moderate partially loculated.  However unable to undergo this at this time due to coagulopathy  -On PPI for ulcer prophylaxis.  - Nutrition team on board.     Planning to start TPN  -On meropenem, vancomycin, micafungin         Genitourinary system:   Oliguric KODI likely septic ATN  High anion gap metabolic acidosis due to lactic acidosis and acute kidney injury    Plan   -Avoiding nephrotoxic agent.  -Following serum creatinine and GFR closely with urine output daily.  -Dialysis performed again this morning.  Patient required start of 250 mL NaCl per hour infusion during dialysis due to decreased blood pressure.  Blood pressure acutely improved following completion of dialysis.  She remains on vasopressors with norepinephrine as well as vasopressin that was started during dialysis.  -Started on sodium bicarbonate infusion for persistent acidosis  -Nephrology team on board for further recommendations         Endocrine system:    Plan:  - scheduled pre-meal subcu insulin with correctional insulin transition to every 6 hours  along with every 6 hour glucose checks as patient will be started on TPN therapy.   - Goal serum glucose level is 180 mg/dL while in ICU.  I strongly recommend starting insulin drip if 2 consecutive serum glucose levels are greater than 200 mg/dL.          Infectious disease system:  Severe sepsis related to bilateral lower lobe pneumonia due to unspecified organism  Culture-negative peritonitis with concern for intra-abdominal abscess versus leak    Plan:  - Current antibiotics: Meropenem, vancomycin   On micafungin  - Cultures:   Following respiratory ET tube culture, blood cultures, stool culture  Infectious disease team on board         Hematological system:  Leukocytosis  Anemia  Thrombocytopenia    Plan   - I recommend PRBC transfusion if hemoglobin is less than 7 g/dL unless active bleeding or cardiac ischemia.  - Continue with heparin subcutaneous for DVT prophylaxis.  I recommend holding chemical anticoagulation if platelet count is less than 50,000.  -Ordered vitamin K as coagulation factors are trending up today.  Fibrinogen level was within normal limits.       Rheumatological and dermatological system:    Plan:  - Turn the patient every 2 hours to prevent pressure ulcers.  -Wound care team involvement as per primary team.         Activity:  - I recommend aggressive PT/OT while in hospital to avoid critical care neuropathy/myopathy.       I have updated RADHA Graves of the plan of care.     Nikki Myers PA-C  02/06/20  10:05 AM      Scribed for Dr. Garcia by Nikki Myers PA-C       The clinical plan was discussed extensively with the nurse, and respiratory therapist.   Critical Care time spent in direct patient care: 45 minutes (excluding procedure time).  Patient is critically ill and is at higher risk for further mortality/morbidity.     IHenry M.D. attest that the above note accurately reflects the work and decisions made by me. Patient was seen and evaluated by me, including history  of present illness, physical exam, assessment, and treatment plan.  The above note was reviewed and edited by me.    Henry Garcia M.D  Pulmonary and Critical Care Medicine

## 2020-02-06 NOTE — PROGRESS NOTES
Nurse Practitioner - Brief Progress Note  PERMANENT  02/06/2020 03:48    [facility]  [facility] - CCU - 10 - [place], [place] ([facility])    MATT HAGAN    Date of Service 02/06/2020 03:48    HPI/Events of Note RN is reporting WBC = 42.7 up from 33.02. On antibiotics for peritonitis, PNA per ID (Meropenem, Micafungin, IV Vancomycin). Hx of recently diagnosed with Stage IV colon cancer metastatic to the liver, mesenteric lymph nodes, and   omentum. Will continue to monitor.      Interventions Intermediate-Communication with other healthcare providers and/or family        Electronically Signed by: Harinder Ogden (ANP,CCRN) on 02/06/2020 03:54

## 2020-02-06 NOTE — TELEPHONE ENCOUNTER
----- Message from Erin Caruso sent at 1/22/2020  4:23 PM EST -----  Regarding: RE: new treatment  I moved her ov out 2 weeks in hopes that we hear from her.        ----- Message -----  From: Regla Montano, RADHA  Sent: 1/22/2020   3:33 PM EST  To: Ryanne Acevedon Nino, Erin Caruso  Subject: FW: new treatment                                Office visit on 1/28 can be cancelled per Dr Burton and he will see her with cycle 2. I have tried to call her to see if we can start her tx on Monday 1/27 @ 830 am but am having a very hard time getting in touch with her. (Discharged from  on 1/20/20). I have tried numerous x and their is no voicemail to leave a message. If patient calls back would you please let her know we can start her tx (FOLFOX- hold Avastin with first cycle) on Monday?  Thank you   ----- Message -----  From: Alice Rene RegSched Rep  Sent: 1/22/2020  12:37 PM EST  To: CELSO Burton MD, Regla Montano RN  Subject: RE: new treatment                                Good to go.  ----- Message -----  From: CELSO Burton MD  Sent: 1/9/2020   4:07 PM EST  To: aFny Mariee [facility], #  Subject: new treatment                                    Palliative, [id number] FOLFOX + Avastin for newly diagnosed metastatic colon adenocarcinoma. Powerport already in place and would like to give the first cycle next week UNLESS she is shown to be MSI-H (these results are pending, but they may be back as early as tomorrow), in which case she may pursue enrollment into [facility]- instead. Thanks.

## 2020-02-06 NOTE — PROGRESS NOTES
Nephrology Progress Note      Subjective     Pt is intubated on MV, continue to be in sever septic shock on pressors    Objective       Vital signs :     Temp:  [98.3 °F (36.8 °C)-101.1 °F (38.4 °C)] 99.1 °F (37.3 °C)  Heart Rate:  [] 80  Resp:  [20-30] 30  BP: ()/() 112/85  FiO2 (%):  [30 %] 30 %      Intake/Output Summary (Last 24 hours) at 2/6/2020 0954  Last data filed at 2/6/2020 0441  Gross per 24 hour   Intake 2139.66 ml   Output 3585 ml   Net -1445.34 ml       Physical Exam:    General Appearance : Intubated on MV  Lungs : B/L lower zone crackles with decreased intensity of breath sounds  Heart :  regular rhythm & normal rate, normal S1, S2 and no murmur, no rub  Abdomen : normal bowel sounds, no masses, no hepatomegaly, no splenomegaly, soft non-tender and no guarding  Extremities : moves extremities well, 2+ edema, no cyanosis and no redness  Neurologic :   Intubated on MV  Acess :       Laboratory Data :     Albumin No results found for: ALBUMIN   Magnesium Magnesium   Date Value Ref Range Status   02/04/2020 2.1 1.6 - 2.6 mg/dL Final          PTH               No results found for: PTH    CBC and coagulation:  Results from last 7 days   Lab Units 02/06/20  0303 02/05/20  1939 02/05/20  1040 02/05/20  0222  02/04/20  0053  02/03/20  0112  02/01/20  0840   PROCALCITONIN ng/mL  --   --   --   --   --   --   --  4.07*  --   --    LACTATE mmol/L 5.5* 5.7* 5.4* 8.9*   < > 7.1*   < > 3.1*   < > 3.8*   CRP mg/dL 34.91*  --   --  29.35*  --  31.38*  --   --   --  29.28*   WBC 10*3/mm3 42.70*  --   --  33.02*  --  38.57*  --  28.49*   < > 27.93*   HEMOGLOBIN g/dL 8.4*  --   --  7.6*  --  9.2*  --  8.4*   < > 7.9*   HEMATOCRIT % 28.2*  --   --  26.1*  --  31.3*  --  28.6*   < > 26.7*   MCV fL 83.4  --   --  82.3  --  82.8  --  82.4   < > 80.4   MCHC g/dL 29.8*  --   --  29.1*  --  29.4*  --  29.4*   < > 29.6*   PLATELETS 10*3/mm3 104*  --   --  119*  --  178  --  206   < > 362   INR   --   --   1.88*  --   --   --   --   --   --  1.34*    < > = values in this interval not displayed.     Acid/base balance:  Results from last 7 days   Lab Units 02/06/20  0715 02/06/20  0431 02/05/20  0457   PH, ARTERIAL pH units 7.349* 7.265* 7.338*   PO2 ART mm Hg 105.0 88.4 96.6   PCO2, ARTERIAL mm Hg 33.6* 37.8 37.2   HCO3 ART mmol/L 18.5* 17.1* 19.9*     Renal and electrolytes:  Results from last 7 days   Lab Units 02/06/20  0303 02/05/20  0222 02/04/20  1932 02/04/20  0053 02/03/20  0112 02/02/20  1903 02/02/20  0120 02/01/20  0840   SODIUM mmol/L 131* 133*  --  133* 130* 134* 132* 131*   POTASSIUM mmol/L 3.9 4.3  --  4.3 4.2 4.3 4.6 4.8   MAGNESIUM mg/dL  --   --  2.1  --   --   --  2.5 1.5*   CHLORIDE mmol/L 87* 90*  --  102 100 100 100 99   CO2 mmol/L 15.3* 18.4*  --  10.3* 9.4* 11.1* 11.2* 12.3*   BUN mg/dL 21* 16  --  26* 25* 24* 26* 26*   CREATININE mg/dL 2.39* 2.40*  --  2.65* 1.96* 1.91* 2.27* 2.10*   EGFR IF NONAFRICN AM mL/min/1.73 22* 22*  --  20* 28* 29* 24* 26*   CALCIUM mg/dL 7.8* 7.8*  --  8.2* 8.3* 8.0* 7.5* 7.8*   IONIZED CALCIUM mmol/L  --   --  1.08*  --   --   --   --   --    PHOSPHORUS mg/dL  --   --  3.6  --   --   --   --  3.9     Estimated Creatinine Clearance: 35.3 mL/min (A) (by C-G formula based on SCr of 2.39 mg/dL (H)).    Liver and pancreatic function:  Results from last 7 days   Lab Units 02/04/20 1932 02/03/20 0112 02/02/20 0120 02/01/20 0840 01/31/20 1955   ALBUMIN g/dL  --  3.77 3.06* 2.13* 2.83*   BILIRUBIN mg/dL  --  1.2 0.6 0.5 0.5   ALK PHOS U/L  --  509* 556* 753* 958*   AST (SGOT) U/L  --  63* 77* 103* 121*   ALT (SGPT) U/L  --  17 20 27 32   AMYLASE U/L 61  --   --   --  35   LIPASE U/L 126*  --   --   --  49         Cardiac:  Results from last 7 days   Lab Units 02/03/20 0112 01/31/20 1955   PROBNP pg/mL 1,467.0* 214.1     Liver and pancreatic function:  Results from last 7 days   Lab Units 02/04/20 1932 02/03/20 0112 02/02/20 0120 02/01/20 0840 01/31/20 1955    ALBUMIN g/dL  --  3.77 3.06* 2.13* 2.83*   BILIRUBIN mg/dL  --  1.2 0.6 0.5 0.5   ALK PHOS U/L  --  509* 556* 753* 958*   AST (SGOT) U/L  --  63* 77* 103* 121*   ALT (SGPT) U/L  --  17 20 27 32   AMYLASE U/L 61  --   --   --  35   LIPASE U/L 126*  --   --   --  49       Medications :       chlorhexidine 15 mL Mouth/Throat Q12H   heparin (porcine) 5,000 Units Subcutaneous Q8H   hydrocortisone sodium succinate 50 mg Intravenous Q6H   meropenem 500 mg Intravenous Q24H   micafungin (MYCAMINE)  mg Intravenous Q24H   pantoprazole 40 mg Intravenous Q12H   sodium chloride 1,000 mL Intravenous Once   sodium chloride 1,000 mL Intravenous Once   sodium chloride 1,000 mL Intravenous Once in Dialysis   sodium chloride 10 mL Intravenous Q12H   sodium chloride 10 mL Intravenous Q12H   sodium chloride 10 mL Intravenous Q12H   sodium chloride 10 mL Intravenous Q12H   sodium chloride 10 mL Intravenous Q12H   sodium chloride 10 mL Intravenous Q12H   thiamine (VITAMIN B1) IVPB 500 mg Intravenous Q8H   vancomycin 1,000 mg Intravenous Once   Vancomycin Pharmacy Intermittent Dosing  Does not apply Daily       DOBUTamine 5 mcg/kg/min Last Rate: Stopped (02/05/20 1024)   fentaNYL Citrate     norepinephrine 0.02-0.3 mcg/kg/min Last Rate: 0.16 mcg/kg/min (02/06/20 0822)   Pharmacy Consult     Pharmacy to dose vancomycin     propofol 5-50 mcg/kg/min Last Rate: 30 mcg/kg/min (02/06/20 0721)   vasopressin (PITRESSIN) infusion 0.04 Units/min Last Rate: Stopped (02/05/20 1350)         Assessment/Plan     1. Sever sepsis with septic shock likely from peritonitis  2. Metastatic poorly differentiated adenocarcinoma of colon origin s/p diverting loop ileostomy on 1/17/2020  3. KODI  4. 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 Pt was intubated yesterday while getting vascath placed  Metabolic acidosis is getting better, all cultures are negative    KODI likely ATN, oliguric due to septic shock  Baseline Cr 1, ->2.65  -continue on SLED, will increase UF with IVF replacement to add convective clearance.     Overall condition is very critical and prognosis is very poor.     D/W with Dr Jose Metzger MD  02/06/20  9:54 AM

## 2020-02-07 NOTE — PROGRESS NOTES
PROGRESS NOTE         Patient Identification:  Name:  Gracy Norman  Age:  44 y.o.  Sex:  female  :  1975  MRN:  [id number]  Visit Number:  [id number]  Primary Care Provider:  Almas Stone MD         LOS: 7 days       ----------------------------------------------------------------------------------------------------------------------  Subjective       Chief Complaints:    Weakness - Generalized and Post-op Problem    Interval History:      Patient remains intubated and sedated on 30% FiO2 via ET tube.  Continues to require Levophed for blood pressure support.  Currently receiving dialysis this morning.  WBC slightly improved at 40.02.  CRP slightly improved at 24.06.  Lactic acid worsening at 8.3.      Review of Systems:    Unable to obtain as the patient is very lethargic  ----------------------------------------------------------------------------------------------------------------------      Objective       Current Hospital Meds:    chlorhexidine 15 mL Mouth/Throat Q12H   hydrocortisone sodium succinate 50 mg Intravenous Q6H   insulin aspart 0-9 Units Subcutaneous Q6H   magnesium sulfate 4 g Intravenous Once   meropenem 500 mg Intravenous Q24H   micafungin (MYCAMINE)  mg Intravenous Q24H   trace minerals + multivitamin IVPB  Intravenous Once per day on    pantoprazole 40 mg Intravenous Q12H   phytonadione (VITAMIN K) IVPB 10 mg Intravenous Daily   potassium phosphate infusion 15 mMol or less 15 mmol Intravenous Once   sodium chloride 1,000 mL Intravenous Once   sodium chloride 1,000 mL Intravenous Once   sodium chloride 250 mL Intravenous Q1H   sodium chloride 10 mL Intravenous Q12H   sodium chloride 10 mL Intravenous Q12H   sodium chloride 10 mL Intravenous Q12H   sodium chloride 10 mL Intravenous Q12H   sodium chloride 10 mL Intravenous Q12H   sodium chloride 10 mL Intravenous Q12H   thiamine (VITAMIN B1) IVPB 500 mg Intravenous Q8H   Vancomycin Pharmacy  Intermittent Dosing  Does not apply Daily       Adult [facility] [employer] TPN  Last Rate: 50 mL/hr at 02/07/20 0223   fentaNYL Citrate     norepinephrine 0.02-0.3 mcg/kg/min Last Rate: 0.08 mcg/kg/min (02/07/20 1025)   Pharmacy Consult     Pharmacy to dose vancomycin     propofol 5-50 mcg/kg/min Last Rate: 40 mcg/kg/min (02/07/20 0841)   sodium bicarbonate drip (greater than 75 mEq/bag) 150 mEq Last Rate: 150 mEq (02/07/20 0257)   sodium chloride 250 mL/hr Last Rate: 250 mL/hr (02/06/20 1257)   vasopressin (PITRESSIN) infusion 0.04 Units/min Last Rate: Stopped (02/06/20 1305)     ----------------------------------------------------------------------------------------------------------------------    Vital Signs:  Temp:  [98.1 °F (36.7 °C)-99.3 °F (37.4 °C)] 98.5 °F (36.9 °C)  Heart Rate:  [] 94  Resp:  [24-33] 30  BP: ()/() 107/87  FiO2 (%):  [30 %] 30 %  Mean Arterial Pressure (Non-Invasive) for the past 24 hrs (Last 3 readings):   Noninvasive MAP (mmHg)   02/07/20 1206 91   02/07/20 1132 81   02/07/20 1102 82     SpO2 Percentage    02/07/20 1102 02/07/20 1132 02/07/20 1206   SpO2: 97% 97% 91%     SpO2:  [91 %-100 %] 91 %  on   ;   Device (Oxygen Therapy): ventilator    Body mass index is 40.65 kg/m².  Wt Readings from Last 3 Encounters:   02/06/20 104 kg (229 lb 8 oz)   01/12/20 92.1 kg (203 lb)   01/09/20 91.6 kg (202 lb)        Intake/Output Summary (Last 24 hours) at 2/7/2020 1213  Last data filed at 2/7/2020 1102  Gross per 24 hour   Intake 4502.97 ml   Output 3700 ml   Net 802.97 ml     NPO Diet  Adult [facility] [employer] TPN  ----------------------------------------------------------------------------------------------------------------------      Physical Exam:    Constitutional:  Intubated, sedated   HENT:  Head: Normocephalic and atraumatic.  Mouth:  Moist mucous membranes.    Eyes:  Conjunctivae and EOM are normal.  No scleral icterus.  Neck:  Neck supple.  No JVD present.     Cardiovascular:   tachycardic, regular rhythm and normal heart sounds with no murmur. No edema.  Pulmonary/Chest:  tachypneic, no wheezes, no crackles, with normal breath sounds and good air movement.  Abdominal: Distended and tense with diffuse tenderness. Ileostomy.   Musculoskeletal:  No edema, no tenderness, and no deformity.  No swelling or redness of joints.  Neurological:  Sedated  Skin:  Skin is warm and dry.  No rash noted.  No pallor.   Psychiatric:  Unable to assess    ----------------------------------------------------------------------------------------------------------------------  Results from last 7 days   Lab Units 02/04/20  1932 02/01/20  0840 01/31/20  1955   CK TOTAL U/L 297*  --   --    TROPONIN T ng/mL  --  <0.010 <0.010     Results from last 7 days   Lab Units 02/03/20  0112 01/31/20  1955   PROBNP pg/mL 1,467.0* 214.1     Results from last 7 days   Lab Units 02/06/20  1117   TRIGLYCERIDES mg/dL 481*     Results from last 7 days   Lab Units 02/07/20  0438   PH, ARTERIAL pH units 7.405   PO2 ART mm Hg 89.6   PCO2, ARTERIAL mm Hg 32.5*   HCO3 ART mmol/L 20.3     Results from last 7 days   Lab Units 02/07/20  0820 02/07/20  0057 02/06/20  1028 02/06/20  0303  02/05/20  1040 02/05/20  0222  02/01/20  0840   CRP mg/dL  --  24.06*  --  34.91*  --   --  29.35*   < > 29.28*   LACTATE mmol/L  --  8.3* 4.6* 5.5*   < > 5.4* 8.9*   < > 3.8*   WBC 10*3/mm3  --  40.02*  --  42.70*  --   --  33.02*   < > 27.93*   HEMOGLOBIN g/dL  --  8.0*  --  8.4*  --   --  7.6*   < > 7.9*   HEMATOCRIT %  --  26.3*  --  28.2*  --   --  26.1*   < > 26.7*   MCV fL  --  82.2  --  83.4  --   --  82.3   < > 80.4   MCHC g/dL  --  30.4*  --  29.8*  --   --  29.1*   < > 29.6*   PLATELETS 10*3/mm3  --  67*  --  104*  --   --  119*   < > 362   INR  1.12*  --   --   --   --  1.88*  --   --  1.34*    < > = values in this interval not displayed.     Results from last 7 days   Lab Units 02/07/20  0057 02/06/20  0303 02/05/20  0222 02/04/20  1932   02/03/20  0112  02/02/20  0120   SODIUM mmol/L 131* 131* 133*  --    < > 130*   < > 132*   POTASSIUM mmol/L 3.2* 3.9 4.3  --    < > 4.2   < > 4.6   MAGNESIUM mg/dL 1.8  --   --  2.1  --   --   --  2.5   CHLORIDE mmol/L 85* 87* 90*  --    < > 100   < > 100   CO2 mmol/L 19.9* 15.3* 18.4*  --    < > 9.4*   < > 11.2*   BUN mg/dL 20 21* 16  --    < > 25*   < > 26*   CREATININE mg/dL 2.13* 2.39* 2.40*  --    < > 1.96*   < > 2.27*   EGFR IF NONAFRICN AM mL/min/1.73 25* 22* 22*  --    < > 28*   < > 24*   CALCIUM mg/dL 8.2* 7.8* 7.8*  --    < > 8.3*   < > 7.5*   GLUCOSE mg/dL 202* 131* 121*  --    < > 98   < > 91   ALBUMIN g/dL 4.14  --   --   --   --  3.77  --  3.06*   BILIRUBIN mg/dL 3.9*  --   --   --   --  1.2  --  0.6   ALK PHOS U/L 304*  --   --   --   --  509*  --  556*   AST (SGOT) U/L 640*  --   --   --   --  63*  --  77*   ALT (SGPT) U/L 112*  --   --   --   --  17  --  20    < > = values in this interval not displayed.   Estimated Creatinine Clearance: 39.7 mL/min (A) (by C-G formula based on SCr of 2.13 mg/dL (H)).  No results found for: AMMONIA    Glucose   Date/Time Value Ref Range Status   02/07/2020 1101 184 (H) 70 - 130 mg/dL Final   02/07/2020 0540 257 (H) 70 - 130 mg/dL Final   02/06/2020 2353 208 (H) 70 - 130 mg/dL Final   02/06/2020 1700 162 (H) 70 - 130 mg/dL Final   02/06/2020 1249 108 70 - 130 mg/dL Final     No results found for: HGBA1C  Lab Results   Component Value Date    TSH 9.270 (H) 01/31/2020    FREET4 0.97 02/01/2020       Blood Culture   Date Value Ref Range Status   02/03/2020 No growth at less than 24 hours  Preliminary   02/03/2020 No growth at less than 24 hours  Preliminary   01/31/2020 No growth at 3 days  Preliminary   01/31/2020 No growth at 3 days  Preliminary     No results found for: URINECX  No results found for: WOUNDCX  No results found for: STOOLCX  No results found for: RESPCX  Pain Management Panel     Pain Management Panel Latest Ref Rng & Units 2/1/2020    CREATININE UR  mg/dL 146.0            ----------------------------------------------------------------------------------------------------------------------  Imaging Results (Last 24 Hours)     Procedure Component Value Units Date/Time    XR Chest 1 View [[id number]] Collected:  02/07/20 0942     Updated:  02/07/20 0945    Narrative:       EXAMINATION: XR CHEST 1 VW-      CLINICAL INDICATION:     Intubated Patient; A41.9-Sepsis, unspecified  organism     TECHNIQUE:  XR CHEST 1 VW-      COMPARISON: 02/06/2020      FINDINGS:   ET tube with tip below clavicles and just at or slightly above the  rob. NG tube extends into the stomach. Right tunneled dialysis  catheter, left IJ deep line, and left Port-A-Cath are stable in  positioning.     Some increase in basilar atelectasis. No effusion or pneumothorax  identified. No acute bony findings.       Impression:       1. Support devices as above.  2. Development of basilar atelectasis. Otherwise stable exam.     This report was finalized on 2/7/2020 9:43 AM by Dr. Bert Butler MD.       XR Chest 1 View [[id number]] Collected:  02/06/20 1517     Updated:  02/06/20 1532    Narrative:       EXAMINATION: XR CHEST 1 VW-      CLINICAL INDICATION:     ET tube; A41.9-Sepsis, unspecified organism     TECHNIQUE:  XR CHEST 1 VW-      COMPARISON: 02/06/2020      FINDINGS:   ET tube with tip just below clavicles. NG tube extends into the stomach.  Deep lines are stable in positioning.     Improved aeration. Decreased basilar airspace disease. No effusion  identified on today's exam. No pneumothorax. No acute bony findings.       Impression:       1. ET tube with tip just below clavicles and just above rob.  2. Other support devices as above.  3. Improved aeration of both lungs with decreased basilar airspace  disease and near resolution of pleural effusions.     This report was finalized on 2/6/2020 3:18 PM by Dr. Bert Butler MD.                    ----------------------------------------------------------------------------------------------------------------------    Assessment/Plan       Assessment/Plan     ASSESSMENT:    1.  Septic shock with lactic acid greater than 4 on admission  2.  Peritonitis     PLAN:     Patient remains intubated and sedated on 30% FiO2 via ET tube.  Continues to require Levophed for blood pressure support.  Currently receiving dialysis this morning.  WBC slightly improved at 40.02.  CRP slightly improved at 24.06.  Lactic acid worsening at 8.3.      CT Abd/pelvis which did not demonstrate large leak but did show extensive metastatic disease and likely metastatic ascites w/ loculated fluid collection c/f infection.      Mycoplasma negative.  Legionella negative.  Respiratory panel PCR negative.  Strep pneumo negative.  Influenza negative.  Urinalysis unremarkable. Blood cultures show no growth thus far.  Body fluid culture reports 4390 nucleated cells, Gram stain reports no organism.      Would recommend to continue Meropenem, Vancomycin per pharmacy dosing and Micafungin 100 mg IV q 24 hours.  Patient carries poor prognosis despite all appropriate interventions.    We will continue to follow culture results and CRP trend and adjust antibiotic therapy appropriately.    Current Antimicrobial:  Micafungin 100 mg IV q 24 hours  Meropenem per pharmacy dosing  Vancomycin per pharmacy dosing     Code Status:   Code Status and Medical Interventions:   Ordered at: 01/31/20 8164     Level Of Support Discussed With:    Patient     Code Status:    CPR     Medical Interventions (Level of Support Prior to Arrest):    Full       Noelle Danie, APRN  02/07/20  12:13 PM      Physician Attestation:    I have personally performed a face-to-face evaluation on this patient. I have collected the review of systems and performed my own physical exam. I reviewed the patient's data including history of present illness, past medical history, past  surgical history and allergy list. . The assessment and plan documented above are my own after discussing the case in detail with the APC. I have reviewed the laboratory and radiological pertinent results. I have reviewed and edited the note above after discussing the findings with the APC.    Keyona Donohue MD  Infectious Diseases  02/07/20  1:07 PM

## 2020-02-07 NOTE — NURSING NOTE
Progressive Mobility    Date: 02/07/20     Mobility Initiation Contradictions: Patient agitation requiring increased sedation in last 30 minutes    Day of Mobility  Activity Performed: PROM to BUE and BLE    Patient: tolerated    Assisted by 1 person/persons    Device(s) used: N/A    Loreto Huggins RN   02/07/20

## 2020-02-07 NOTE — PROGRESS NOTES
Nephrology Progress Note      Subjective     Pt is intubated on MV, continue to be in sever septic shock on pressors    Objective       Vital signs :     Temp:  [98.1 °F (36.7 °C)-99.3 °F (37.4 °C)] 98.1 °F (36.7 °C)  Heart Rate:  [] 125  Resp:  [24-33] 30  BP: ()/() 116/82  FiO2 (%):  [30 %] 30 %      Intake/Output Summary (Last 24 hours) at 2/7/2020 0828  Last data filed at 2/7/2020 0544  Gross per 24 hour   Intake 4402.97 ml   Output 3700 ml   Net 702.97 ml       Physical Exam:    General Appearance : Intubated on MV  Lungs : B/L lower zone crackles with decreased intensity of breath sounds  Heart :  regular rhythm & normal rate, normal S1, S2 and no murmur, no rub  Abdomen : normal bowel sounds, no masses, no hepatomegaly, no splenomegaly, soft non-tender and no guarding  Extremities : moves extremities well, 2+ edema, no cyanosis and no redness  Neurologic :   Intubated on MV  Acess :       Laboratory Data :     Albumin Albumin   Date Value Ref Range Status   02/07/2020 4.14 3.50 - 5.20 g/dL Final      Magnesium Magnesium   Date Value Ref Range Status   02/07/2020 1.8 1.6 - 2.6 mg/dL Final   02/04/2020 2.1 1.6 - 2.6 mg/dL Final          PTH               No results found for: PTH    CBC and coagulation:  Results from last 7 days   Lab Units 02/07/20  0057 02/06/20  1028 02/06/20  0303  02/05/20  1040 02/05/20  0222  02/03/20  0112  02/01/20  0840   PROCALCITONIN ng/mL  --   --   --   --   --   --   --  4.07*  --   --    LACTATE mmol/L 8.3* 4.6* 5.5*   < > 5.4* 8.9*   < > 3.1*   < > 3.8*   CRP mg/dL 24.06*  --  34.91*  --   --  29.35*   < >  --   --  29.28*   WBC 10*3/mm3 40.02*  --  42.70*  --   --  33.02*   < > 28.49*   < > 27.93*   HEMOGLOBIN g/dL 8.0*  --  8.4*  --   --  7.6*   < > 8.4*   < > 7.9*   HEMATOCRIT % 26.3*  --  28.2*  --   --  26.1*   < > 28.6*   < > 26.7*   MCV fL 82.2  --  83.4  --   --  82.3   < > 82.4   < > 80.4   MCHC g/dL 30.4*  --  29.8*  --   --  29.1*   < > 29.4*   < >  29.6*   PLATELETS 10*3/mm3 67*  --  104*  --   --  119*   < > 206   < > 362   INR   --   --   --   --  1.88*  --   --   --   --  1.34*    < > = values in this interval not displayed.     Acid/base balance:  Results from last 7 days   Lab Units 02/07/20  0438 02/06/20  0715 02/06/20  0431   PH, ARTERIAL pH units 7.405 7.349* 7.265*   PO2 ART mm Hg 89.6 105.0 88.4   PCO2, ARTERIAL mm Hg 32.5* 33.6* 37.8   HCO3 ART mmol/L 20.3 18.5* 17.1*     Renal and electrolytes:  Results from last 7 days   Lab Units 02/07/20  0057 02/06/20  0303 02/05/20  0222 02/04/20  1932 02/04/20  0053 02/03/20  0112  02/02/20  0120 02/01/20  0840   SODIUM mmol/L 131* 131* 133*  --  133* 130*   < > 132* 131*   POTASSIUM mmol/L 3.2* 3.9 4.3  --  4.3 4.2   < > 4.6 4.8   MAGNESIUM mg/dL 1.8  --   --  2.1  --   --   --  2.5 1.5*   CHLORIDE mmol/L 85* 87* 90*  --  102 100   < > 100 99   CO2 mmol/L 19.9* 15.3* 18.4*  --  10.3* 9.4*   < > 11.2* 12.3*   BUN mg/dL 20 21* 16  --  26* 25*   < > 26* 26*   CREATININE mg/dL 2.13* 2.39* 2.40*  --  2.65* 1.96*   < > 2.27* 2.10*   EGFR IF NONAFRICN AM mL/min/1.73 25* 22* 22*  --  20* 28*   < > 24* 26*   CALCIUM mg/dL 8.2* 7.8* 7.8*  --  8.2* 8.3*   < > 7.5* 7.8*   IONIZED CALCIUM mmol/L  --   --   --  1.08*  --   --   --   --   --    PHOSPHORUS mg/dL 2.2*  --   --  3.6  --   --   --   --  3.9    < > = values in this interval not displayed.     Estimated Creatinine Clearance: 39.7 mL/min (A) (by C-G formula based on SCr of 2.13 mg/dL (H)).    Liver and pancreatic function:  Results from last 7 days   Lab Units 02/07/20  0057 02/04/20  1932 02/03/20  0112 02/02/20  0120  01/31/20 1955   ALBUMIN g/dL 4.14  --  3.77 3.06*   < > 2.83*   BILIRUBIN mg/dL 3.9*  --  1.2 0.6   < > 0.5   ALK PHOS U/L 304*  --  509* 556*   < > 958*   AST (SGOT) U/L 640*  --  63* 77*   < > 121*   ALT (SGPT) U/L 112*  --  17 20   < > 32   AMYLASE U/L  --  61  --   --   --  35   LIPASE U/L  --  126*  --   --   --  49    < > = values in this  interval not displayed.         Cardiac:  Results from last 7 days   Lab Units 02/03/20  0112 01/31/20 1955   PROBNP pg/mL 1,467.0* 214.1     Liver and pancreatic function:  Results from last 7 days   Lab Units 02/07/20  0057 02/04/20  1932 02/03/20  0112 02/02/20  0120  01/31/20 1955   ALBUMIN g/dL 4.14  --  3.77 3.06*   < > 2.83*   BILIRUBIN mg/dL 3.9*  --  1.2 0.6   < > 0.5   ALK PHOS U/L 304*  --  509* 556*   < > 958*   AST (SGOT) U/L 640*  --  63* 77*   < > 121*   ALT (SGPT) U/L 112*  --  17 20   < > 32   AMYLASE U/L  --  61  --   --   --  35   LIPASE U/L  --  126*  --   --   --  49    < > = values in this interval not displayed.       Medications :       chlorhexidine 15 mL Mouth/Throat Q12H   heparin (porcine) 5,000 Units Subcutaneous Q8H   hydrocortisone sodium succinate 50 mg Intravenous Q6H   insulin aspart 0-9 Units Subcutaneous Q6H   meropenem 500 mg Intravenous Q24H   micafungin (MYCAMINE)  mg Intravenous Q24H   trace minerals + multivitamin IVPB  Intravenous Once per day on Mon Wed Fri   pantoprazole 40 mg Intravenous Q12H   phytonadione (VITAMIN K) IVPB 10 mg Intravenous Daily   sodium chloride 1,000 mL Intravenous Once   sodium chloride 1,000 mL Intravenous Once   sodium chloride 1,000 mL Intravenous Once in Dialysis   sodium chloride 250 mL Intravenous Q1H   sodium chloride 10 mL Intravenous Q12H   sodium chloride 10 mL Intravenous Q12H   sodium chloride 10 mL Intravenous Q12H   sodium chloride 10 mL Intravenous Q12H   sodium chloride 10 mL Intravenous Q12H   sodium chloride 10 mL Intravenous Q12H   thiamine (VITAMIN B1) IVPB 500 mg Intravenous Q8H   Vancomycin Pharmacy Intermittent Dosing  Does not apply Daily       Adult Central Clinimix TPN  Last Rate: 50 mL/hr at 02/07/20 0223   fentaNYL Citrate     norepinephrine 0.02-0.3 mcg/kg/min Last Rate: 0.05 mcg/kg/min (02/07/20 0558)   Pharmacy Consult     Pharmacy to dose vancomycin     propofol 5-50 mcg/kg/min Last Rate: 40 mcg/kg/min  (02/07/20 2398)   sodium bicarbonate drip (greater than 75 mEq/bag) 150 mEq Last Rate: 150 mEq (02/07/20 0257)   sodium chloride 250 mL/hr Last Rate: 250 mL/hr (02/06/20 1257)   vasopressin (PITRESSIN) infusion 0.04 Units/min Last Rate: Stopped (02/06/20 1305)         Assessment/Plan     1. Sever sepsis with septic shock likely from peritonitis  2. Metastatic poorly differentiated adenocarcinoma of colon origin s/p diverting loop ileostomy on 1/17/2020  3. KODI  4. 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 She continue to have persistent sever lactic acidosis and no improvement in overall clinical condition     Pt was started on dialysis due to worsening volume overload leading to hypoxic respiratory failure in setting of oliguic KODI.   KODI likely ATN, oliguric due to septic shock  Baseline Cr 1, ->2.65  -continue on SLED.     Overall condition is very critical and prognosis is very poor.     D/W with Dr Garcia and Dr ILDEFONSO Metzger MD  02/07/20  8:28 AM

## 2020-02-07 NOTE — PLAN OF CARE
Problem: Ventilation, Mechanical Invasive (Adult)  Goal: Signs and Symptoms of Listed Potential Problems Will be Absent, Minimized or Managed (Ventilation, Mechanical Invasive)  Outcome: Ongoing (interventions implemented as appropriate)  Flowsheets (Taken 2/6/2020 1921)  Problems Assessed (Mechanical Ventilation, Invasive): all  Problems Present (Mech Vent, Invasive): none

## 2020-02-07 NOTE — PROGRESS NOTES
Discharge Planning Assessment   S[facility]chez     Patient Name: Gracy Norman  MRN: [id number]  Today's Date: 2/7/2020    Admit Date: 1/31/2020    Discharge Plan     Row Name 02/07/20 1315       Plan    Plan  Pt was admitted on 01/31/2020. Pt is currently intubated. Pt lives at home with her spouse and other family members. Pt does not receive  services or use any DMe. Pt may need DMe at discharge, and requested a rolling walker in the past. Pt does not have a POA or a living will. Pt does not have a PCP. SS will follow up in regards to discharge plan once pt is extubated.    Patient/Family in Agreement with Plan  yes        Expected Discharge Date and Time     Expected Discharge Date Expected Discharge Time    Feb 5, 2020               JASS Del Rio

## 2020-02-07 NOTE — PROGRESS NOTES
[facility] HOSPITALIST PROGRESS NOTE     Patient Identification:  Name:  Gracy Norman  Age:  44 y.o.  Sex:  female  :  1975  MRN:  [id number]  Visit Number:  [id number]  ROOM: [address]     Primary Care Provider:  Almas Stone MD    Length of stay in inpatient status:  7    Subjective     Chief Compliant:    Chief Complaint   Patient presents with   • Weakness - Generalized   • Post-op Problem     History of Presenting Illness:    Patient remains critically ill, no acute events overnight, still requiring pressors, still intubated, WBC count 40K, lactate trending up again to 8, CRP still elevated at 24, AST/ALT now elevated and Tbili and alk phos as well, RUQ US ordered and pending, repeat paracentesis pending per IR now that INR is < 1.5, discussed case w/ nephrology today and does not feel dialysis is improving overall poor clinical picture, Dr. Metzger reported he has discussed w/ Dr. Garcia as well, plan to discuss w/ pulmonary today and hopefully later will be able to have a goals of care conversation w/ patient's family as they were not there this morning when she was getting dialysis or in the waiting room.    Objective     Current Hospital Meds:  chlorhexidine 15 mL Mouth/Throat Q12H   hydrocortisone sodium succinate 50 mg Intravenous Q6H   insulin aspart 0-9 Units Subcutaneous Q6H   magnesium sulfate 4 g Intravenous Once   meropenem 500 mg Intravenous Q24H   micafungin (MYCAMINE)  mg Intravenous Q24H   trace minerals + multivitamin IVPB  Intravenous Once per day on    pantoprazole 40 mg Intravenous Q12H   phytonadione (VITAMIN K) IVPB 10 mg Intravenous Daily   potassium phosphate infusion 15 mMol or less 15 mmol Intravenous Once   sodium chloride 1,000 mL Intravenous Once   sodium chloride 1,000 mL Intravenous Once   sodium chloride 1,000 mL Intravenous Once in Dialysis   sodium chloride 250 mL Intravenous Q1H   sodium chloride 10 mL Intravenous Q12H   sodium  chloride 10 mL Intravenous Q12H   sodium chloride 10 mL Intravenous Q12H   sodium chloride 10 mL Intravenous Q12H   sodium chloride 10 mL Intravenous Q12H   sodium chloride 10 mL Intravenous Q12H   thiamine (VITAMIN B1) IVPB 500 mg Intravenous Q8H   Vancomycin Pharmacy Intermittent Dosing  Does not apply Daily     Adult Central Clinimix TPN  Last Rate: 50 mL/hr at 02/07/20 0223   fentaNYL Citrate     norepinephrine 0.02-0.3 mcg/kg/min Last Rate: 0.08 mcg/kg/min (02/07/20 1025)   Pharmacy Consult     Pharmacy to dose vancomycin     propofol 5-50 mcg/kg/min Last Rate: 40 mcg/kg/min (02/07/20 0841)   sodium bicarbonate drip (greater than 75 mEq/bag) 150 mEq Last Rate: 150 mEq (02/07/20 0257)   sodium chloride 250 mL/hr Last Rate: 250 mL/hr (02/06/20 1257)   vasopressin (PITRESSIN) infusion 0.04 Units/min Last Rate: Stopped (02/06/20 1305)     Current Antimicrobial Therapy:  Anti-Infectives (From admission, onward)    Ordered     Dose/Rate Route Frequency Start Stop    02/06/20 0835  vancomycin (VANCOCIN) 1,000 mg in sodium chloride 0.9 % 250 mL IVPB     Ordering Provider:  Saad Garcia MD    1,000 mg  over 60 Minutes Intravenous Once 02/06/20 1800 02/06/20 1835    02/05/20 0823  vancomycin (VANCOCIN) 1,250 mg in sodium chloride 0.9 % 250 mL IVPB     Ordering Provider:  Saad Garcia MD    1,250 mg  over 60 Minutes Intravenous Once 02/05/20 1800 02/05/20 1821    02/05/20 1234  meropenem (MERREM) 500mg/100 mL 0.9% NS IVPB (mbp)  Review   Ordering Provider:  Keyona Donohue MD    500 mg  over 3 Hours Intravenous Every 24 Hours 02/05/20 1500 02/12/20 1459    02/04/20 1720  vancomycin (VANCOCIN) 1,250 mg in sodium chloride 0.9 % 250 mL IVPB     Ordering Provider:  Saad Garcia MD    1,250 mg  over 60 Minutes Intravenous Once 02/04/20 2200 02/04/20 2147    02/04/20 1326  Vancomycin Pharmacy Intermittent Dosing  Review   Ordering Provider:  Sophie Grimes MD     Does not apply Daily 02/04/20 1415 02/11/20 0859       02/04/20 1131  micafungin sodium (MYCAMINE) 100 mg in sodium chloride 0.9 % 100 mL IVPB  Review   Ordering Provider:  Sophie Grimes MD    100 mg  over 60 Minutes Intravenous Every 24 Hours 02/04/20 1300 02/11/20 1259    02/04/20 1151  Pharmacy to dose vancomycin  Review   Ordering Provider:  Sophie Grimes MD     Does not apply Continuous PRN 02/04/20 1151 02/11/20 1150    02/01/20 1601  vancomycin (VANCOCIN) 1,000 mg in sodium chloride 0.9 % 250 mL IVPB     Ordering Provider:  Denny Coy MD    1,000 mg  over 60 Minutes Intravenous Once 02/01/20 1800 02/01/20 1812 01/31/20 1949  vancomycin (VANCOCIN) 1,750 mg in sodium chloride 0.9 % 500 mL IVPB     Ordering Provider:  Waqas York MD    20 mg/kg × 93 kg Intravenous Once 01/31/20 1951 02/01/20 0000    01/31/20 1949  piperacillin-tazobactam (ZOSYN) 4.5 g/100 mL 0.9% NS IVPB (mbp)     Ordering Provider:  Waqas York MD    4.5 g Intravenous Once 01/31/20 1951 01/31/20 2129        Current Diuretic Therapy:  Diuretics (From admission, onward)    Ordered     Dose/Rate Route Frequency Start Stop    02/03/20 1610  furosemide (LASIX) injection 80 mg     Ordering Provider:  Alycia Metzger MD    80 mg Intravenous Once 02/03/20 1700 02/03/20 1632    02/03/20 1635  furosemide (LASIX) 10 MG/ML injection  - ADS Override Pull     Note to Pharmacy:  Created by cabinet override   Ordering Provider:  Selam Pierce RN       02/03/20 1635 02/04/20 0444        ----------------------------------------------------------------------------------------------------------------------  Vital Signs:  Temp:  [98.1 °F (36.7 °C)-99.3 °F (37.4 °C)] 98.1 °F (36.7 °C)  Heart Rate:  [] 103  Resp:  [24-33] 30  BP: ()/() 98/77  FiO2 (%):  [30 %] 30 %  SpO2:  [92 %-100 %] 99 %  on   ;   Device (Oxygen Therapy): ventilator  Body mass index is 40.65 kg/m².    Wt Readings from Last 3 Encounters:   02/06/20 104 kg (229 lb 8 oz)   01/12/20 92.1 kg (203  lb)   01/09/20 91.6 kg (202 lb)     Intake & Output (last 3 days)       02/04 0701 - 02/05 0700 02/05 0701 - 02/06 0700 02/06 0701 - 02/07 0700 02/07 0701 - 02/08 0700    P.O.        I.V. (mL/kg) 2548.9 (24.7) 1389.7 (13.4) 3215.9 (30.9)     IV Piggyback 450 750 800     TPN   387.1     Total Intake(mL/kg) 2998.9 (29.1) 2139.7 (20.6) 4403 (42.3)     Urine (mL/kg/hr) 130 (0.1) 25 (0) 0 (0)     Other 1950 3500 3700     Stool 350 60 0     Total Output 2430 3585 3700     Net +568.9 -1445.3 +703                 NPO Diet  Adult Central Clinimix TPN  ----------------------------------------------------------------------------------------------------------------------  Physical exam:  Constitutional:  Well-developed and well-nourished. Intubated/sedated  HENT:  Head:  Normocephalic and atraumatic.  Mouth:  dry mucous membranes.    Eyes:  Conjunctivae and EOM are normal. No scleral icterus.    Neck:  Neck supple.  No JVD present.    Cardiovascular: regular rate, regular rhythm and normal heart sounds with no murmur.  Pulmonary/Chest:  Intubated, minimal Fi02, clear BS  Abdominal:  Distended, mor firm today, body wall edema is worse  Musculoskeletal:  No deformity.  No red or swollen joints anywhere.    Neurological:  Unable to assess due to intubation/sedation  Skin:  Skin is warm and dry. No rash noted. No pallor.   Peripheral vascular:  no clubbing, no cyanosis, trace edema.  ----------------------------------------------------------------------------------------------------------------------  Tele:    ----------------------------------------------------------------------------------------------------------------------  Results from last 7 days   Lab Units 02/07/20  0820 02/07/20  0057 02/06/20  1028 02/06/20  0303  02/05/20  1040 02/05/20  0222  02/01/20  0840   CRP mg/dL  --  24.06*  --  34.91*  --   --  29.35*   < > 29.28*   LACTATE mmol/L  --  8.3* 4.6* 5.5*   < > 5.4* 8.9*   < > 3.8*   WBC 10*3/mm3  --  40.02*  --   42.70*  --   --  33.02*   < > 27.93*   HEMOGLOBIN g/dL  --  8.0*  --  8.4*  --   --  7.6*   < > 7.9*   HEMATOCRIT %  --  26.3*  --  28.2*  --   --  26.1*   < > 26.7*   MCV fL  --  82.2  --  83.4  --   --  82.3   < > 80.4   MCHC g/dL  --  30.4*  --  29.8*  --   --  29.1*   < > 29.6*   PLATELETS 10*3/mm3  --  67*  --  104*  --   --  119*   < > 362   INR  1.12*  --   --   --   --  1.88*  --   --  1.34*    < > = values in this interval not displayed.     Results from last 7 days   Lab Units 02/07/20  0438   PH, ARTERIAL pH units 7.405   PO2 ART mm Hg 89.6   PCO2, ARTERIAL mm Hg 32.5*   HCO3 ART mmol/L 20.3     Results from last 7 days   Lab Units 02/07/20  0057 02/06/20  0303 02/05/20  0222 02/04/20  1932  02/03/20  0112  02/02/20  0120 02/01/20  0840   SODIUM mmol/L 131* 131* 133*  --    < > 130*   < > 132* 131*   POTASSIUM mmol/L 3.2* 3.9 4.3  --    < > 4.2   < > 4.6 4.8   MAGNESIUM mg/dL 1.8  --   --  2.1  --   --   --  2.5 1.5*   CHLORIDE mmol/L 85* 87* 90*  --    < > 100   < > 100 99   CO2 mmol/L 19.9* 15.3* 18.4*  --    < > 9.4*   < > 11.2* 12.3*   BUN mg/dL 20 21* 16  --    < > 25*   < > 26* 26*   CREATININE mg/dL 2.13* 2.39* 2.40*  --    < > 1.96*   < > 2.27* 2.10*   EGFR IF NONAFRICN AM mL/min/1.73 25* 22* 22*  --    < > 28*   < > 24* 26*   CALCIUM mg/dL 8.2* 7.8* 7.8*  --    < > 8.3*   < > 7.5* 7.8*   IONIZED CALCIUM mmol/L  --   --   --  1.08*  --   --   --   --   --    PHOSPHORUS mg/dL 2.2*  --   --  3.6  --   --   --   --  3.9   GLUCOSE mg/dL 202* 131* 121*  --    < > 98   < > 91 96   ALBUMIN g/dL 4.14  --   --   --   --  3.77  --  3.06* 2.13*   BILIRUBIN mg/dL 3.9*  --   --   --   --  1.2  --  0.6 0.5   ALK PHOS U/L 304*  --   --   --   --  509*  --  556* 753*   AST (SGOT) U/L 640*  --   --   --   --  63*  --  77* 103*   ALT (SGPT) U/L 112*  --   --   --   --  17  --  20 27    < > = values in this interval not displayed.   Estimated Creatinine Clearance: 39.7 mL/min (A) (by C-G formula based on SCr of 2.13  mg/dL (H)).  No results found for: AMMONIA  Results from last 7 days   Lab Units 02/04/20  1932 02/01/20  0840 01/31/20 1955   CK TOTAL U/L 297*  --   --    TROPONIN T ng/mL  --  <0.010 <0.010     Results from last 7 days   Lab Units 02/03/20  0112 01/31/20 1955   PROBNP pg/mL 1,467.0* 214.1     Results from last 7 days   Lab Units 02/06/20  1117   TRIGLYCERIDES mg/dL 481*     Glucose   Date/Time Value Ref Range Status   02/07/2020 0540 257 (H) 70 - 130 mg/dL Final   02/06/2020 2353 208 (H) 70 - 130 mg/dL Final   02/06/2020 1700 162 (H) 70 - 130 mg/dL Final   02/06/2020 1249 108 70 - 130 mg/dL Final     Lab Results   Component Value Date    TSH 9.270 (H) 01/31/2020    FREET4 0.97 02/01/2020     No results found for: PREGTESTUR, PREGSERUM, HCG, HCGQUANT  Pain Management Panel     Pain Management Panel Latest Ref Rng & Units 2/1/2020    CREATININE UR mg/dL 146.0        Brief Urine Lab Results  (Last result in the past 365 days)      Color   Clarity   Blood   Leuk Est   Nitrite   Protein   CREAT   Urine HCG        02/02/20 2053 Dark Yellow Turbid Large (3+) Negative Negative 100 mg/dL (2+)             Blood Culture   Date Value Ref Range Status   02/03/2020 No growth at 3 days  Preliminary   02/03/2020 No growth at 3 days  Preliminary   01/31/2020 No growth at 5 days  Final   01/31/2020 No growth at 5 days  Final     No results found for: URINECX  No results found for: WOUNDCX  No results found for: STOOLCX  Respiratory Culture   Date Value Ref Range Status   02/06/2020 Scant growth (1+) Candida albicans (A)  Final   02/06/2020 No Normal Respiratory Razia (A)  Final     No results found for: AFBCX  Results from last 7 days   Lab Units 02/07/20  0057 02/06/20  1028 02/06/20  0303 02/05/20  1939 02/05/20  1040 02/05/20  0222 02/04/20  1931  02/04/20  0053  02/03/20  0112  02/01/20  0840  01/31/20  1955   PROCALCITONIN ng/mL  --   --   --   --   --   --   --   --   --   --  4.07*  --   --   --   --    LACTATE mmol/L  8.3* 4.6* 5.5* 5.7* 5.4* 8.9* 7.6*   < > 7.1*   < > 3.1*   < > 3.8*   < > 5.2*   CRP mg/dL 24.06*  --  34.91*  --   --  29.35*  --   --  31.38*  --   --   --  29.28*  --  30.94*    < > = values in this interval not displayed.     I have personally looked at the labs and they are summarized above.  ----------------------------------------------------------------------------------------------------------------------  Detailed radiology reports for the last 24 hours:    Imaging Results (Last 24 Hours)     Procedure Component Value Units Date/Time    XR Chest 1 View [[id number]] Collected:  02/07/20 0942     Updated:  02/07/20 0945    Narrative:       EXAMINATION: XR CHEST 1 VW-      CLINICAL INDICATION:     Intubated Patient; A41.9-Sepsis, unspecified  organism     TECHNIQUE:  XR CHEST 1 VW-      COMPARISON: 02/06/2020      FINDINGS:   ET tube with tip below clavicles and just at or slightly above the  rob. NG tube extends into the stomach. Right tunneled dialysis  catheter, left IJ deep line, and left Port-A-Cath are stable in  positioning.     Some increase in basilar atelectasis. No effusion or pneumothorax  identified. No acute bony findings.       Impression:       1. Support devices as above.  2. Development of basilar atelectasis. Otherwise stable exam.     This report was finalized on 2/7/2020 9:43 AM by Dr. Bert Butler MD.       XR Chest 1 View [[id number]] Collected:  02/06/20 1517     Updated:  02/06/20 1532    Narrative:       EXAMINATION: XR CHEST 1 VW-      CLINICAL INDICATION:     ET tube; A41.9-Sepsis, unspecified organism     TECHNIQUE:  XR CHEST 1 VW-      COMPARISON: 02/06/2020      FINDINGS:   ET tube with tip just below clavicles. NG tube extends into the stomach.  Deep lines are stable in positioning.     Improved aeration. Decreased basilar airspace disease. No effusion  identified on today's exam. No pneumothorax. No acute bony findings.       Impression:       1. ET tube with tip just below  clavicles and just above rob.  2. Other support devices as above.  3. Improved aeration of both lungs with decreased basilar airspace  disease and near resolution of pleural effusions.     This report was finalized on 2/6/2020 3:18 PM by Dr. Bert Butler MD.           Assessment & Plan    #Septic shock and Acute Respiratory Failure 2/2 SBP & PNA, Bacterial, treating for MDR organisms - Worse  #Lactic acidosis - Worse  - On presentation (WBC 29,100, CRP 30.94, lactic acid 5.2, temperature 102.4, heart rate 152, blood pressure 72/60  - CT chest on admission showed some bibasilar consolidations that are likely atelectasis   - CT Abd/Pelvis showed b/l lower lobe PNA, loculated fluid in pre-rectal space   - Diagnostic paracentesis performed on 2/1 that revealed ANC: 3400. Confirming SBP. Culture NGTD  - Started having fevers 2/4, lactate trended up to 8.9, WBC count up to 42K, CRP up to 30, Bcx's and para Cx's NGTD, Cdiff negative  - RUQ US pending  - ID consulted; Continue Vanc, Nickolas, Micafungin  - Pulmonary consulted; following, managing vent and acid base disturbances, stress dose steroids  - Consulted IR for possible drainage of loculated ascites; I discussed w/ Dr. Butler and will be technically difficult to access that particular fluid collection, will perform diagnostic para at bedside  - Overall condition remains critical, per conversations w/ Dr. Metzger does not appear to be responding to dialysis as is essentially anuric and persistent/worsening acidosis despite broad spectrum Abx now w/ multi-organ failure (Renal, Liver, Respiratory) and continued pressor requirement in setting of non-curable widely metastatic colon cancer  - Will approach goals of care discussions w/ family today     #Anuric KODI 2/2 ATN/Sepsis c/b AGMA   - Baseline Cr 0.7-0.9, Cr on presentation 1.95=>2.10=>2.27, became oliguric and now anuric, did not respond to albumin challenge, suspect ATN/Sepsis  - CT abdomen/pelvis did not reveal  urinary obstruction.  - Consulted Surgery;  Placed TDC on 2/4  - Consulted Nephrology; Initiated on HD/SLED but per our conversations w/ today does not appear to be improving overall clinical picture     #Stage 4 colon cancer with bulky lymphadenopathy and widely disseminated liver metastases and adrenal masses s/p diverting loop ileostomy   - Scheduled to f/u with oncology at  on 2/3. Expected to start chemo soon. Suspect treatments would be palliative in nature.   - Underwent diverting loop ileostomy at  on 1/17  - CT Abd/Pelvis 2/2 showed extensive hepatic mets, ill-defined cecal mass involving surrounding mesentery, mesenteric LAD, small-mod ascites, stable low density mass L adrenal gland.  - Repeat CT Abd/Pelvis 2/4 showed extensive metastatic dz involving liver and peritoneum w/ omental caking, metastatic adenopathy RLQ and upper RP space, abnormal thickening R colon wall of terminal ileum, complex ascites that may represent malignant ascites  - Consulted Hematology/Oncology; Discussed w/ family that patient's disease is non curable, discussed overall prognosis and appreciate their input and conversations w/ family.    #Severe Leukocytosis   - WBC count up to 42K, treatments as per above    #Mild Rhabdomyolysis  - CK elevated, fluids as per above    #Normocytic anemia   - Hemoglobin has been trending down. 10 on 1/2/20  - 8.8=>7.9=>6.6, s/p 1 unit of pRBC  - No signs of active bleeding, BUN/creatinine not elevated, suspect  hemoconcentrated on admission and fluids have diluted   - Iron studies consistent with SNEHAL and AOCD, B12 and folate normal.   - Continue IV PPI, monitor for signs of bleeding    #Respiratory Alkalosis  - Likely 2/2 above AGMA, compensatory, address as per above    #Euthyroid  - TSH 9, free T4 normal, NTD    #Hypomagnesemia   - Replaced    #Diverting loop ileostomy  - Performed 1/17 at  for SBO    #Morbid Obesity  - BMI 40, complicates all aspects of care.    F: Oral  E: Monitor &  Replace PRN  N: TFs  Ppx: SQH  Code: Full Code    Dispo: Pending GOC conversations.     *This patient is considered high risk due to septic shock, KODI, underlying colon cancer    VTE Prophylaxis:   Mechanical Order History:      Ordered        02/07/20 1014  Place Sequential Compression Device  Once         02/07/20 1014  Maintain Sequential Compression Device  Continuous                 Pharmalogical Order History:     Ordered     Dose Route Frequency Stop    02/04/20 1353  heparin (porcine) 5000 UNIT/ML injection  Status:  Discontinued      -- -- As Needed 02/04/20 1459    02/01/20 1455  heparin (porcine) 5000 UNIT/ML injection 5,000 Units  Status:  Discontinued      5,000 Units SC Every 8 Hours Scheduled 02/07/20 1014    02/01/20 0309  enoxaparin (LOVENOX) syringe 40 mg  Status:  Discontinued      40 mg SC Every 24 Hours 02/01/20 1455        Saad Garcia MD  [facility]  02/07/20  10:58 AM

## 2020-02-07 NOTE — PLAN OF CARE
Patient intubated and sedated on vent.  Patient on Diprivan, Fentanyl, Levophed, and Sodium Bicarb gtts.  TPN infusing at 50 ml per hour at this time.  No urine output noted this shift.  Patient's Lactate has increased to 8.3 and WBC remains critically elevated.

## 2020-02-07 NOTE — PLAN OF CARE
Pt remains on vasopressors, rate of 30 on vent, HD in progress and to have paracentesis later today.

## 2020-02-07 NOTE — PROGRESS NOTES
Today I had extensive GOC conversation w/ , 1 daughter and 1 son.  I expressed that patient was critically ill still and continues to not improve w/ dialysis and broad spectrum Abx.  She is still anuric, lactate has continued to rise again and 8.7, WBC count and CRP severely elevated still, now AST/ALT, Tbili and alk phos elevated and per Abd US showed pericholecystic stranding and GB wall thickening, I discussed patient's non-curable cancer, now w/ renal and impending liver failure, difficult to control infection and septic shock, family states that she has family coming in from [place] tonight and would like for me to discuss these trends with their whole family tomorrow afternoon.  We will plan on speaking with them when they are all here around 2-3 tomorrow afternoon.

## 2020-02-07 NOTE — NURSING NOTE
Progressive Mobility    Date: 02/07/20     Mobility Initiation Contradictions: Actively undergoing a procedure    Day of Mobility  Activity Performed: PROM done with am assessment prior to start of HD      Patient: tolerated      Assisted by 1 person/persons    Device(s) used: none      Ely Dutton RN   02/07/20

## 2020-02-07 NOTE — PROGRESS NOTES
" LOS: 7 days   Patient Care Team:  Almas Stone MD as PCP - General (Family Medicine)  CELSO Burton MD as Consulting Physician (Oncology)    Chief Complaint: shortness of breath    Subjective     History of Present Illness     Patient remains on mechanical ventilator support this morning.  Currently on settings of rate 30, tidal volume 450, 30% FiO2, PEEP 10.  She is currently sedated with propofol and fentanyl infusions.  She is currently requiring pressure support with norepinephrine at 0.16 mcg/kg/min.  She is undergoing dialysis again this morning.    Subjective    Unable to obtain review of systems due to intubation and sedation.    Objective     Vital Signs  Temp:  [98.1 °F (36.7 °C)-99.3 °F (37.4 °C)] 98.6 °F (37 °C)  Heart Rate:  [] 96  Resp:  [24-33] 28  BP: ()/() 122/85  FiO2 (%):  [30 %] 30 %  Body mass index is 40.65 kg/m².    Intake/Output Summary (Last 24 hours) at 2/7/2020 1419  Last data filed at 2/7/2020 1332  Gross per 24 hour   Intake 5752.97 ml   Output 4000 ml   Net 1752.97 ml     I/O this shift:  In: 2400 [I.V.:2250; IV Piggyback:150]  Out: 4000 [Other:4000]    Objective:  General Appearance:  General patient appearance: Intubated and sedated.  Appears to be resting comfortably.    Vital signs: (most recent): Blood pressure 122/85, pulse 96, temperature 98.6 °F (37 °C), temperature source Bladder, resp. rate 28, height 160 cm (63\"), weight 104 kg (229 lb 8 oz), last menstrual period 12/31/2019, SpO2 99 %, not currently breastfeeding.  No fever.    HEENT: (Normocephalic.  Atraumatic.  ET tube in place.  )    Lungs:  There are rales.  No wheezes or rhonchi.  (Bilateral air entry positive.)  Heart: Normal rate.  Regular rhythm.  S1 normal and S2 normal.  No murmur.   Abdomen: Abdomen is soft and distended.  Hypoactive bowel sounds.     Extremities: There is dependent edema (diffuse).  There is no deformity.    Pulses: Distal pulses are intact.    Neurological: " (Unable to assess due to sedation status.).    Pupils:  Pupils are equal, round, and reactive to light.    Skin:  Warm and dry.              Results Review:     I reviewed the patient's new clinical results.     ABG this morning was only significant for decreased PCO2.    Electrolyte abnormalities of hyponatremia, hypokalemia, hypochloremia, hypocalcemia and hypophosphatemia noted.  Albumin level is normal.  Creatinine elevation showed normal improvement.  BUN remained stable.  Liver enzymes are significantly elevated this morning.  CRP remains significantly elevated but trending downward.  Lactic elevation is worsening.  CBC shows minimal interval improvement of leukocytosis.  However there is downtrending of anemia as well as the platelet levels.    Blood cultures are pending, currently negative.  Respiratory culture was significant for Candida albicans.  Stool cultures pending.  Peripheral blood smear from 2/6 showed leukocytosis, normocytic anemia, thrombocytopenia.  No platelet clumping noted. No Dohle bodies.    Chest x-ray today shows persistent low lung volumes    WBC WBC   Date Value Ref Range Status   02/07/2020 40.02 (C) 3.40 - 10.80 10*3/mm3 Final   02/06/2020 42.70 (C) 3.40 - 10.80 10*3/mm3 Final   02/05/2020 33.02 (C) 3.40 - 10.80 10*3/mm3 Final      HGB Hemoglobin   Date Value Ref Range Status   02/07/2020 8.0 (L) 12.0 - 15.9 g/dL Final   02/06/2020 8.4 (L) 12.0 - 15.9 g/dL Final   02/05/2020 7.6 (L) 12.0 - 15.9 g/dL Final      HCT Hematocrit   Date Value Ref Range Status   02/07/2020 26.3 (L) 34.0 - 46.6 % Final   02/06/2020 28.2 (L) 34.0 - 46.6 % Final   02/05/2020 26.1 (L) 34.0 - 46.6 % Final      Platlets No results found for: LABPLAT     PT/INR:    Protime   Date Value Ref Range Status   02/07/2020 15.0 11.0 - 15.4 Seconds Final   02/05/2020 22.6 (H) 11.0 - 15.4 Seconds Final   /  INR   Date Value Ref Range Status   02/07/2020 1.12 (H) 0.90 - 1.10 Final   02/05/2020 1.88 (H) 0.90 - 1.10 Final        Sodium Sodium   Date Value Ref Range Status   02/07/2020 131 (L) 136 - 145 mmol/L Final   02/06/2020 131 (L) 136 - 145 mmol/L Final   02/05/2020 133 (L) 136 - 145 mmol/L Final      Potassium Potassium   Date Value Ref Range Status   02/07/2020 3.2 (L) 3.5 - 5.2 mmol/L Final   02/06/2020 3.9 3.5 - 5.2 mmol/L Final   02/05/2020 4.3 3.5 - 5.2 mmol/L Final      Chloride Chloride   Date Value Ref Range Status   02/07/2020 85 (L) 98 - 107 mmol/L Final   02/06/2020 87 (L) 98 - 107 mmol/L Final   02/05/2020 90 (L) 98 - 107 mmol/L Final      Bicarbonate No results found for: PLASMABICARB   BUN BUN   Date Value Ref Range Status   02/07/2020 20 6 - 20 mg/dL Final   02/06/2020 21 (H) 6 - 20 mg/dL Final   02/05/2020 16 6 - 20 mg/dL Final      Creatinine Creatinine   Date Value Ref Range Status   02/07/2020 2.13 (H) 0.57 - 1.00 mg/dL Final   02/06/2020 2.39 (H) 0.57 - 1.00 mg/dL Final   02/05/2020 2.40 (H) 0.57 - 1.00 mg/dL Final      Calcium Calcium   Date Value Ref Range Status   02/07/2020 8.2 (L) 8.6 - 10.5 mg/dL Final   02/06/2020 7.8 (L) 8.6 - 10.5 mg/dL Final   02/05/2020 7.8 (L) 8.6 - 10.5 mg/dL Final      Magnesium Magnesium   Date Value Ref Range Status   02/07/2020 1.8 1.6 - 2.6 mg/dL Final   02/04/2020 2.1 1.6 - 2.6 mg/dL Final        pH pH, Arterial   Date Value Ref Range Status   02/07/2020 7.441 7.350 - 7.450 pH units Final   02/07/2020 7.405 7.350 - 7.450 pH units Final   02/06/2020 7.349 (L) 7.350 - 7.450 pH units Final     Comment:     84 Value below reference range   02/06/2020 7.265 (L) 7.350 - 7.450 pH units Final     Comment:     84 Value below reference range   02/05/2020 7.338 (L) 7.350 - 7.450 pH units Final     Comment:     84 Value below reference range   02/04/2020 7.497 (H) 7.350 - 7.450 pH units Final     Comment:     83 Value above reference range   02/04/2020 7.296 (L) 7.350 - 7.450 pH units Final     Comment:     84 Value below reference range   02/04/2020 7.205 (C) 7.350 - 7.450 pH units  Final     Comment:     85 Value below critical limit      pO2 pO2, Arterial   Date Value Ref Range Status   02/07/2020 102.0 83.0 - 108.0 mm Hg Final     Comment:     83 Value above reference range   02/07/2020 89.6 83.0 - 108.0 mm Hg Final   02/06/2020 105.0 83.0 - 108.0 mm Hg Final     Comment:     83 Value above reference range   02/06/2020 88.4 83.0 - 108.0 mm Hg Final   02/05/2020 96.6 83.0 - 108.0 mm Hg Final   02/04/2020 149.0 (H) 83.0 - 108.0 mm Hg Final     Comment:     83 Value above reference range   02/04/2020 117.0 (H) 83.0 - 108.0 mm Hg Final     Comment:     83 Value above reference range   02/04/2020 103.0 83.0 - 108.0 mm Hg Final     Comment:     83 Value above reference range      pCO2 pCO2, Arterial   Date Value Ref Range Status   02/07/2020 35.2 35.0 - 45.0 mm Hg Final   02/07/2020 32.5 (L) 35.0 - 45.0 mm Hg Final     Comment:     84 Value below reference range   02/06/2020 33.6 (L) 35.0 - 45.0 mm Hg Final     Comment:     84 Value below reference range   02/06/2020 37.8 35.0 - 45.0 mm Hg Final   02/05/2020 37.2 35.0 - 45.0 mm Hg Final   02/04/2020 36.4 35.0 - 45.0 mm Hg Final   02/04/2020 39.5 35.0 - 45.0 mm Hg Final   02/04/2020 35.4 35.0 - 45.0 mm Hg Final      HCO3 HCO3, Arterial   Date Value Ref Range Status   02/07/2020 23.9 20.0 - 26.0 mmol/L Final   02/07/2020 20.3 20.0 - 26.0 mmol/L Final   02/06/2020 18.5 (L) 20.0 - 26.0 mmol/L Final   02/06/2020 17.1 (L) 20.0 - 26.0 mmol/L Final     Comment:     84 Value below reference range   02/05/2020 19.9 (L) 20.0 - 26.0 mmol/L Final     Comment:     84 Value below reference range   02/04/2020 28.2 (H) 20.0 - 26.0 mmol/L Final     Comment:     83 Value above reference range   02/04/2020 19.2 (L) 20.0 - 26.0 mmol/L Final     Comment:     84 Value below reference range   02/04/2020 14.0 (L) 20.0 - 26.0 mmol/L Final     Comment:     84 Value below reference range          chlorhexidine 15 mL Mouth/Throat Q12H   hydrocortisone sodium succinate 50 mg  Intravenous Q6H   insulin aspart 0-9 Units Subcutaneous Q6H   magnesium sulfate 4 g Intravenous Once   meropenem 500 mg Intravenous Q24H   micafungin (MYCAMINE)  mg Intravenous Q24H   trace minerals + multivitamin IVPB  Intravenous Once per day on Mon Wed Fri   pantoprazole 40 mg Intravenous Q12H   phytonadione (VITAMIN K) IVPB 10 mg Intravenous Daily   sodium chloride 1,000 mL Intravenous Once   sodium chloride 1,000 mL Intravenous Once   sodium chloride 10 mL Intravenous Q12H   sodium chloride 10 mL Intravenous Q12H   sodium chloride 10 mL Intravenous Q12H   sodium chloride 10 mL Intravenous Q12H   sodium chloride 10 mL Intravenous Q12H   sodium chloride 10 mL Intravenous Q12H   thiamine (VITAMIN B1) IVPB 500 mg Intravenous Q8H   Vancomycin Pharmacy Intermittent Dosing  Does not apply Daily       Adult Central Clinimix TPN  Last Rate: 50 mL/hr at 02/07/20 0223   fentaNYL Citrate     norepinephrine 0.02-0.3 mcg/kg/min Last Rate: 0.08 mcg/kg/min (02/07/20 1025)   Pharmacy Consult     Pharmacy to dose vancomycin     propofol 5-50 mcg/kg/min Last Rate: 40 mcg/kg/min (02/07/20 1307)   sodium bicarbonate drip (greater than 75 mEq/bag) 150 mEq Last Rate: 150 mEq (02/07/20 0257)   sodium chloride 250 mL/hr Last Rate: 250 mL/hr (02/06/20 1257)   vasopressin (PITRESSIN) infusion 0.04 Units/min Last Rate: Stopped (02/06/20 1305)       Medication Review: I have reviewed the current medications.    Assessment/Plan     Patient Active Problem List   Diagnosis Code   • Malignant neoplasm of ascending colon (CMS/HCC) C18.2   • Port-A-Cath in place Z95.828   • Sepsis (CMS/HCC) A41.9       Assessment & Plan          Central nervous system    Plan:  - Pain management: fentanyl drip.   - Sedation: propofol , RASS goal: 0 to -1  - Patient does not have biot's type of breathing pattern while on opioids.   - I highly recommend avoiding nighttime disturbances and light exposure during night to maintain normal circadian rhythms to  avoid hypoactive or hyperactive delirium.  - On thiamine 500 mg every 8 hours        Cardiovascular system:  Septic shock    Plan:  -Goal CVP around 10 mmHg  -Vasopressor: norepinephrine,  Targeting mean atrial pressure of 65 mmHg   -Antibiotics: meropenem, vancomycin, micafungin  -Continuing to monitor lactate trends  -On IV hydrocortisone 50 mg every 6 hours          Respiratory system:  Acute hypoxic respiratory failure on mechanical ventilator  Bilateral lower lobe pneumonia, due to unspecified organism   Bilateral pleural effusions KODI likely septic ATN    Plan:  - Mode of mechanical ventilation is assist control volume cycle.  Ventilator setting includes tidal volume of 450 mL with rate of 30, FiO2 of 30 and PEEP of 10. Patient is currently on sedation with propofol and for analgesia patient is on fentanyl.      - Ventilator graphics : Flow time scalar was reviewed and auto PEEP is absent, volume time scalar was reviewed and leak is absent , pressure times scalar was reviewed and pressure stress index is 1 indicating normal  pressure stress index.  Pressure volume loop was assessed.  Auto triggering is absent.  Missed triggering is absent.  Double triggering is absent.  Active expiration is absent.  -Based on ABG, chest x-ray and ventilator graphics, I have changed the rate to 28.   ABG will be repeated to assess ventilator changes.   -Head of the bed elevated at 30 degrees  -Mouth care with oral chlorhexidine   -Meropenem IV, vancomycin IV, micafungin IV.  Respiratory culture was notable for scant Candida.         Liver, gallbladder, Pancreas and gastrointestinal system:  Culture-negative peritonitis with concern for intra-abdominal abscess versus leak  Stage IV poorly differentiated adenocarcinoma of the colon with liver mets, possible splenic mass, and adrenal mass status post diverting loop ileostomy    Plan:  -Paracentesis recommended for complex ascites with mild-moderate partially loculated fluid of the  prerectal space. Unable to be completed at this time in the setting of coagulopathy.  She is currently receiving vitamin K 10 mg daily   - Continue with PPI for ulcer prophylaxis.  - Nutrition team on board.   Patient was started on TPN   Monitoring and corrected glucose as needed.  -Antibiotic coverage         Genitourinary system:   Oliguric KODI likely septic ATN  High anion gap metabolic acidosis due to lactic acidosis and acute kidney injury    Plan   -Avoiding nephrotoxic agent.  -Following serum creatinine and GFR closely with urine output daily.  -nephrology team on board.   Patient underwent dialysis this morning.          Endocrine system:    Plan:  - Continue with correctional insulin therapy  - Goal serum glucose level is 180 mg/dL while in ICU.  I strongly recommend starting insulin drip if 2 consecutive serum glucose levels are greater than 200 mg/dL.          Infectious disease system:  Severe sepsis related to bilateral lower lobe pneumonia due to unspecified organism  Culture-negative peritonitis with concern for intra-abdominal abscess versus leak    Plan:  - Current antibiotics: IV meropenem, IV vancomycin. On IV micafungin.   - Cultures:   Following blood culture results  Respiratory culture showed scant candida  Stool culture is also pending.   Infectious disease team on board         Hematological system:  Leukocytosis  Anemia  Thrombocytopenia    Plan   - I recommend PRBC transfusion if hemoglobin is less than 7 g/dL unless active bleeding or cardiac ischemia.  -Discontinued heparin subcutaneous in the setting of worsening thrombocytopenia.   Ordered for SCDs use for dvt prophylaxis.   -ordered heparin induced platelet antibody  -peripheral blood smear showed thrombocytopenia and normocytic anemia without platelet clumping.  -On Vitamin K 10 mg daily. Down trending noted of INR and PT today.          Rheumatological and dermatological system:    Plan:  - Turn the patient every 2 hours to  prevent pressure ulcers.  -Wound care team involvement as per primary team.         Activity:  - I recommend aggressive PT/OT while in hospital to avoid critical care neuropathy/myopathy.         I have updated Ely GARCIA of the plan of care.       Nikki Myers PA-C  02/07/20  2:19 PM    Scribed for Dr. Garcia by Nikki Myers PA-C       The clinical plan was discussed extensively with the nurse, and respiratory therapist.   Critical Care time spent in direct patient care: 38 minutes (excluding procedure time).  Patient is critically ill and is at higher risk for further mortality/morbidity.     I, Henry Garcia M.D. attest that the above note accurately reflects the work and decisions made by me. Patient was seen and evaluated by me, including history of present illness, physical exam, assessment, and treatment plan.  The above note was reviewed and edited by me.    Henry Garcia M.D  Pulmonary and Critical Care Medicine

## 2020-02-08 NOTE — NURSING NOTE
Progressive Mobility    Date: 02/08/20     Mobility Initiation Contradictions: Heart Rate <60, >120 beats.min and Patient agitation requiring increased sedation in last 30 minutes    Day of Mobility  Activity Performed: PROM BUE and BLE    Patient: tolerated    Assisted by 1 person/persons    Device(s) used: N/A    Loreto Huggins RN   02/08/20

## 2020-02-08 NOTE — PROGRESS NOTES
Pharmacokinetics Service Note:    Patient continues on vancomycin therapy. Random AM level returned @ 24.8 mcg/mL which warrants re-dosing post-dialysis today. Will continue to obtain random levels to guide vancomycin dosing    Thank you,  Fany Tolbert [facility]  02/08/20  8:37 AM

## 2020-02-08 NOTE — PLAN OF CARE
Patient remains intubated, sedated and on vent; on Diprivan, Fentanyl, Levophed, Sodium Bicarb gtts.  TPN infusing at goal rate of 60 ml per hour.  Patient has had 15ml urine output this shift and less than 10 ml liquid yellow stool noted in ileostomy bag.  WBC 49.47 this morning, Lactate 9.9, and Phosphorus 1.6; AICU notified of critical labs and K Phos ordered per protocol.

## 2020-02-08 NOTE — PROGRESS NOTES
Nephrology Progress Note      Subjective     Pt is intubated on MV, continue to be in sever septic shock on pressors    Objective       Vital signs :     Temp:  [98.5 °F (36.9 °C)-99 °F (37.2 °C)] 98.8 °F (37.1 °C)  Heart Rate:  [] 120  Resp:  [27-33] 27  BP: ()/() 110/89  FiO2 (%):  [30 %] 30 %      Intake/Output Summary (Last 24 hours) at 2/8/2020 0857  Last data filed at 2/8/2020 0541  Gross per 24 hour   Intake 6965.54 ml   Output 4023 ml   Net 2942.54 ml       Physical Exam:    General Appearance : Intubated on MV  Lungs : B/L lower zone crackles with decreased intensity of breath sounds  Heart :  regular rhythm & normal rate, normal S1, S2 and no murmur, no rub  Abdomen : normal bowel sounds, no masses, no hepatomegaly, no splenomegaly, soft non-tender and no guarding  Extremities : moves extremities well, 2+ edema, no cyanosis and no redness  Neurologic :   Intubated on MV  Acess :       Laboratory Data :     Albumin Albumin   Date Value Ref Range Status   02/08/2020 4.12 3.50 - 5.20 g/dL Final   02/07/2020 4.14 3.50 - 5.20 g/dL Final      Magnesium Magnesium   Date Value Ref Range Status   02/08/2020 2.5 1.6 - 2.6 mg/dL Final   02/07/2020 1.8 1.6 - 2.6 mg/dL Final          PTH               No results found for: PTH    CBC and coagulation:  Results from last 7 days   Lab Units 02/08/20  0351 02/07/20  2127 02/07/20  1432 02/07/20  0820 02/07/20  0057  02/06/20  0303  02/05/20  1040  02/03/20  0112   PROCALCITONIN ng/mL  --   --   --   --   --   --   --   --   --   --  4.07*   LACTATE mmol/L 9.9* 9.7* 8.7*  --  8.3*   < > 5.5*   < > 5.4*   < > 3.1*   CRP mg/dL 22.31*  --   --   --  24.06*  --  34.91*  --   --    < >  --    WBC 10*3/mm3 49.47*  --   --   --  40.02*  --  42.70*  --   --    < > 28.49*   HEMOGLOBIN g/dL 8.8*  --   --   --  8.0*  --  8.4*  --   --    < > 8.4*   HEMATOCRIT % 28.9*  --   --   --  26.3*  --  28.2*  --   --    < > 28.6*   MCV fL 82.8  --   --   --  82.2  --  83.4  --    --    < > 82.4   MCHC g/dL 30.4*  --   --   --  30.4*  --  29.8*  --   --    < > 29.4*   PLATELETS 10*3/mm3 56*  --   --   --  67*  --  104*  --   --    < > 206   INR   --   --   --  1.12*  --   --   --   --  1.88*  --   --     < > = values in this interval not displayed.     Acid/base balance:  Results from last 7 days   Lab Units 02/08/20  0828 02/07/20  1202 02/07/20  0438   PH, ARTERIAL pH units 7.407 7.441 7.405   PO2 ART mm Hg 107.0 102.0 89.6   PCO2, ARTERIAL mm Hg 37.3 35.2 32.5*   HCO3 ART mmol/L 23.5 23.9 20.3     Renal and electrolytes:  Results from last 7 days   Lab Units 02/08/20  0351 02/07/20  1042 02/07/20  0057 02/06/20  0303 02/05/20  0222 02/04/20  1932 02/04/20  0053   SODIUM mmol/L 128*  --  131* 131* 133*  --  133*   POTASSIUM mmol/L 3.0*  --  3.2* 3.9 4.3  --  4.3   MAGNESIUM mg/dL 2.5  --  1.8  --   --  2.1  --    CHLORIDE mmol/L 82*  --  85* 87* 90*  --  102   CO2 mmol/L 21.0*  --  19.9* 15.3* 18.4*  --  10.3*   BUN mg/dL 22*  --  20 21* 16  --  26*   CREATININE mg/dL 1.90*  --  2.13* 2.39* 2.40*  --  2.65*   EGFR IF NONAFRICN AM mL/min/1.73 29*  --  25* 22* 22*  --  20*   CALCIUM mg/dL 8.2*  --  8.2* 7.8* 7.8*  --  8.2*   IONIZED CALCIUM mmol/L  --  1.00*  --   --   --  1.08*  --    PHOSPHORUS mg/dL 1.6*  --  2.2*  --   --  3.6  --      Estimated Creatinine Clearance: 44.5 mL/min (A) (by C-G formula based on SCr of 1.9 mg/dL (H)).    Liver and pancreatic function:  Results from last 7 days   Lab Units 02/08/20  0351 02/07/20  0057 02/04/20 1932 02/03/20  0112   ALBUMIN g/dL 4.12 4.14  --  3.77   BILIRUBIN mg/dL 4.0* 3.9*  --  1.2   ALK PHOS U/L 329* 304*  --  509*   AST (SGOT) U/L 350* 640*  --  63*   ALT (SGPT) U/L 91* 112*  --  17   AMYLASE U/L  --   --  61  --    LIPASE U/L  --   --  126*  --          Cardiac:  Results from last 7 days   Lab Units 02/03/20  0112   PROBNP pg/mL 1,467.0*     Liver and pancreatic function:  Results from last 7 days   Lab Units 02/08/20  0351  02/07/20  0057 02/04/20  1932 02/03/20  0112   ALBUMIN g/dL 4.12 4.14  --  3.77   BILIRUBIN mg/dL 4.0* 3.9*  --  1.2   ALK PHOS U/L 329* 304*  --  509*   AST (SGOT) U/L 350* 640*  --  63*   ALT (SGPT) U/L 91* 112*  --  17   AMYLASE U/L  --   --  61  --    LIPASE U/L  --   --  126*  --        Medications :       chlorhexidine 15 mL Mouth/Throat Q12H   hydrocortisone sodium succinate 50 mg Intravenous Q6H   insulin aspart 0-9 Units Subcutaneous Q6H   meropenem 500 mg Intravenous Q24H   micafungin (MYCAMINE)  mg Intravenous Q24H   trace minerals + multivitamin IVPB  Intravenous Once per day on Mon Wed Fri   pantoprazole 40 mg Intravenous Q12H   phytonadione (VITAMIN K) IVPB 10 mg Intravenous Daily   potassium chloride 10 mEq Intravenous Q1H   potassium phosphate infusion greater than 15 mMoles 30 mmol Intravenous Once   sodium chloride 1,000 mL Intravenous Once   sodium chloride 1,000 mL Intravenous Once   sodium chloride 1,000 mL Intravenous Once in Dialysis   sodium chloride 250 mL Intravenous Q1H   sodium chloride 10 mL Intravenous Q12H   sodium chloride 10 mL Intravenous Q12H   sodium chloride 10 mL Intravenous Q12H   sodium chloride 10 mL Intravenous Q12H   sodium chloride 10 mL Intravenous Q12H   sodium chloride 10 mL Intravenous Q12H   thiamine (VITAMIN B1) IVPB 500 mg Intravenous Q8H   vancomycin 1,250 mg Intravenous Once   Vancomycin Pharmacy Intermittent Dosing  Does not apply Daily       Adult Central Clinimix TPN  Last Rate: 60 mL/hr at 02/07/20 1730   fentaNYL Citrate     norepinephrine 0.02-0.3 mcg/kg/min Last Rate: 0.16 mcg/kg/min (02/08/20 0800)   propofol 5-50 mcg/kg/min Last Rate: 40 mcg/kg/min (02/08/20 0440)   sodium bicarbonate drip (greater than 75 mEq/bag) 150 mEq Last Rate: 150 mEq (02/08/20 0652)   sodium chloride 250 mL/hr Last Rate: 250 mL/hr (02/06/20 1257)   vasopressin (PITRESSIN) infusion 0.04 Units/min Last Rate: Stopped (02/06/20 1305)         Assessment/Plan     1. Sever sepsis  with septic shock likely from peritonitis  2. Metastatic poorly differentiated adenocarcinoma of colon origin s/p diverting loop ileostomy on 1/17/2020  3. KODI  4. AG metabolic acidosis due to lactic acidosis, Non Gap MA likely 2/2 Type IV RTA with KODI    No significant improvement on RRT, sever persistent metabolic acidosis.  Doing SLED and using some convective clearance for septic shock.     KODI likely ATN, oliguric due to septic shock  Pt was started on dialysis due to worsening volume overload leading to hypoxic respiratory failure in setting of oliguic KODI. Baseline Cr 1    -continue on SLED.   -family meeting today  -electrolyte replacement per protocol    Overall condition is very critical and prognosis is very poor.     D/W with Dr Garcia and Dr ILDEFONSO Metzger MD  02/08/20  8:57 AM

## 2020-02-08 NOTE — PROGRESS NOTES
[facility] HOSPITALIST PROGRESS NOTE     Patient Identification:  Name:  Gracy Norman  Age:  44 y.o.  Sex:  female  :  1975  MRN:  [id number]  Visit Number:  [id number]  ROOM: [address]     Primary Care Provider:  Almas Stone MD    Length of stay in inpatient status:  8    Subjective     Chief Compliant:    Chief Complaint   Patient presents with   • Weakness - Generalized   • Post-op Problem     History of Presenting Illness:    Patient remains critically ill today, no acute events overnight, remains tachycardic, BP is stable but had to go up slightly on pressors, remains sedated, undergoing dialysis today, lactate and WBC count continue to trend up, family coming in later for GOC conversations, have consulted surgery for possible underlying cholecystitis but likely poor surgical candidate.  Patient's overall prognosis is very poor, continuing life sustaining support but remains anuric, liver function tests remain elevated.    Objective     Current Hospital Meds:  chlorhexidine 15 mL Mouth/Throat Q12H   hydrocortisone sodium succinate 50 mg Intravenous Q6H   insulin aspart 0-9 Units Subcutaneous Q6H   meropenem 500 mg Intravenous Q24H   micafungin (MYCAMINE)  mg Intravenous Q24H   trace minerals + multivitamin IVPB  Intravenous Once per day on    pantoprazole 40 mg Intravenous Q12H   phytonadione (VITAMIN K) IVPB 10 mg Intravenous Daily   potassium chloride 10 mEq Intravenous Q1H   sodium chloride 1,000 mL Intravenous Once   sodium chloride 1,000 mL Intravenous Once   sodium chloride 1,000 mL Intravenous Once in Dialysis   sodium chloride 250 mL Intravenous Q1H   sodium chloride 10 mL Intravenous Q12H   sodium chloride 10 mL Intravenous Q12H   sodium chloride 10 mL Intravenous Q12H   sodium chloride 10 mL Intravenous Q12H   sodium chloride 10 mL Intravenous Q12H   sodium chloride 10 mL Intravenous Q12H   thiamine (VITAMIN B1) IVPB 500 mg Intravenous Q8H      vancomycin 1,250 mg Intravenous Once   Vancomycin Pharmacy Intermittent Dosing  Does not apply Daily     Adult [facility] TPN  Last Rate: 60 mL/hr at 02/07/20 1730   fentaNYL Citrate     norepinephrine 0.02-0.3 mcg/kg/min Last Rate: 0.16 mcg/kg/min (02/08/20 0930)   propofol 5-50 mcg/kg/min Last Rate: 40 mcg/kg/min (02/08/20 0930)   sodium bicarbonate drip (greater than 75 mEq/bag) 150 mEq Last Rate: 150 mEq (02/08/20 0652)   sodium chloride 250 mL/hr Last Rate: 250 mL/hr (02/06/20 1257)   vasopressin (PITRESSIN) infusion 0.04 Units/min Last Rate: Stopped (02/06/20 1305)     Current Antimicrobial Therapy:  Anti-Infectives (From admission, onward)    Ordered     Dose/Rate Route Frequency Start Stop    02/08/20 0835  vancomycin (VANCOCIN) 1,250 mg in sodium chloride 0.9 % 250 mL IVPB     Ordering Provider:  Saad Garcia MD    1,250 mg  over 60 Minutes Intravenous Once 02/08/20 1800      02/06/20 0835  vancomycin (VANCOCIN) 1,000 mg in sodium chloride 0.9 % 250 mL IVPB     Ordering Provider:  Saad Garcia MD    1,000 mg  over 60 Minutes Intravenous Once 02/06/20 1800 02/06/20 1835    02/05/20 0823  vancomycin (VANCOCIN) 1,250 mg in sodium chloride 0.9 % 250 mL IVPB     Ordering Provider:  Saad Garcia MD    1,250 mg  over 60 Minutes Intravenous Once 02/05/20 1800 02/05/20 1821    02/05/20 1234  meropenem (MERREM) 500mg/100 mL 0.9% NS IVPB (mbp)  Review   Ordering Provider:  Keyona Donohue MD    500 mg  over 3 Hours Intravenous Every 24 Hours 02/05/20 1500 02/12/20 1459    02/04/20 1720  vancomycin (VANCOCIN) 1,250 mg in sodium chloride 0.9 % 250 mL IVPB     Ordering Provider:  Saad Garcia MD    1,250 mg  over 60 Minutes Intravenous Once 02/04/20 2200 02/04/20 2147    02/04/20 1326  Vancomycin Pharmacy Intermittent Dosing  Review   Ordering Provider:  Sophie Grimes MD     Does not apply Daily 02/04/20 1415 02/11/20 0859    02/04/20 1131  micafungin sodium (MYCAMINE) 100 mg in sodium chloride  0.9 % 100 mL IVPB  Review   Ordering Provider:  Sophie Grimes MD    100 mg  over 60 Minutes Intravenous Every 24 Hours 02/04/20 1300 02/11/20 1259    02/01/20 1601  vancomycin (VANCOCIN) 1,000 mg in sodium chloride 0.9 % 250 mL IVPB     Ordering Provider:  Denny Coy MD    1,000 mg  over 60 Minutes Intravenous Once 02/01/20 1800 02/01/20 1812 01/31/20 1949  vancomycin (VANCOCIN) 1,750 mg in sodium chloride 0.9 % 500 mL IVPB     Ordering Provider:  Waqas York MD    20 mg/kg × 93 kg Intravenous Once 01/31/20 1951 02/01/20 0000    01/31/20 1949  piperacillin-tazobactam (ZOSYN) 4.5 g/100 mL 0.9% NS IVPB (mbp)     Ordering Provider:  Waqas York MD    4.5 g Intravenous Once 01/31/20 1951 01/31/20 2129        Current Diuretic Therapy:  Diuretics (From admission, onward)    Ordered     Dose/Rate Route Frequency Start Stop    02/03/20 1610  furosemide (LASIX) injection 80 mg     Ordering Provider:  Alycia Metzger MD    80 mg Intravenous Once 02/03/20 1700 02/03/20 1632    02/03/20 1635  furosemide (LASIX) 10 MG/ML injection  - ADS Override Pull     Note to Pharmacy:  Created by cabinet override   Ordering Provider:  Selam Pierce RN       02/03/20 1635 02/04/20 0444        ----------------------------------------------------------------------------------------------------------------------  Vital Signs:  Temp:  [98.5 °F (36.9 °C)-99 °F (37.2 °C)] 98.8 °F (37.1 °C)  Heart Rate:  [] 126  Resp:  [27-33] 28  BP: ()/() 113/41  FiO2 (%):  [30 %] 30 %  SpO2:  [83 %-100 %] 100 %  on   ;   Device (Oxygen Therapy): ventilator  Body mass index is 40.65 kg/m².    Wt Readings from Last 3 Encounters:   02/06/20 104 kg (229 lb 8 oz)   01/12/20 92.1 kg (203 lb)   01/09/20 91.6 kg (202 lb)     Intake & Output (last 3 days)       02/05 0701 - 02/06 0700 02/06 0701 - 02/07 0700 02/07 0701 - 02/08 0700 02/08 0701 - 02/09 0700    I.V. (mL/kg) 1389.7 (13.4) 3215.9 (30.9) 5038.5 (48.4)     IV  Piggyback 750 800 550     TPN  387.1 1377     Total Intake(mL/kg) 2139.7 (20.6) 4403 (42.3) 6965.5 (67)     Urine (mL/kg/hr) 25 (0) 0 (0) 15 (0)     Other 3500 3700 4000     Stool 60 0 8     Total Output 3585 3700 4023     Net -1445.3 +703 +2942.5                 NPO Diet  Adult [facility] TPN  ----------------------------------------------------------------------------------------------------------------------  Physical exam:  Constitutional:  Well-developed and well-nourished. Intubated/sedated  HENT:  Head:  Normocephalic and atraumatic.  Mouth:  dry mucous membranes.    Eyes:  Conjunctivae and EOM are normal. No scleral icterus.    Neck:  Neck supple.  No JVD present.    Cardiovascular: tachycardic, regular rhythm and normal heart sounds with no murmur.  Pulmonary/Chest:  Intubated, minimal Fi02, clear BS  Abdominal:  Distended, firm but stable, body wall edema is stable  Musculoskeletal:  No deformity.  No red or swollen joints anywhere.    Neurological:  Unable to assess due to intubation/sedation  Skin:  Skin is warm and dry. No rash noted. No pallor.   Peripheral vascular:  no clubbing, no cyanosis, trace edema, distal extremities are cool to touch  ----------------------------------------------------------------------------------------------------------------------  Tele:    ----------------------------------------------------------------------------------------------------------------------  Results from last 7 days   Lab Units 02/08/20  0351 02/07/20  2127 02/07/20  1432 02/07/20  0820 02/07/20  0057  02/06/20  0303  02/05/20  1040   CRP mg/dL 22.31*  --   --   --  24.06*  --  34.91*  --   --    LACTATE mmol/L 9.9* 9.7* 8.7*  --  8.3*   < > 5.5*   < > 5.4*   WBC 10*3/mm3 49.47*  --   --   --  40.02*  --  42.70*  --   --    HEMOGLOBIN g/dL 8.8*  --   --   --  8.0*  --  8.4*  --   --    HEMATOCRIT % 28.9*  --   --   --  26.3*  --  28.2*  --   --    MCV fL 82.8  --   --   --  82.2  --  83.4  --   --       MCHC g/dL 30.4*  --   --   --  30.4*  --  29.8*  --   --    PLATELETS 10*3/mm3 56*  --   --   --  67*  --  104*  --   --    INR   --   --   --  1.12*  --   --   --   --  1.88*    < > = values in this interval not displayed.     Results from last 7 days   Lab Units 02/08/20  0828   PH, ARTERIAL pH units 7.407   PO2 ART mm Hg 107.0   PCO2, ARTERIAL mm Hg 37.3   HCO3 ART mmol/L 23.5     Results from last 7 days   Lab Units 02/08/20  0351 02/07/20  1042 02/07/20  0057 02/06/20  0303  02/04/20  1932  02/03/20  0112   SODIUM mmol/L 128*  --  131* 131*   < >  --    < > 130*   POTASSIUM mmol/L 3.0*  --  3.2* 3.9   < >  --    < > 4.2   MAGNESIUM mg/dL 2.5  --  1.8  --   --  2.1  --   --    CHLORIDE mmol/L 82*  --  85* 87*   < >  --    < > 100   CO2 mmol/L 21.0*  --  19.9* 15.3*   < >  --    < > 9.4*   BUN mg/dL 22*  --  20 21*   < >  --    < > 25*   CREATININE mg/dL 1.90*  --  2.13* 2.39*   < >  --    < > 1.96*   EGFR IF NONAFRICN AM mL/min/1.73 29*  --  25* 22*   < >  --    < > 28*   CALCIUM mg/dL 8.2*  --  8.2* 7.8*   < >  --    < > 8.3*   IONIZED CALCIUM mmol/L  --  1.00*  --   --   --  1.08*  --   --    PHOSPHORUS mg/dL 1.6*  --  2.2*  --   --  3.6  --   --    GLUCOSE mg/dL 304*  --  202* 131*   < >  --    < > 98   ALBUMIN g/dL 4.12  --  4.14  --   --   --   --  3.77   BILIRUBIN mg/dL 4.0*  --  3.9*  --   --   --   --  1.2   ALK PHOS U/L 329*  --  304*  --   --   --   --  509*   AST (SGOT) U/L 350*  --  640*  --   --   --   --  63*   ALT (SGPT) U/L 91*  --  112*  --   --   --   --  17    < > = values in this interval not displayed.   Estimated Creatinine Clearance: 44.5 mL/min (A) (by C-G formula based on SCr of 1.9 mg/dL (H)).  No results found for: AMMONIA  Results from last 7 days   Lab Units 02/04/20  1932   CK TOTAL U/L 297*     Results from last 7 days   Lab Units 02/03/20  0112   PROBNP pg/mL 1,467.0*     Results from last 7 days   Lab Units 02/06/20  1117   TRIGLYCERIDES mg/dL 481*     Glucose   Date/Time Value  Ref Range Status   02/08/2020 0528 354 (H) 70 - 130 mg/dL Final   02/08/2020 0037 367 (H) 70 - 130 mg/dL Final   02/07/2020 1740 308 (H) 70 - 130 mg/dL Final   02/07/2020 1101 184 (H) 70 - 130 mg/dL Final   02/07/2020 0540 257 (H) 70 - 130 mg/dL Final   02/06/2020 2353 208 (H) 70 - 130 mg/dL Final   02/06/2020 1700 162 (H) 70 - 130 mg/dL Final   02/06/2020 1249 108 70 - 130 mg/dL Final     Lab Results   Component Value Date    TSH 9.270 (H) 01/31/2020    FREET4 0.97 02/01/2020     No results found for: PREGTESTUR, PREGSERUM, HCG, HCGQUANT  Pain Management Panel     Pain Management Panel Latest Ref Rng & Units 2/1/2020    CREATININE UR mg/dL 146.0        Brief Urine Lab Results  (Last result in the past 365 days)      Color   Clarity   Blood   Leuk Est   Nitrite   Protein   CREAT   Urine HCG        02/02/20 2053 Dark Yellow Turbid Large (3+) Negative Negative 100 mg/dL (2+)             Blood Culture   Date Value Ref Range Status   02/03/2020 No growth at 4 days  Preliminary   02/03/2020 No growth at 4 days  Preliminary     No results found for: URINECX  No results found for: WOUNDCX  No results found for: STOOLCX  Respiratory Culture   Date Value Ref Range Status   02/06/2020 Scant growth (1+) Candida albicans (A)  Final   02/06/2020 No Normal Respiratory Razia (A)  Final     No results found for: AFBCX  Results from last 7 days   Lab Units 02/08/20  0351 02/07/20  2127 02/07/20  1432 02/07/20  0057 02/06/20  1028 02/06/20  0303 02/05/20  1939  02/05/20  0222  02/04/20  0053  02/03/20  0112   PROCALCITONIN ng/mL  --   --   --   --   --   --   --   --   --   --   --   --  4.07*   LACTATE mmol/L 9.9* 9.7* 8.7* 8.3* 4.6* 5.5* 5.7*   < > 8.9*   < > 7.1*   < > 3.1*   CRP mg/dL 22.31*  --   --  24.06*  --  34.91*  --   --  29.35*  --  31.38*  --   --     < > = values in this interval not displayed.       I have personally looked at the labs and they are summarized  above.  ----------------------------------------------------------------------------------------------------------------------  Detailed radiology reports for the last 24 hours:    Imaging Results (Last 24 Hours)     Procedure Component Value Units Date/Time    US Abdomen Limited [[id number]] Collected:  02/07/20 1500     Updated:  02/07/20 1504    Narrative:       EXAMINATION: US ABDOMEN LIMITED-         CLINICAL INDICATION:     cholestatic injury pattern on labs;  A41.9-Sepsis, unspecified organism     TECHNIQUE: Multiplanar gray scale ultrasound of the abdomen.     COMPARISON: NONE      FINDINGS:   Pancreas bed obscure.  Gallbladder with pericholecystic fluid and wall thickening. Sludge  within the gallbladder but no dense shadowing stones.   The CBD measures 3 mm.  Markedly abnormal appearance of the liver with multiple hyperechoic and  isoechoic lesions most consistent with metastatic disease. Perihepatic  ascites is noted.    No ascites demonstrated.   There is no sonographic Diaz sign.       Impression:       1. Gallbladder with luminal sludge and wall thickening but no  significant distention.  2. Pericholecystic fluid is present but could be a attributable to  underlying liver disease and ascites. This could also account for the  lateral wall thickening.  3. Numerous liver lesions compatible with metastatic disease.  4. Small amounts of ascites along the liver margins.        This report was finalized on 2/7/2020 3:02 PM by Dr. Bert Butler MD.           Assessment & Plan    #Septic shock and Acute Respiratory Failure 2/2 SBP & PNA, Bacterial, treating for MDR organisms - Worse  #Lactic acidosis - Worse  - On presentation (WBC 29,100, CRP 30.94, lactic acid 5.2, temperature 102.4, heart rate 152, blood pressure 72/60  - CT chest on admission showed some bibasilar consolidations that are likely atelectasis   - CT Abd/Pelvis showed b/l lower lobe PNA, loculated fluid in pre-rectal space   - Diagnostic  paracentesis performed on 2/1 that revealed ANC: 3400. Confirming SBP. Culture NGTD  - Started having fevers 2/4, lactate trended up to 8.9, WBC count up to 42K, CRP up to 30, Bcx's and para Cx's NGTD, Cdiff negative  - RUQ US showed gallbladder w/ luminal sludge, wall thickening, pericholecystic fluid  - ID consulted; Continue Vanc, Nickolas, Micafungin  - Pulmonary consulted; following during week, no not here this weekend  - Consulted IR for possible drainage of loculated ascites; I discussed w/ Dr. Butler and will be technically difficult to access that particular fluid collection  - Consulted Surgery; Likely poor surgical candidate, recs pending  - GOC conversation w/ family later today, WBC count and lactate worsening, remains anuric, LFTs still elevated    #Anuric KODI 2/2 ATN/Sepsis c/b AGMA   - Baseline Cr 0.7-0.9, Cr on presentation 1.95=>2.10=>2.27, became oliguric and now anuric, did not respond to albumin challenge, suspect ATN/Sepsis  - CT abdomen/pelvis did not reveal urinary obstruction.  - Consulted Surgery;  Placed TDC on 2/4  - Consulted Nephrology; Following for HD/SLED needs    #Stage 4 colon cancer with bulky lymphadenopathy and widely disseminated liver metastases and adrenal masses s/p diverting loop ileostomy   - Scheduled to f/u with oncology at  on 2/3. Expected to start chemo soon. Suspect treatments would be palliative in nature.   - Underwent diverting loop ileostomy at  on 1/17  - CT Abd/Pelvis 2/2 showed extensive hepatic mets, ill-defined cecal mass involving surrounding mesentery, mesenteric LAD, small-mod ascites, stable low density mass L adrenal gland.  - Repeat CT Abd/Pelvis 2/4 showed extensive metastatic dz involving liver and peritoneum w/ omental caking, metastatic adenopathy RLQ and upper RP space, abnormal thickening R colon wall of terminal ileum, complex ascites that may represent malignant ascites  - Consulted Hematology/Oncology; Discussed w/ family that patient's  disease is non curable, discussed overall prognosis and appreciate their input and conversations w/ family.    #Severe Leukocytosis   - WBC count up to 42K, treatments as per above    #Mild Rhabdomyolysis  - CK elevated, fluids as per above    #Normocytic anemia   - Hemoglobin has been trending down. 10 on 1/2/20  - 8.8=>7.9=>6.6, s/p 1 unit of pRBC  - No signs of active bleeding, BUN/creatinine not elevated, suspect  hemoconcentrated on admission and fluids have diluted   - Iron studies consistent with SNEHAL and AOCD, B12 and folate normal.   - Continue IV PPI, monitor for signs of bleeding    #Respiratory Alkalosis  - Likely 2/2 above AGMA, compensatory, address as per above    #Euthyroid  - TSH 9, free T4 normal, NTD    #Hypomagnesemia   - Replaced    #Diverting loop ileostomy  - Performed 1/17 at  for SBO    #Morbid Obesity  - BMI 40, complicates all aspects of care.    F: Oral  E: Monitor & Replace PRN  N: TFs  Ppx: SQH  Code: Full Code    Dispo: Pending GOC conversations.     *This patient is considered high risk due to septic shock, KODI, underlying colon cancer    *I have spent 45min of critical care time (8:00-8:45AM) of direct patient care, evaluating the patient, managing pressors, sedation medications and ventilator, coordinating life supporting care and management plans w/ nephrology and ID, continuing GOC conversations w/ family.    VTE Prophylaxis:   Mechanical Order History:      Ordered        02/07/20 1014  Place Sequential Compression Device  Once         02/07/20 1014  Maintain Sequential Compression Device  Continuous                 Pharmalogical Order History:     Ordered     Dose Route Frequency Stop    02/04/20 1353  heparin (porcine) 5000 UNIT/ML injection  Status:  Discontinued      -- -- As Needed 02/04/20 1459    02/01/20 1455  heparin (porcine) 5000 UNIT/ML injection 5,000 Units  Status:  Discontinued      5,000 Units SC Every 8 Hours Scheduled 02/07/20 1014    02/01/20 0309  enoxaparin  (LOVENOX) syringe 40 mg  Status:  Discontinued      40 mg SC Every 24 Hours 02/01/20 1455        Saad Garcia MD  [facility]  02/08/20  11:24 AM

## 2020-02-08 NOTE — PROGRESS NOTES
PROGRESS NOTE         Patient Identification:  Name:  Gracy Norman  Age:  44 y.o.  Sex:  female  :  1975  MRN:  [id number]  Visit Number:  [id number]  Primary Care Provider:  Almas Stone MD         LOS: 8 days       ----------------------------------------------------------------------------------------------------------------------  Subjective       Chief Complaints:    Weakness - Generalized and Post-op Problem    Interval History:      Patient remains intubated and sedated on 30% FiO2 via ET tube.  Continues to require Levophed for blood pressure support.  Currently receiving dialysis this morning.  Tachycardic.  WBC worsening at 49.47.  CRP improving at 22.31.  Lactic acid worsening at 9.9.    Review of Systems:    Unable to obtain as the patient is very lethargic  ----------------------------------------------------------------------------------------------------------------------      Objective       Current Hospital Meds:    chlorhexidine 15 mL Mouth/Throat Q12H   hydrocortisone sodium succinate 50 mg Intravenous Q6H   insulin aspart 0-9 Units Subcutaneous Q6H   meropenem 500 mg Intravenous Q24H   micafungin (MYCAMINE)  mg Intravenous Q24H   trace minerals + multivitamin IVPB  Intravenous Once per day on    pantoprazole 40 mg Intravenous Q12H   phytonadione (VITAMIN K) IVPB 10 mg Intravenous Daily   potassium chloride 10 mEq Intravenous Q1H   sodium chloride 1,000 mL Intravenous Once   sodium chloride 1,000 mL Intravenous Once   sodium chloride 250 mL Intravenous Q1H   sodium chloride 10 mL Intravenous Q12H   sodium chloride 10 mL Intravenous Q12H   sodium chloride 10 mL Intravenous Q12H   sodium chloride 10 mL Intravenous Q12H   sodium chloride 10 mL Intravenous Q12H   sodium chloride 10 mL Intravenous Q12H   thiamine (VITAMIN B1) IVPB 500 mg Intravenous Q8H   vancomycin 1,250 mg Intravenous Once   Vancomycin Pharmacy Intermittent Dosing  Does not apply Daily          Adult [facility] TPN  Last Rate: 60 mL/hr at 02/07/20 1730   fentaNYL Citrate     norepinephrine 0.02-0.3 mcg/kg/min Last Rate: 0.16 mcg/kg/min (02/08/20 0930)   propofol 5-50 mcg/kg/min Last Rate: 40 mcg/kg/min (02/08/20 0930)   sodium bicarbonate drip (greater than 75 mEq/bag) 150 mEq Last Rate: 150 mEq (02/08/20 0652)   sodium chloride 250 mL/hr Last Rate: 250 mL/hr (02/06/20 1257)   vasopressin (PITRESSIN) infusion 0.04 Units/min Last Rate: Stopped (02/06/20 1305)     ----------------------------------------------------------------------------------------------------------------------    Vital Signs:  Temp:  [98.6 °F (37 °C)-99 °F (37.2 °C)] 98.8 °F (37.1 °C)  Heart Rate:  [] 122  Resp:  [27-33] 28  BP: ()/() 113/41  FiO2 (%):  [30 %] 30 %  Mean Arterial Pressure (Non-Invasive) for the past 24 hrs (Last 3 readings):   Noninvasive MAP (mmHg)   02/08/20 1100 84   02/08/20 1045 93   02/08/20 1030 70     SpO2 Percentage    02/08/20 1045 02/08/20 1100 02/08/20 1115   SpO2: 100% 100% 100%     SpO2:  [83 %-100 %] 100 %  on   ;   Device (Oxygen Therapy): ventilator    Body mass index is 40.65 kg/m².  Wt Readings from Last 3 Encounters:   02/06/20 104 kg (229 lb 8 oz)   01/12/20 92.1 kg (203 lb)   01/09/20 91.6 kg (202 lb)        Intake/Output Summary (Last 24 hours) at 2/8/2020 1238  Last data filed at 2/8/2020 0541  Gross per 24 hour   Intake 6865.54 ml   Output 4023 ml   Net 2842.54 ml     NPO Diet  Adult [facility] TPN  ----------------------------------------------------------------------------------------------------------------------      Physical Exam:    Constitutional:  Intubated, sedated   HENT:  Head: Normocephalic and atraumatic.  Mouth:  Moist mucous membranes.    Eyes:  Conjunctivae and EOM are normal.  No scleral icterus.  Neck:  Neck supple.  No JVD present.     Cardiovascular:  tachycardic, regular rhythm and normal heart sounds with no murmur. No  edema.  Pulmonary/Chest:  tachypneic, no wheezes, no crackles, with normal breath sounds and good air movement.  Abdominal: Distended and tense with diffuse tenderness. Ileostomy.   Musculoskeletal:  No edema, no tenderness, and no deformity.  No swelling or redness of joints.  Neurological:  Sedated  Skin:  Skin is warm and dry.  No rash noted.  No pallor.   Psychiatric:  Unable to assess    ----------------------------------------------------------------------------------------------------------------------  Results from last 7 days   Lab Units 02/04/20  1932   CK TOTAL U/L 297*     Results from last 7 days   Lab Units 02/03/20  0112   PROBNP pg/mL 1,467.0*     Results from last 7 days   Lab Units 02/06/20  1117   TRIGLYCERIDES mg/dL 481*     Results from last 7 days   Lab Units 02/08/20  0828   PH, ARTERIAL pH units 7.407   PO2 ART mm Hg 107.0   PCO2, ARTERIAL mm Hg 37.3   HCO3 ART mmol/L 23.5     Results from last 7 days   Lab Units 02/08/20  0351 02/07/20  2127 02/07/20  1432 02/07/20  0820 02/07/20  0057  02/06/20  0303  02/05/20  1040   CRP mg/dL 22.31*  --   --   --  24.06*  --  34.91*  --   --    LACTATE mmol/L 9.9* 9.7* 8.7*  --  8.3*   < > 5.5*   < > 5.4*   WBC 10*3/mm3 49.47*  --   --   --  40.02*  --  42.70*  --   --    HEMOGLOBIN g/dL 8.8*  --   --   --  8.0*  --  8.4*  --   --    HEMATOCRIT % 28.9*  --   --   --  26.3*  --  28.2*  --   --    MCV fL 82.8  --   --   --  82.2  --  83.4  --   --    MCHC g/dL 30.4*  --   --   --  30.4*  --  29.8*  --   --    PLATELETS 10*3/mm3 56*  --   --   --  67*  --  104*  --   --    INR   --   --   --  1.12*  --   --   --   --  1.88*    < > = values in this interval not displayed.     Results from last 7 days   Lab Units 02/08/20  0351 02/07/20  0057 02/06/20  0303  02/04/20  1932  02/03/20  0112   SODIUM mmol/L 128* 131* 131*   < >  --    < > 130*   POTASSIUM mmol/L 3.0* 3.2* 3.9   < >  --    < > 4.2   MAGNESIUM mg/dL 2.5 1.8  --   --  2.1  --   --    CHLORIDE mmol/L  82* 85* 87*   < >  --    < > 100   CO2 mmol/L 21.0* 19.9* 15.3*   < >  --    < > 9.4*   BUN mg/dL 22* 20 21*   < >  --    < > 25*   CREATININE mg/dL 1.90* 2.13* 2.39*   < >  --    < > 1.96*   EGFR IF NONAFRICN AM mL/min/1.73 29* 25* 22*   < >  --    < > 28*   CALCIUM mg/dL 8.2* 8.2* 7.8*   < >  --    < > 8.3*   GLUCOSE mg/dL 304* 202* 131*   < >  --    < > 98   ALBUMIN g/dL 4.12 4.14  --   --   --   --  3.77   BILIRUBIN mg/dL 4.0* 3.9*  --   --   --   --  1.2   ALK PHOS U/L 329* 304*  --   --   --   --  509*   AST (SGOT) U/L 350* 640*  --   --   --   --  63*   ALT (SGPT) U/L 91* 112*  --   --   --   --  17    < > = values in this interval not displayed.   Estimated Creatinine Clearance: 44.5 mL/min (A) (by C-G formula based on SCr of 1.9 mg/dL (H)).  No results found for: AMMONIA    Glucose   Date/Time Value Ref Range Status   02/08/2020 0528 354 (H) 70 - 130 mg/dL Final   02/08/2020 0037 367 (H) 70 - 130 mg/dL Final   02/07/2020 1740 308 (H) 70 - 130 mg/dL Final   02/07/2020 1101 184 (H) 70 - 130 mg/dL Final   02/07/2020 0540 257 (H) 70 - 130 mg/dL Final   02/06/2020 2353 208 (H) 70 - 130 mg/dL Final   02/06/2020 1700 162 (H) 70 - 130 mg/dL Final   02/06/2020 1249 108 70 - 130 mg/dL Final     No results found for: HGBA1C  Lab Results   Component Value Date    TSH 9.270 (H) 01/31/2020    FREET4 0.97 02/01/2020       Blood Culture   Date Value Ref Range Status   02/03/2020 No growth at less than 24 hours  Preliminary   02/03/2020 No growth at less than 24 hours  Preliminary   01/31/2020 No growth at 3 days  Preliminary   01/31/2020 No growth at 3 days  Preliminary     No results found for: URINECX  No results found for: WOUNDCX  No results found for: STOOLCX  No results found for: RESPCX  Pain Management Panel     Pain Management Panel Latest Ref Rng & Units 2/1/2020    CREATININE UR mg/dL 146.0             ----------------------------------------------------------------------------------------------------------------------  Imaging Results (Last 24 Hours)     Procedure Component Value Units Date/Time    US Abdomen Limited [[id number]] Collected:  02/07/20 1500     Updated:  02/07/20 1504    Narrative:       EXAMINATION: US ABDOMEN LIMITED-         CLINICAL INDICATION:     cholestatic injury pattern on labs;  A41.9-Sepsis, unspecified organism     TECHNIQUE: Multiplanar gray scale ultrasound of the abdomen.     COMPARISON: NONE      FINDINGS:   Pancreas bed obscure.  Gallbladder with pericholecystic fluid and wall thickening. Sludge  within the gallbladder but no dense shadowing stones.   The CBD measures 3 mm.  Markedly abnormal appearance of the liver with multiple hyperechoic and  isoechoic lesions most consistent with metastatic disease. Perihepatic  ascites is noted.    No ascites demonstrated.   There is no sonographic Diaz sign.       Impression:       1. Gallbladder with luminal sludge and wall thickening but no  significant distention.  2. Pericholecystic fluid is present but could be a attributable to  underlying liver disease and ascites. This could also account for the  lateral wall thickening.  3. Numerous liver lesions compatible with metastatic disease.  4. Small amounts of ascites along the liver margins.        This report was finalized on 2/7/2020 3:02 PM by Dr. Bert Butler MD.             ----------------------------------------------------------------------------------------------------------------------    Assessment/Plan       Assessment/Plan     ASSESSMENT:    1.  Septic shock with lactic acid greater than 4 on admission  2.  Peritonitis     PLAN:      Patient remains intubated and sedated on 30% FiO2 via ET tube.  Continues to require Levophed for blood pressure support.  Currently receiving dialysis this morning.  Tachycardic.  WBC worsening at 49.47.  CRP improving at 22.31.   Lactic acid worsening at 9.9.    CT Abd/pelvis which did not demonstrate large leak but did show extensive metastatic disease and likely metastatic ascites w/ loculated fluid collection c/f infection.      Mycoplasma negative.  Legionella negative.  Respiratory panel PCR negative.  Strep pneumo negative.  Influenza negative.  Urinalysis unremarkable. Blood cultures show no growth thus far.  Body fluid culture reports 4390 nucleated cells, Gram stain reports no organism.      Would recommend to continue Meropenem, Vancomycin per pharmacy dosing and Micafungin 100 mg IV q 24 hours.  Patient carries poor prognosis despite all appropriate interventions.    We will continue to follow culture results and CRP trend and adjust antibiotic therapy appropriately.    Current Antimicrobial:  Micafungin 100 mg IV q 24 hours  Meropenem per pharmacy dosing  Vancomycin per pharmacy dosing     Code Status:   Code Status and Medical Interventions:   Ordered at: 01/31/20 6881     Level Of Support Discussed With:    Patient     Code Status:    CPR     Medical Interventions (Level of Support Prior to Arrest):    Full       MUNIR Conner  02/08/20  12:38 PM

## 2020-02-08 NOTE — PLAN OF CARE
"Patient remains intubated and sedated; in critical condition. [facility] meeting with dr keith and family today. The family is waiting for 1 more family member to arrive then will \"probably\" terminally extubate patient and focus more on comfort. Patient has had no urine output or stool this day. Did have dialysis and tolerated well. Remains on moderate dose of levophed. Has had low grade fever today.  "

## 2020-02-09 NOTE — PROGRESS NOTES
Chief Complaint: Shortness of breath    Subjective     Interval History:   She is currently intubated and sedated.  Episodes of fever overnight.  Currently on IV norepinephrine with 0.08 mcg/kg/min and IV vasopressin 0.04 units/min.  She is currently on sodium bicarbonate infusion with IV fentanyl IV propofol.  She is on assist control volume-cycled mode of mechanical ventilation.      Review of Systems:   ROS  Review of system could not be obtained as patient is currently intubated and sedated.      Vital Signs  Temp:  [98.5 °F (36.9 °C)-100.9 °F (38.3 °C)] 98.6 °F (37 °C)  Heart Rate:  [101-134] 114  Resp:  [24-33] 28  BP: ()/() 101/83  FiO2 (%):  [30 %] 30 %    Physical Exam:  Physical Exam  General Appearance: Intubated and sedated  Lungs: Tachypneic.  Bilateral rales noted.  No rhonchi.  No wheezing.  Heart: Tachycardic.  S1-S2 normal.  On IV vasopressors.  Abdomen: Distended.  Body wall edema.  Very sluggish bowel sounds.  Extremities: +2 lower extremity edema noted.  Pulses: Distal pulses are intact.  There are no decreased pulses.    Neurological-intubated and sedated.  Pupils:  Pupils are equal, round, and reactive to light.    Skin:  Warm and dry.       Results Review:   I reviewed the patient's new clinical results.  Results from last 7 days   Lab Units 02/09/20  0113 02/08/20  0351 02/07/20  0057   WBC 10*3/mm3 50.27* 49.47* 40.02*   HEMOGLOBIN g/dL 8.3* 8.8* 8.0*   PLATELETS 10*3/mm3 40* 56* 67*     Results from last 7 days   Lab Units 02/09/20  0113 02/08/20  1549 02/08/20  0351 02/07/20  0057   SODIUM mmol/L 127*  --  128* 131*   POTASSIUM mmol/L 3.7 3.4* 3.0* 3.2*   CHLORIDE mmol/L 82*  --  82* 85*   CO2 mmol/L 21.9*  --  21.0* 19.9*   BUN mg/dL 21*  --  22* 20   CREATININE mg/dL 1.71*  --  1.90* 2.13*   CALCIUM mg/dL 8.0*  --  8.2* 8.2*   GLUCOSE mg/dL 317*  --  304* 202*   MAGNESIUM mg/dL 2.0  --  2.5 1.8     Results from last 7 days   Lab Units 02/09/20  0113 02/08/20  0351  02/07/20  0057   ALK PHOS U/L 337* 329* 304*   BILIRUBIN mg/dL 4.6* 4.0* 3.9*   ALT (SGPT) U/L 70* 91* 112*   AST (SGOT) U/L 322* 350* 640*     Results from last 7 days   Lab Units 02/09/20  1639   PH, ARTERIAL pH units 7.363   PO2 ART mm Hg 102.0   PCO2, ARTERIAL mm Hg 39.9   HCO3 ART mmol/L 22.7         chlorhexidine 15 mL Mouth/Throat Q12H   hydrocortisone sodium succinate 50 mg Intravenous Q6H   insulin aspart 0-9 Units Subcutaneous Q6H   meropenem 500 mg Intravenous Q24H   micafungin (MYCAMINE)  mg Intravenous Q24H   trace minerals + multivitamin IVPB  Intravenous Once per day on Mon Wed Fri   pantoprazole 40 mg Intravenous Q12H   phytonadione (VITAMIN K) IVPB 10 mg Intravenous Daily   sodium chloride 1,000 mL Intravenous Once   sodium chloride 1,000 mL Intravenous Once   sodium chloride 10 mL Intravenous Q12H   sodium chloride 10 mL Intravenous Q12H   sodium chloride 10 mL Intravenous Q12H   sodium chloride 10 mL Intravenous Q12H   sodium chloride 10 mL Intravenous Q12H   sodium chloride 10 mL Intravenous Q12H   thiamine (VITAMIN B1) IVPB 500 mg Intravenous Q8H   Vancomycin Pharmacy Intermittent Dosing  Does not apply Daily       Adult [facility] TPN  Last Rate: 60 mL/hr at 02/08/20 1807   fentaNYL Citrate     norepinephrine 0.02-0.3 mcg/kg/min Last Rate: 0.08 mcg/kg/min (02/09/20 1230)   propofol 5-50 mcg/kg/min Last Rate: 40 mcg/kg/min (02/09/20 1418)   sodium bicarbonate drip (greater than 75 mEq/bag) 150 mEq Last Rate: 150 mEq (02/09/20 0841)   vasopressin (PITRESSIN) infusion 0.04 Units/min Last Rate: 0.04 Units/min (02/09/20 1634)     I reviewed the medications.      I have reviewed the labs.  Worsening of leukocytosis noted.    .  Assessment/Plan        Central nervous system     Plan:  -On IV fentanyl drip and IV propofol drip.  RA SS goal of 0 to -1.  She does not have Biot's type of breathing pattern while on opioids.  On IV thiamine.  - I highly recommend avoiding nighttime disturbances  and light exposure during night to maintain normal circadian rhythms to avoid hypoactive or hyperactive delirium.         Cardiovascular system:  Septic shock     Plan:  -Goal CVP around 10 mmHg.  On IV norepinephrine and IV vasopressin.  Targeting mean arterial pressure of 65 mmHg.  -On IV meropenem, IV vancomycin and IV micafungin.  Monitoring lactate trend.  -On iron I have decreased the IV hydrocortisone to 50 mg every 8 hours.            Respiratory system:  Acute hypoxic respiratory failure on mechanical ventilator  Bilateral lower lobe pneumonia, due to unspecified organism   Bilateral pleural effusions KODI likely septic ATN     Plan:  - Ventilator graphics : Flow time scalar was reviewed and auto PEEP is negative, volume time scalar was reviewed and leak is negative, pressure times scalar was reviewed and pressure stress index is 1 indicating normal pressure stress index.  Pressure volume loop was assessed.  Auto triggering is negative.  Missed triggering is negative.  Double triggering is negative.  Active expiration is negative.  -Head of the bed elevated at 30 degrees  -Mouth care with oral chlorhexidine   On IV meropenem, IV vancomycin and IV micafungin.            Liver, gallbladder, Pancreas and gastrointestinal system:  Culture-negative peritonitis with concern for intra-abdominal abscess versus leak  Stage IV poorly differentiated adenocarcinoma of the colon with liver mets, possible splenic mass, and adrenal mass status post diverting loop ileostomy     Plan:  -General surgery and IR involved by primary team for paracentesis of loculated effusion.  Poor surgical candidate.  High risk versus benefit ratio due to difficult location.  - Continue with PPI for ulcer prophylaxis.  -On TPN  Dietary Orders (From admission, onward)     Start     Ordered    02/06/20 1055  NPO Diet  Diet Effective Now      02/06/20 1058                        Genitourinary system:   Oliguric KODI likely septic ATN  High anion  gap metabolic acidosis due to lactic acidosis and acute kidney injury     Plan   -Avoiding nephrotoxic agent.  -Nephrology is on board for IHD/SLED  -On bicarb drip            Endocrine system:     Plan:  - Continue with correctional insulin therapy  -Goal serum glucose of 140 to 180 mg/dL while in ICU.  -I have decreased the dose of IV hydrocortisone to 50 mg every 8 hours.  I have ordered 15 units of IV regular insulin.            Infectious disease system:  Severe sepsis related to bilateral lower lobe pneumonia due to unspecified organism  Culture-negative peritonitis with concern for intra-abdominal abscess versus leak     Plan:  -On IV meropenem, IV vancomycin and IV micafungin.  Following blood culture results  Respiratory culture showed scant candida            Hematological system:  Leukocytosis  Anemia  Thrombocytopenia     Plan   - I recommend PRBC transfusion if hemoglobin is less than 7 g/dL unless active bleeding or cardiac ischemia.  -On SCDs for DVT prophylaxis.  -Follow heparin induced platelet antibody  -peripheral blood smear showed thrombocytopenia and normocytic anemia without platelet clumping.  -On vitamin K daily.            Rheumatological and dermatological system:     Plan:  - Turn the patient every 2 hours to prevent pressure ulcers.  -Wound care team involvement as per primary team.            Activity:  - I recommend aggressive PT/OT while in hospital to avoid critical care neuropathy/myopathy.      Very poor prognosis.           The clinical plan was discussed extensively with the nurse, and respiratory therapist.   Critical Care time spent in direct patient care: 34 minutes (excluding procedure time).  Patient is critically ill and is at higher risk for further mortality/morbidity.          Henry Garcia M.D  Pulmonary and Critical Care Medicine                   Henry Garcia MD

## 2020-02-09 NOTE — PROGRESS NOTES
[facility] HOSPITALIST PROGRESS NOTE     Patient Identification:  Name:  Gracy Norman  Age:  44 y.o.  Sex:  female  :  1975  MRN:  [id number]  Visit Number:  [id number]  ROOM: [address]     Primary Care Provider:  Almas Stone MD    Length of stay in inpatient status:  9    Subjective     Chief Compliant:    Chief Complaint   Patient presents with   • Weakness - Generalized   • Post-op Problem     History of Presenting Illness:    Patient remains critically ill, no acute events overnight, WBC count now 50K, on broad spectrum Abx per ID including antifungal coverage, lactate still elevated at 7.6, remains on pressors, Cr improved w/ dialysis but still no significant UOP, LFTs still elevated, I spoke w/ surgery regarding gallbladder disease and poor surgical candidate, formal recs pending, having GOC conversations w/ family which are ongoing, some family came in from [place] yesterday and we spoke at length about her poor prognosis, they are awaiting additional family members to come in from [place] to see but it sounds like they are leaning toward extubation and comfort measures, will continue w/ current plan of care, adjusted vent settings this AM w/ increased PEEP, will follow up later today w/ family for continued GOC conversations.  Objective     Current Hospital Meds:  chlorhexidine 15 mL Mouth/Throat Q12H   hydrocortisone sodium succinate 50 mg Intravenous Q6H   insulin aspart 0-9 Units Subcutaneous Q6H   meropenem 500 mg Intravenous Q24H   micafungin (MYCAMINE)  mg Intravenous Q24H   trace minerals + multivitamin IVPB  Intravenous Once per day on    pantoprazole 40 mg Intravenous Q12H   phytonadione (VITAMIN K) IVPB 10 mg Intravenous Daily   sodium chloride 1,000 mL Intravenous Once   sodium chloride 1,000 mL Intravenous Once   sodium chloride 10 mL Intravenous Q12H   sodium chloride 10 mL Intravenous Q12H   sodium chloride 10 mL Intravenous Q12H   sodium chloride 10  mL Intravenous Q12H   sodium chloride 10 mL Intravenous Q12H   sodium chloride 10 mL Intravenous Q12H   thiamine (VITAMIN B1) IVPB 500 mg Intravenous Q8H   Vancomycin Pharmacy Intermittent Dosing  Does not apply Daily     Adult Central Clinimix TPN  Last Rate: 60 mL/hr at 02/08/20 1807   fentaNYL Citrate     norepinephrine 0.02-0.3 mcg/kg/min Last Rate: 0.08 mcg/kg/min (02/09/20 0500)   propofol 5-50 mcg/kg/min Last Rate: 40 mcg/kg/min (02/09/20 1058)   sodium bicarbonate drip (greater than 75 mEq/bag) 150 mEq Last Rate: 150 mEq (02/09/20 0841)   vasopressin (PITRESSIN) infusion 0.04 Units/min Last Rate: 0.04 Units/min (02/09/20 0839)     Current Antimicrobial Therapy:  Anti-Infectives (From admission, onward)    Ordered     Dose/Rate Route Frequency Start Stop    02/08/20 0835  vancomycin (VANCOCIN) 1,250 mg in sodium chloride 0.9 % 250 mL IVPB     Ordering Provider:  Saad Garcia MD    1,250 mg  over 60 Minutes Intravenous Once 02/08/20 1800 02/08/20 1918    02/06/20 0835  vancomycin (VANCOCIN) 1,000 mg in sodium chloride 0.9 % 250 mL IVPB     Ordering Provider:  Saad Garcia MD    1,000 mg  over 60 Minutes Intravenous Once 02/06/20 1800 02/06/20 1835    02/05/20 0823  vancomycin (VANCOCIN) 1,250 mg in sodium chloride 0.9 % 250 mL IVPB     Ordering Provider:  Saad Garcia MD    1,250 mg  over 60 Minutes Intravenous Once 02/05/20 1800 02/05/20 1821    02/05/20 1234  meropenem (MERREM) 500mg/100 mL 0.9% NS IVPB (mbp)  Review   Ordering Provider:  Keyona Donohue MD    500 mg  over 3 Hours Intravenous Every 24 Hours 02/05/20 1500 02/12/20 1459    02/04/20 1720  vancomycin (VANCOCIN) 1,250 mg in sodium chloride 0.9 % 250 mL IVPB     Ordering Provider:  Saad Garcia MD    1,250 mg  over 60 Minutes Intravenous Once 02/04/20 2200 02/04/20 2147    02/04/20 1326  Vancomycin Pharmacy Intermittent Dosing  Review   Ordering Provider:  Sophie Grimes MD     Does not apply Daily 02/04/20 1415 02/11/20 0859     02/04/20 1131  micafungin sodium (MYCAMINE) 100 mg in sodium chloride 0.9 % 100 mL IVPB  Review   Ordering Provider:  Sophie Grimes MD    100 mg  over 60 Minutes Intravenous Every 24 Hours 02/04/20 1300 02/11/20 1259    02/01/20 1601  vancomycin (VANCOCIN) 1,000 mg in sodium chloride 0.9 % 250 mL IVPB     Ordering Provider:  Denny Coy MD    1,000 mg  over 60 Minutes Intravenous Once 02/01/20 1800 02/01/20 1812 01/31/20 1949  vancomycin (VANCOCIN) 1,750 mg in sodium chloride 0.9 % 500 mL IVPB     Ordering Provider:  Waqas York MD    20 mg/kg × 93 kg Intravenous Once 01/31/20 1951 02/01/20 0000    01/31/20 1949  piperacillin-tazobactam (ZOSYN) 4.5 g/100 mL 0.9% NS IVPB (mbp)     Ordering Provider:  Waqas York MD    4.5 g Intravenous Once 01/31/20 1951 01/31/20 2129        Current Diuretic Therapy:  Diuretics (From admission, onward)    Ordered     Dose/Rate Route Frequency Start Stop    02/03/20 1610  furosemide (LASIX) injection 80 mg     Ordering Provider:  Alycia Metzger MD    80 mg Intravenous Once 02/03/20 1700 02/03/20 1632    02/03/20 1635  furosemide (LASIX) 10 MG/ML injection  - ADS Override Pull     Note to Pharmacy:  Created by cabinet override   Ordering Provider:  Selam Pierce RN       02/03/20 1635 02/04/20 0444        ----------------------------------------------------------------------------------------------------------------------  Vital Signs:  Temp:  [98.5 °F (36.9 °C)-99.1 °F (37.3 °C)] 98.7 °F (37.1 °C)  Heart Rate:  [] 126  Resp:  [24-33] 30  BP: ()/() 107/71  FiO2 (%):  [30 %] 30 %  SpO2:  [90 %-100 %] 92 %  on   ;   Device (Oxygen Therapy): ventilator  Body mass index is 40.65 kg/m².    Wt Readings from Last 3 Encounters:   02/06/20 104 kg (229 lb 8 oz)   01/12/20 92.1 kg (203 lb)   01/09/20 91.6 kg (202 lb)     Intake & Output (last 3 days)       02/06 0701 - 02/07 0700 02/07 0701 - 02/08 0700 02/08 0701 - 02/09 0700 02/09 0701 -  02/10 0700    I.V. (mL/kg) 3215.9 (30.9) 5038.5 (48.4) 2933.1 (28.2)     IV Piggyback 800 550 800     .1 1377 772     Total Intake(mL/kg) 4403 (42.3) 6965.5 (67) 4505.1 (43.3)     Urine (mL/kg/hr) 0 (0) 15 (0) 0 (0)     Other 3700 4000 4000     Stool 0 8 0     Total Output 3700 4023 4000     Net +703 +2942.5 +505.1                 NPO Diet  Adult Central Clinimix TPN  ----------------------------------------------------------------------------------------------------------------------  Physical exam:  Constitutional:  Well-developed and well-nourished. Intubated/sedated  HENT:  Head:  Normocephalic and atraumatic.  Mouth:  dry mucous membranes.    Eyes:  Conjunctivae and EOM are normal. No scleral icterus.    Neck:  Neck supple.  No JVD present.    Cardiovascular: increased tachycardic, regular rhythm and normal heart sounds with no murmur.  Pulmonary/Chest:  Intubated, minimal Fi02, clear BS  Abdominal:  Distended, firm but stable, body wall edema is stable  Musculoskeletal:  No deformity.  No red or swollen joints anywhere.    Neurological:  Unable to assess due to intubation/sedation  Skin:  Skin is warm and dry. No rash noted. No pallor.   Peripheral vascular:  no clubbing, no cyanosis, trace edema, distal extremities still cool today  ----------------------------------------------------------------------------------------------------------------------  Tele:    ----------------------------------------------------------------------------------------------------------------------  Results from last 7 days   Lab Units 02/09/20  0632 02/09/20  0113 02/08/20  2321 02/08/20  2103  02/08/20  0351  02/07/20  0820 02/07/20  0057  02/05/20  1040   CRP mg/dL  --  20.16*  --   --   --  22.31*  --   --  24.06*   < >  --    LACTATE mmol/L 7.6*  --  8.4* 8.3*   < > 9.9*   < >  --  8.3*   < > 5.4*   WBC 10*3/mm3  --  50.27*  --   --   --  49.47*  --   --  40.02*   < >  --    HEMOGLOBIN g/dL  --  8.3*  --   --   --  8.8*   --   --  8.0*   < >  --    HEMATOCRIT %  --  27.0*  --   --   --  28.9*  --   --  26.3*   < >  --    MCV fL  --  83.3  --   --   --  82.8  --   --  82.2   < >  --    MCHC g/dL  --  30.7*  --   --   --  30.4*  --   --  30.4*   < >  --    PLATELETS 10*3/mm3  --  40*  --   --   --  56*  --   --  67*   < >  --    INR   --   --   --   --   --   --   --  1.12*  --   --  1.88*    < > = values in this interval not displayed.     Results from last 7 days   Lab Units 02/09/20  1042   PH, ARTERIAL pH units 7.329*   PO2 ART mm Hg 85.2   PCO2, ARTERIAL mm Hg 44.1   HCO3 ART mmol/L 23.2     Results from last 7 days   Lab Units 02/09/20  0113 02/08/20  1549 02/08/20  0351 02/07/20  1042 02/07/20  0057  02/04/20  1932   SODIUM mmol/L 127*  --  128*  --  131*   < >  --    POTASSIUM mmol/L 3.7 3.4* 3.0*  --  3.2*   < >  --    MAGNESIUM mg/dL 2.0  --  2.5  --  1.8  --  2.1   CHLORIDE mmol/L 82*  --  82*  --  85*   < >  --    CO2 mmol/L 21.9*  --  21.0*  --  19.9*   < >  --    BUN mg/dL 21*  --  22*  --  20   < >  --    CREATININE mg/dL 1.71*  --  1.90*  --  2.13*   < >  --    EGFR IF NONAFRICN AM mL/min/1.73 33*  --  29*  --  25*   < >  --    CALCIUM mg/dL 8.0*  --  8.2*  --  8.2*   < >  --    IONIZED CALCIUM mmol/L  --   --   --  1.00*  --   --  1.08*   PHOSPHORUS mg/dL 3.3 1.8* 1.6*  --  2.2*  --  3.6   GLUCOSE mg/dL 317*  --  304*  --  202*   < >  --    ALBUMIN g/dL 3.93  --  4.12  --  4.14  --   --    BILIRUBIN mg/dL 4.6*  --  4.0*  --  3.9*  --   --    ALK PHOS U/L 337*  --  329*  --  304*  --   --    AST (SGOT) U/L 322*  --  350*  --  640*  --   --    ALT (SGPT) U/L 70*  --  91*  --  112*  --   --     < > = values in this interval not displayed.   Estimated Creatinine Clearance: 49.4 mL/min (A) (by C-G formula based on SCr of 1.71 mg/dL (H)).  No results found for: AMMONIA  Results from last 7 days   Lab Units 02/04/20  1932   CK TOTAL U/L 297*     Results from last 7 days   Lab Units 02/03/20  0112   PROBNP pg/mL 1,467.0*          Results from last 7 days   Lab Units 02/06/20  1117   TRIGLYCERIDES mg/dL 481*     Glucose   Date/Time Value Ref Range Status   02/09/2020 0500 312 (H) 70 - 130 mg/dL Final   02/09/2020 0039 308 (H) 70 - 130 mg/dL Final   02/08/2020 1812 249 (H) 70 - 130 mg/dL Final   02/08/2020 1240 187 (H) 70 - 130 mg/dL Final   02/08/2020 0528 354 (H) 70 - 130 mg/dL Final   02/08/2020 0037 367 (H) 70 - 130 mg/dL Final   02/07/2020 1740 308 (H) 70 - 130 mg/dL Final   02/07/2020 1101 184 (H) 70 - 130 mg/dL Final     Lab Results   Component Value Date    TSH 9.270 (H) 01/31/2020    FREET4 0.97 02/01/2020     No results found for: PREGTESTUR, PREGSERUM, HCG, HCGQUANT  Pain Management Panel     Pain Management Panel Latest Ref Rng & Units 2/1/2020    CREATININE UR mg/dL 146.0        Brief Urine Lab Results  (Last result in the past 365 days)      Color   Clarity   Blood   Leuk Est   Nitrite   Protein   CREAT   Urine HCG        02/02/20 2053 Dark Yellow Turbid Large (3+) Negative Negative 100 mg/dL (2+)             Blood Culture   Date Value Ref Range Status   02/03/2020 No growth at 5 days  Final   02/03/2020 No growth at 5 days  Final     No results found for: URINECX  No results found for: WOUNDCX  No results found for: STOOLCX  Respiratory Culture   Date Value Ref Range Status   02/06/2020 Scant growth (1+) Candida albicans (A)  Final   02/06/2020 No Normal Respiratory Razia (A)  Final     No results found for: AFBCX  Results from last 7 days   Lab Units 02/09/20  0632 02/09/20  0113 02/08/20  2321 02/08/20  2103 02/08/20  1320 02/08/20  0351 02/07/20  2127 02/07/20  1432 02/07/20  0057  02/06/20  0303  02/05/20  0222  02/04/20  0053  02/03/20  0112   PROCALCITONIN ng/mL  --   --   --   --   --   --   --   --   --   --   --   --   --   --   --   --  4.07*   LACTATE mmol/L 7.6*  --  8.4* 8.3* 7.3* 9.9* 9.7* 8.7* 8.3*   < > 5.5*   < > 8.9*   < > 7.1*   < > 3.1*   CRP mg/dL  --  20.16*  --   --   --  22.31*  --   --  24.06*  --   34.91*  --  29.35*  --  31.38*  --   --     < > = values in this interval not displayed.     I have personally looked at the labs and they are summarized above.  ----------------------------------------------------------------------------------------------------------------------  Detailed radiology reports for the last 24 hours:    Imaging Results (Last 24 Hours)     Procedure Component Value Units Date/Time    XR Chest 1 View [[id number]] Resulted:  02/09/20 0740     Updated:  02/09/20 0740        Assessment & Plan    #Septic shock and Acute Respiratory Failure 2/2 SBP & PNA, Bacterial, treating for MDR organisms - Worse  #Lactic acidosis - Worse  - On presentation (WBC 29,100, CRP 30.94, lactic acid 5.2, temperature 102.4, heart rate 152, blood pressure 72/60  - CT chest on admission showed some bibasilar consolidations that are likely atelectasis   - CT Abd/Pelvis showed b/l lower lobe PNA, loculated fluid in pre-rectal space   - Diagnostic paracentesis performed on 2/1 that revealed ANC: 3400. Confirming SBP. Culture NGTD  - Started having fevers 2/4, lactate trended up to 8.9, WBC count up to 42K, CRP up to 30, Bcx's and para Cx's NGTD, Cdiff negative  - RUQ US showed gallbladder w/ luminal sludge, wall thickening, pericholecystic fluid  - ID consulted; Continue Vanc, Nickolas, Micafungin  - Pulmonary consulted; following during week, no not here this weekend  - Consulted IR for possible drainage of loculated ascites; I discussed w/ Dr. Butler and will be technically difficult to access that particular fluid collection  - Consulted Surgery; Likely poor surgical candidate, recs pending  - Pt slightly more acidotic on ABG today, Pc02 slightly up, RR 28 but have increased PEEP and repeat pending  - GOC Conversations ongoing, awaiting more family to come in but appear to be leaning toward extubation and comfort measures, WBC count worsening and lactate improves w/ dialysis but remains critically elevated, remains  anuric, LFTs still elevated, HR trending up, BP trending down even w/ pressors.    #Anuric KODI 2/2 ATN/Sepsis c/b AGMA   - Baseline Cr 0.7-0.9, Cr on presentation 1.95=>2.10=>2.27, became oliguric and now anuric, did not respond to albumin challenge, suspect ATN/Sepsis  - CT abdomen/pelvis did not reveal urinary obstruction.  - Consulted Surgery;  Placed TDC on 2/4  - Consulted Nephrology; Following for HD/SLED needs    #Stage 4 colon cancer with bulky lymphadenopathy and widely disseminated liver metastases and adrenal masses s/p diverting loop ileostomy   - Scheduled to f/u with oncology at  on 2/3. Expected to start chemo soon. Suspect treatments would be palliative in nature.   - Underwent diverting loop ileostomy at  on 1/17  - CT Abd/Pelvis 2/2 showed extensive hepatic mets, ill-defined cecal mass involving surrounding mesentery, mesenteric LAD, small-mod ascites, stable low density mass L adrenal gland.  - Repeat CT Abd/Pelvis 2/4 showed extensive metastatic dz involving liver and peritoneum w/ omental caking, metastatic adenopathy RLQ and upper RP space, abnormal thickening R colon wall of terminal ileum, complex ascites that may represent malignant ascites  - Consulted Hematology/Oncology; Discussed w/ family that patient's disease is non curable, discussed overall prognosis and appreciate their input and conversations w/ family.    #Severe Leukocytosis   - WBC count up to 42K, treatments as per above    #Mild Rhabdomyolysis  - CK elevated, fluids as per above    #Normocytic anemia   - Hemoglobin has been trending down. 10 on 1/2/20  - 8.8=>7.9=>6.6, s/p 1 unit of pRBC  - No signs of active bleeding, BUN/creatinine not elevated, suspect  hemoconcentrated on admission and fluids have diluted   - Iron studies consistent with SNEHAL and AOCD, B12 and folate normal.   - Continue IV PPI, monitor for signs of bleeding    #Respiratory Alkalosis  - Likely 2/2 above AGMA, compensatory, address as per  above    #Euthyroid  - TSH 9, free T4 normal, NTD    #Hypomagnesemia   - Replaced    #Diverting loop ileostomy  - Performed 1/17 at  for SBO    #Morbid Obesity  - BMI 40, complicates all aspects of care.    F: Oral  E: Monitor & Replace PRN  N: TFs  Ppx: SQH  Code: Full Code    Dispo: Pending GOC conversations.     *This patient is considered high risk due to septic shock, KODI, underlying colon cancer    *I have spent 30min of critical care time (8:00-8:30AM) of direct patient care, evaluating the patient, managing pressors, sedation medications and ventilator settings, coordinating life supporting care and management plans w/ nephrology and ID, continuing GOC conversations w/ family which are still ongoing.    VTE Prophylaxis:   Mechanical Order History:      Ordered        02/07/20 1014  Place Sequential Compression Device  Once         02/07/20 1014  Maintain Sequential Compression Device  Continuous                 Pharmalogical Order History:     Ordered     Dose Route Frequency Stop    02/04/20 1353  heparin (porcine) 5000 UNIT/ML injection  Status:  Discontinued      -- -- As Needed 02/04/20 1459    02/01/20 1455  heparin (porcine) 5000 UNIT/ML injection 5,000 Units  Status:  Discontinued      5,000 Units SC Every 8 Hours Scheduled 02/07/20 1014    02/01/20 0309  enoxaparin (LOVENOX) syringe 40 mg  Status:  Discontinued      40 mg SC Every 24 Hours 02/01/20 1455        Saad Garcia MD  [facility]  02/09/20  11:11 AM

## 2020-02-09 NOTE — PROGRESS NOTES
PROGRESS NOTE         Patient Identification:  Name:  Gracy Norman  Age:  44 y.o.  Sex:  female  :  1975  MRN:  [id number]  Visit Number:  [id number]  Primary Care Provider:  Almas Stone MD         LOS: 9 days       ----------------------------------------------------------------------------------------------------------------------  Subjective       Chief Complaints:    Weakness - Generalized and Post-op Problem    Interval History:      Patient remains intubated and sedated at 30% FiO2 via ET tube.  Patient continues to require Levophed and vasopressin for blood pressure support.  WBC worsening at 50.27.  CRP improving at 20.16.  Lactic acid slightly improved at 7.6.    Review of Systems:    Unable to obtain, intubated and sedated.  ----------------------------------------------------------------------------------------------------------------------      Objective       Current Hospital Meds:    chlorhexidine 15 mL Mouth/Throat Q12H   hydrocortisone sodium succinate 50 mg Intravenous Q6H   insulin aspart 0-9 Units Subcutaneous Q6H   meropenem 500 mg Intravenous Q24H   micafungin (MYCAMINE)  mg Intravenous Q24H   trace minerals + multivitamin IVPB  Intravenous Once per day on    pantoprazole 40 mg Intravenous Q12H   phytonadione (VITAMIN K) IVPB 10 mg Intravenous Daily   sodium chloride 1,000 mL Intravenous Once   sodium chloride 1,000 mL Intravenous Once   sodium chloride 10 mL Intravenous Q12H   sodium chloride 10 mL Intravenous Q12H   sodium chloride 10 mL Intravenous Q12H   sodium chloride 10 mL Intravenous Q12H   sodium chloride 10 mL Intravenous Q12H   sodium chloride 10 mL Intravenous Q12H   thiamine (VITAMIN B1) IVPB 500 mg Intravenous Q8H   Vancomycin Pharmacy Intermittent Dosing  Does not apply Daily       Adult [facility] TPN  Last Rate: 60 mL/hr at 20 1807   fentaNYL Citrate     norepinephrine 0.02-0.3 mcg/kg/min Last Rate: 0.06 mcg/kg/min  (02/09/20 1150)   propofol 5-50 mcg/kg/min Last Rate: 40 mcg/kg/min (02/09/20 1058)   sodium bicarbonate drip (greater than 75 mEq/bag) 150 mEq Last Rate: 150 mEq (02/09/20 0841)   vasopressin (PITRESSIN) infusion 0.04 Units/min Last Rate: 0.04 Units/min (02/09/20 0839)     ----------------------------------------------------------------------------------------------------------------------    Vital Signs:  Temp:  [98.5 °F (36.9 °C)-100.9 °F (38.3 °C)] 100.9 °F (38.3 °C)  Heart Rate:  [] 130  Resp:  [24-33] 33  BP: ()/() 114/82  FiO2 (%):  [30 %] 30 %  Mean Arterial Pressure (Non-Invasive) for the past 24 hrs (Last 3 readings):   Noninvasive MAP (mmHg)   02/09/20 1145 94   02/09/20 1130 90   02/09/20 1115 72     SpO2 Percentage    02/09/20 1115 02/09/20 1130 02/09/20 1145   SpO2: 92% 91% 90%     SpO2:  [90 %-100 %] 90 %  on   ;   Device (Oxygen Therapy): ventilator    Body mass index is 40.65 kg/m².  Wt Readings from Last 3 Encounters:   02/06/20 104 kg (229 lb 8 oz)   01/12/20 92.1 kg (203 lb)   01/09/20 91.6 kg (202 lb)        Intake/Output Summary (Last 24 hours) at 2/9/2020 1343  Last data filed at 2/9/2020 1205  Gross per 24 hour   Intake 4605.13 ml   Output 4000 ml   Net 605.13 ml     NPO Diet  Adult [facility] TPN  ----------------------------------------------------------------------------------------------------------------------      Physical Exam:    Constitutional:  Intubated, sedated   HENT:  Head: Normocephalic and atraumatic.  Mouth:  Moist mucous membranes.    Eyes:  Conjunctivae and EOM are normal.  No scleral icterus.  Neck:  Neck supple.  No JVD present.     Cardiovascular:  tachycardic, regular rhythm and normal heart sounds with no murmur. No edema.  Pulmonary/Chest:  tachypneic, no wheezes, no crackles, with normal breath sounds and good air movement.  Abdominal: Distended and tense with diffuse tenderness. Ileostomy.   Musculoskeletal:  No edema, no tenderness, and no  deformity.  No swelling or redness of joints.  Neurological:  Sedated  Skin:  Skin is warm and dry.  No rash noted.  No pallor.   Psychiatric:  Unable to assess    ----------------------------------------------------------------------------------------------------------------------  Results from last 7 days   Lab Units 02/04/20  1932   CK TOTAL U/L 297*     Results from last 7 days   Lab Units 02/03/20  0112   PROBNP pg/mL 1,467.0*     Results from last 7 days   Lab Units 02/06/20  1117   TRIGLYCERIDES mg/dL 481*     Results from last 7 days   Lab Units 02/09/20  1042   PH, ARTERIAL pH units 7.329*   PO2 ART mm Hg 85.2   PCO2, ARTERIAL mm Hg 44.1   HCO3 ART mmol/L 23.2     Results from last 7 days   Lab Units 02/09/20  0632 02/09/20  0113 02/08/20  2321 02/08/20  2103  02/08/20  0351  02/07/20  0820 02/07/20  0057  02/05/20  1040   CRP mg/dL  --  20.16*  --   --   --  22.31*  --   --  24.06*   < >  --    LACTATE mmol/L 7.6*  --  8.4* 8.3*   < > 9.9*   < >  --  8.3*   < > 5.4*   WBC 10*3/mm3  --  50.27*  --   --   --  49.47*  --   --  40.02*   < >  --    HEMOGLOBIN g/dL  --  8.3*  --   --   --  8.8*  --   --  8.0*   < >  --    HEMATOCRIT %  --  27.0*  --   --   --  28.9*  --   --  26.3*   < >  --    MCV fL  --  83.3  --   --   --  82.8  --   --  82.2   < >  --    MCHC g/dL  --  30.7*  --   --   --  30.4*  --   --  30.4*   < >  --    PLATELETS 10*3/mm3  --  40*  --   --   --  56*  --   --  67*   < >  --    INR   --   --   --   --   --   --   --  1.12*  --   --  1.88*    < > = values in this interval not displayed.     Results from last 7 days   Lab Units 02/09/20  0113 02/08/20  1549 02/08/20  0351 02/07/20  0057   SODIUM mmol/L 127*  --  128* 131*   POTASSIUM mmol/L 3.7 3.4* 3.0* 3.2*   MAGNESIUM mg/dL 2.0  --  2.5 1.8   CHLORIDE mmol/L 82*  --  82* 85*   CO2 mmol/L 21.9*  --  21.0* 19.9*   BUN mg/dL 21*  --  22* 20   CREATININE mg/dL 1.71*  --  1.90* 2.13*   EGFR IF NONAFRICN AM mL/min/1.73 33*  --  29* 25*      CALCIUM mg/dL 8.0*  --  8.2* 8.2*   GLUCOSE mg/dL 317*  --  304* 202*   ALBUMIN g/dL 3.93  --  4.12 4.14   BILIRUBIN mg/dL 4.6*  --  4.0* 3.9*   ALK PHOS U/L 337*  --  329* 304*   AST (SGOT) U/L 322*  --  350* 640*   ALT (SGPT) U/L 70*  --  91* 112*   Estimated Creatinine Clearance: 49.4 mL/min (A) (by C-G formula based on SCr of 1.71 mg/dL (H)).  No results found for: AMMONIA    Glucose   Date/Time Value Ref Range Status   02/09/2020 1212 274 (H) 70 - 130 mg/dL Final   02/09/2020 0500 312 (H) 70 - 130 mg/dL Final   02/09/2020 0039 308 (H) 70 - 130 mg/dL Final   02/08/2020 1812 249 (H) 70 - 130 mg/dL Final   02/08/2020 1240 187 (H) 70 - 130 mg/dL Final   02/08/2020 0528 354 (H) 70 - 130 mg/dL Final   02/08/2020 0037 367 (H) 70 - 130 mg/dL Final   02/07/2020 1740 308 (H) 70 - 130 mg/dL Final     No results found for: HGBA1C  Lab Results   Component Value Date    TSH 9.270 (H) 01/31/2020    FREET4 0.97 02/01/2020       Blood Culture   Date Value Ref Range Status   02/03/2020 No growth at less than 24 hours  Preliminary   02/03/2020 No growth at less than 24 hours  Preliminary   01/31/2020 No growth at 3 days  Preliminary   01/31/2020 No growth at 3 days  Preliminary     No results found for: URINECX  No results found for: WOUNDCX  No results found for: STOOLCX  No results found for: RESPCX  Pain Management Panel     Pain Management Panel Latest Ref Rng & Units 2/1/2020    CREATININE UR mg/dL 146.0            ----------------------------------------------------------------------------------------------------------------------  Imaging Results (Last 24 Hours)     Procedure Component Value Units Date/Time    XR Chest 1 View [[id number]] Collected:  02/09/20 1118     Updated:  02/09/20 1125    Narrative:       EXAMINATION: XR CHEST 1 VW-      CLINICAL INDICATION:     soa; A41.9-Sepsis, unspecified organism     TECHNIQUE:  XR CHEST 1 VW-      COMPARISON: 02/07/2020      FINDINGS:   ET tube with tip just below clavicles.  Left IJ deep line and Port-A-Cath  are stable. Right tunneled dialysis catheter positioning appears stable.  NG tube extends into the region of the stomach.     Lung volumes are stable. Mild increase in left basilar airspace disease.  Small pleural effusions. No pneumothorax. No acute bony findings.  Development of vascular congestion.       Impression:       1. Support devices detailed above.  2. Stable lung volumes but with increase in left basilar airspace  disease and development of pulmonary vascular congestion.  3. No significant change in small pleural effusions.     This report was finalized on 2/9/2020 11:23 AM by Dr. Bert Butler MD.             ----------------------------------------------------------------------------------------------------------------------    Assessment/Plan       Assessment/Plan     ASSESSMENT:    1.  Septic shock with lactic acid greater than 4 on admission  2.  Peritonitis     PLAN:     Patient remains intubated and sedated at 30% FiO2 via ET tube.  Patient continues to require Levophed and vasopressin for blood pressure support.  WBC worsening at 50.27.  CRP improving at 20.16.  Lactic acid slightly improved at 7.6.    CT Abd/pelvis which did not demonstrate large leak but did show extensive metastatic disease and likely metastatic ascites w/ loculated fluid collection c/f infection.      Mycoplasma negative.  Legionella negative.  Respiratory panel PCR negative.  Strep pneumo negative.  Influenza negative.  Urinalysis unremarkable. Blood cultures show no growth thus far.  Body fluid culture reports 4390 nucleated cells, Gram stain reports no organism.      Would recommend to continue Meropenem, Vancomycin per pharmacy dosing and Micafungin 100 mg IV q 24 hours.  Patient carries poor prognosis despite all appropriate interventions.    We will continue to follow culture results and CRP trend and adjust antibiotic therapy appropriately.    Current Antimicrobial:  Micafungin 100  mg IV q 24 hours  Meropenem per pharmacy dosing  Vancomycin per pharmacy dosing     Code Status:   Code Status and Medical Interventions:   Ordered at: 01/31/20 1741     Level Of Support Discussed With:    Patient     Code Status:    CPR     Medical Interventions (Level of Support Prior to Arrest):    Full       MUNIR Conner  02/09/20  1:43 PM

## 2020-02-09 NOTE — PROGRESS NOTES
Chief Complaint: Shortness of breath    Subjective     Interval History:   She is currently intubated and sedated.  She is on the rate of 20 with tidal volume of 450 with FiO2 of 30 and PEEP of 10.  Flow max of 62/min.  Peak pressure of 40 with mean pressure of 22 and plateau pressures of 33.  Minute ventilation of 10.1. Currently on TPN, IV norepinephrine.  She is currently on IV propofol and IV fentanyl.      Review of Systems:   ROS  Review of system could not be obtained as patient is currently intubated and sedated      Vital Signs  Temp:  [98.8 °F (37.1 °C)-99.1 °F (37.3 °C)] 98.9 °F (37.2 °C)  Heart Rate:  [] 124  Resp:  [24-33] 24  BP: ()/() 106/92  FiO2 (%):  [30 %] 30 %    Physical Exam:  Physical Exam  General Appearance: Intubated and sedated  Lungs: Tachypneic.  Bilateral rales noted.  No rhonchi.  No wheezing.  Heart: Tachycardic.  S1-S2 normal.  On IV vasopressors.  Abdomen: Distended.  Body wall edema.  Very sluggish bowel sounds.  Extremities: +2 dependent edema noted.  Pulses: Distal pulses are intact.  There are no decreased pulses.    Neurological intubated and sedated   Pupils:  Pupils are equal, round, and reactive to light.    Skin:  Warm and dry.       Results Review:   I reviewed the patient's new clinical results.  Results from last 7 days   Lab Units 02/08/20  0351 02/07/20 0057 02/06/20  0303   WBC 10*3/mm3 49.47* 40.02* 42.70*   HEMOGLOBIN g/dL 8.8* 8.0* 8.4*   PLATELETS 10*3/mm3 56* 67* 104*     Results from last 7 days   Lab Units 02/08/20  1549 02/08/20  0351 02/07/20 0057 02/06/20  0303  02/04/20  1932   SODIUM mmol/L  --  128* 131* 131*   < >  --    POTASSIUM mmol/L 3.4* 3.0* 3.2* 3.9   < >  --    CHLORIDE mmol/L  --  82* 85* 87*   < >  --    CO2 mmol/L  --  21.0* 19.9* 15.3*   < >  --    BUN mg/dL  --  22* 20 21*   < >  --    CREATININE mg/dL  --  1.90* 2.13* 2.39*   < >  --    CALCIUM mg/dL  --  8.2* 8.2* 7.8*   < >  --    GLUCOSE mg/dL  --  304* 202* 131*   <  >  --    MAGNESIUM mg/dL  --  2.5 1.8  --   --  2.1    < > = values in this interval not displayed.     Results from last 7 days   Lab Units 02/08/20  0351 02/07/20  0057 02/03/20  0112   ALK PHOS U/L 329* 304* 509*   BILIRUBIN mg/dL 4.0* 3.9* 1.2   ALT (SGPT) U/L 91* 112* 17   AST (SGOT) U/L 350* 640* 63*     Results from last 7 days   Lab Units 02/08/20  0828   PH, ARTERIAL pH units 7.407   PO2 ART mm Hg 107.0   PCO2, ARTERIAL mm Hg 37.3   HCO3 ART mmol/L 23.5         chlorhexidine 15 mL Mouth/Throat Q12H   hydrocortisone sodium succinate 50 mg Intravenous Q6H   insulin aspart 0-9 Units Subcutaneous Q6H   meropenem 500 mg Intravenous Q24H   micafungin (MYCAMINE)  mg Intravenous Q24H   trace minerals + multivitamin IVPB  Intravenous Once per day on Mon Wed Fri   pantoprazole 40 mg Intravenous Q12H   phytonadione (VITAMIN K) IVPB 10 mg Intravenous Daily   sodium chloride 1,000 mL Intravenous Once   sodium chloride 1,000 mL Intravenous Once   sodium chloride 10 mL Intravenous Q12H   sodium chloride 10 mL Intravenous Q12H   sodium chloride 10 mL Intravenous Q12H   sodium chloride 10 mL Intravenous Q12H   sodium chloride 10 mL Intravenous Q12H   sodium chloride 10 mL Intravenous Q12H   thiamine (VITAMIN B1) IVPB 500 mg Intravenous Q8H   Vancomycin Pharmacy Intermittent Dosing  Does not apply Daily       Adult [facility] TPN  Last Rate: 60 mL/hr at 02/08/20 1807   fentaNYL Citrate     norepinephrine 0.02-0.3 mcg/kg/min Last Rate: 0.18 mcg/kg/min (02/08/20 2201)   propofol 5-50 mcg/kg/min Last Rate: 40 mcg/kg/min (02/08/20 1806)   sodium bicarbonate drip (greater than 75 mEq/bag) 150 mEq Last Rate: 150 mEq (02/08/20 2021)   sodium chloride 250 mL/hr Last Rate: 250 mL/hr (02/06/20 1257)   vasopressin (PITRESSIN) infusion 0.04 Units/min Last Rate: Stopped (02/06/20 1305)     I reviewed the medications.      Worsening of lactic acidosis, worsening of leukocytosis.  Worsening of platelet count.Respiratory  cultures is positive for Candida.  Assessment/Plan        Central nervous system     Plan:  -On IV fentanyl drip and IV propofol drip. RASS goal: 0 to -1  - Patient does not have biot's type of breathing pattern while on opioids.   - I highly recommend avoiding nighttime disturbances and light exposure during night to maintain normal circadian rhythms to avoid hypoactive or hyperactive delirium.  - On IV thiamine         Cardiovascular system:  Septic shock     Plan:  -Goal CVP around 10 mmHg  -On IV norepinephrine.  I started IV vasopressin.  Targeting mean atrial pressure of 65 mmHg   -Antibiotics: On IV meropenem, IV vancomycin and IV micafungin.  -Monitoring lactate trend.  -On IV hydrocortisone 50 mg every 6 hours            Respiratory system:  Acute hypoxic respiratory failure on mechanical ventilator  Bilateral lower lobe pneumonia, due to unspecified organism   Bilateral pleural effusions KODI likely septic ATN     Plan:  I have decreased the tidal volume to 330 mL.  Keeping the rate of 28 with FiO2 of 30 and PEEP of 5.  We will repeat the ABG in 30 minutes. The peak pressure is 32 with a plateau pressure of 27 and minute ventilation of 9.3.  No auto PEEP noted. Auto triggering is absent.  Missed triggering is absent.  Double triggering is absent.  Active expiration is absent.  -Head of the bed elevated at 30 degrees  -Mouth care with oral chlorhexidine   On IV meropenem, IV vancomycin and IV micafungin.            Liver, gallbladder, Pancreas and gastrointestinal system:  Culture-negative peritonitis with concern for intra-abdominal abscess versus leak  Stage IV poorly differentiated adenocarcinoma of the colon with liver mets, possible splenic mass, and adrenal mass status post diverting loop ileostomy     Plan:  -General surgery and IR involved by primary team for paracentesis of loculated effusion.  Poor surgical candidate.  High risk versus benefit ratio due to difficult location.  - Continue with PPI  for ulcer prophylaxis.  -On TPN  Dietary Orders (From admission, onward)     Start     Ordered    02/06/20 1055  NPO Diet  Diet Effective Now      02/06/20 1058                        Genitourinary system:   Oliguric KODI likely septic ATN  High anion gap metabolic acidosis due to lactic acidosis and acute kidney injury     Plan   -Avoiding nephrotoxic agent.  -Nephrology is on board for IHD/SLED  -On bicarb drip.            Endocrine system:     Plan:  - Continue with correctional insulin therapy  -Goal serum glucose of 140 to 180 mg/dL while in ICU.            Infectious disease system:  Severe sepsis related to bilateral lower lobe pneumonia due to unspecified organism  Culture-negative peritonitis with concern for intra-abdominal abscess versus leak     Plan:  - Current antibiotics: On IV meropenem, IV vancomycin and IV micafungin  - Cultures:   Following blood culture results  Respiratory culture showed scant candida  Stool culture is also pending.             Hematological system:  Leukocytosis  Anemia  Thrombocytopenia     Plan   - I recommend PRBC transfusion if hemoglobin is less than 7 g/dL unless active bleeding or cardiac ischemia.  -On SCDs for DVT prophylaxis  -Follow heparin induced platelet antibody  -peripheral blood smear showed thrombocytopenia and normocytic anemia without platelet clumping.  -On vitamin K daily            Rheumatological and dermatological system:     Plan:  - Turn the patient every 2 hours to prevent pressure ulcers.  -Wound care team involvement as per primary team.            Activity:  - I recommend aggressive PT/OT while in hospital to avoid critical care neuropathy/myopathy.      Very poor prognosis.    The clinical plan was discussed extensively with the nurse, and respiratory therapist.   Critical Care time spent in direct patient care: 34 minutes (excluding procedure time).  Patient is critically ill and is at higher risk for further mortality/morbidity.              Henry Garcia MD

## 2020-02-09 NOTE — PLAN OF CARE
Problem: Patient Care Overview  Goal: Plan of Care Review  Outcome: Ongoing (interventions implemented as appropriate)  Flowsheets (Taken 2/9/2020 1500)  Plan of Care Reviewed With: family  Note:   Patient remains sedated on vent; family awaiting other family members to arrive possibly tomorrow for terminal extubation; patient will remain a full code until other wise; provide active listening to family

## 2020-02-09 NOTE — PROGRESS NOTES
Nephrology Progress Note      Subjective     Pt is intubated on MV, continue to be in sever septic shock on pressors    Objective       Vital signs :     Temp:  [98.5 °F (36.9 °C)-99.1 °F (37.3 °C)] 99 °F (37.2 °C)  Heart Rate:  [] 120  Resp:  [24-33] 30  BP: ()/() 117/51  FiO2 (%):  [30 %] 30 %      Intake/Output Summary (Last 24 hours) at 2/9/2020 0749  Last data filed at 2/9/2020 0317  Gross per 24 hour   Intake 4505.13 ml   Output 4000 ml   Net 505.13 ml       Physical Exam:    General Appearance : Intubated on MV  Lungs : B/L lower zone crackles with decreased intensity of breath sounds  Heart :  regular rhythm & normal rate, normal S1, S2 and no murmur, no rub  Abdomen : normal bowel sounds, no masses, no hepatomegaly, no splenomegaly, soft non-tender and no guarding  Extremities : moves extremities well, 2+ edema, no cyanosis and no redness  Neurologic :   Intubated on MV  Acess :       Laboratory Data :     Albumin Albumin   Date Value Ref Range Status   02/09/2020 3.93 3.50 - 5.20 g/dL Final   02/08/2020 4.12 3.50 - 5.20 g/dL Final   02/07/2020 4.14 3.50 - 5.20 g/dL Final      Magnesium Magnesium   Date Value Ref Range Status   02/09/2020 2.0 1.6 - 2.6 mg/dL Final   02/08/2020 2.5 1.6 - 2.6 mg/dL Final   02/07/2020 1.8 1.6 - 2.6 mg/dL Final          PTH               No results found for: PTH    CBC and coagulation:  Results from last 7 days   Lab Units 02/09/20  0632 02/09/20  0113 02/08/20  2321 02/08/20  2103  02/08/20  0351  02/07/20  0820 02/07/20  0057  02/05/20  1040  02/03/20  0112   PROCALCITONIN ng/mL  --   --   --   --   --   --   --   --   --   --   --   --  4.07*   LACTATE mmol/L 7.6*  --  8.4* 8.3*   < > 9.9*   < >  --  8.3*   < > 5.4*   < > 3.1*   CRP mg/dL  --  20.16*  --   --   --  22.31*  --   --  24.06*   < >  --    < >  --    WBC 10*3/mm3  --  50.27*  --   --   --  49.47*  --   --  40.02*   < >  --    < > 28.49*   HEMOGLOBIN g/dL  --  8.3*  --   --   --  8.8*  --   --   8.0*   < >  --    < > 8.4*   HEMATOCRIT %  --  27.0*  --   --   --  28.9*  --   --  26.3*   < >  --    < > 28.6*   MCV fL  --  83.3  --   --   --  82.8  --   --  82.2   < >  --    < > 82.4   MCHC g/dL  --  30.7*  --   --   --  30.4*  --   --  30.4*   < >  --    < > 29.4*   PLATELETS 10*3/mm3  --  40*  --   --   --  56*  --   --  67*   < >  --    < > 206   INR   --   --   --   --   --   --   --  1.12*  --   --  1.88*  --   --     < > = values in this interval not displayed.     Acid/base balance:  Results from last 7 days   Lab Units 02/08/20  2321 02/08/20  0828 02/07/20  1202   PH, ARTERIAL pH units 7.353 7.407 7.441   PO2 ART mm Hg 79.1* 107.0 102.0   PCO2, ARTERIAL mm Hg 41.5 37.3 35.2   HCO3 ART mmol/L 23.1 23.5 23.9     Renal and electrolytes:  Results from last 7 days   Lab Units 02/09/20  0113 02/08/20  1549 02/08/20  0351 02/07/20  1042 02/07/20  0057 02/06/20  0303 02/05/20  0222 02/04/20  1932   SODIUM mmol/L 127*  --  128*  --  131* 131* 133*  --    POTASSIUM mmol/L 3.7 3.4* 3.0*  --  3.2* 3.9 4.3  --    MAGNESIUM mg/dL 2.0  --  2.5  --  1.8  --   --  2.1   CHLORIDE mmol/L 82*  --  82*  --  85* 87* 90*  --    CO2 mmol/L 21.9*  --  21.0*  --  19.9* 15.3* 18.4*  --    BUN mg/dL 21*  --  22*  --  20 21* 16  --    CREATININE mg/dL 1.71*  --  1.90*  --  2.13* 2.39* 2.40*  --    EGFR IF NONAFRICN AM mL/min/1.73 33*  --  29*  --  25* 22* 22*  --    CALCIUM mg/dL 8.0*  --  8.2*  --  8.2* 7.8* 7.8*  --    IONIZED CALCIUM mmol/L  --   --   --  1.00*  --   --   --  1.08*   PHOSPHORUS mg/dL 3.3 1.8* 1.6*  --  2.2*  --   --  3.6     Estimated Creatinine Clearance: 49.4 mL/min (A) (by C-G formula based on SCr of 1.71 mg/dL (H)).    Liver and pancreatic function:  Results from last 7 days   Lab Units 02/09/20  0113 02/08/20  0351 02/07/20  0057 02/04/20  1932   ALBUMIN g/dL 3.93 4.12 4.14  --    BILIRUBIN mg/dL 4.6* 4.0* 3.9*  --    ALK PHOS U/L 337* 329* 304*  --    AST (SGOT) U/L 322* 350* 640*  --    ALT (SGPT) U/L  70* 91* 112*  --    AMYLASE U/L  --   --   --  61   LIPASE U/L  --   --   --  126*         Cardiac:  Results from last 7 days   Lab Units 02/03/20  0112   PROBNP pg/mL 1,467.0*     Liver and pancreatic function:  Results from last 7 days   Lab Units 02/09/20  0113 02/08/20  0351 02/07/20  0057 02/04/20  1932   ALBUMIN g/dL 3.93 4.12 4.14  --    BILIRUBIN mg/dL 4.6* 4.0* 3.9*  --    ALK PHOS U/L 337* 329* 304*  --    AST (SGOT) U/L 322* 350* 640*  --    ALT (SGPT) U/L 70* 91* 112*  --    AMYLASE U/L  --   --   --  61   LIPASE U/L  --   --   --  126*       Medications :       chlorhexidine 15 mL Mouth/Throat Q12H   hydrocortisone sodium succinate 50 mg Intravenous Q6H   insulin aspart 0-9 Units Subcutaneous Q6H   meropenem 500 mg Intravenous Q24H   micafungin (MYCAMINE)  mg Intravenous Q24H   trace minerals + multivitamin IVPB  Intravenous Once per day on Mon Wed Fri   pantoprazole 40 mg Intravenous Q12H   phytonadione (VITAMIN K) IVPB 10 mg Intravenous Daily   sodium chloride 1,000 mL Intravenous Once   sodium chloride 1,000 mL Intravenous Once   sodium chloride 10 mL Intravenous Q12H   sodium chloride 10 mL Intravenous Q12H   sodium chloride 10 mL Intravenous Q12H   sodium chloride 10 mL Intravenous Q12H   sodium chloride 10 mL Intravenous Q12H   sodium chloride 10 mL Intravenous Q12H   thiamine (VITAMIN B1) IVPB 500 mg Intravenous Q8H   Vancomycin Pharmacy Intermittent Dosing  Does not apply Daily       Adult Central Clinimix TPN  Last Rate: 60 mL/hr at 02/08/20 1807   fentaNYL Citrate     norepinephrine 0.02-0.3 mcg/kg/min Last Rate: 0.08 mcg/kg/min (02/09/20 0500)   propofol 5-50 mcg/kg/min Last Rate: 30 mcg/kg/min (02/09/20 0643)   sodium bicarbonate drip (greater than 75 mEq/bag) 150 mEq Last Rate: 150 mEq (02/08/20 2021)   vasopressin (PITRESSIN) infusion 0.04 Units/min Last Rate: 0.04 Units/min (02/08/20 1647)         Assessment/Plan     1. Sever sepsis with septic shock likely from peritonitis  2.  Metastatic poorly differentiated adenocarcinoma of colon origin s/p diverting loop ileostomy on 1/17/2020  3. KODI  4. AG metabolic acidosis due to lactic acidosis, Non Gap MA likely 2/2 Type IV RTA with KODI    Pt condition remains critical with poor prognosis, currently ongoing discussion for comfort care. There was no significant improvement on RRT with SLED with sever persistent metabolic acidosis.    KODI likely ATN, oliguric due to septic shock  Pt was started on dialysis due to worsening volume overload leading to hypoxic respiratory failure in setting of oliguic KODI. Baseline Cr 1  -will do dialysis again tomorrow if no decision about goals of care.   -electrolyte replacement per protocol    D/W with Dr Jose Metzger MD  02/09/20  7:49 AM

## 2020-02-09 NOTE — PLAN OF CARE
Problem: Patient Care Overview  Goal: Plan of Care Review  Outcome: Ongoing (interventions implemented as appropriate)  Goal: Individualization and Mutuality  Outcome: Ongoing (interventions implemented as appropriate)  Goal: Discharge Needs Assessment  Outcome: Ongoing (interventions implemented as appropriate)  Goal: Interprofessional Rounds/Family Conf  Outcome: Ongoing (interventions implemented as appropriate)     Problem: Fall Risk (Adult)  Goal: Identify Related Risk Factors and Signs and Symptoms  Outcome: Ongoing (interventions implemented as appropriate)  Goal: Absence of Fall  Outcome: Ongoing (interventions implemented as appropriate)     Problem: Skin Injury Risk (Adult)  Goal: Identify Related Risk Factors and Signs and Symptoms  Outcome: Ongoing (interventions implemented as appropriate)  Goal: Skin Health and Integrity  Outcome: Ongoing (interventions implemented as appropriate)     Problem: Sepsis/Septic Shock (Adult)  Goal: Signs and Symptoms of Listed Potential Problems Will be Absent, Minimized or Managed (Sepsis/Septic Shock)  Outcome: Ongoing (interventions implemented as appropriate)     Problem: Ventilation, Mechanical Invasive (Adult)  Goal: Signs and Symptoms of Listed Potential Problems Will be Absent, Minimized or Managed (Ventilation, Mechanical Invasive)  Outcome: Ongoing (interventions implemented as appropriate)     Problem: Hemodialysis (Adult)  Goal: Signs and Symptoms of Listed Potential Problems Will be Absent, Minimized or Managed (Hemodialysis)  Outcome: Ongoing (interventions implemented as appropriate)

## 2020-02-09 NOTE — PAYOR COMM NOTE
"[facility]   ANNIE GODOY  PHONE  [phone]  FAX  [phone]  NPI:  [id number]    CLINICAL UPDATE    Matt Norman (44 y.o. Female)     Date of Birth Social Security Number Address Home Phone MRN    1975  [address] [place] [address]  [id number]    [employer] Marital Status          None        Admission Date Admission Type Admitting Provider Attending Provider Department, Room/Bed    1/31/20 Emergency Denny Coy MD Parks, Andrew, MD [facility] CRITICAL CARE, CC10/    Discharge Date Discharge Disposition Discharge Destination                       Attending Provider:  Saad Garcia MD    Allergies:  Bactrim [Sulfamethoxazole-trimethoprim], Metronidazole, Sulfa Antibiotics    Isolation:  None   Infection:  None   Code Status:  CPR    Ht:  160 cm (63\")   Wt:  104 kg (229 lb 8 oz)    Admission Cmt:  None   Principal Problem:  Sepsis (CMS/[facility]) [A41.9]                 Active Insurance as of 1/31/2020     Primary Coverage     Payor Plan Insurance Group Employer/Plan Group    P[employer]ORT [employer] PLAN PASSPORT [id number]     Payor Plan Address Payor Plan Phone Number Payor Plan Fax Number Effective Dates    [address] [phone]  11/1/1997 - None Entered    [facility] [address]       Subscriber Name Subscriber Birth Date Member ID       MATT NORMAN [birth year]                 Emergency Contacts      (Rel.) Home Phone Work Phone Mobile Phone    Danilo Norman (Spouse) -- [phone]              Current Facility-Administered Medications   Medication Dose Route Frequency Provider Last Rate Last Dose   • acetaminophen (TYLENOL) tablet 650 mg  650 mg Oral Q6H PRN Saad Garcia MD   650 mg at 02/04/20 2330   • Adult [facility] TPN   Intravenous Q24H Henry Garcia MD 60 mL/hr at 02/08/20 1807      And   • multiple vitamin 10 mL, trace elements Cr-Cu-Mn-Zn 5 mL in sodium chloride 0.9 % 500 mL IVPB   Intravenous Once per day on Mon Wed " Fri Henry Garcia .8 mL/hr at 02/07/20 0843     • albumin human 25 % IV SOLN 25 g  25 g Intravenous PRN Alycia Metzger MD       • artificial tears ophthalmic ointment   Both Eyes Q1H PRN Saad Garcia MD       • chlorhexidine (PERIDEX) 0.12 % solution 15 mL  15 mL Mouth/Throat Q12H Saad Garcia MD   15 mL at 02/09/20 0836   • diazePAM (VALIUM) tablet 2.5 mg  2.5 mg Oral Q8H PRN Sophie Grimes MD       • fentaNYL (SUBLIMAZE) PCA 1500 mcg/30 mL syringe   Intravenous Continuous Saad Garcia MD       • heparin injection 500 Units  5 mL Intravenous PRN Sophie Grimes MD       • hydrocortisone sodium succinate (Solu-CORTEF) injection 50 mg  50 mg Intravenous Q6H Henry Garcia MD   50 mg at 02/09/20 0837   • HYDROmorphone (DILAUDID) injection 1 mg  1 mg Intravenous Q2H PRN Sophie Grimes MD   1 mg at 02/04/20 0615   • insulin aspart (novoLOG) injection 0-9 Units  0-9 Units Subcutaneous Q6H Nikki Myers PA-C   4 Units at 02/09/20 1219   • Magnesium Sulfate 2 gram Bolus, followed by 8 gram infusion (total Mg dose 10 grams)- Mg less than or equal to 1mg/dL  2 g Intravenous PRN Sophie Grimes MD        Or   • Magnesium Sulfate 2 gram / 50mL Infusion (GIVE X 3 BAGS TO EQUAL 6GM TOTAL DOSE) - Mg 1.1 - 1.5 mg/dl  2 g Intravenous PRN Sophie Grimes MD        Or   • Magnesium Sulfate 4 gram infusion- Mg 1.6-1.9 mg/dL  4 g Intravenous PRN Sophie Grimes MD       • meropenem (MERREM) 500mg/100 mL 0.9% NS IVPB (mbp)  500 mg Intravenous Q24H Keyona Donohue MD   500 mg at 02/08/20 1433   • micafungin sodium (MYCAMINE) 100 mg in sodium chloride 0.9 % 100 mL IVPB  100 mg Intravenous Q24H Sophie Grimes MD   100 mg at 02/09/20 1204   • midazolam (VERSED) injection 2 mg  2 mg Intravenous Q1H PRN Saad Garcia MD   2 mg at 02/06/20 0722   • norepinephrine (LEVOPHED) 8,000 mcg in sodium chloride 0.9 % 250 mL (32 mcg/mL) infusion  0.02-0.3 mcg/kg/min Intravenous Titrated Jose  MD Henry 15.45 mL/hr at 02/09/20 1230 0.08 mcg/kg/min at 02/09/20 1230   • ondansetron (ZOFRAN) injection 4 mg  4 mg Intravenous Q6H PRN Sophie Grimes MD   4 mg at 02/02/20 1506   • oxyCODONE (ROXICODONE) immediate release tablet 5 mg  5 mg Oral Q6H PRN Sophie Grimes MD   5 mg at 02/04/20 0136   • pantoprazole (PROTONIX) injection 40 mg  40 mg Intravenous Q12H Saad Garcia MD   40 mg at 02/09/20 0837   • phytonadione (AQUA-MEPHYTON, VITAMIN K) 10 mg in sodium chloride 0.9 % 50 mL IVPB  10 mg Intravenous Daily Henry Garcia MD 50 mL/hr at 02/09/20 1419 10 mg at 02/09/20 1419   • potassium phosphate 45 mmol in sodium chloride 0.9 % 250 mL infusion  45 mmol Intravenous PRN Henry Garcia MD        Or   • potassium phosphate 30 mmol in sodium chloride 0.9 % 100 mL infusion  30 mmol Intravenous PRN Henry Garcia MD        Or   • potassium phosphate 15 mmol in sodium chloride 0.9 % 100 mL infusion  15 mmol Intravenous PRN Henry Garcia MD        Or   • sodium phosphates 45 mmol in sodium chloride 0.9 % 250 mL IVPB  45 mmol Intravenous PRN Henry Garcia MD        Or   • sodium phosphates 30 mmol in sodium chloride 0.9 % 100 mL IVPB  30 mmol Intravenous PRN Henry Garcia MD        Or   • sodium phosphates 15 mmol in sodium chloride 0.9 % 100 mL IVPB  15 mmol Intravenous PRN Henry Garcia MD       • propofol (DIPRIVAN) infusion 10 mg/mL 100 mL  5-50 mcg/kg/min Intravenous Titrated Henry Garcia MD 24.7 mL/hr at 02/09/20 1418 40 mcg/kg/min at 02/09/20 1418   • sodium bicarbonate 8.4 % 150 mEq in dextrose (D5W) 5 % 1,000 mL infusion (greater than 75 mEq)  150 mEq Intravenous Continuous Henry Garcia MD 75 mL/hr at 02/09/20 0841 150 mEq at 02/09/20 0841   • sodium chloride 0.9 % bolus 1,000 mL  1,000 mL Intravenous Once Sophie Grimes MD       • sodium chloride 0.9 % bolus 1,000 mL  1,000 mL Intravenous Once Henry Garcia MD       • sodium chloride 0.9 % flush 10 mL  10  mL Intravenous PRN Sophie Grimes MD       • sodium chloride 0.9 % flush 10 mL  10 mL Intravenous Q12H Sophie Grimes MD   10 mL at 20 0838   • sodium chloride 0.9 % flush 10 mL  10 mL Intravenous PRN Sophie Grimes MD       • sodium chloride 0.9 % flush 10 mL  10 mL Intravenous Q12H Sophie Grimes MD   10 mL at 20 0838   • sodium chloride 0.9 % flush 10 mL  10 mL Intravenous PRN Sophie Grimes MD       • sodium chloride 0.9 % flush 10 mL  10 mL Intravenous Q12H Sophie Grimes MD   10 mL at 20 0838   • sodium chloride 0.9 % flush 10 mL  10 mL Intravenous PRN Sophie Grimes MD       • sodium chloride 0.9 % flush 10 mL  10 mL Intravenous Q12H Sophie Grimes MD   10 mL at 20 0838   • sodium chloride 0.9 % flush 10 mL  10 mL Intravenous Q12H Sophie Grimes MD   10 mL at 20 0837   • sodium chloride 0.9 % flush 10 mL  10 mL Intravenous Q12H Sophie Grimes MD   10 mL at 20 0837   • sodium chloride 0.9 % flush 10 mL  10 mL Intravenous PRN Sophie Grimes MD       • sodium chloride 0.9 % flush 20 mL  20 mL Intravenous PRN Sophie Grimes MD       • sodium chloride 0.9 % flush 20 mL  20 mL Intravenous PRN Sophie Grimes MD       • thiamine (B-1) 500 mg in sodium chloride 0.9 % 100 mL IVPB  500 mg Intravenous Q8H Henry Garcia  mL/hr at 20 1205 500 mg at 20 1205   • Vancomycin Pharmacy Intermittent Dosing   Does not apply Daily Sophie Grimes MD       • Vasopressin (PITRESSIN) 20 Units in sodium chloride 0.9 % 100 mL (0.2 Units/mL) infusion  0.04 Units/min Intravenous Continuous Henry Garcia MD 12 mL/hr at 20 0839 0.04 Units/min at 20 0839        Physician Progress Notes (last 48 hours) (Notes from 20 1450 through 20 1450)      Noelle Helton APRN at 20 1343                     PROGRESS NOTE         Patient Identification:  Name:  Gracy Norman  Age:  44 y.o.  Sex:  female  :   1975  MRN:  [id number]  Visit Number:  [id number]  Primary Care Provider:  Almas Stone MD         LOS: 9 days       ----------------------------------------------------------------------------------------------------------------------  Subjective       Chief Complaints:    Weakness - Generalized and Post-op Problem    Interval History:      Patient remains intubated and sedated at 30% FiO2 via ET tube.  Patient continues to require Levophed and vasopressin for blood pressure support.  WBC worsening at 50.27.  CRP improving at 20.16.  Lactic acid slightly improved at 7.6.    Review of Systems:    Unable to obtain, intubated and sedated.  ----------------------------------------------------------------------------------------------------------------------      Objective       Current Hospital Meds:    chlorhexidine 15 mL Mouth/Throat Q12H   hydrocortisone sodium succinate 50 mg Intravenous Q6H   insulin aspart 0-9 Units Subcutaneous Q6H   meropenem 500 mg Intravenous Q24H   micafungin (MYCAMINE)  mg Intravenous Q24H   trace minerals + multivitamin IVPB  Intravenous Once per day on Mon Wed Fri   pantoprazole 40 mg Intravenous Q12H   phytonadione (VITAMIN K) IVPB 10 mg Intravenous Daily   sodium chloride 1,000 mL Intravenous Once   sodium chloride 1,000 mL Intravenous Once   sodium chloride 10 mL Intravenous Q12H   sodium chloride 10 mL Intravenous Q12H   sodium chloride 10 mL Intravenous Q12H   sodium chloride 10 mL Intravenous Q12H   sodium chloride 10 mL Intravenous Q12H   sodium chloride 10 mL Intravenous Q12H   thiamine (VITAMIN B1) IVPB 500 mg Intravenous Q8H   Vancomycin Pharmacy Intermittent Dosing  Does not apply Daily       Adult [facility] TPN  Last Rate: 60 mL/hr at 02/08/20 1807   fentaNYL Citrate     norepinephrine 0.02-0.3 mcg/kg/min Last Rate: 0.06 mcg/kg/min (02/09/20 1150)   propofol 5-50 mcg/kg/min Last Rate: 40 mcg/kg/min (02/09/20 1058)   sodium bicarbonate drip  (greater than 75 mEq/bag) 150 mEq Last Rate: 150 mEq (02/09/20 0841)   vasopressin (PITRESSIN) infusion 0.04 Units/min Last Rate: 0.04 Units/min (02/09/20 0839)     ----------------------------------------------------------------------------------------------------------------------    Vital Signs:  Temp:  [98.5 °F (36.9 °C)-100.9 °F (38.3 °C)] 100.9 °F (38.3 °C)  Heart Rate:  [] 130  Resp:  [24-33] 33  BP: ()/() 114/82  FiO2 (%):  [30 %] 30 %  Mean Arterial Pressure (Non-Invasive) for the past 24 hrs (Last 3 readings):   Noninvasive MAP (mmHg)   02/09/20 1145 94   02/09/20 1130 90   02/09/20 1115 72     SpO2 Percentage    02/09/20 1115 02/09/20 1130 02/09/20 1145   SpO2: 92% 91% 90%     SpO2:  [90 %-100 %] 90 %  on   ;   Device (Oxygen Therapy): ventilator    Body mass index is 40.65 kg/m².  Wt Readings from Last 3 Encounters:   02/06/20 104 kg (229 lb 8 oz)   01/12/20 92.1 kg (203 lb)   01/09/20 91.6 kg (202 lb)        Intake/Output Summary (Last 24 hours) at 2/9/2020 1343  Last data filed at 2/9/2020 1205  Gross per 24 hour   Intake 4605.13 ml   Output 4000 ml   Net 605.13 ml     NPO Diet  Adult [facility] TPN  ----------------------------------------------------------------------------------------------------------------------      Physical Exam:    Constitutional:  Intubated, sedated   HENT:  Head: Normocephalic and atraumatic.  Mouth:  Moist mucous membranes.    Eyes:  Conjunctivae and EOM are normal.  No scleral icterus.  Neck:  Neck supple.  No JVD present.     Cardiovascular:  tachycardic, regular rhythm and normal heart sounds with no murmur. No edema.  Pulmonary/Chest:  tachypneic, no wheezes, no crackles, with normal breath sounds and good air movement.  Abdominal: Distended and tense with diffuse tenderness. Ileostomy.   Musculoskeletal:  No edema, no tenderness, and no deformity.  No swelling or redness of joints.  Neurological:  Sedated  Skin:  Skin is warm and dry.  No rash  noted.  No pallor.   Psychiatric:  Unable to assess    ----------------------------------------------------------------------------------------------------------------------  Results from last 7 days   Lab Units 02/04/20  1932   CK TOTAL U/L 297*     Results from last 7 days   Lab Units 02/03/20  0112   PROBNP pg/mL 1,467.0*     Results from last 7 days   Lab Units 02/06/20  1117   TRIGLYCERIDES mg/dL 481*     Results from last 7 days   Lab Units 02/09/20  1042   PH, ARTERIAL pH units 7.329*   PO2 ART mm Hg 85.2   PCO2, ARTERIAL mm Hg 44.1   HCO3 ART mmol/L 23.2     Results from last 7 days   Lab Units 02/09/20  0632 02/09/20  0113 02/08/20  2321 02/08/20  2103  02/08/20  0351  02/07/20  0820 02/07/20  0057  02/05/20  1040   CRP mg/dL  --  20.16*  --   --   --  22.31*  --   --  24.06*   < >  --    LACTATE mmol/L 7.6*  --  8.4* 8.3*   < > 9.9*   < >  --  8.3*   < > 5.4*   WBC 10*3/mm3  --  50.27*  --   --   --  49.47*  --   --  40.02*   < >  --    HEMOGLOBIN g/dL  --  8.3*  --   --   --  8.8*  --   --  8.0*   < >  --    HEMATOCRIT %  --  27.0*  --   --   --  28.9*  --   --  26.3*   < >  --    MCV fL  --  83.3  --   --   --  82.8  --   --  82.2   < >  --    MCHC g/dL  --  30.7*  --   --   --  30.4*  --   --  30.4*   < >  --    PLATELETS 10*3/mm3  --  40*  --   --   --  56*  --   --  67*   < >  --    INR   --   --   --   --   --   --   --  1.12*  --   --  1.88*    < > = values in this interval not displayed.     Results from last 7 days   Lab Units 02/09/20  0113 02/08/20  1549 02/08/20  0351 02/07/20  0057   SODIUM mmol/L 127*  --  128* 131*   POTASSIUM mmol/L 3.7 3.4* 3.0* 3.2*   MAGNESIUM mg/dL 2.0  --  2.5 1.8   CHLORIDE mmol/L 82*  --  82* 85*   CO2 mmol/L 21.9*  --  21.0* 19.9*   BUN mg/dL 21*  --  22* 20   CREATININE mg/dL 1.71*  --  1.90* 2.13*   EGFR IF NONAFRICN AM mL/min/1.73 33*  --  29* 25*   CALCIUM mg/dL 8.0*  --  8.2* 8.2*   GLUCOSE mg/dL 317*  --  304* 202*   ALBUMIN g/dL 3.93  --  4.12 4.14      BILIRUBIN mg/dL 4.6*  --  4.0* 3.9*   ALK PHOS U/L 337*  --  329* 304*   AST (SGOT) U/L 322*  --  350* 640*   ALT (SGPT) U/L 70*  --  91* 112*   Estimated Creatinine Clearance: 49.4 mL/min (A) (by C-G formula based on SCr of 1.71 mg/dL (H)).  No results found for: AMMONIA    Glucose   Date/Time Value Ref Range Status   02/09/2020 1212 274 (H) 70 - 130 mg/dL Final   02/09/2020 0500 312 (H) 70 - 130 mg/dL Final   02/09/2020 0039 308 (H) 70 - 130 mg/dL Final   02/08/2020 1812 249 (H) 70 - 130 mg/dL Final   02/08/2020 1240 187 (H) 70 - 130 mg/dL Final   02/08/2020 0528 354 (H) 70 - 130 mg/dL Final   02/08/2020 0037 367 (H) 70 - 130 mg/dL Final   02/07/2020 1740 308 (H) 70 - 130 mg/dL Final     No results found for: HGBA1C  Lab Results   Component Value Date    TSH 9.270 (H) 01/31/2020    FREET4 0.97 02/01/2020       Blood Culture   Date Value Ref Range Status   02/03/2020 No growth at less than 24 hours  Preliminary   02/03/2020 No growth at less than 24 hours  Preliminary   01/31/2020 No growth at 3 days  Preliminary   01/31/2020 No growth at 3 days  Preliminary     No results found for: URINECX  No results found for: WOUNDCX  No results found for: STOOLCX  No results found for: RESPCX  Pain Management Panel     Pain Management Panel Latest Ref Rng & Units 2/1/2020    CREATININE UR mg/dL 146.0            ----------------------------------------------------------------------------------------------------------------------  Imaging Results (Last 24 Hours)     Procedure Component Value Units Date/Time    XR Chest 1 View [[id number]] Collected:  02/09/20 1118     Updated:  02/09/20 1125    Narrative:       EXAMINATION: XR CHEST 1 VW-      CLINICAL INDICATION:     soa; A41.9-Sepsis, unspecified organism     TECHNIQUE:  XR CHEST 1 VW-      COMPARISON: 02/07/2020      FINDINGS:   ET tube with tip just below clavicles. Left IJ deep line and Port-A-Cath  are stable. Right tunneled dialysis catheter positioning appears  stable.  NG tube extends into the region of the stomach.     Lung volumes are stable. Mild increase in left basilar airspace disease.  Small pleural effusions. No pneumothorax. No acute bony findings.  Development of vascular congestion.       Impression:       1. Support devices detailed above.  2. Stable lung volumes but with increase in left basilar airspace  disease and development of pulmonary vascular congestion.  3. No significant change in small pleural effusions.     This report was finalized on 2/9/2020 11:23 AM by Dr. Bert Butler MD.             ----------------------------------------------------------------------------------------------------------------------    Assessment/Plan       Assessment/Plan     ASSESSMENT:    1.  Septic shock with lactic acid greater than 4 on admission  2.  Peritonitis     PLAN:     Patient remains intubated and sedated at 30% FiO2 via ET tube.  Patient continues to require Levophed and vasopressin for blood pressure support.  WBC worsening at 50.27.  CRP improving at 20.16.  Lactic acid slightly improved at 7.6.    CT Abd/pelvis which did not demonstrate large leak but did show extensive metastatic disease and likely metastatic ascites w/ loculated fluid collection c/f infection.      Mycoplasma negative.  Legionella negative.  Respiratory panel PCR negative.  Strep pneumo negative.  Influenza negative.  Urinalysis unremarkable. Blood cultures show no growth thus far.  Body fluid culture reports 4390 nucleated cells, Gram stain reports no organism.      Would recommend to continue Meropenem, Vancomycin per pharmacy dosing and Micafungin 100 mg IV q 24 hours.  Patient carries poor prognosis despite all appropriate interventions.    We will continue to follow culture results and CRP trend and adjust antibiotic therapy appropriately.    Current Antimicrobial:  Micafungin 100 mg IV q 24 hours  Meropenem per pharmacy dosing  Vancomycin per pharmacy dosing     Code Status:    Code Status and Medical Interventions:   Ordered at: 20 2311     Level Of Support Discussed With:    Patient     Code Status:    CPR     Medical Interventions (Level of Support Prior to Arrest):    Full       MUNIR Conner  20  1:43 PM        Electronically signed by Noelle Helton APRN at 20 5276     Saad Garcia MD at 20 1111              [facility]     Patient Identification:  Name:  Gracy Norman  Age:  44 y.o.  Sex:  female  :  1975  MRN:  [id number]  Visit Number:  [id number]  ROOM: [address]     Primary Care Provider:  Almas Stone MD    Length of stay in inpatient status:  9    Subjective     Chief Compliant:    Chief Complaint   Patient presents with   • Weakness - Generalized   • Post-op Problem     History of Presenting Illness:    Patient remains critically ill, no acute events overnight, WBC count now 50K, on broad spectrum Abx per ID including antifungal coverage, lactate still elevated at 7.6, remains on pressors, Cr improved w/ dialysis but still no significant UOP, LFTs still elevated, I spoke w/ surgery regarding gallbladder disease and poor surgical candidate, formal recs pending, having GOC conversations w/ family which are ongoing, some family came in from [place] yesterday and we spoke at length about her poor prognosis, they are awaiting additional family members to come in from [place] to see but it sounds like they are leaning toward extubation and comfort measures, will continue w/ current plan of care, adjusted vent settings this AM w/ increased PEEP, will follow up later today w/ family for continued GOC conversations.  Objective     Current Hospital Meds:  chlorhexidine 15 mL Mouth/Throat Q12H   hydrocortisone sodium succinate 50 mg Intravenous Q6H   insulin aspart 0-9 Units Subcutaneous Q6H   meropenem 500 mg Intravenous Q24H   micafungin (MYCAMINE)  mg Intravenous Q24H   trace minerals +  multivitamin IVPB  Intravenous Once per day on Mon Wed Fri   pantoprazole 40 mg Intravenous Q12H   phytonadione (VITAMIN K) IVPB 10 mg Intravenous Daily   sodium chloride 1,000 mL Intravenous Once   sodium chloride 1,000 mL Intravenous Once   sodium chloride 10 mL Intravenous Q12H   sodium chloride 10 mL Intravenous Q12H   sodium chloride 10 mL Intravenous Q12H   sodium chloride 10 mL Intravenous Q12H   sodium chloride 10 mL Intravenous Q12H   sodium chloride 10 mL Intravenous Q12H   thiamine (VITAMIN B1) IVPB 500 mg Intravenous Q8H   Vancomycin Pharmacy Intermittent Dosing  Does not apply Daily     Adult [facility] TPN  Last Rate: 60 mL/hr at 02/08/20 1807   fentaNYL Citrate     norepinephrine 0.02-0.3 mcg/kg/min Last Rate: 0.08 mcg/kg/min (02/09/20 0500)   propofol 5-50 mcg/kg/min Last Rate: 40 mcg/kg/min (02/09/20 1058)   sodium bicarbonate drip (greater than 75 mEq/bag) 150 mEq Last Rate: 150 mEq (02/09/20 0841)   vasopressin (PITRESSIN) infusion 0.04 Units/min Last Rate: 0.04 Units/min (02/09/20 0839)     Current Antimicrobial Therapy:  Anti-Infectives (From admission, onward)    Ordered     Dose/Rate Route Frequency Start Stop    02/08/20 0835  vancomycin (VANCOCIN) 1,250 mg in sodium chloride 0.9 % 250 mL IVPB     Ordering Provider:  Saad Garcia MD    1,250 mg  over 60 Minutes Intravenous Once 02/08/20 1800 02/08/20 1918    02/06/20 0835  vancomycin (VANCOCIN) 1,000 mg in sodium chloride 0.9 % 250 mL IVPB     Ordering Provider:  Saad Garcia MD    1,000 mg  over 60 Minutes Intravenous Once 02/06/20 1800 02/06/20 1835    02/05/20 0823  vancomycin (VANCOCIN) 1,250 mg in sodium chloride 0.9 % 250 mL IVPB     Ordering Provider:  Saad Garcia MD    1,250 mg  over 60 Minutes Intravenous Once 02/05/20 1800 02/05/20 1821    02/05/20 1234  meropenem (MERREM) 500mg/100 mL 0.9% NS IVPB (mbp)  Review   Ordering Provider:  Keyona Donohue MD    500 mg  over 3 Hours Intravenous Every 24 Hours 02/05/20  1500 02/12/20 1459    02/04/20 1720  vancomycin (VANCOCIN) 1,250 mg in sodium chloride 0.9 % 250 mL IVPB     Ordering Provider:  Saad Garcia MD    1,250 mg  over 60 Minutes Intravenous Once 02/04/20 2200 02/04/20 2147    02/04/20 1326  Vancomycin Pharmacy Intermittent Dosing  Review   Ordering Provider:  Sophie Grimes MD     Does not apply Daily 02/04/20 1415 02/11/20 0859    02/04/20 1131  micafungin sodium (MYCAMINE) 100 mg in sodium chloride 0.9 % 100 mL IVPB  Review   Ordering Provider:  Sophie Grimes MD    100 mg  over 60 Minutes Intravenous Every 24 Hours 02/04/20 1300 02/11/20 1259    02/01/20 1601  vancomycin (VANCOCIN) 1,000 mg in sodium chloride 0.9 % 250 mL IVPB     Ordering Provider:  Denny Coy MD    1,000 mg  over 60 Minutes Intravenous Once 02/01/20 1800 02/01/20 1812    01/31/20 1949  vancomycin (VANCOCIN) 1,750 mg in sodium chloride 0.9 % 500 mL IVPB     Ordering Provider:  Waqas York MD    20 mg/kg × 93 kg Intravenous Once 01/31/20 1951 02/01/20 0000    01/31/20 1949  piperacillin-tazobactam (ZOSYN) 4.5 g/100 mL 0.9% NS IVPB (mbp)     Ordering Provider:  Waqas York MD    4.5 g Intravenous Once 01/31/20 1951 01/31/20 2129        Current Diuretic Therapy:  Diuretics (From admission, onward)    Ordered     Dose/Rate Route Frequency Start Stop    02/03/20 1610  furosemide (LASIX) injection 80 mg     Ordering Provider:  Alycia Mtezger MD    80 mg Intravenous Once 02/03/20 1700 02/03/20 1632    02/03/20 1635  furosemide (LASIX) 10 MG/ML injection  - ADS Override Pull     Note to Pharmacy:  Created by cabinet override   Ordering Provider:  Selam Pierce RN       02/03/20 1635 02/04/20 0444        ----------------------------------------------------------------------------------------------------------------------  Vital Signs:  Temp:  [98.5 °F (36.9 °C)-99.1 °F (37.3 °C)] 98.7 °F (37.1 °C)  Heart Rate:  [] 126  Resp:  [24-33] 30  BP: ()/()  107/71  FiO2 (%):  [30 %] 30 %  SpO2:  [90 %-100 %] 92 %  on   ;   Device (Oxygen Therapy): ventilator  Body mass index is 40.65 kg/m².    Wt Readings from Last 3 Encounters:   02/06/20 104 kg (229 lb 8 oz)   01/12/20 92.1 kg (203 lb)   01/09/20 91.6 kg (202 lb)     Intake & Output (last 3 days)       02/06 0701 - 02/07 0700 02/07 0701 - 02/08 0700 02/08 0701 - 02/09 0700 02/09 0701 - 02/10 0700    I.V. (mL/kg) 3215.9 (30.9) 5038.5 (48.4) 2933.1 (28.2)     IV Piggyback 800 550 800     .1 1377 772     Total Intake(mL/kg) 4403 (42.3) 6965.5 (67) 4505.1 (43.3)     Urine (mL/kg/hr) 0 (0) 15 (0) 0 (0)     Other 3700 4000 4000     Stool 0 8 0     Total Output 3700 4023 4000     Net +703 +2942.5 +505.1                 NPO Diet  Adult [facility] TPN  ----------------------------------------------------------------------------------------------------------------------  Physical exam:  Constitutional:  Well-developed and well-nourished. Intubated/sedated  HENT:  Head:  Normocephalic and atraumatic.  Mouth:  dry mucous membranes.    Eyes:  Conjunctivae and EOM are normal. No scleral icterus.    Neck:  Neck supple.  No JVD present.    Cardiovascular: increased tachycardic, regular rhythm and normal heart sounds with no murmur.  Pulmonary/Chest:  Intubated, minimal Fi02, clear BS  Abdominal:  Distended, firm but stable, body wall edema is stable  Musculoskeletal:  No deformity.  No red or swollen joints anywhere.    Neurological:  Unable to assess due to intubation/sedation  Skin:  Skin is warm and dry. No rash noted. No pallor.   Peripheral vascular:  no clubbing, no cyanosis, trace edema, distal extremities still cool today  ----------------------------------------------------------------------------------------------------------------------  Tele:    ----------------------------------------------------------------------------------------------------------------------  Results from last 7 days   Lab Units  02/09/20  0632 02/09/20  0113 02/08/20  2321 02/08/20  2103  02/08/20 0351 02/07/20 0820 02/07/20 0057 02/05/20  1040   CRP mg/dL  --  20.16*  --   --   --  22.31*  --   --  24.06*   < >  --    LACTATE mmol/L 7.6*  --  8.4* 8.3*   < > 9.9*   < >  --  8.3*   < > 5.4*   WBC 10*3/mm3  --  50.27*  --   --   --  49.47*  --   --  40.02*   < >  --    HEMOGLOBIN g/dL  --  8.3*  --   --   --  8.8*  --   --  8.0*   < >  --    HEMATOCRIT %  --  27.0*  --   --   --  28.9*  --   --  26.3*   < >  --    MCV fL  --  83.3  --   --   --  82.8  --   --  82.2   < >  --    MCHC g/dL  --  30.7*  --   --   --  30.4*  --   --  30.4*   < >  --    PLATELETS 10*3/mm3  --  40*  --   --   --  56*  --   --  67*   < >  --    INR   --   --   --   --   --   --   --  1.12*  --   --  1.88*    < > = values in this interval not displayed.     Results from last 7 days   Lab Units 02/09/20  1042   PH, ARTERIAL pH units 7.329*   PO2 ART mm Hg 85.2   PCO2, ARTERIAL mm Hg 44.1   HCO3 ART mmol/L 23.2     Results from last 7 days   Lab Units 02/09/20  0113 02/08/20  1549 02/08/20  0351 02/07/20  1042 02/07/20 0057 02/04/20  1932   SODIUM mmol/L 127*  --  128*  --  131*   < >  --    POTASSIUM mmol/L 3.7 3.4* 3.0*  --  3.2*   < >  --    MAGNESIUM mg/dL 2.0  --  2.5  --  1.8  --  2.1   CHLORIDE mmol/L 82*  --  82*  --  85*   < >  --    CO2 mmol/L 21.9*  --  21.0*  --  19.9*   < >  --    BUN mg/dL 21*  --  22*  --  20   < >  --    CREATININE mg/dL 1.71*  --  1.90*  --  2.13*   < >  --    EGFR IF NONAFRICN AM mL/min/1.73 33*  --  29*  --  25*   < >  --    CALCIUM mg/dL 8.0*  --  8.2*  --  8.2*   < >  --    IONIZED CALCIUM mmol/L  --   --   --  1.00*  --   --  1.08*   PHOSPHORUS mg/dL 3.3 1.8* 1.6*  --  2.2*  --  3.6   GLUCOSE mg/dL 317*  --  304*  --  202*   < >  --    ALBUMIN g/dL 3.93  --  4.12  --  4.14  --   --    BILIRUBIN mg/dL 4.6*  --  4.0*  --  3.9*  --   --    ALK PHOS U/L 337*  --  329*  --  304*  --   --    AST (SGOT) U/L 322*  --  350*  --   640*  --   --    ALT (SGPT) U/L 70*  --  91*  --  112*  --   --     < > = values in this interval not displayed.   Estimated Creatinine Clearance: 49.4 mL/min (A) (by C-G formula based on SCr of 1.71 mg/dL (H)).  No results found for: AMMONIA  Results from last 7 days   Lab Units 02/04/20  1932   CK TOTAL U/L 297*     Results from last 7 days   Lab Units 02/03/20  0112   PROBNP pg/mL 1,467.0*     Results from last 7 days   Lab Units 02/06/20  1117   TRIGLYCERIDES mg/dL 481*     Glucose   Date/Time Value Ref Range Status   02/09/2020 0500 312 (H) 70 - 130 mg/dL Final   02/09/2020 0039 308 (H) 70 - 130 mg/dL Final   02/08/2020 1812 249 (H) 70 - 130 mg/dL Final   02/08/2020 1240 187 (H) 70 - 130 mg/dL Final   02/08/2020 0528 354 (H) 70 - 130 mg/dL Final   02/08/2020 0037 367 (H) 70 - 130 mg/dL Final   02/07/2020 1740 308 (H) 70 - 130 mg/dL Final   02/07/2020 1101 184 (H) 70 - 130 mg/dL Final     Lab Results   Component Value Date    TSH 9.270 (H) 01/31/2020    FREET4 0.97 02/01/2020     No results found for: PREGTESTUR, PREGSERUM, HCG, HCGQUANT  Pain Management Panel     Pain Management Panel Latest Ref Rng & Units 2/1/2020    CREATININE UR mg/dL 146.0        Brief Urine Lab Results  (Last result in the past 365 days)      Color   Clarity   Blood   Leuk Est   Nitrite   Protein   CREAT   Urine HCG        02/02/20 2053 Dark Yellow Turbid Large (3+) Negative Negative 100 mg/dL (2+)             Blood Culture   Date Value Ref Range Status   02/03/2020 No growth at 5 days  Final   02/03/2020 No growth at 5 days  Final     No results found for: URINECX  No results found for: WOUNDCX  No results found for: STOOLCX  Respiratory Culture   Date Value Ref Range Status   02/06/2020 Scant growth (1+) Candida albicans (A)  Final   02/06/2020 No Normal Respiratory Razia (A)  Final     No results found for: AFBCX  Results from last 7 days   Lab Units 02/09/20  0632 02/09/20  0113 02/08/20  2321 02/08/20  2103 02/08/20  1320  02/08/20  0351 02/07/20  2127 02/07/20  1432 02/07/20  0057  02/06/20  0303  02/05/20  0222  02/04/20  0053  02/03/20  0112   PROCALCITONIN ng/mL  --   --   --   --   --   --   --   --   --   --   --   --   --   --   --   --  4.07*   LACTATE mmol/L 7.6*  --  8.4* 8.3* 7.3* 9.9* 9.7* 8.7* 8.3*   < > 5.5*   < > 8.9*   < > 7.1*   < > 3.1*   CRP mg/dL  --  20.16*  --   --   --  22.31*  --   --  24.06*  --  34.91*  --  29.35*  --  31.38*  --   --     < > = values in this interval not displayed.     I have personally looked at the labs and they are summarized above.  ----------------------------------------------------------------------------------------------------------------------  Detailed radiology reports for the last 24 hours:    Imaging Results (Last 24 Hours)     Procedure Component Value Units Date/Time    XR Chest 1 View [[id number]] Resulted:  02/09/20 0740     Updated:  02/09/20 0740        Assessment & Plan    #Septic shock and Acute Respiratory Failure 2/2 SBP & PNA, Bacterial, treating for MDR organisms - Worse  #Lactic acidosis - Worse  - On presentation (WBC 29,100, CRP 30.94, lactic acid 5.2, temperature 102.4, heart rate 152, blood pressure 72/60  - CT chest on admission showed some bibasilar consolidations that are likely atelectasis   - CT Abd/Pelvis showed b/l lower lobe PNA, loculated fluid in pre-rectal space   - Diagnostic paracentesis performed on 2/1 that revealed ANC: 3400. Confirming SBP. Culture NGTD  - Started having fevers 2/4, lactate trended up to 8.9, WBC count up to 42K, CRP up to 30, Bcx's and para Cx's NGTD, Cdiff negative  - RUQ US showed gallbladder w/ luminal sludge, wall thickening, pericholecystic fluid  - ID consulted; Continue Vanc, Nickolas, Micafungin  - Pulmonary consulted; following during week, no not here this weekend  - Consulted IR for possible drainage of loculated ascites; I discussed w/ Dr. Butler and will be technically difficult to access that particular fluid  collection  - Consulted Surgery; Likely poor surgical candidate, recs pending  - Pt slightly more acidotic on ABG today, Pc02 slightly up, RR 28 but have increased PEEP and repeat pending  - GOC Conversations ongoing, awaiting more family to come in but appear to be leaning toward extubation and comfort measures, WBC count worsening and lactate improves w/ dialysis but remains critically elevated, remains anuric, LFTs still elevated, HR trending up, BP trending down even w/ pressors.    #Anuric KODI 2/2 ATN/Sepsis c/b AGMA   - Baseline Cr 0.7-0.9, Cr on presentation 1.95=>2.10=>2.27, became oliguric and now anuric, did not respond to albumin challenge, suspect ATN/Sepsis  - CT abdomen/pelvis did not reveal urinary obstruction.  - Consulted Surgery;  Placed TDC on 2/4  - Consulted Nephrology; Following for HD/SLED needs    #Stage 4 colon cancer with bulky lymphadenopathy and widely disseminated liver metastases and adrenal masses s/p diverting loop ileostomy   - Scheduled to f/u with oncology at  on 2/3. Expected to start chemo soon. Suspect treatments would be palliative in nature.   - Underwent diverting loop ileostomy at  on 1/17  - CT Abd/Pelvis 2/2 showed extensive hepatic mets, ill-defined cecal mass involving surrounding mesentery, mesenteric LAD, small-mod ascites, stable low density mass L adrenal gland.  - Repeat CT Abd/Pelvis 2/4 showed extensive metastatic dz involving liver and peritoneum w/ omental caking, metastatic adenopathy RLQ and upper RP space, abnormal thickening R colon wall of terminal ileum, complex ascites that may represent malignant ascites  - Consulted Hematology/Oncology; Discussed w/ family that patient's disease is non curable, discussed overall prognosis and appreciate their input and conversations w/ family.    #Severe Leukocytosis   - WBC count up to 42K, treatments as per above    #Mild Rhabdomyolysis  - CK elevated, fluids as per above    #Normocytic anemia   - Hemoglobin has  been trending down. 10 on 1/2/20  - 8.8=>7.9=>6.6, s/p 1 unit of pRBC  - No signs of active bleeding, BUN/creatinine not elevated, suspect  hemoconcentrated on admission and fluids have diluted   - Iron studies consistent with SNEHAL and AOCD, B12 and folate normal.   - Continue IV PPI, monitor for signs of bleeding    #Respiratory Alkalosis  - Likely 2/2 above AGMA, compensatory, address as per above    #Euthyroid  - TSH 9, free T4 normal, NTD    #Hypomagnesemia   - Replaced    #Diverting loop ileostomy  - Performed 1/17 at  for SBO    #Morbid Obesity  - BMI 40, complicates all aspects of care.    F: Oral  E: Monitor & Replace PRN  N: TFs  Ppx: SQH  Code: Full Code    Dispo: Pending GOC conversations.     *This patient is considered high risk due to septic shock, KODI, underlying colon cancer    *I have spent 30min of critical care time (8:00-8:30AM) of direct patient care, evaluating the patient, managing pressors, sedation medications and ventilator settings, coordinating life supporting care and management plans w/ nephrology and ID, continuing GOC conversations w/ family which are still ongoing.    VTE Prophylaxis:   Mechanical Order History:      Ordered        02/07/20 1014  Place Sequential Compression Device  Once         02/07/20 1014  Maintain Sequential Compression Device  Continuous                 Pharmalogical Order History:     Ordered     Dose Route Frequency Stop    02/04/20 1353  heparin (porcine) 5000 UNIT/ML injection  Status:  Discontinued      -- -- As Needed 02/04/20 1459    02/01/20 1455  heparin (porcine) 5000 UNIT/ML injection 5,000 Units  Status:  Discontinued      5,000 Units SC Every 8 Hours Scheduled 02/07/20 1014    02/01/20 0309  enoxaparin (LOVENOX) syringe 40 mg  Status:  Discontinued      40 mg SC Every 24 Hours 02/01/20 1455        Saad Garcia MD  [facility]  02/09/20  11:11 AM    Electronically signed by Saad Garcia MD at 02/09/20 1118     Progress  Notes signed by Isauro Calvo MD at 20 0021         [facility] Physician - Brief Progress Note  PERMANENT  2020 00:19    [facility] [place] [place] [facility]    GRACY NORMAN    Date of Service 2020 00:19    HPI/Events of Note Lactic 8.4 (8.3)  Reviewed chart -  no further intervention needed - end of life care discussion  being held and waiting for family      Interventions Intermediate-Diagnostic test evaluation        Electronically Signed by: Isauro Calvo) on 2020 00:21    Electronically signed by Isauro Calvo MD at 20 0021     Noelle Helton APRN at 20 1238                     PROGRESS NOTE         Patient Identification:  Name:  Gracy Norman  Age:  44 y.o.  Sex:  female  :  1975  MRN:  [id number]  Visit Number:  [id number]  Primary Care Provider:  Almas Stone MD         LOS: 8 days       ----------------------------------------------------------------------------------------------------------------------  Subjective       Chief Complaints:    Weakness - Generalized and Post-op Problem    Interval History:      Patient remains intubated and sedated on 30% FiO2 via ET tube.  Continues to require Levophed for blood pressure support.  Currently receiving dialysis this morning.  Tachycardic.  WBC worsening at 49.47.  CRP improving at 22.31.  Lactic acid worsening at 9.9.    Review of Systems:    Unable to obtain as the patient is very lethargic  ----------------------------------------------------------------------------------------------------------------------      Objective       Current Hospital Meds:    chlorhexidine 15 mL Mouth/Throat Q12H   hydrocortisone sodium succinate 50 mg Intravenous Q6H   insulin aspart 0-9 Units Subcutaneous Q6H   meropenem 500 mg Intravenous Q24H   micafungin (MYCAMINE)  mg Intravenous Q24H   trace minerals + multivitamin IVPB  Intravenous Once per day on     pantoprazole 40 mg Intravenous Q12H   phytonadione (VITAMIN K) IVPB 10 mg Intravenous Daily   potassium chloride 10 mEq Intravenous Q1H   sodium chloride 1,000 mL Intravenous Once   sodium chloride 1,000 mL Intravenous Once   sodium chloride 250 mL Intravenous Q1H   sodium chloride 10 mL Intravenous Q12H   sodium chloride 10 mL Intravenous Q12H   sodium chloride 10 mL Intravenous Q12H   sodium chloride 10 mL Intravenous Q12H   sodium chloride 10 mL Intravenous Q12H   sodium chloride 10 mL Intravenous Q12H   thiamine (VITAMIN B1) IVPB 500 mg Intravenous Q8H   vancomycin 1,250 mg Intravenous Once   Vancomycin Pharmacy Intermittent Dosing  Does not apply Daily       Adult [employer] TPN  Last Rate: 60 mL/hr at 02/07/20 1730   fentaNYL Citrate     norepinephrine 0.02-0.3 mcg/kg/min Last Rate: 0.16 mcg/kg/min (02/08/20 0930)   propofol 5-50 mcg/kg/min Last Rate: 40 mcg/kg/min (02/08/20 0930)   sodium bicarbonate drip (greater than 75 mEq/bag) 150 mEq Last Rate: 150 mEq (02/08/20 0652)   sodium chloride 250 mL/hr Last Rate: 250 mL/hr (02/06/20 1257)   vasopressin (PITRESSIN) infusion 0.04 Units/min Last Rate: Stopped (02/06/20 1305)     ----------------------------------------------------------------------------------------------------------------------    Vital Signs:  Temp:  [98.6 °F (37 °C)-99 °F (37.2 °C)] 98.8 °F (37.1 °C)  Heart Rate:  [] 122  Resp:  [27-33] 28  BP: ()/() 113/41  FiO2 (%):  [30 %] 30 %  Mean Arterial Pressure (Non-Invasive) for the past 24 hrs (Last 3 readings):   Noninvasive MAP (mmHg)   02/08/20 1100 84   02/08/20 1045 93   02/08/20 1030 70     SpO2 Percentage    02/08/20 1045 02/08/20 1100 02/08/20 1115   SpO2: 100% 100% 100%     SpO2:  [83 %-100 %] 100 %  on   ;   Device (Oxygen Therapy): ventilator    Body mass index is 40.65 kg/m².  Wt Readings from Last 3 Encounters:   02/06/20 104 kg (229 lb 8 oz)   01/12/20 92.1 kg (203 lb)   01/09/20 91.6 kg (202 lb)         Intake/Output Summary (Last 24 hours) at 2/8/2020 1238  Last data filed at 2/8/2020 0541  Gross per 24 hour   Intake 6865.54 ml   Output 4023 ml   Net 2842.54 ml     NPO Diet  Adult [employer] TPN  ----------------------------------------------------------------------------------------------------------------------      Physical Exam:    Constitutional:  Intubated, sedated   HENT:  Head: Normocephalic and atraumatic.  Mouth:  Moist mucous membranes.    Eyes:  Conjunctivae and EOM are normal.  No scleral icterus.  Neck:  Neck supple.  No JVD present.     Cardiovascular:  tachycardic, regular rhythm and normal heart sounds with no murmur. No edema.  Pulmonary/Chest:  tachypneic, no wheezes, no crackles, with normal breath sounds and good air movement.  Abdominal: Distended and tense with diffuse tenderness. Ileostomy.   Musculoskeletal:  No edema, no tenderness, and no deformity.  No swelling or redness of joints.  Neurological:  Sedated  Skin:  Skin is warm and dry.  No rash noted.  No pallor.   Psychiatric:  Unable to assess    ----------------------------------------------------------------------------------------------------------------------  Results from last 7 days   Lab Units 02/04/20  1932   CK TOTAL U/L 297*     Results from last 7 days   Lab Units 02/03/20  0112   PROBNP pg/mL 1,467.0*     Results from last 7 days   Lab Units 02/06/20  1117   TRIGLYCERIDES mg/dL 481*     Results from last 7 days   Lab Units 02/08/20  0828   PH, ARTERIAL pH units 7.407   PO2 ART mm Hg 107.0   PCO2, ARTERIAL mm Hg 37.3   HCO3 ART mmol/L 23.5     Results from last 7 days   Lab Units 02/08/20  0351 02/07/20  2127 02/07/20  1432 02/07/20  0820 02/07/20  0057  02/06/20  0303  02/05/20  1040   CRP mg/dL 22.31*  --   --   --  24.06*  --  34.91*  --   --    LACTATE mmol/L 9.9* 9.7* 8.7*  --  8.3*   < > 5.5*   < > 5.4*   WBC 10*3/mm3 49.47*  --   --   --  40.02*  --  42.70*  --   --    HEMOGLOBIN g/dL 8.8*  --   --   --   8.0*  --  8.4*  --   --    HEMATOCRIT % 28.9*  --   --   --  26.3*  --  28.2*  --   --    MCV fL 82.8  --   --   --  82.2  --  83.4  --   --    MCHC g/dL 30.4*  --   --   --  30.4*  --  29.8*  --   --    PLATELETS 10*3/mm3 56*  --   --   --  67*  --  104*  --   --    INR   --   --   --  1.12*  --   --   --   --  1.88*    < > = values in this interval not displayed.     Results from last 7 days   Lab Units 02/08/20  0351 02/07/20  0057 02/06/20  0303  02/04/20  1932  02/03/20  0112   SODIUM mmol/L 128* 131* 131*   < >  --    < > 130*   POTASSIUM mmol/L 3.0* 3.2* 3.9   < >  --    < > 4.2   MAGNESIUM mg/dL 2.5 1.8  --   --  2.1  --   --    CHLORIDE mmol/L 82* 85* 87*   < >  --    < > 100   CO2 mmol/L 21.0* 19.9* 15.3*   < >  --    < > 9.4*   BUN mg/dL 22* 20 21*   < >  --    < > 25*   CREATININE mg/dL 1.90* 2.13* 2.39*   < >  --    < > 1.96*   EGFR IF NONAFRICN AM mL/min/1.73 29* 25* 22*   < >  --    < > 28*   CALCIUM mg/dL 8.2* 8.2* 7.8*   < >  --    < > 8.3*   GLUCOSE mg/dL 304* 202* 131*   < >  --    < > 98   ALBUMIN g/dL 4.12 4.14  --   --   --   --  3.77   BILIRUBIN mg/dL 4.0* 3.9*  --   --   --   --  1.2   ALK PHOS U/L 329* 304*  --   --   --   --  509*   AST (SGOT) U/L 350* 640*  --   --   --   --  63*   ALT (SGPT) U/L 91* 112*  --   --   --   --  17    < > = values in this interval not displayed.   Estimated Creatinine Clearance: 44.5 mL/min (A) (by C-G formula based on SCr of 1.9 mg/dL (H)).  No results found for: AMMONIA    Glucose   Date/Time Value Ref Range Status   02/08/2020 0528 354 (H) 70 - 130 mg/dL Final   02/08/2020 0037 367 (H) 70 - 130 mg/dL Final   02/07/2020 1740 308 (H) 70 - 130 mg/dL Final   02/07/2020 1101 184 (H) 70 - 130 mg/dL Final   02/07/2020 0540 257 (H) 70 - 130 mg/dL Final   02/06/2020 2353 208 (H) 70 - 130 mg/dL Final   02/06/2020 1700 162 (H) 70 - 130 mg/dL Final   02/06/2020 1249 108 70 - 130 mg/dL Final     No results found for: HGBA1C  Lab Results   Component Value Date    TSH  9.270 (H) 01/31/2020    FREET4 0.97 02/01/2020       Blood Culture   Date Value Ref Range Status   02/03/2020 No growth at less than 24 hours  Preliminary   02/03/2020 No growth at less than 24 hours  Preliminary   01/31/2020 No growth at 3 days  Preliminary   01/31/2020 No growth at 3 days  Preliminary     No results found for: URINECX  No results found for: WOUNDCX  No results found for: STOOLCX  No results found for: RESPCX  Pain Management Panel     Pain Management Panel Latest Ref Rng & Units 2/1/2020    CREATININE UR mg/dL 146.0            ----------------------------------------------------------------------------------------------------------------------  Imaging Results (Last 24 Hours)     Procedure Component Value Units Date/Time    US Abdomen Limited [[id number]] Collected:  02/07/20 1500     Updated:  02/07/20 1504    Narrative:       EXAMINATION: US ABDOMEN LIMITED-         CLINICAL INDICATION:     cholestatic injury pattern on labs;  A41.9-Sepsis, unspecified organism     TECHNIQUE: Multiplanar gray scale ultrasound of the abdomen.     COMPARISON: NONE      FINDINGS:   Pancreas bed obscure.  Gallbladder with pericholecystic fluid and wall thickening. Sludge  within the gallbladder but no dense shadowing stones.   The CBD measures 3 mm.  Markedly abnormal appearance of the liver with multiple hyperechoic and  isoechoic lesions most consistent with metastatic disease. Perihepatic  ascites is noted.    No ascites demonstrated.   There is no sonographic Diaz sign.       Impression:       1. Gallbladder with luminal sludge and wall thickening but no  significant distention.  2. Pericholecystic fluid is present but could be a attributable to  underlying liver disease and ascites. This could also account for the  lateral wall thickening.  3. Numerous liver lesions compatible with metastatic disease.  4. Small amounts of ascites along the liver margins.        This report was finalized on 2/7/2020 3:02 PM by  Dr. Bert Butler MD.             ----------------------------------------------------------------------------------------------------------------------    Assessment/Plan       Assessment/Plan     ASSESSMENT:    1.  Septic shock with lactic acid greater than 4 on admission  2.  Peritonitis     PLAN:      Patient remains intubated and sedated on 30% FiO2 via ET tube.  Continues to require Levophed for blood pressure support.  Currently receiving dialysis this morning.  Tachycardic.  WBC worsening at 49.47.  CRP improving at 22.31.  Lactic acid worsening at 9.9.    CT Abd/pelvis which did not demonstrate large leak but did show extensive metastatic disease and likely metastatic ascites w/ loculated fluid collection c/f infection.      Mycoplasma negative.  Legionella negative.  Respiratory panel PCR negative.  Strep pneumo negative.  Influenza negative.  Urinalysis unremarkable. Blood cultures show no growth thus far.  Body fluid culture reports 4390 nucleated cells, Gram stain reports no organism.      Would recommend to continue Meropenem, Vancomycin per pharmacy dosing and Micafungin 100 mg IV q 24 hours.  Patient carries poor prognosis despite all appropriate interventions.    We will continue to follow culture results and CRP trend and adjust antibiotic therapy appropriately.    Current Antimicrobial:  Micafungin 100 mg IV q 24 hours  Meropenem per pharmacy dosing  Vancomycin per pharmacy dosing     Code Status:   Code Status and Medical Interventions:   Ordered at: 20 2311     Level Of Support Discussed With:    Patient     Code Status:    CPR     Medical Interventions (Level of Support Prior to Arrest):    Full       MUNIR Conner  20  12:38 PM        Electronically signed by Noelle Helton APRN at 20 3928     Saad Garcia MD at 20 112              [facility]     Patient Identification:  Name:  Gracy Norman  Age:  44 y.o.  Sex:  female  :   1975  MRN:  [id number]  Visit Number:  [id number]  ROOM: [address]     Primary Care Provider:  Almas Stone MD    Length of stay in inpatient status:  8    Subjective     Chief Compliant:    Chief Complaint   Patient presents with   • Weakness - Generalized   • Post-op Problem     History of Presenting Illness:    Patient remains critically ill today, no acute events overnight, remains tachycardic, BP is stable but had to go up slightly on pressors, remains sedated, undergoing dialysis today, lactate and WBC count continue to trend up, family coming in later for GOC conversations, have consulted surgery for possible underlying cholecystitis but likely poor surgical candidate.  Patient's overall prognosis is very poor, continuing life sustaining support but remains anuric, liver function tests remain elevated.    Objective     Current Hospital Meds:  chlorhexidine 15 mL Mouth/Throat Q12H   hydrocortisone sodium succinate 50 mg Intravenous Q6H   insulin aspart 0-9 Units Subcutaneous Q6H   meropenem 500 mg Intravenous Q24H   micafungin (MYCAMINE)  mg Intravenous Q24H   trace minerals + multivitamin IVPB  Intravenous Once per day on Mon Wed Fri   pantoprazole 40 mg Intravenous Q12H   phytonadione (VITAMIN K) IVPB 10 mg Intravenous Daily   potassium chloride 10 mEq Intravenous Q1H   sodium chloride 1,000 mL Intravenous Once   sodium chloride 1,000 mL Intravenous Once   sodium chloride 1,000 mL Intravenous Once in Dialysis   sodium chloride 250 mL Intravenous Q1H   sodium chloride 10 mL Intravenous Q12H   sodium chloride 10 mL Intravenous Q12H   sodium chloride 10 mL Intravenous Q12H   sodium chloride 10 mL Intravenous Q12H   sodium chloride 10 mL Intravenous Q12H   sodium chloride 10 mL Intravenous Q12H   thiamine (VITAMIN B1) IVPB 500 mg Intravenous Q8H   vancomycin 1,250 mg Intravenous Once   Vancomycin Pharmacy Intermittent Dosing  Does not apply Daily     Adult [employer] TPN  Last Rate:  60 mL/hr at 02/07/20 1730   fentaNYL Citrate     norepinephrine 0.02-0.3 mcg/kg/min Last Rate: 0.16 mcg/kg/min (02/08/20 0930)   propofol 5-50 mcg/kg/min Last Rate: 40 mcg/kg/min (02/08/20 0930)   sodium bicarbonate drip (greater than 75 mEq/bag) 150 mEq Last Rate: 150 mEq (02/08/20 0652)   sodium chloride 250 mL/hr Last Rate: 250 mL/hr (02/06/20 1257)   vasopressin (PITRESSIN) infusion 0.04 Units/min Last Rate: Stopped (02/06/20 1305)     Current Antimicrobial Therapy:  Anti-Infectives (From admission, onward)    Ordered     Dose/Rate Route Frequency Start Stop    02/08/20 0835  vancomycin (VANCOCIN) 1,250 mg in sodium chloride 0.9 % 250 mL IVPB     Ordering Provider:  Saad Garcia MD    1,250 mg  over 60 Minutes Intravenous Once 02/08/20 1800      02/06/20 0835  vancomycin (VANCOCIN) 1,000 mg in sodium chloride 0.9 % 250 mL IVPB     Ordering Provider:  Saad Garcia MD    1,000 mg  over 60 Minutes Intravenous Once 02/06/20 1800 02/06/20 1835    02/05/20 0823  vancomycin (VANCOCIN) 1,250 mg in sodium chloride 0.9 % 250 mL IVPB     Ordering Provider:  Saad Garcia MD    1,250 mg  over 60 Minutes Intravenous Once 02/05/20 1800 02/05/20 1821    02/05/20 1234  meropenem (MERREM) 500mg/100 mL 0.9% NS IVPB (mbp)  Review   Ordering Provider:  Keyona Donohue MD    500 mg  over 3 Hours Intravenous Every 24 Hours 02/05/20 1500 02/12/20 1459    02/04/20 1720  vancomycin (VANCOCIN) 1,250 mg in sodium chloride 0.9 % 250 mL IVPB     Ordering Provider:  Saad Garcia MD    1,250 mg  over 60 Minutes Intravenous Once 02/04/20 2200 02/04/20 2147    02/04/20 1326  Vancomycin Pharmacy Intermittent Dosing  Review   Ordering Provider:  Sophie Grimes MD     Does not apply Daily 02/04/20 1415 02/11/20 0859    02/04/20 1131  micafungin sodium (MYCAMINE) 100 mg in sodium chloride 0.9 % 100 mL IVPB  Review   Ordering Provider:  Sophie Grimes MD    100 mg  over 60 Minutes Intravenous Every 24 Hours 02/04/20 1300 02/11/20  1259    02/01/20 1601  vancomycin (VANCOCIN) 1,000 mg in sodium chloride 0.9 % 250 mL IVPB     Ordering Provider:  Denny Coy MD    1,000 mg  over 60 Minutes Intravenous Once 02/01/20 1800 02/01/20 1812 01/31/20 1949  vancomycin (VANCOCIN) 1,750 mg in sodium chloride 0.9 % 500 mL IVPB     Ordering Provider:  Waqas York MD    20 mg/kg × 93 kg Intravenous Once 01/31/20 1951 02/01/20 0000    01/31/20 1949  piperacillin-tazobactam (ZOSYN) 4.5 g/100 mL 0.9% NS IVPB (mbp)     Ordering Provider:  Waqas York MD    4.5 g Intravenous Once 01/31/20 1951 01/31/20 2129        Current Diuretic Therapy:  Diuretics (From admission, onward)    Ordered     Dose/Rate Route Frequency Start Stop    02/03/20 1610  furosemide (LASIX) injection 80 mg     Ordering Provider:  Alycia Metzger MD    80 mg Intravenous Once 02/03/20 1700 02/03/20 1632    02/03/20 1635  furosemide (LASIX) 10 MG/ML injection  - ADS Override Pull     Note to Pharmacy:  Created by cabinet override   Ordering Provider:  Selam Pierce RN       02/03/20 1635 02/04/20 0444        ----------------------------------------------------------------------------------------------------------------------  Vital Signs:  Temp:  [98.5 °F (36.9 °C)-99 °F (37.2 °C)] 98.8 °F (37.1 °C)  Heart Rate:  [] 126  Resp:  [27-33] 28  BP: ()/() 113/41  FiO2 (%):  [30 %] 30 %  SpO2:  [83 %-100 %] 100 %  on   ;   Device (Oxygen Therapy): ventilator  Body mass index is 40.65 kg/m².    Wt Readings from Last 3 Encounters:   02/06/20 104 kg (229 lb 8 oz)   01/12/20 92.1 kg (203 lb)   01/09/20 91.6 kg (202 lb)     Intake & Output (last 3 days)       02/05 0701 - 02/06 0700 02/06 0701 - 02/07 0700 02/07 0701 - 02/08 0700 02/08 0701 - 02/09 0700    I.V. (mL/kg) 1389.7 (13.4) 3215.9 (30.9) 5038.5 (48.4)     IV Piggyback 750 800 550     TPN  387.1 1377     Total Intake(mL/kg) 2139.7 (20.6) 4403 (42.3) 6965.5 (67)     Urine (mL/kg/hr) 25 (0) 0 (0) 15 (0)      Other 3500 3700 4000     Stool 60 0 8     Total Output 3585 3700 4023     Net -1445.3 +703 +2942.5                 NPO Diet  Adult Central Clinimix TPN  ----------------------------------------------------------------------------------------------------------------------  Physical exam:  Constitutional:  Well-developed and well-nourished. Intubated/sedated  HENT:  Head:  Normocephalic and atraumatic.  Mouth:  dry mucous membranes.    Eyes:  Conjunctivae and EOM are normal. No scleral icterus.    Neck:  Neck supple.  No JVD present.    Cardiovascular: tachycardic, regular rhythm and normal heart sounds with no murmur.  Pulmonary/Chest:  Intubated, minimal Fi02, clear BS  Abdominal:  Distended, firm but stable, body wall edema is stable  Musculoskeletal:  No deformity.  No red or swollen joints anywhere.    Neurological:  Unable to assess due to intubation/sedation  Skin:  Skin is warm and dry. No rash noted. No pallor.   Peripheral vascular:  no clubbing, no cyanosis, trace edema , distal extremities are cool to touch  ----------------------------------------------------------------------------------------------------------------------  Tele:    ----------------------------------------------------------------------------------------------------------------------  Results from last 7 days   Lab Units 02/08/20  0351 02/07/20  2127 02/07/20  1432 02/07/20  0820 02/07/20  0057  02/06/20  0303  02/05/20  1040   CRP mg/dL 22.31*  --   --   --  24.06*  --  34.91*  --   --    LACTATE mmol/L 9.9* 9.7* 8.7*  --  8.3*   < > 5.5*   < > 5.4*   WBC 10*3/mm3 49.47*  --   --   --  40.02*  --  42.70*  --   --    HEMOGLOBIN g/dL 8.8*  --   --   --  8.0*  --  8.4*  --   --    HEMATOCRIT % 28.9*  --   --   --  26.3*  --  28.2*  --   --    MCV fL 82.8  --   --   --  82.2  --  83.4  --   --    MCHC g/dL 30.4*  --   --   --  30.4*  --  29.8*  --   --    PLATELETS 10*3/mm3 56*  --   --   --  67*  --  104*  --   --    INR   --   --   --   1.12*  --   --   --   --  1.88*    < > = values in this interval not displayed.     Results from last 7 days   Lab Units 02/08/20  0828   PH, ARTERIAL pH units 7.407   PO2 ART mm Hg 107.0   PCO2, ARTERIAL mm Hg 37.3   HCO3 ART mmol/L 23.5     Results from last 7 days   Lab Units 02/08/20  0351 02/07/20  1042 02/07/20  0057 02/06/20  0303  02/04/20  1932  02/03/20  0112   SODIUM mmol/L 128*  --  131* 131*   < >  --    < > 130*   POTASSIUM mmol/L 3.0*  --  3.2* 3.9   < >  --    < > 4.2   MAGNESIUM mg/dL 2.5  --  1.8  --   --  2.1  --   --    CHLORIDE mmol/L 82*  --  85* 87*   < >  --    < > 100   CO2 mmol/L 21.0*  --  19.9* 15.3*   < >  --    < > 9.4*   BUN mg/dL 22*  --  20 21*   < >  --    < > 25*   CREATININE mg/dL 1.90*  --  2.13* 2.39*   < >  --    < > 1.96*   EGFR IF NONAFRICN AM mL/min/1.73 29*  --  25* 22*   < >  --    < > 28*   CALCIUM mg/dL 8.2*  --  8.2* 7.8*   < >  --    < > 8.3*   IONIZED CALCIUM mmol/L  --  1.00*  --   --   --  1.08*  --   --    PHOSPHORUS mg/dL 1.6*  --  2.2*  --   --  3.6  --   --    GLUCOSE mg/dL 304*  --  202* 131*   < >  --    < > 98   ALBUMIN g/dL 4.12  --  4.14  --   --   --   --  3.77   BILIRUBIN mg/dL 4.0*  --  3.9*  --   --   --   --  1.2   ALK PHOS U/L 329*  --  304*  --   --   --   --  509*   AST (SGOT) U/L 350*  --  640*  --   --   --   --  63*   ALT (SGPT) U/L 91*  --  112*  --   --   --   --  17    < > = values in this interval not displayed.   Estimated Creatinine Clearance: 44.5 mL/min (A) (by C-G formula based on SCr of 1.9 mg/dL (H)).  No results found for: AMMONIA  Results from last 7 days   Lab Units 02/04/20  1932   CK TOTAL U/L 297*     Results from last 7 days   Lab Units 02/03/20  0112   PROBNP pg/mL 1,467.0*     Results from last 7 days   Lab Units 02/06/20  1117   TRIGLYCERIDES mg/dL 481*     Glucose   Date/Time Value Ref Range Status   02/08/2020 0528 354 (H) 70 - 130 mg/dL Final   02/08/2020 0037 367 (H) 70 - 130 mg/dL Final   02/07/2020 1740 308 (H) 70 - 130  mg/dL Final   02/07/2020 1101 184 (H) 70 - 130 mg/dL Final   02/07/2020 0540 257 (H) 70 - 130 mg/dL Final   02/06/2020 2353 208 (H) 70 - 130 mg/dL Final   02/06/2020 1700 162 (H) 70 - 130 mg/dL Final   02/06/2020 1249 108 70 - 130 mg/dL Final     Lab Results   Component Value Date    TSH 9.270 (H) 01/31/2020    FREET4 0.97 02/01/2020     No results found for: PREGTESTUR, PREGSERUM, HCG, HCGQUANT  Pain Management Panel     Pain Management Panel Latest Ref Rng & Units 2/1/2020    CREATININE UR mg/dL 146.0        Brief Urine Lab Results  (Last result in the past 365 days)      Color   Clarity   Blood   Leuk Est   Nitrite   Protein   CREAT   Urine HCG        02/02/20 2053 Dark Yellow Turbid Large (3+) Negative Negative 100 mg/dL (2+)             Blood Culture   Date Value Ref Range Status   02/03/2020 No growth at 4 days  Preliminary   02/03/2020 No growth at 4 days  Preliminary     No results found for: URINECX  No results found for: WOUNDCX  No results found for: STOOLCX  Respiratory Culture   Date Value Ref Range Status   02/06/2020 Scant growth (1+) Candida albicans (A)  Final   02/06/2020 No Normal Respiratory Razia (A)  Final     No results found for: AFBCX  Results from last 7 days   Lab Units 02/08/20  0351 02/07/20  2127 02/07/20  1432 02/07/20  0057 02/06/20  1028 02/06/20  0303 02/05/20  1939  02/05/20  0222  02/04/20  0053  02/03/20  0112   PROCALCITONIN ng/mL  --   --   --   --   --   --   --   --   --   --   --   --  4.07*   LACTATE mmol/L 9.9* 9.7* 8.7* 8.3* 4.6* 5.5* 5.7*   < > 8.9*   < > 7.1*   < > 3.1*   CRP mg/dL 22.31*  --   --  24.06*  --  34.91*  --   --  29.35*  --  31.38*  --   --     < > = values in this interval not displayed.       I have personally looked at the labs and they are summarized above.  ----------------------------------------------------------------------------------------------------------------------  Detailed radiology reports for the last 24 hours:    Imaging Results (Last 24  Hours)     Procedure Component Value Units Date/Time    US Abdomen Limited [[id number]] Collected:  02/07/20 1500     Updated:  02/07/20 1504    Narrative:       EXAMINATION: US ABDOMEN LIMITED-         CLINICAL INDICATION:     cholestatic injury pattern on labs;  A41.9-Sepsis, unspecified organism     TECHNIQUE: Multiplanar gray scale ultrasound of the abdomen.     COMPARISON: NONE      FINDINGS:   Pancreas bed obscure.  Gallbladder with pericholecystic fluid and wall thickening. Sludge  within the gallbladder but no dense shadowing stones.   The CBD measures 3 mm.  Markedly abnormal appearance of the liver with multiple hyperechoic and  isoechoic lesions most consistent with metastatic disease. Perihepatic  ascites is noted.    No ascites demonstrated.   There is no sonographic Diaz sign.       Impression:       1. Gallbladder with luminal sludge and wall thickening but no  significant distention.  2. Pericholecystic fluid is present but could be a attributable to  underlying liver disease and ascites. This could also account for the  lateral wall thickening.  3. Numerous liver lesions compatible with metastatic disease.  4. Small amounts of ascites along the liver margins.        This report was finalized on 2/7/2020 3:02 PM by Dr. Bert Butler MD.           Assessment & Plan    #Septic shock and Acute Respiratory Failure 2/2 SBP & PNA, Bacterial, treating for MDR organisms - Worse  #Lactic acidosis - Worse  - On presentation (WBC 29,100, CRP 30.94, lactic acid 5.2, temperature 102.4, heart rate 152, blood pressure 72/60  - CT chest on admission showed some bibasilar consolidations that are likely atelectasis   - CT Abd/Pelvis showed b/l lower lobe PNA, loculated fluid in pre-rectal space   - Diagnostic paracentesis performed on 2/1 that revealed ANC: 3400. Confirming SBP. Culture NGTD  - Started having fevers 2/4, lactate trended up to 8.9, WBC count up to 42K, CRP up to 30, Bcx's and para Cx's NGTD, Cdiff  negative  - RUQ US showed gallbladder w/ luminal sludge, wall thickening, pericholecystic fluid  - ID consulted; Continue Vanc, Nickolas, Micafungin  - Pulmonary consulted; following during week, no not here this weekend  - Consulted IR for possible drainage of loculated ascites; I discussed w/ Dr. Butler and will be technically difficult to access that particular fluid collection  - Consulted Surgery; Likely poor surgical candidate, recs pending  - GOC conversation w/ family later today, WBC count and lactate worsening, remains anuric, LFTs still elevated    #Anuric KODI 2/2 ATN/Sepsis c/b AGMA   - Baseline Cr 0.7-0.9, Cr on presentation 1.95=>2.10=>2.27, became oliguric and now anuric, did not respond to albumin challenge, suspect ATN/Sepsis  - CT abdomen/pelvis did not reveal urinary obstruction.  - Consulted Surgery;  Placed TDC on 2/4  - Consulted Nephrology; Following for HD/SLED needs    #Stage 4 colon cancer with bulky lymphadenopathy and widely disseminated liver metastases and adrenal masses s/p diverting loop ileostomy   - Scheduled to f/u with oncology at  on 2/3. Expected to start chemo soon. Suspect treatments would be palliative in nature.   - Underwent diverting loop ileostomy at  on 1/17  - CT Abd/Pelvis 2/2 showed extensive hepatic mets, ill-defined cecal mass involving surrounding mesentery, mesenteric LAD, small-mod ascites, stable low density mass L adrenal gland.  - Repeat CT Abd/Pelvis 2/4 showed extensive metastatic dz involving liver and peritoneum w/ omental caking, metastatic adenopathy RLQ and upper RP space, abnormal thickening R colon wall of terminal ileum, complex ascites that may represent malignant ascites  - Consulted Hematology/Oncology; Discussed w/ family that patient's disease is non curable, discussed overall prognosis and appreciate their input and conversations w/ family.    #Severe Leukocytosis   - WBC count up to 42K, treatments as per above    #Mild Rhabdomyolysis  - CK  elevated, fluids as per above    #Normocytic anemia   - Hemoglobin has been trending down. 10 on 1/2/20  - 8.8=>7.9=>6.6, s/p 1 unit of pRBC  - No signs of active bleeding, BUN/creatinine not elevated, suspect  hemoconcentrated on admission and fluids have diluted   - Iron studies consistent with SNEHAL and AOCD, B12 and folate normal.   - Continue IV PPI, monitor for signs of bleeding    #Respiratory Alkalosis  - Likely 2/2 above AGMA, compensatory, address as per above    #Euthyroid  - TSH 9, free T4 normal, NTD    #Hypomagnesemia   - Replaced    #Diverting loop ileostomy  - Performed 1/17 at  for SBO    #Morbid Obesity  - BMI 40, complicates all aspects of care.    F: Oral  E: Monitor & Replace PRN  N: TFs  Ppx: SQH  Code: Full Code    Dispo: Pending GOC conversations.     *This patient is considered high risk due to septic shock, KODI, underlying colon cancer    *I have spent 45min of critical care time (8:00-8:45AM) of direct patient care, evaluating the patient, managing pressors, sedation medications and ventilator, coordinating life supporting care and management plans w/ nephrology and ID, continuing GOC conversations w/ family.    VTE Prophylaxis:   Mechanical Order History:      Ordered        02/07/20 1014  Place Sequential Compression Device  Once         02/07/20 1014  Maintain Sequential Compression Device  Continuous                 Pharmalogical Order History:     Ordered     Dose Route Frequency Stop    02/04/20 1353  heparin (porcine) 5000 UNIT/ML injection  Status:  Discontinued      -- -- As Needed 02/04/20 1459    02/01/20 1455  heparin (porcine) 5000 UNIT/ML injection 5,000 Units  Status:  Discontinued      5,000 Units SC Every 8 Hours Scheduled 02/07/20 1014    02/01/20 0309  enoxaparin (LOVENOX) syringe 40 mg  Status:  Discontinued      40 mg SC Every 24 Hours 02/01/20 1455        Saad Garcia MD  [facility]  02/08/20  11:24 AM    Electronically signed by Saad Garcia,  MD at 02/08/20 1128     Alycia Metzger MD at 02/08/20 0857          Nephrology Progress Note      Subjective     Pt is intubated on MV, continue to be in sever septic shock on pressors    Objective       Vital signs :     Temp:  [98.5 °F (36.9 °C)-99 °F (37.2 °C)] 98.8 °F (37.1 °C)  Heart Rate:  [] 120  Resp:  [27-33] 27  BP: ()/() 110/89  FiO2 (%):  [30 %] 30 %      Intake/Output Summary (Last 24 hours) at 2/8/2020 0857  Last data filed at 2/8/2020 0541  Gross per 24 hour   Intake 6965.54 ml   Output 4023 ml   Net 2942.54 ml       Physical Exam:    General Appearance : Intubated on MV  Lungs : B/L lower zone crackles with decreased intensity of breath sounds  Heart :  regular rhythm & normal rate, normal S1, S2 and no murmur, no rub  Abdomen : normal bowel sounds, no masses, no hepatomegaly, no splenomegaly, soft non-tender and no guarding  Extremities : moves extremities well, 2+ edema, no cyanosis and no redness  Neurologic :   Intubated on MV  Acess :       Laboratory Data :     Albumin Albumin   Date Value Ref Range Status   02/08/2020 4.12 3.50 - 5.20 g/dL Final   02/07/2020 4.14 3.50 - 5.20 g/dL Final      Magnesium Magnesium   Date Value Ref Range Status   02/08/2020 2.5 1.6 - 2.6 mg/dL Final   02/07/2020 1.8 1.6 - 2.6 mg/dL Final          PTH               No results found for: PTH    CBC and coagulation:  Results from last 7 days   Lab Units 02/08/20  0351 02/07/20  2127 02/07/20  1432 02/07/20  0820 02/07/20  0057  02/06/20  0303  02/05/20  1040  02/03/20  0112   PROCALCITONIN ng/mL  --   --   --   --   --   --   --   --   --   --  4.07*   LACTATE mmol/L 9.9* 9.7* 8.7*  --  8.3*   < > 5.5*   < > 5.4*   < > 3.1*   CRP mg/dL 22.31*  --   --   --  24.06*  --  34.91*  --   --    < >  --    WBC 10*3/mm3 49.47*  --   --   --  40.02*  --  42.70*  --   --    < > 28.49*   HEMOGLOBIN g/dL 8.8*  --   --   --  8.0*  --  8.4*  --   --    < > 8.4*   HEMATOCRIT % 28.9*  --   --   --  26.3*  --  28.2*   --   --    < > 28.6*   MCV fL 82.8  --   --   --  82.2  --  83.4  --   --    < > 82.4   MCHC g/dL 30.4*  --   --   --  30.4*  --  29.8*  --   --    < > 29.4*   PLATELETS 10*3/mm3 56*  --   --   --  67*  --  104*  --   --    < > 206   INR   --   --   --  1.12*  --   --   --   --  1.88*  --   --     < > = values in this interval not displayed.     Acid/base balance:  Results from last 7 days   Lab Units 02/08/20  0828 02/07/20  1202 02/07/20  0438   PH, ARTERIAL pH units 7.407 7.441 7.405   PO2 ART mm Hg 107.0 102.0 89.6   PCO2, ARTERIAL mm Hg 37.3 35.2 32.5*   HCO3 ART mmol/L 23.5 23.9 20.3     Renal and electrolytes:  Results from last 7 days   Lab Units 02/08/20  0351 02/07/20  1042 02/07/20  0057 02/06/20  0303 02/05/20  0222 02/04/20  1932 02/04/20  0053   SODIUM mmol/L 128*  --  131* 131* 133*  --  133*   POTASSIUM mmol/L 3.0*  --  3.2* 3.9 4.3  --  4.3   MAGNESIUM mg/dL 2.5  --  1.8  --   --  2.1  --    CHLORIDE mmol/L 82*  --  85* 87* 90*  --  102   CO2 mmol/L 21.0*  --  19.9* 15.3* 18.4*  --  10.3*   BUN mg/dL 22*  --  20 21* 16  --  26*   CREATININE mg/dL 1.90*  --  2.13* 2.39* 2.40*  --  2.65*   EGFR IF NONAFRICN AM mL/min/1.73 29*  --  25* 22* 22*  --  20*   CALCIUM mg/dL 8.2*  --  8.2* 7.8* 7.8*  --  8.2*   IONIZED CALCIUM mmol/L  --  1.00*  --   --   --  1.08*  --    PHOSPHORUS mg/dL 1.6*  --  2.2*  --   --  3.6  --      Estimated Creatinine Clearance: 44.5 mL/min (A) (by C-G formula based on SCr of 1.9 mg/dL (H)).    Liver and pancreatic function:  Results from last 7 days   Lab Units 02/08/20  0351 02/07/20  0057 02/04/20  1932 02/03/20  0112   ALBUMIN g/dL 4.12 4.14  --  3.77   BILIRUBIN mg/dL 4.0* 3.9*  --  1.2   ALK PHOS U/L 329* 304*  --  509*   AST (SGOT) U/L 350* 640*  --  63*   ALT (SGPT) U/L 91* 112*  --  17   AMYLASE U/L  --   --  61  --    LIPASE U/L  --   --  126*  --          Cardiac:  Results from last 7 days   Lab Units 02/03/20  0112   PROBNP pg/mL 1,467.0*     Liver and pancreatic  function:  Results from last 7 days   Lab Units 02/08/20  0351 02/07/20  0057 02/04/20  1932 02/03/20  0112   ALBUMIN g/dL 4.12 4.14  --  3.77   BILIRUBIN mg/dL 4.0* 3.9*  --  1.2   ALK PHOS U/L 329* 304*  --  509*   AST (SGOT) U/L 350* 640*  --  63*   ALT (SGPT) U/L 91* 112*  --  17   AMYLASE U/L  --   --  61  --    LIPASE U/L  --   --  126*  --        Medications :       chlorhexidine 15 mL Mouth/Throat Q12H   hydrocortisone sodium succinate 50 mg Intravenous Q6H   insulin aspart 0-9 Units Subcutaneous Q6H   meropenem 500 mg Intravenous Q24H   micafungin (MYCAMINE)  mg Intravenous Q24H   trace minerals + multivitamin IVPB  Intravenous Once per day on Mon Wed Fri   pantoprazole 40 mg Intravenous Q12H   phytonadione (VITAMIN K) IVPB 10 mg Intravenous Daily   potassium chloride 10 mEq Intravenous Q1H   potassium phosphate infusion greater than 15 mMoles 30 mmol Intravenous Once   sodium chloride 1,000 mL Intravenous Once   sodium chloride 1,000 mL Intravenous Once   sodium chloride 1,000 mL Intravenous Once in Dialysis   sodium chloride 250 mL Intravenous Q1H   sodium chloride 10 mL Intravenous Q12H   sodium chloride 10 mL Intravenous Q12H   sodium chloride 10 mL Intravenous Q12H   sodium chloride 10 mL Intravenous Q12H   sodium chloride 10 mL Intravenous Q12H   sodium chloride 10 mL Intravenous Q12H   thiamine (VITAMIN B1) IVPB 500 mg Intravenous Q8H   vancomycin 1,250 mg Intravenous Once   Vancomycin Pharmacy Intermittent Dosing  Does not apply Daily       Adult Central Clinimix TPN  Last Rate: 60 mL/hr at 02/07/20 1730   fentaNYL Citrate     norepinephrine 0.02-0.3 mcg/kg/min Last Rate: 0.16 mcg/kg/min (02/08/20 0800)   propofol 5-50 mcg/kg/min Last Rate: 40 mcg/kg/min (02/08/20 0440)   sodium bicarbonate drip (greater than 75 mEq/bag) 150 mEq Last Rate: 150 mEq (02/08/20 0652)   sodium chloride 250 mL/hr Last Rate: 250 mL/hr (02/06/20 1257)   vasopressin (PITRESSIN) infusion 0.04 Units/min Last Rate:  Stopped (02/06/20 1305)         Assessment/Plan     1. Sever sepsis with septic shock likely from peritonitis  2. Metastatic poorly differentiated adenocarcinoma of colon origin s/p diverting loop ileostomy on 1/17/2020  3. KODI  4. AG metabolic acidosis due to lactic acidosis, Non Gap MA likely 2/2 Type IV RTA with KODI    No significant improvement on RRT, sever persistent metabolic acidosis.  Doing SLED and using some convective clearance for septic shock.     KODI likely ATN, oliguric due to septic shock  Pt was started on dialysis due to worsening volume overload leading to hypoxic respiratory failure in setting of oliguic KODI. Baseline Cr 1    -continue on SLED.   -family meeting today  -electrolyte replacement per protocol    Overall condition is very critical and prognosis is very poor.     D/W with Dr Garcia and Dr ILDEFONSO Metzger MD  02/08/20  8:57 AM      Electronically signed by Alycia Metzger MD at 02/08/20 6935     Saad Garcia MD at 02/07/20 6477        Today I had extensive GOC conversation w/ , 1 daughter and 1 son.  I expressed that patient was critically ill still and continues to not improve w/ dialysis and broad spectrum Abx.  She is still anuric, lactate has continued to rise again and 8.7, WBC count and CRP severely elevated still, now AST/ALT, Tbili and alk phos elevated and per Abd US showed pericholecystic stranding and GB wall thickening, I discussed patient's non-curable cancer, now w/ renal and impending liver failure, difficult to control infection and septic shock, family states that she has family coming in from [place] tonight and would like for me to discuss these trends with their whole family tomorrow afternoon.  We will plan on speaking with them when they are all here around 2-3 tomorrow afternoon.     Electronically signed by Saad Garcia MD at 02/07/20 4276

## 2020-02-09 NOTE — PROGRESS NOTES
Patient continues on vancomycin. Random AM level returned @ 39.1 mcg/mL which dose not warrant re-dosing today. Will continue to obtain levels to guide dosing.    Thank you,  Fany Tolbert [facility]  02/09/20  1:52 PM

## 2020-02-09 NOTE — PROGRESS NOTES
THC Physician - Brief Progress Note  PERMANENT  02/09/2020 00:19    [facility]  [facility] - CCU - 10 - [place], [place] ([facility])    MATT HAGAN    Date of Service 02/09/2020 00:19    HPI/Events of Note Lactic 8.4 (8.3)  Reviewed chart -  no further intervention needed - end of life care discussion  being held and waiting for family      Interventions Intermediate-Diagnostic test evaluation        Electronically Signed by: Isauro Calvo) on 02/09/2020 00:21

## 2020-02-09 NOTE — NURSING NOTE
Progressive Mobility    Date: 02/09/20     Mobility Initiation Contradictions: Heart Rate <60, >120 beats.min    Day of Mobility PROM performed B/L upper and lower ext     Patient: tolerated    Assisted by 1 person/persons    Device(s) used: N/A    Selam Pierce RN   02/09/20

## 2020-02-09 NOTE — CONSULTS
Consultation note    Referring physician: Hospitalist service    Consulting Physician: Dr. Taz Grullon MD    Reason for consultation: Cholelithiasis      HPI:   Patient is a 44-year-old white female who was admitted to the hospital service 8 days ago with a diagnosis of septic shock due to either right-sided pneumonia or spontaneous bacterial peritonitis, stage IV colon cancer with bulky lymphadenopathy and liver lesions and adrenal masses, lactic acidosis, acute kidney injury, elevated TSH, acute hypomagnesemia, normocytic anemia.  She has been in ICU and on a ventilator.  She has had a dialysis cath placed for hemodialysis.  Overall the patient is a very poor prognosis.  Liver functions are elevated.  She has had a gallbladder ultrasound which shows luminal sludge and wall thickening but no significant distention.  There is also pericholecystic fluid present as well as numerous liver lesions compatible with metastatic disease.  I been asked to see her to see if surgical intervention for her gallbladder would be appropriate.      Past Medical History:   Diagnosis Date   • Adenocarcinoma of cecum, stage 4b (CMS/HCC) 01/2020   • Anxiety    • GERD (gastroesophageal reflux disease)    • Headache    • Obesity (BMI 30-39.9)    • Snoring        Past Surgical History:   Procedure Laterality Date   • INSERTION HEMODIALYSIS CATHETER Right 2/4/2020    Procedure: HEMODIALYSIS CATHETER INSERTION;  Surgeon: Sophie Grimes MD;  Location: [facility] OR;  Service: General;  Laterality: Right;   • INTERVENTIONAL RADIOLOGY PROCEDURE Left 2/4/2020    Procedure: CENTRAL LINE PLACEMENT;  Surgeon: Sophie Grimes MD;  Location: [facility] OR;  Service: General;  Laterality: Left;   • LIVER BIOPSY N/A 1/2/2020    Procedure: LIVER BIOPSY LAPAROSCOPIC;  Surgeon: Sophie Grimes MD;  Location: [facility] OR;  Service: General   • OH ILEOSTOMY/JEJUNOSTOMY,NONTUBE     • TUBAL ABDOMINAL LIGATION     • VENOUS ACCESS DEVICE (PORT) INSERTION N/A  1/2/2020    Procedure: INSERTION VENOUS ACCESS DEVICE;  Surgeon: Sophie Grimes MD;  Location: [facility];  Service: General         Current Facility-Administered Medications:   •  acetaminophen (TYLENOL) tablet 650 mg, 650 mg, Oral, Q6H PRN, Saad Garcia MD, 650 mg at 02/04/20 2330  •  Adult Central Clinimix TPN, , Intravenous, Q24H, Last Rate: 60 mL/hr at 02/08/20 1807 **AND** multiple vitamin 10 mL, trace elements Cr-Cu-Mn-Zn 5 mL in sodium chloride 0.9 % 500 mL IVPB, , Intravenous, Once per day on Mon Wed Fri, Henry Garcia MD, Last Rate: 128.8 mL/hr at 02/07/20 0843  •  albumin human 25 % IV SOLN 25 g, 25 g, Intravenous, PRN, Alycia Metzger MD  •  artificial tears ophthalmic ointment, , Both Eyes, Q1H PRN, Saad Garcia MD  •  chlorhexidine (PERIDEX) 0.12 % solution 15 mL, 15 mL, Mouth/Throat, Q12H, Saad Garcia MD, 15 mL at 02/09/20 0836  •  diazePAM (VALIUM) tablet 2.5 mg, 2.5 mg, Oral, Q8H PRN, Sophie Grimes MD  •  fentaNYL (SUBLIMAZE) PCA 1500 mcg/30 mL syringe, , Intravenous, Continuous, Saad Garcia MD  •  heparin injection 500 Units, 5 mL, Intravenous, PRN, Sophie Grimes MD  •  hydrocortisone sodium succinate (Solu-CORTEF) injection 50 mg, 50 mg, Intravenous, Q6H, Henry Garcia MD, 50 mg at 02/09/20 0837  •  HYDROmorphone (DILAUDID) injection 1 mg, 1 mg, Intravenous, Q2H PRN, Sophie Grimes MD, 1 mg at 02/04/20 0615  •  insulin aspart (novoLOG) injection 0-9 Units, 0-9 Units, Subcutaneous, Q6H, Nikki Myers PA-C, 4 Units at 02/09/20 1219  •  Magnesium Sulfate 2 gram Bolus, followed by 8 gram infusion (total Mg dose 10 grams)- Mg less than or equal to 1mg/dL, 2 g, Intravenous, PRN **OR** Magnesium Sulfate 2 gram / 50mL Infusion (GIVE X 3 BAGS TO EQUAL 6GM TOTAL DOSE) - Mg 1.1 - 1.5 mg/dl, 2 g, Intravenous, PRN **OR** Magnesium Sulfate 4 gram infusion- Mg 1.6-1.9 mg/dL, 4 g, Intravenous, PRN, Sophie Grimes MD  •  meropenem (MERREM) 500mg/100 mL 0.9% NS IVPB  (mbp), 500 mg, Intravenous, Q24H, Keyona Donohue MD, 500 mg at 02/08/20 1433  •  micafungin sodium (MYCAMINE) 100 mg in sodium chloride 0.9 % 100 mL IVPB, 100 mg, Intravenous, Q24H, Sophie Grimes MD, 100 mg at 02/09/20 1204  •  midazolam (VERSED) injection 2 mg, 2 mg, Intravenous, Q1H PRN, Saad Garcia MD, 2 mg at 02/06/20 0722  •  norepinephrine (LEVOPHED) 8,000 mcg in sodium chloride 0.9 % 250 mL (32 mcg/mL) infusion, 0.02-0.3 mcg/kg/min, Intravenous, Titrated, Henry Garcia MD, Last Rate: 15.45 mL/hr at 02/09/20 1230, 0.08 mcg/kg/min at 02/09/20 1230  •  ondansetron (ZOFRAN) injection 4 mg, 4 mg, Intravenous, Q6H PRN, Sophie Grimes MD, 4 mg at 02/02/20 1506  •  oxyCODONE (ROXICODONE) immediate release tablet 5 mg, 5 mg, Oral, Q6H PRN, Sophie Grimes MD, 5 mg at 02/04/20 0136  •  pantoprazole (PROTONIX) injection 40 mg, 40 mg, Intravenous, Q12H, Saad Garcia MD, 40 mg at 02/09/20 0837  •  phytonadione (AQUA-MEPHYTON, VITAMIN K) 10 mg in sodium chloride 0.9 % 50 mL IVPB, 10 mg, Intravenous, Daily, Henry Garcia MD, Last Rate: 50 mL/hr at 02/09/20 1419, 10 mg at 02/09/20 1419  •  potassium phosphate 45 mmol in sodium chloride 0.9 % 250 mL infusion, 45 mmol, Intravenous, PRN **OR** potassium phosphate 30 mmol in sodium chloride 0.9 % 100 mL infusion, 30 mmol, Intravenous, PRN **OR** potassium phosphate 15 mmol in sodium chloride 0.9 % 100 mL infusion, 15 mmol, Intravenous, PRN **OR** sodium phosphates 45 mmol in sodium chloride 0.9 % 250 mL IVPB, 45 mmol, Intravenous, PRN **OR** sodium phosphates 30 mmol in sodium chloride 0.9 % 100 mL IVPB, 30 mmol, Intravenous, PRN **OR** sodium phosphates 15 mmol in sodium chloride 0.9 % 100 mL IVPB, 15 mmol, Intravenous, PRN, Henry Garcia MD  •  propofol (DIPRIVAN) infusion 10 mg/mL 100 mL, 5-50 mcg/kg/min, Intravenous, Titrated, Henry Garcia MD, Last Rate: 24.7 mL/hr at 02/09/20 1418, 40 mcg/kg/min at 02/09/20 1418  •  sodium bicarbonate  8.4 % 150 mEq in dextrose (D5W) 5 % 1,000 mL infusion (greater than 75 mEq), 150 mEq, Intravenous, Continuous, Henry Garcia MD, Last Rate: 75 mL/hr at 02/09/20 0841, 150 mEq at 02/09/20 0841  •  sodium chloride 0.9 % bolus 1,000 mL, 1,000 mL, Intravenous, Once, Sophie Grimes MD  •  sodium chloride 0.9 % bolus 1,000 mL, 1,000 mL, Intravenous, Once, Henry Garcia MD  •  [COMPLETED] Insert peripheral IV, , , Once **AND** sodium chloride 0.9 % flush 10 mL, 10 mL, Intravenous, PRNCornelio Barbara A, MD  •  sodium chloride 0.9 % flush 10 mL, 10 mL, Intravenous, Q12H, Sohpie Grimes MD, 10 mL at 02/09/20 0838  •  sodium chloride 0.9 % flush 10 mL, 10 mL, Intravenous, PRNCornelio Barbara A, MD  •  sodium chloride 0.9 % flush 10 mL, 10 mL, Intravenous, Q12H, Sophie Grimes MD, 10 mL at 02/09/20 0838  •  sodium chloride 0.9 % flush 10 mL, 10 mL, Intravenous, PRNCornelio Barbara A, MD  •  sodium chloride 0.9 % flush 10 mL, 10 mL, Intravenous, Q12H, Sophie Grimes MD, 10 mL at 02/09/20 0838  •  sodium chloride 0.9 % flush 10 mL, 10 mL, Intravenous, PRNCornelio Barbara A, MD  •  sodium chloride 0.9 % flush 10 mL, 10 mL, Intravenous, Q12H, Sophie Grimes MD, 10 mL at 02/09/20 0838  •  sodium chloride 0.9 % flush 10 mL, 10 mL, Intravenous, Q12H, Sophie Grimes MD, 10 mL at 02/09/20 0837  •  sodium chloride 0.9 % flush 10 mL, 10 mL, Intravenous, Q12H, Sophie Grimes MD, 10 mL at 02/09/20 0837  •  sodium chloride 0.9 % flush 10 mL, 10 mL, Intravenous, PRN, Sophie Grimes MD  •  sodium chloride 0.9 % flush 20 mL, 20 mL, Intravenous, PRN, Sophie Grimes MD  •  sodium chloride 0.9 % flush 20 mL, 20 mL, Intravenous, PRN, Sophie Grimes MD  •  thiamine (B-1) 500 mg in sodium chloride 0.9 % 100 mL IVPB, 500 mg, Intravenous, Q8H, Henry Garcia MD, Last Rate: 200 mL/hr at 02/09/20 1205, 500 mg at 02/09/20 1205  •  Vancomycin Pharmacy Intermittent Dosing, , Does not apply, Daily,  Sophie Grimes MD  •  Vasopressin (PITRESSIN) 20 Units in sodium chloride 0.9 % 100 mL (0.2 Units/mL) infusion, 0.04 Units/min, Intravenous, Continuous, Henry Garcia MD, Last Rate: 12 mL/hr at 02/09/20 0839, 0.04 Units/min at 02/09/20 0839    Allergies   Allergen Reactions   • Bactrim [Sulfamethoxazole-Trimethoprim] Swelling     Throat swells    • Metronidazole Swelling     Throat swelling and facial rash    • Sulfa Antibiotics Anaphylaxis       Social History     Socioeconomic History   • Marital status:      Spouse name: Not on file   • Number of children: Not on file   • Years of education: Not on file   • Highest education level: Not on file   Tobacco Use   • Smoking status: Never Smoker   • Smokeless tobacco: Never Used   Substance and Sexual Activity   • Alcohol use: No   • Drug use: No   • Sexual activity: Defer       Family History   Problem Relation Age of Onset   • Hypertension Mother    • Diabetes Mother    • Cancer Father         stomach   • Breast cancer Neg Hx        ROS:   Constitutional: Denies any weight changes, fatigue or weakness.  Eyes: Denies blurred or double vision  Cardiovascular: Denies chest pain, palpitations, edemas.  Respiratory: Denies cough, sputum, SOB.  Gastrointestinal: Denies N&V, abdominal pain, diarrhea, constipation.  Genitourinary: Denies dysuria, frequency.  Endocrine: Denies cold intolerance, lethargy and flushing.  Hem: Denies excessive bruising and postop bleeding.  Musculoskeletal: Denies weakness, joint swelling, pain or stiffness.  Neuro: Denies seizures, CVA, paresthesia, or peripheral neuropathy.   Skin: Denies change in nevi, rashes, masses.    Physical Exam:   Vitals:    02/09/20 1445   BP: 103/69   Pulse: 117   Resp: 28   Temp:    SpO2: 93%     General :  patient is a 44 y.o. female in no acute distress.  On ventilator.    HEENT:    normocephalic, pupils equally round and reactive to light, extraocular motions                    intact.                      Chest:       clear bilaterally.  Equal breath sounds.  No rales or rhonchi    Heart:        regular rate and rhythm.    Abdomen:  soft, nontender.  No distention    :            normal external genitalia    Rectal:       not performed    Extremities: no clubbing cyanosis or edema.  Good femoral, popliteal, dorsalis pedis                           and posterior tibial pulse.    Neurologic: patient oriented ×3.  Cranial  nerves grossly intact.  No sensory, cellebellar,                     or motor dysfunction.      Lab Results   Component Value Date    GLUCOSE 317 (H) 02/09/2020    BUN 21 (H) 02/09/2020    CREATININE 1.71 (H) 02/09/2020    EGFRIFNONA 33 (L) 02/09/2020    BCR 12.3 02/09/2020    CO2 21.9 (L) 02/09/2020    CALCIUM 8.0 (L) 02/09/2020    ALBUMIN 3.93 02/09/2020     (H) 02/09/2020    ALT 70 (H) 02/09/2020     WBC   Date Value Ref Range Status   02/09/2020 50.27 (C) 3.40 - 10.80 10*3/mm3 Final     RBC   Date Value Ref Range Status   02/09/2020 3.24 (L) 3.77 - 5.28 10*6/mm3 Final     Hemoglobin   Date Value Ref Range Status   02/09/2020 8.3 (L) 12.0 - 15.9 g/dL Final     Hematocrit   Date Value Ref Range Status   02/09/2020 27.0 (L) 34.0 - 46.6 % Final     MCV   Date Value Ref Range Status   02/09/2020 83.3 79.0 - 97.0 fL Final     MCH   Date Value Ref Range Status   02/09/2020 25.6 (L) 26.6 - 33.0 pg Final     MCHC   Date Value Ref Range Status   02/09/2020 30.7 (L) 31.5 - 35.7 g/dL Final     RDW   Date Value Ref Range Status   02/09/2020 20.8 (H) 12.3 - 15.4 % Final     RDW-SD   Date Value Ref Range Status   02/09/2020 54.3 (H) 37.0 - 54.0 fl Final     MPV   Date Value Ref Range Status   02/09/2020   Final     Comment:     Unable to calculate.      Platelets   Date Value Ref Range Status   02/09/2020 40 (C) 140 - 450 10*3/mm3 Final       Imaging Results (Last 72 Hours)     Procedure Component Value Units Date/Time    XR Chest 1 View [[id number]] Collected:  02/09/20 1118     Updated:   02/09/20 1125    Narrative:       EXAMINATION: XR CHEST 1 VW-      CLINICAL INDICATION:     soa; A41.9-Sepsis, unspecified organism     TECHNIQUE:  XR CHEST 1 VW-      COMPARISON: 02/07/2020      FINDINGS:   ET tube with tip just below clavicles. Left IJ deep line and Port-A-Cath  are stable. Right tunneled dialysis catheter positioning appears stable.  NG tube extends into the region of the stomach.     Lung volumes are stable. Mild increase in left basilar airspace disease.  Small pleural effusions. No pneumothorax. No acute bony findings.  Development of vascular congestion.       Impression:       1. Support devices detailed above.  2. Stable lung volumes but with increase in left basilar airspace  disease and development of pulmonary vascular congestion.  3. No significant change in small pleural effusions.     This report was finalized on 2/9/2020 11:23 AM by Dr. Bert Butler MD.       US Abdomen Limited [[id number]] Collected:  02/07/20 1500     Updated:  02/07/20 1504    Narrative:       EXAMINATION: US ABDOMEN LIMITED-         CLINICAL INDICATION:     cholestatic injury pattern on labs;  A41.9-Sepsis, unspecified organism     TECHNIQUE: Multiplanar gray scale ultrasound of the abdomen.     COMPARISON: NONE      FINDINGS:   Pancreas bed obscure.  Gallbladder with pericholecystic fluid and wall thickening. Sludge  within the gallbladder but no dense shadowing stones.   The CBD measures 3 mm.  Markedly abnormal appearance of the liver with multiple hyperechoic and  isoechoic lesions most consistent with metastatic disease. Perihepatic  ascites is noted.    No ascites demonstrated.   There is no sonographic Diaz sign.       Impression:       1. Gallbladder with luminal sludge and wall thickening but no  significant distention.  2. Pericholecystic fluid is present but could be a attributable to  underlying liver disease and ascites. This could also account for the  lateral wall thickening.  3. Numerous liver  lesions compatible with metastatic disease.  4. Small amounts of ascites along the liver margins.        This report was finalized on 2/7/2020 3:02 PM by Dr. Bert Butler MD.       XR Chest 1 View [[id number]] Collected:  02/07/20 0942     Updated:  02/07/20 0945    Narrative:       EXAMINATION: XR CHEST 1 VW-      CLINICAL INDICATION:     Intubated Patient; A41.9-Sepsis, unspecified  organism     TECHNIQUE:  XR CHEST 1 VW-      COMPARISON: 02/06/2020      FINDINGS:   ET tube with tip below clavicles and just at or slightly above the  rob. NG tube extends into the stomach. Right tunneled dialysis  catheter, left IJ deep line, and left Port-A-Cath are stable in  positioning.     Some increase in basilar atelectasis. No effusion or pneumothorax  identified. No acute bony findings.       Impression:       1. Support devices as above.  2. Development of basilar atelectasis. Otherwise stable exam.     This report was finalized on 2/7/2020 9:43 AM by Dr. Bert Butler MD.               Assessment:   Overall this patient's prognosis is extremely poor.  She is a very poor surgical candidate and no further surgical intervention should be performed.    Plan:  Family is likely to decide on comfort measures.    Dictated utilizing Dragon dictation

## 2020-02-10 NOTE — PROGRESS NOTES
PROGRESS NOTE         Patient Identification:  Name:  Gracy Norman  Age:  44 y.o.  Sex:  female  :  1975  MRN:  [id number]  Visit Number:  [id number]  Primary Care Provider:  Almas Stone MD         LOS: 10 days       ----------------------------------------------------------------------------------------------------------------------  Subjective       Chief Complaints:    Weakness - Generalized and Post-op Problem    Interval History:      Patient remains intubated and sedated at 40% FiO2 via ET tube.  Patient continues to require Levophed and vasopressin for blood pressure support.  WBC worsening at 54.33.  CRP improving at 18.05.  Lactic acid slightly improved at 6.7. Plans for terminal extubation today.    Review of Systems:    Unable to obtain, intubated and sedated.  ----------------------------------------------------------------------------------------------------------------------      Objective       Current [facility] Meds:    chlorhexidine 15 mL Mouth/Throat Q12H   hydrocortisone sodium succinate 50 mg Intravenous Q8H   insulin aspart 0-9 Units Subcutaneous Q6H   insulin regular 5 Units Intravenous Once   magnesium sulfate 4 g Intravenous Once   meropenem 500 mg Intravenous Q24H   micafungin (MYCAMINE)  mg Intravenous Q24H   trace minerals + multivitamin IVPB  Intravenous Once per day on    pantoprazole 40 mg Intravenous Q12H   phytonadione (VITAMIN K) IVPB 10 mg Intravenous Daily   sodium chloride 1,000 mL Intravenous Once   sodium chloride 1,000 mL Intravenous Once   sodium chloride 10 mL Intravenous Q12H   sodium chloride 10 mL Intravenous Q12H   sodium chloride 10 mL Intravenous Q12H   sodium chloride 10 mL Intravenous Q12H   sodium chloride 10 mL Intravenous Q12H   sodium chloride 10 mL Intravenous Q12H   thiamine (VITAMIN B1) IVPB 500 mg Intravenous Q8H   Vancomycin Pharmacy Intermittent Dosing  Does not apply Daily       Adult Central Clinimix TPN  Last  Rate: 60 mL/hr at 02/09/20 1809   fentaNYL Citrate     norepinephrine 0.02-0.3 mcg/kg/min Last Rate: 0.08 mcg/kg/min (02/09/20 2113)   propofol 5-50 mcg/kg/min Last Rate: 40 mcg/kg/min (02/10/20 1100)   sodium bicarbonate drip (greater than 75 mEq/bag) 150 mEq Last Rate: 150 mEq (02/10/20 0025)   vasopressin (PITRESSIN) infusion 0.04 Units/min Last Rate: 0.04 Units/min (02/10/20 0916)     ----------------------------------------------------------------------------------------------------------------------    Vital Signs:  Temp:  [98.2 °F (36.8 °C)-100.4 °F (38 °C)] 98.7 °F (37.1 °C)  Heart Rate:  [] 106  Resp:  [28-30] 28  BP: ()/(28-90) 107/75  FiO2 (%):  [30 %-40 %] 40 %  Mean Arterial Pressure (Non-Invasive) for the past 24 hrs (Last 3 readings):   Noninvasive MAP (mmHg)   02/10/20 1034 82   02/10/20 0903 88   02/10/20 0833 90     SpO2 Percentage    02/10/20 1034 02/10/20 1042 02/10/20 1301   SpO2: 100% 100% 100%     SpO2:  [90 %-100 %] 100 %  on   ;   Device (Oxygen Therapy): ventilator    Body mass index is 41.01 kg/m².  Wt Readings from Last 3 Encounters:   02/10/20 105 kg (231 lb 7.7 oz)   01/12/20 92.1 kg (203 lb)   01/09/20 91.6 kg (202 lb)        Intake/Output Summary (Last 24 hours) at 2/10/2020 1340  Last data filed at 2/10/2020 0500  Gross per 24 hour   Intake 4819.37 ml   Output 0 ml   Net 4819.37 ml     NPO Diet  Adult Central Clinimix TPN  ----------------------------------------------------------------------------------------------------------------------      Physical Exam:    Constitutional:  Intubated, sedated   HENT:  Head: Normocephalic and atraumatic.  Mouth:  Moist mucous membranes.    Eyes:  Conjunctivae and EOM are normal.  No scleral icterus.  Neck:  Neck supple.  No JVD present.     Cardiovascular:  tachycardic, regular rhythm and normal heart sounds with no murmur. No edema.  Pulmonary/Chest:  tachypneic, no wheezes, no crackles, with normal breath sounds and good air  movement.  Abdominal: Distended and tense with diffuse tenderness. Ileostomy.   Musculoskeletal:  No edema, no tenderness, and no deformity.  No swelling or redness of joints.  Neurological:  Sedated  Skin:  Skin is warm and dry.  No rash noted.  No pallor.   Psychiatric:  Unable to assess    ----------------------------------------------------------------------------------------------------------------------  Results from last 7 days   Lab Units 02/04/20  1932   CK TOTAL U/L 297*         Results from last 7 days   Lab Units 02/06/20  1117   TRIGLYCERIDES mg/dL 481*     Results from last 7 days   Lab Units 02/09/20  1639   PH, ARTERIAL pH units 7.363   PO2 ART mm Hg 102.0   PCO2, ARTERIAL mm Hg 39.9   HCO3 ART mmol/L 22.7     Results from last 7 days   Lab Units 02/10/20  0952 02/10/20  0114 02/09/20  2231 02/09/20  1624  02/09/20  0113  02/08/20  0351  02/07/20  0820  02/05/20  1040   CRP mg/dL  --  18.05*  --   --   --  20.16*  --  22.31*  --   --    < >  --    LACTATE mmol/L 6.0*  --  6.7* 7.1*   < >  --    < > 9.9*   < >  --    < > 5.4*   WBC 10*3/mm3  --  54.33*  --   --   --  50.27*  --  49.47*  --   --    < >  --    HEMOGLOBIN g/dL  --  8.3*  --   --   --  8.3*  --  8.8*  --   --    < >  --    HEMATOCRIT %  --  26.9*  --   --   --  27.0*  --  28.9*  --   --    < >  --    MCV fL  --  84.3  --   --   --  83.3  --  82.8  --   --    < >  --    MCHC g/dL  --  30.9*  --   --   --  30.7*  --  30.4*  --   --    < >  --    PLATELETS 10*3/mm3  --  50*  --   --   --  40*  --  56*  --   --    < >  --    INR   --   --   --   --   --   --   --   --   --  1.12*  --  1.88*    < > = values in this interval not displayed.     Results from last 7 days   Lab Units 02/10/20  0114 02/09/20  0113 02/08/20  1549 02/08/20  0351   SODIUM mmol/L 125* 127*  --  128*   POTASSIUM mmol/L 3.9 3.7 3.4* 3.0*   MAGNESIUM mg/dL 1.9 2.0  --  2.5   CHLORIDE mmol/L 78* 82*  --  82*   CO2 mmol/L 22.4 21.9*  --  21.0*   BUN mg/dL 38* 21*  --  22*      CREATININE mg/dL 2.28* 1.71*  --  1.90*   EGFR IF NONAFRICN AM mL/min/1.73 23* 33*  --  29*   CALCIUM mg/dL 7.7* 8.0*  --  8.2*   GLUCOSE mg/dL 288* 317*  --  304*   ALBUMIN g/dL 3.26* 3.93  --  4.12   BILIRUBIN mg/dL 5.0* 4.6*  --  4.0*   ALK PHOS U/L 434* 337*  --  329*   AST (SGOT) U/L 410* 322*  --  350*   ALT (SGPT) U/L 79* 70*  --  91*   Estimated Creatinine Clearance: 37.2 mL/min (A) (by C-G formula based on SCr of 2.28 mg/dL (H)).  No results found for: AMMONIA    Glucose   Date/Time Value Ref Range Status   02/10/2020 0505 283 (H) 70 - 130 mg/dL Final   02/09/2020 2314 272 (H) 70 - 130 mg/dL Final   02/09/2020 1954 290 (H) 70 - 130 mg/dL Final   02/09/2020 1658 312 (H) 70 - 130 mg/dL Final   02/09/2020 1212 274 (H) 70 - 130 mg/dL Final   02/09/2020 0500 312 (H) 70 - 130 mg/dL Final   02/09/2020 0039 308 (H) 70 - 130 mg/dL Final   02/08/2020 1812 249 (H) 70 - 130 mg/dL Final     No results found for: HGBA1C  Lab Results   Component Value Date    TSH 9.270 (H) 01/31/2020    FREET4 0.97 02/01/2020       Blood Culture   Date Value Ref Range Status   02/03/2020 No growth at less than 24 hours  Preliminary   02/03/2020 No growth at less than 24 hours  Preliminary   01/31/2020 No growth at 3 days  Preliminary   01/31/2020 No growth at 3 days  Preliminary     No results found for: URINECX  No results found for: WOUNDCX  No results found for: STOOLCX  No results found for: RESPCX  Pain Management Panel     Pain Management Panel Latest Ref Rng & Units 2/1/2020    CREATININE UR mg/dL 146.0            ----------------------------------------------------------------------------------------------------------------------  Imaging Results (Last 24 Hours)     Procedure Component Value Units Date/Time    XR Chest 1 View [[id number]] Resulted:  02/10/20 1243     Updated:  02/10/20 1243           ----------------------------------------------------------------------------------------------------------------------    Assessment/Plan       Assessment/Plan     ASSESSMENT:    1.  Septic shock with lactic acid greater than 4 on admission  2.  Peritonitis     PLAN:     Patient remains intubated and sedated at 40% FiO2 via ET tube.  Patient continues to require Levophed and vasopressin for blood pressure support.  WBC worsening at 54.33.  CRP improving at 18.05.  Lactic acid slightly improved at 6.7. Plans for terminal extubation today.    CT Abd/pelvis which did not demonstrate large leak but did show extensive metastatic disease and likely metastatic ascites w/ loculated fluid collection c/f infection.      Mycoplasma negative.  Legionella negative.  Respiratory panel PCR negative.  Strep pneumo negative.  Influenza negative.  Urinalysis unremarkable. Blood cultures show no growth thus far.  Body fluid culture reports 4390 nucleated cells, Gram stain reports no organism.      Would recommend to continue Meropenem, Vancomycin per pharmacy dosing and Micafungin 100 mg IV q 24 hours.  Patient carries poor prognosis despite all appropriate interventions.      Current Antimicrobial:  Micafungin 100 mg IV q 24 hours  Meropenem per pharmacy dosing  Vancomycin per pharmacy dosing     Code Status:   Code Status and Medical Interventions:   Ordered at: 02/10/20 1333     Code Status:    No CPR     Medical Interventions (Level of Support Prior to Arrest):    Comfort Measures       Noelle Helton, APRN  02/10/20  1:40 PM      Physician Attestation:    I have personally performed a face-to-face evaluation on this patient. I have collected the review of systems and performed my own physical exam. I reviewed the patient's data including history of present illness, past medical history, past surgical history and allergy list. . The assessment and plan documented above are my own after discussing the case in detail with the  APC. I have reviewed the laboratory and radiological pertinent results. I have reviewed and edited the note above after discussing the findings with the APC.    Keyona Donohue MD  Infectious Diseases  02/10/20  3:20 PM

## 2020-02-10 NOTE — PROGRESS NOTES
Patient continues to remain on broad spectrum antibiotics as well as pressor support with worsening leukocytosis and low grade fever. Her brother is currently at bedside and I again discussed prognosis and answered any questions that they may have from an oncology stand point. They are aware prognosis is poor.

## 2020-02-10 NOTE — PROGRESS NOTES
Discharge Planning Assessment   [facility]     Patient Name: Gracy Norman  MRN: [id number]  Today's Date: 2/10/2020    Admit Date: 1/31/2020        Discharge Plan     Row Name 02/10/20 1451       Plan    Plan  Pt was admitted on 01/31/2020. Pt is currently intubated. Pt lives at home with her spouse and other family members. Pt does not receive  services or use any DMe. Pt may need DMe at discharge, and requested a rolling walker in the past. SS will follow up in regards to discharge plan once pt is extubated.     Patient/Family in Agreement with Plan  yes              JASS Del Rio

## 2020-02-10 NOTE — PROGRESS NOTES
Nephrology Progress Note      Subjective     Pt is intubated on MV, continue to be in sever septic shock on pressors    Objective       Vital signs :     Temp:  [98.2 °F (36.8 °C)-100.9 °F (38.3 °C)] 99.1 °F (37.3 °C)  Heart Rate:  [100-138] 101  Resp:  [28-33] 28  BP: ()/(28-90) 117/48  FiO2 (%):  [30 %-40 %] 40 %      Intake/Output Summary (Last 24 hours) at 2/10/2020 0820  Last data filed at 2/10/2020 0500  Gross per 24 hour   Intake 5019.37 ml   Output 0 ml   Net 5019.37 ml       Physical Exam:    General Appearance : Intubated on MV  Lungs : B/L lower zone crackles with decreased intensity of breath sounds  Heart :  regular rhythm & normal rate, normal S1, S2 and no murmur, no rub  Abdomen : normal bowel sounds, no masses, no hepatomegaly, no splenomegaly, soft non-tender and no guarding  Extremities : moves extremities well, 2+ edema, no cyanosis and no redness  Neurologic :   Intubated on MV  Acess :       Laboratory Data :     Albumin Albumin   Date Value Ref Range Status   02/10/2020 3.26 (L) 3.50 - 5.20 g/dL Final   02/09/2020 3.93 3.50 - 5.20 g/dL Final   02/08/2020 4.12 3.50 - 5.20 g/dL Final      Magnesium Magnesium   Date Value Ref Range Status   02/10/2020 1.9 1.6 - 2.6 mg/dL Final   02/09/2020 2.0 1.6 - 2.6 mg/dL Final   02/08/2020 2.5 1.6 - 2.6 mg/dL Final          PTH               No results found for: PTH    CBC and coagulation:  Results from last 7 days   Lab Units 02/10/20  0114 02/09/20  2231 02/09/20  1624 02/09/20  0632 02/09/20  0113  02/08/20  0351  02/07/20  0820  02/05/20  1040   LACTATE mmol/L  --  6.7* 7.1* 7.6*  --    < > 9.9*   < >  --    < > 5.4*   CRP mg/dL 18.05*  --   --   --  20.16*  --  22.31*  --   --    < >  --    WBC 10*3/mm3 54.33*  --   --   --  50.27*  --  49.47*  --   --    < >  --    HEMOGLOBIN g/dL 8.3*  --   --   --  8.3*  --  8.8*  --   --    < >  --    HEMATOCRIT % 26.9*  --   --   --  27.0*  --  28.9*  --   --    < >  --    MCV fL 84.3  --   --   --  83.3  --   82.8  --   --    < >  --    MCHC g/dL 30.9*  --   --   --  30.7*  --  30.4*  --   --    < >  --    PLATELETS 10*3/mm3 50*  --   --   --  40*  --  56*  --   --    < >  --    INR   --   --   --   --   --   --   --   --  1.12*  --  1.88*    < > = values in this interval not displayed.     Acid/base balance:  Results from last 7 days   Lab Units 02/09/20  1639 02/09/20  1329 02/09/20  1042   PH, ARTERIAL pH units 7.363 7.311* 7.329*   PO2 ART mm Hg 102.0 80.8* 85.2   PCO2, ARTERIAL mm Hg 39.9 46.3* 44.1   HCO3 ART mmol/L 22.7 23.4 23.2     Renal and electrolytes:  Results from last 7 days   Lab Units 02/10/20  0114 02/09/20  0113 02/08/20  1549 02/08/20  0351 02/07/20  1042 02/07/20  0057 02/06/20  0303  02/04/20  1932   SODIUM mmol/L 125* 127*  --  128*  --  131* 131*   < >  --    POTASSIUM mmol/L 3.9 3.7 3.4* 3.0*  --  3.2* 3.9   < >  --    MAGNESIUM mg/dL 1.9 2.0  --  2.5  --  1.8  --   --  2.1   CHLORIDE mmol/L 78* 82*  --  82*  --  85* 87*   < >  --    CO2 mmol/L 22.4 21.9*  --  21.0*  --  19.9* 15.3*   < >  --    BUN mg/dL 38* 21*  --  22*  --  20 21*   < >  --    CREATININE mg/dL 2.28* 1.71*  --  1.90*  --  2.13* 2.39*   < >  --    EGFR IF NONAFRICN AM mL/min/1.73 23* 33*  --  29*  --  25* 22*   < >  --    CALCIUM mg/dL 7.7* 8.0*  --  8.2*  --  8.2* 7.8*   < >  --    IONIZED CALCIUM mmol/L  --   --   --   --  1.00*  --   --   --  1.08*   PHOSPHORUS mg/dL 3.7 3.3 1.8* 1.6*  --  2.2*  --   --  3.6    < > = values in this interval not displayed.     Estimated Creatinine Clearance: 37.2 mL/min (A) (by C-G formula based on SCr of 2.28 mg/dL (H)).    Liver and pancreatic function:  Results from last 7 days   Lab Units 02/10/20  0114 02/09/20  0113 02/08/20  0351  02/04/20  1932   ALBUMIN g/dL 3.26* 3.93 4.12   < >  --    BILIRUBIN mg/dL 5.0* 4.6* 4.0*   < >  --    ALK PHOS U/L 434* 337* 329*   < >  --    AST (SGOT) U/L 410* 322* 350*   < >  --    ALT (SGPT) U/L 79* 70* 91*   < >  --    AMYLASE U/L  --   --   --   --   61   LIPASE U/L  --   --   --   --  126*    < > = values in this interval not displayed.         Cardiac:      Liver and pancreatic function:  Results from last 7 days   Lab Units 02/10/20  0114 02/09/20  0113 02/08/20  0351  02/04/20  1932   ALBUMIN g/dL 3.26* 3.93 4.12   < >  --    BILIRUBIN mg/dL 5.0* 4.6* 4.0*   < >  --    ALK PHOS U/L 434* 337* 329*   < >  --    AST (SGOT) U/L 410* 322* 350*   < >  --    ALT (SGPT) U/L 79* 70* 91*   < >  --    AMYLASE U/L  --   --   --   --  61   LIPASE U/L  --   --   --   --  126*    < > = values in this interval not displayed.       Medications :       chlorhexidine 15 mL Mouth/Throat Q12H   hydrocortisone sodium succinate 50 mg Intravenous Q8H   insulin aspart 0-9 Units Subcutaneous Q6H   meropenem 500 mg Intravenous Q24H   micafungin (MYCAMINE)  mg Intravenous Q24H   trace minerals + multivitamin IVPB  Intravenous Once per day on Mon Wed Fri   pantoprazole 40 mg Intravenous Q12H   phytonadione (VITAMIN K) IVPB 10 mg Intravenous Daily   sodium chloride 1,000 mL Intravenous Once   sodium chloride 1,000 mL Intravenous Once   sodium chloride 10 mL Intravenous Q12H   sodium chloride 10 mL Intravenous Q12H   sodium chloride 10 mL Intravenous Q12H   sodium chloride 10 mL Intravenous Q12H   sodium chloride 10 mL Intravenous Q12H   sodium chloride 10 mL Intravenous Q12H   thiamine (VITAMIN B1) IVPB 500 mg Intravenous Q8H   Vancomycin Pharmacy Intermittent Dosing  Does not apply Daily       Adult Central Clinimix TPN  Last Rate: 60 mL/hr at 02/09/20 1809   fentaNYL Citrate     norepinephrine 0.02-0.3 mcg/kg/min Last Rate: 0.08 mcg/kg/min (02/09/20 2113)   propofol 5-50 mcg/kg/min Last Rate: 40 mcg/kg/min (02/10/20 0627)   sodium bicarbonate drip (greater than 75 mEq/bag) 150 mEq Last Rate: 150 mEq (02/10/20 0025)   vasopressin (PITRESSIN) infusion 0.04 Units/min Last Rate: 0.04 Units/min (02/10/20 0025)         Assessment/Plan     1. Sever sepsis with septic shock likely from  peritonitis  2. Metastatic poorly differentiated adenocarcinoma of colon origin s/p diverting loop ileostomy on 1/17/2020  3. KODI  4. 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    KODI likely ATN, oliguric due to septic shock  Pt was started on dialysis due to worsening volume overload leading to hypoxic respiratory failure in setting of oliguic KODI. Baseline Cr 1  -No intervention from nephrology stands point at this time, pt is going to have terminal extubation, will sign off.     Alycia Metzger MD  02/10/20  8:20 AM

## 2020-02-10 NOTE — PROGRESS NOTES
THC Physician - Brief Progress Note  PERMANENT  02/10/2020 01:48    [facility]  [facility] - [facility] - [facility] - [place] [place] ([facility])    MATT HAGAN    Date of Service 02/10/2020 01:48    HPI/Events of Note WBC is 54 but patient is on antibiotics and being followed by ID  and plan for terminal extubation today.      Interventions Intermediate-Diagnostic test evaluation        Electronically Signed by: Timi Espinoza) on 02/10/2020 01:50

## 2020-02-10 NOTE — PROGRESS NOTES
THC Physician - Brief Progress Note  PERMANENT  02/10/2020 00:28    [facility]  [facility] - [facility] - [facility] - [place], [place] ([facility])    MATT HAGAN    Date of Service 02/10/2020 00:28    HPI/Events of Note Lactic acid is trending down , will follow      Interventions Intermediate-Diagnostic test evaluation        Electronically Signed by: Timi Espinoza) on 02/10/2020 00:29

## 2020-02-10 NOTE — NURSING NOTE
Progressive Mobility    Date: 02/10/20     Mobility Initiation Contradictions: On vent, sedation. IV pressors. Pt unable to ambulate or move due to condition.     Day of Mobility 6 Activity Performed: PROM    Patient: Tolerated    Assisted by 1 person/persons    Device(s) used: N/A    Aung To RN   02/10/20 Progressive Mobility

## 2020-02-10 NOTE — PROGRESS NOTES
" LOS: 10 days   Patient Care Team:  Almas Stone MD as PCP - General (Family Medicine)  CELSO Burton MD as Consulting Physician (Oncology)    Chief Complaint:  Shortness of breath    Subjective     History of Present Illness     Patient remains on mechanical ventilator support.  Currently on settings of rate 28, tidal volume 380, FiO2 40%, PEEP of 8.  She remains on sedation with propofol and fentanyl.  She continues require  pressure support by norepinephrine and vasopressin.  Family members are present at bedside to discuss further goals of care.  She is requiring a cooling device to maintain body temperature in the setting of recent fever.    Subjective    Unable to obtain review of systems due to intubation and sedation.       Objective     Vital Signs  Temp:  [98.2 °F (36.8 °C)-100.9 °F (38.3 °C)] 98.7 °F (37.1 °C)  Heart Rate:  [] 108  Resp:  [28-33] 28  BP: ()/(28-90) 107/75  FiO2 (%):  [30 %-40 %] 40 %  Body mass index is 41.01 kg/m².    Intake/Output Summary (Last 24 hours) at 2/10/2020 1131  Last data filed at 2/10/2020 0500  Gross per 24 hour   Intake 5019.37 ml   Output 0 ml   Net 5019.37 ml     No intake/output data recorded.    Objective:  General Appearance:  General patient appearance: Intubated and sedated.  Appears to be resting comfortably.    Vital signs: (most recent): Blood pressure 107/75, pulse 108, temperature 98.7 °F (37.1 °C), temperature source Bladder, resp. rate 28, height 160 cm (63\"), weight 105 kg (231 lb 7.7 oz), last menstrual period 12/31/2019, SpO2 100 %, not currently breastfeeding.  No fever.    HEENT: (Normocephalic.  Atraumatic.  ET tube in place.)    Lungs:  There are rales (bibasilar).  No wheezes or rhonchi.  (Bilateral air entry positive.)  Heart: Normal rate.  Regular rhythm.  S1 normal and S2 normal.  No murmur.   Abdomen: Abdomen is soft and distended.  Hypoactive bowel sounds.     Extremities: There is dependent edema (diffuse, 1+).  There " is no deformity.    Pulses: Distal pulses are intact.    Neurological: (Unable to assess due to sedation status.).    Pupils:  Pupils are equal, round, and reactive to light.    Skin:  Warm and dry.            Results Review:     I reviewed the patient's new clinical results.     CMP notable for hyponatremia, hypochloremia, significant anion gap elevation with worsening of creatinine and BUN elevation.  Hypocalcemia also noted in the setting of hypoalbuminemia.  Persistent transaminitis noted with with interval worsening.  Lactate remains elevated with will today at 6.0.  CRP shows minimal downward trend.  Persistent worsening of leukocytosis noted with stable anemia.  Thrombocytopenia remains overall stable.    No pending cultures or other significant cultures.  Previous respiratory culture was notable for scant Candida.    Chest x-ray yesterday notable for worsening left basilar infiltrates and pulmonary edema.    WBC WBC   Date Value Ref Range Status   02/10/2020 54.33 (C) 3.40 - 10.80 10*3/mm3 Final   02/09/2020 50.27 (C) 3.40 - 10.80 10*3/mm3 Final   02/08/2020 49.47 (C) 3.40 - 10.80 10*3/mm3 Final      HGB Hemoglobin   Date Value Ref Range Status   02/10/2020 8.3 (L) 12.0 - 15.9 g/dL Final   02/09/2020 8.3 (L) 12.0 - 15.9 g/dL Final   02/08/2020 8.8 (L) 12.0 - 15.9 g/dL Final      HCT Hematocrit   Date Value Ref Range Status   02/10/2020 26.9 (L) 34.0 - 46.6 % Final   02/09/2020 27.0 (L) 34.0 - 46.6 % Final   02/08/2020 28.9 (L) 34.0 - 46.6 % Final      Platlets No results found for: LABPLAT     PT/INR:  No results found for: PROTIME/No results found for: INR    Sodium Sodium   Date Value Ref Range Status   02/10/2020 125 (L) 136 - 145 mmol/L Final   02/09/2020 127 (L) 136 - 145 mmol/L Final   02/08/2020 128 (L) 136 - 145 mmol/L Final      Potassium Potassium   Date Value Ref Range Status   02/10/2020 3.9 3.5 - 5.2 mmol/L Final     Comment:     1+ Hemolysis  Specimen hemolyzed.  Results may be affected.    02/09/2020 3.7 3.5 - 5.2 mmol/L Final   02/08/2020 3.4 (L) 3.5 - 5.2 mmol/L Final   02/08/2020 3.0 (L) 3.5 - 5.2 mmol/L Final      Chloride Chloride   Date Value Ref Range Status   02/10/2020 78 (L) 98 - 107 mmol/L Final   02/09/2020 82 (L) 98 - 107 mmol/L Final   02/08/2020 82 (L) 98 - 107 mmol/L Final      Bicarbonate No results found for: PLASMABICARB   BUN BUN   Date Value Ref Range Status   02/10/2020 38 (H) 6 - 20 mg/dL Final   02/09/2020 21 (H) 6 - 20 mg/dL Final   02/08/2020 22 (H) 6 - 20 mg/dL Final      Creatinine Creatinine   Date Value Ref Range Status   02/10/2020 2.28 (H) 0.57 - 1.00 mg/dL Final   02/09/2020 1.71 (H) 0.57 - 1.00 mg/dL Final   02/08/2020 1.90 (H) 0.57 - 1.00 mg/dL Final      Calcium Calcium   Date Value Ref Range Status   02/10/2020 7.7 (L) 8.6 - 10.5 mg/dL Final   02/09/2020 8.0 (L) 8.6 - 10.5 mg/dL Final   02/08/2020 8.2 (L) 8.6 - 10.5 mg/dL Final      Magnesium Magnesium   Date Value Ref Range Status   02/10/2020 1.9 1.6 - 2.6 mg/dL Final   02/09/2020 2.0 1.6 - 2.6 mg/dL Final   02/08/2020 2.5 1.6 - 2.6 mg/dL Final        pH pH, Arterial   Date Value Ref Range Status   02/09/2020 7.363 7.350 - 7.450 pH units Final   02/09/2020 7.311 (L) 7.350 - 7.450 pH units Final     Comment:     84 Value below reference range   02/09/2020 7.329 (L) 7.350 - 7.450 pH units Final     Comment:     84 Value below reference range   02/09/2020 7.324 (L) 7.350 - 7.450 pH units Final     Comment:     84 Value below reference range   02/08/2020 7.353 7.350 - 7.450 pH units Final   02/08/2020 7.407 7.350 - 7.450 pH units Final   02/07/2020 7.441 7.350 - 7.450 pH units Final      pO2 pO2, Arterial   Date Value Ref Range Status   02/09/2020 102.0 83.0 - 108.0 mm Hg Final     Comment:     83 Value above reference range   02/09/2020 80.8 (L) 83.0 - 108.0 mm Hg Final   02/09/2020 85.2 83.0 - 108.0 mm Hg Final   02/09/2020 90.9 83.0 - 108.0 mm Hg Final   02/08/2020 79.1 (L) 83.0 - 108.0 mm Hg Final     Comment:      84 Value below reference range   02/08/2020 107.0 83.0 - 108.0 mm Hg Final     Comment:     83 Value above reference range   02/07/2020 102.0 83.0 - 108.0 mm Hg Final     Comment:     83 Value above reference range      pCO2 pCO2, Arterial   Date Value Ref Range Status   02/09/2020 39.9 35.0 - 45.0 mm Hg Final   02/09/2020 46.3 (H) 35.0 - 45.0 mm Hg Final     Comment:     83 Value above reference range   02/09/2020 44.1 35.0 - 45.0 mm Hg Final   02/09/2020 44.7 35.0 - 45.0 mm Hg Final   02/08/2020 41.5 35.0 - 45.0 mm Hg Final   02/08/2020 37.3 35.0 - 45.0 mm Hg Final   02/07/2020 35.2 35.0 - 45.0 mm Hg Final      HCO3 HCO3, Arterial   Date Value Ref Range Status   02/09/2020 22.7 20.0 - 26.0 mmol/L Final   02/09/2020 23.4 20.0 - 26.0 mmol/L Final   02/09/2020 23.2 20.0 - 26.0 mmol/L Final   02/09/2020 23.2 20.0 - 26.0 mmol/L Final   02/08/2020 23.1 20.0 - 26.0 mmol/L Final   02/08/2020 23.5 20.0 - 26.0 mmol/L Final   02/07/2020 23.9 20.0 - 26.0 mmol/L Final          chlorhexidine 15 mL Mouth/Throat Q12H   hydrocortisone sodium succinate 50 mg Intravenous Q8H   insulin aspart 0-9 Units Subcutaneous Q6H   insulin regular 5 Units Intravenous Once   magnesium sulfate 4 g Intravenous Once   meropenem 500 mg Intravenous Q24H   micafungin (MYCAMINE)  mg Intravenous Q24H   trace minerals + multivitamin IVPB  Intravenous Once per day on Mon Wed Fri   pantoprazole 40 mg Intravenous Q12H   phytonadione (VITAMIN K) IVPB 10 mg Intravenous Daily   sodium chloride 1,000 mL Intravenous Once   sodium chloride 1,000 mL Intravenous Once   sodium chloride 10 mL Intravenous Q12H   sodium chloride 10 mL Intravenous Q12H   sodium chloride 10 mL Intravenous Q12H   sodium chloride 10 mL Intravenous Q12H   sodium chloride 10 mL Intravenous Q12H   sodium chloride 10 mL Intravenous Q12H   thiamine (VITAMIN B1) IVPB 500 mg Intravenous Q8H   Vancomycin Pharmacy Intermittent Dosing  Does not apply Daily       Adult Central Clinimix TPN   Last Rate: 60 mL/hr at 02/09/20 1809   fentaNYL Citrate     norepinephrine 0.02-0.3 mcg/kg/min Last Rate: 0.08 mcg/kg/min (02/09/20 2113)   propofol 5-50 mcg/kg/min Last Rate: 40 mcg/kg/min (02/10/20 1100)   sodium bicarbonate drip (greater than 75 mEq/bag) 150 mEq Last Rate: 150 mEq (02/10/20 0025)   vasopressin (PITRESSIN) infusion 0.04 Units/min Last Rate: 0.04 Units/min (02/10/20 0916)       Medication Review: I have reviewed the current medications.     Assessment/Plan     Patient Active Problem List   Diagnosis Code   • Malignant neoplasm of ascending colon (CMS/[facility]) C18.2   • Port-A-Cath in place Z95.828   • Sepsis (CMS/[facility]) A41.9       Assessment & Plan       Central nervous system    Plan:  - Pain management: fentanyl drip.   - Sedation: Propofol, RASS goal: 0 to -1  - Patient does not have biot's type of breathing pattern while on opioids.   - I highly recommend avoiding nighttime disturbances and light exposure during night to maintain normal circadian rhythms to avoid hypoactive or hyperactive delirium.  - On IV thiamine       Cardiovascular system:  Septic shock    Plan:  - Goal CVP around 10 mmHg  -Vasopressor: vasopressin, norepinephrine,  Targeting mean atrial pressure of 65 mmHg   -Antibiotics: On IV meropenem, IV micafungin, IV vancomycin  -On hydrocortisone 50 mg IV every 8 hours          Respiratory system:  Acute hypoxic respiratory failure on mechanical ventilator  Bilateral lower lobe pneumonia due to unspecified organism  Bilateral pleural effusions in the setting of KODI likely septic ATN    Plan:  - Mode of mechanical ventilation is assist control volume cycle.  Ventilator setting includes tidal volume of 28mL with rate of 380, FiO2 of 40 and PEEP of 8.   Patient is currently on sedation with propofol and for analgesia patient is on fentanyl.      - Ventilator graphics : Flow time scalar was reviewed and auto PEEP is absent, volume time scalar was reviewed and leak is absent , pressure times  scalar was reviewed and pressure stress index is 1 indicating normal  pressure stress index.  Pressure volume loop was assessed.  Auto triggering is absent.  Missed triggering is absent.  Double triggering is absent.  Active expiration is absent.  -Ordered ABG  -Ordered chest x-ray  -Head of the bed elevated at 30 degrees  -Mouth care with oral chlorhexidine   -on sodium bicarbonate drip for persistent metabolic acidosis  -IV meropenem, IV vancomycin, IV micafungin  -patient has undergone diuresis with RRT and SLED by nephrology due to KODI         Liver, gallbladder, Pancreas and gastrointestinal system:  Culture-negative peritonitis with concern for intra-abdominal abscess versus leak  Stage IV poorly differentiated adenocarcinoma of the colon with liver mets, possible splenic mass, and adrenal mass status post diverting loop ileostomy    Plan:  -Patient was not recommended for paracentesis of the loculated effusion as there is high risk as compared to benefit due to location.  - Continue with PPI for ulcer prophylaxis.  - Receiving TPN  -Surgical team on board. Patient is not a surgical candidate at this time for cholelithiasis in the setting of metastatic colon adenocaricnoma, stage IV  -On IV meropenem, IV vancomycin, IV micafungin with continued worsening of leukocytosis noted         Genitourinary system:   Oliguric KODI likely septic ATN  Anion gap metabolic acidosis due to lactic acidosis and KODI    Plan   -Avoiding nephrotoxic agent.  -Following serum creatinine and GFR closely with urine output daily.  -On sodium bicarbonate infusion  -nephrology team on board.  She has undergone RRT with SLED, but no significant improvement.          Endocrine system:    Plan:  - On scheduled and correctional insulin.  - Goal serum glucose level is 180 mg/dL while in ICU.  I strongly recommend starting insulin drip if 2 consecutive serum glucose levels are greater than 200 mg/dL.   -Ordered 5 units of regular insulin with  glucose recheck         Infectious disease system:  Septic shock related to bilateral lower lobe pneumonia due to unspecified organism  Culture negative peritonitis with concern for intra-abdominal abscess versus leak    Plan:  - Current antibiotics: IV meropenem, IV micafugin, IV vancomycin  - Cultures:   No pending cultures.  History cultures notable for scant Candida.         Hematological system:  Leukocytosis  Anemia  Thrombocytopenia    Plan   - I recommend PRBC transfusion if hemoglobin is less than 7 g/dL unless active bleeding or cardiac ischemia.  - Continue with SCDs for DVT prophylaxis.   - On vitamin K 10 mg IV daily  - Heparin platelet antibody was nonelevated.       Rheumatological and dermatological system:    Plan:  - Turn the patient every 2 hours to prevent pressure ulcers.  -Wound care team involvement as per primary team.         Activity:  - I recommend aggressive PT/OT while in hospital to avoid critical care neuropathy/myopathy.     Family meeting:  Several family members are present at bedside including patient's daughter.  Discussed with them that prognosis remains poor at this time in the setting of multiple comorbidities. Signs of sepsis or worsening, in the setting of pneumonia as well as concern for infective ascites.  She is being covered with broad-spectrum antibiotics but continues to show decline.  Discussed that the ascites is likely related to the colon cancer, which by imaging is notable for significant metastasis to several organs.   They will be having further conversation today to discuss possible terminal weaning.       I have updated Antoinette GARCIA of the plan of care.       Nikki Myers PA-C  02/10/20  11:31 AM    Scribed for Dr. Garcia by Nikki Myers PA-C      The clinical plan was discussed with .  The clinical plan was discussed extensively with the nurse, and respiratory therapist.   Critical Care time spent in direct patient care: 82 minutes (excluding  procedure time).  Patient is critically ill and is at higher risk for further mortality/morbidity.     I, Henry Garcia M.D. attest that the above note accurately reflects the work and decisions made by me. Patient was seen and evaluated by me, including history of present illness, physical exam, assessment, and treatment plan.  The above note was reviewed and edited by me.    Henry Garcia M.D  Pulmonary and Critical Care Medicine

## 2020-02-10 NOTE — PLAN OF CARE
"Patient terminally extubated earlier this afternoon. Family requests vasopressors be continued \"until we are finished saying goodbye\". All other medications stopped and discontinued.   "

## 2020-02-11 VITALS
RESPIRATION RATE: 2 BRPM | BODY MASS INDEX: 41.02 KG/M2 | DIASTOLIC BLOOD PRESSURE: 72 MMHG | SYSTOLIC BLOOD PRESSURE: 88 MMHG | HEIGHT: 63 IN | OXYGEN SATURATION: 80 % | WEIGHT: 231.48 LBS | TEMPERATURE: 99.2 F

## 2020-02-11 NOTE — NURSING NOTE
MECHELLE called regarding pt exporiation. Spoke will Luann Gong and patient was ruled out as potential donor. No further concerns at this time.

## 2020-02-11 NOTE — PAYOR COMM NOTE
"[facility]   ANNIE GODOY  PHONE  [phone]  FAX  [phone]  NPI:  [id number]    PATIENT  2/10/2020    Gracy Norman (Dcsd. Female)     Date of Birth Social Security Number Address Home Phone MRN    1975  [address] [place] [address]  [id number]    [employer] Status          None        Admission Date Admission Type Admitting Provider Attending Provider Department, Room/Bed    20 Emergency Denny Coy MD  [facility] CRITICAL CARE, CC10/1C    Discharge Date Discharge Disposition Discharge Destination        2/10/2020               Attending Provider:  (none)   Allergies:  Bactrim [Sulfamethoxazole-trimethoprim], Metronidazole, Sulfa Antibiotics    Isolation:  None   Infection:  None   Code Status:  Prior    Ht:  160 cm (63\")   Wt:  105 kg (231 lb 7.7 oz)    Admission Cmt:  None   Principal Problem:  Sepsis (CMS/[facility]) [A41.9]                 Active Insurance as of 2020     Primary Coverage     Payor Plan Insurance Group Employer/Plan Group    [employer]AN PAS[employer]ORT [id number]     Payor Plan Address Payor Plan Phone Number Payor Plan Fax Number Effective Dates    [address] [phone]  1997 - None Entered    [facility] [id number]       Subscriber Name Subscriber Birth Date Member ID       GRACY NORMAN 1975 [id number]                 Emergency Contacts      (Rel.) Home Phone Work Phone Mobile Phone    Danilo Norman (Spouse) -- [phone]              "

## 2020-02-11 NOTE — DISCHARGE SUMMARY
[facility] MEDICINE DEATH SUMMARY    Patient Identification:  Name:  Gracy Norman  Age:  44 y.o.  Sex:  female  :  1975  MRN:  [id number]  Visit Number:  [id number]    Date of Admission: 2020  Date of Death:  2/10/2020 at 21:10 pm    PCP: Almas Stone MD    DISCHARGE DIAGNOSIS  -Septic shock that was present on admission (WBC 29,100, CRP 30.94, lactic acid 5.2, temperature 102.4, heart rate 152, blood pressure 72/60) due to right sided pneumonia and spontaneous bacterial peritonitis  -Stage 4 colon cancer with bulky lymphadenopathy and liver lesions and adrenal masses and a diverting loop ileostomy, with no chemotherapy started due to her acute illnesses since diagnosis  -Lactic acidosis on admission that worsened during admission  -Prolonged QTC of 536 ms on admission  -Acute kidney injury with baseline creatinine 0.7 and admission creatinine 1.95, that then developed into anuric acute kidney injury, due to ATN and sepsis  -Anion gap metabolic acidosis due to the KODI  -Mild rhabdomyolysis  -Elevated TSH with no history of hypothyroidism, suspect due to euthyroid sick syndrome  -Acute hypomagnesemia  -Nonobstructing right renal stones  -Normocytic anemia  -Morbid obesity, BMI 41.01 kg/m2    CONSULTS   Dr. Metzger with nephrology  Dr. Donohue with infectious disease  Dr. Garcia with pulmonology  Dr. Clay with hematology/oncology  Misha Grullon and Cornelio with general surgery    PROCEDURES PERFORMED  2020:  Ultrasound guided paracentesis with 1200 mL removed  2020:  Transfusion of one unit of PRBCs  2/3/2020:  Placement of a right basilic double lumen midline catheter  2020:  Placement of a tunneled right internal jugular double lumen hemodialysis catheter under ultrasound guidance  2020:  Placement of a left internal jugular triple lumen central line under fluoroscopy  2020-2/10/2020:  Oral intubation and mechanical ventilation    [facility]  COURSE  Patient is a 44 y.o. female presented to [facility] complaining on 2020 with generalized weakness and abdominal pain after drinking a carbonated beverage.  She was found in the ED to have septic shock with no obvious source of infection.  Due to the severity of her initial presentation, she was admitted to the critical care unit.  Please see the admitting history and physical for further details.  Blood and urine cultures were obtained in the ED and she was empirically started on vancomycin, Zosyn, and doxycycline.  CXR was finalized as a right basilar pneumonia.  Dr. Riggs was able to perform a bedside paracentesis via ultrasound and studies were concerning for spontaneous bacterial peritonitis; 4,390 nucleated cells were seen with 78% of these being neutrophils.  She was also started on IVF for the acute renal failure and nephrology was consulted.  The IVF were changed to a bicarb drip and lasix was given as well.  Infectious disease team was consulted; the vancomycin was discontinued and Zosyn was continued for the peritonitis and pneumonia. Pateint developed renal failure requiring a alternating iHD and SLED. Patient was intubated for tunneled line placement and was not able to be extubated. Unfortunately hemodynamically patient continued to decline with worsening septic shock.  Oncology reports patient's metastatic disease is non curable and would have poor prognosis even without this acute critical illness. Surgery has recommended palliative stance as well as she is a poor surgical candidate. Family decided to terminally extubate patient today and she was made comfort care. I started PRN morphine, PRN ativan, PRN robinol and will titrated them as needed. Patient passed away comfortably at 21:10 on .     ...    DISCHARGE DISPOSITION          Denny Coy MD  20  6:29 AM      Please send a copy of this dictation to the following providers:  Almas Stone,  MD

## 2020-02-11 NOTE — PROGRESS NOTES
[facility] HOSPITALIST DEATH NOTE    Date of death:  2/10/2020  Patient time of death occurred at 21:10 pm.    Denny Coy MD  02/10/20  9:27 PM

## 2020-02-11 NOTE — PLAN OF CARE
Problem: Patient Care Overview  Goal: Interprofessional Rounds/Family Conf  Outcome: Outcome(s) achieved  Flowsheets (Taken 2/1/2020 1817 by Danya Esquivel RN)  Participants: ;patient;family;nursing;other (see comments)     Problem: Fall Risk (Adult)  Goal: Identify Related Risk Factors and Signs and Symptoms  Outcome: Outcome(s) achieved  Flowsheets  Taken 2/1/2020 0736 by Loreto Huggins RN  Related Risk Factors (Fall Risk): culprit medication(s);fatigue/slow reaction;environment unfamiliar  Taken 2/2/2020 0332 by Loreto Huggins RN  Signs and Symptoms (Fall Risk): presence of risk factors     Problem: Fall Risk (Adult)  Goal: Absence of Fall  Outcome: Outcome(s) achieved     Problem: Skin Injury Risk (Adult)  Goal: Skin Health and Integrity  Outcome: Outcome(s) achieved     Problem: Patient Care Overview  Goal: Plan of Care Review  Outcome: Unable to achieve outcome(s) by discharge  Flowsheets  Taken 2/10/2020 1400 by Antoinette Bundy, RN  Plan of Care Reviewed With: family  Taken 2/10/2020 2126 by Viry Marsh RN  Outcome Summary: terminal extubation

## 2020-02-11 NOTE — PROGRESS NOTES
[facility] HOSPITALIST PROGRESS NOTE     Patient Identification:  Name:  Gracy Norman  Age:  44 y.o.  Sex:  female  :  1975  MRN:  [id number]  Visit Number:  [id number]  ROOM: [address]     Primary Care Provider:  Almas Stone MD    Length of stay in inpatient status:  10    Subjective     Chief Compliant:    Chief Complaint   Patient presents with   • Weakness - Generalized   • Post-op Problem       History of Presenting Illness:    Multiple family members bedside throughout the day. I have updated them multiple times and discussed goals of care. They have decided to make patient comfort care.     ROS:  Unable to obtain due to AMS    Objective     Current Hospital Meds:  chlorhexidine 15 mL Mouth/Throat Q12H   hydrocortisone sodium succinate 50 mg Intravenous Q8H   insulin aspart 0-9 Units Subcutaneous Q6H   insulin regular 5 Units Intravenous Once   meropenem 500 mg Intravenous Q24H   micafungin (MYCAMINE)  mg Intravenous Q24H   trace minerals + multivitamin IVPB  Intravenous Once per day on    pantoprazole 40 mg Intravenous Q12H   phytonadione (VITAMIN K) IVPB 10 mg Intravenous Daily   sodium chloride 1,000 mL Intravenous Once   sodium chloride 1,000 mL Intravenous Once   sodium chloride 10 mL Intravenous Q12H   sodium chloride 10 mL Intravenous Q12H   sodium chloride 10 mL Intravenous Q12H   sodium chloride 10 mL Intravenous Q12H   sodium chloride 10 mL Intravenous Q12H   sodium chloride 10 mL Intravenous Q12H   thiamine (VITAMIN B1) IVPB 500 mg Intravenous Q8H   Vancomycin Pharmacy Intermittent Dosing  Does not apply Daily     Adult [facility] TPN  Last Rate: Stopped (02/10/20 1330)   fentaNYL Citrate  Last Rate: Stopped (02/10/20 1341)   norepinephrine 0.02-0.3 mcg/kg/min Last Rate: Stopped (02/10/20 1845)   propofol 5-50 mcg/kg/min Last Rate: 40 mcg/kg/min (02/10/20 1100)   sodium bicarbonate drip (greater than 75 mEq/bag) 150 mEq Last Rate: Stopped  (02/10/20 1330)   vasopressin (PITRESSIN) infusion 0.04 Units/min Last Rate: Stopped (02/10/20 1845)       Current Antimicrobial Therapy:  Anti-Infectives (From admission, onward)    Ordered     Dose/Rate Route Frequency Start Stop    20 0835  vancomycin (VANCOCIN) 1,250 mg in sodium chloride 0.9 % 250 mL IVPB     Ordering Provider:  Saad Garcia MD    1,250 mg  over 60 Minutes Intravenous Once 20 1800 20 1918    20 0835  vancomycin (VANCOCIN) 1,000 mg in sodium chloride 0.9 % 250 mL IVPB     Ordering Provider:  Saad Garcia MD    1,000 mg  over 60 Minutes Intravenous Once 20 1800 20 1835    20 0823  vancomycin (VANCOCIN) 1,250 mg in sodium chloride 0.9 % 250 mL IVPB     Ordering Provider:  Saad Garcia MD    1,250 mg  over 60 Minutes Intravenous Once 20 1800 20 1821    20 1234  meropenem (MERREM) 500mg/100 mL 0.9% NS IVPB (mbp)  Review   Ordering Provider:  Keyona Donohue MD    500 mg  over 3 Hours Intravenous Every 24 Hours 20 1500 20 1459    20 1720  vancomycin (VANCOCIN) 1,250 mg in sodium chloride 0.9 % 250 mL IVPB     Ordering Provider:  Saad Garcia MD    1,250 mg  over 60 Minutes Intravenous Once 20 2200 20 2147    20 1326  Vancomycin Pharmacy Intermittent Dosing  Let    Ordering Provider:  Sophie Grimes MD     Does not apply Daily 20 1415 20 0859    20 1131  micafungin sodium (MYCAMINE) 100 mg in sodium chloride 0.9 % 100 mL IVPB  Let    Ordering Provider:  Sophie Grimes MD    100 mg  over 60 Minutes Intravenous Every 24 Hours 20 1300 20 1259    20 1601  vancomycin (VANCOCIN) 1,000 mg in sodium chloride 0.9 % 250 mL IVPB     Ordering Provider:  Denny Coy MD    1,000 mg  over 60 Minutes Intravenous Once 20 1800 20 1812    20 194  vancomycin (VANCOCIN) 1,750 mg in sodium chloride 0.9 % 500 mL IVPB     Ordering  Provider:  Waqas York MD    20 mg/kg × 93 kg Intravenous Once 01/31/20 1951 02/01/20 0000    01/31/20 1949  piperacillin-tazobactam (ZOSYN) 4.5 g/100 mL 0.9% NS IVPB (mbp)     Ordering Provider:  Waqas York MD    4.5 g Intravenous Once 01/31/20 1951 01/31/20 2129        Current Diuretic Therapy:  Diuretics (From admission, onward)    Ordered     Dose/Rate Route Frequency Start Stop    02/03/20 1610  furosemide (LASIX) injection 80 mg     Ordering Provider:  Alycia Metzger MD    80 mg Intravenous Once 02/03/20 1700 02/03/20 1632 02/03/20 1635  furosemide (LASIX) 10 MG/ML injection  - ADS Override Pull     Note to Pharmacy:  Created by cabinet override   Ordering Provider:  Selam Pierce RN       02/03/20 1635 02/04/20 0444        ----------------------------------------------------------------------------------------------------------------------  Vital Signs:  Temp:  [98.2 °F (36.8 °C)-100.4 °F (38 °C)] 99.2 °F (37.3 °C)  Heart Rate:  [] 117  Resp:  [6-28] 8  BP: ()/(35-94) 63/52  FiO2 (%):  [40 %] 40 %  SpO2:  [62 %-100 %] 91 %  on  Flow (L/min):  [4] 4;   Device (Oxygen Therapy): nasal cannula  Body mass index is 41.01 kg/m².    Wt Readings from Last 3 Encounters:   02/10/20 105 kg (231 lb 7.7 oz)   01/12/20 92.1 kg (203 lb)   01/09/20 91.6 kg (202 lb)     Intake & Output (last 3 days)       02/08 0701 - 02/09 0700 02/09 0701 - 02/10 0700 02/10 0701 - 02/11 0700    I.V. (mL/kg) 2933.1 (28.2) 3140.2 (29.9) 1195.7 (11.4)    IV Piggyback 800 450      1429.2 510    Total Intake(mL/kg) 4505.1 (43.3) 5019.4 (47.8) 1705.7 (16.2)    Urine (mL/kg/hr) 0 (0) 0 (0) 0 (0)    Other 4000      Stool 0 0     Total Output 4000 0 0    Net +505.1 +5019.4 +1705.7               NPO Diet  Adult Central Clinimix TPN  ----------------------------------------------------------------------------------------------------------------------  Physical exam:  Constitutional:  Intubated, sedated.        HENT:  Head:  Normocephalic and atraumatic.  Mouth:  Moist mucous membranes.    Eyes:  Conjunctivae and EOM are normal. No scleral icterus.    Neck:  Neck supple.  No JVD present.    Cardiovascular:  Normal rate, regular rhythm and normal heart sounds with no murmur.  Pulmonary/Chest:  Course mechanical breath sounds bilaterally.   Abdominal:  Soft.  Bowel sounds are normal.  No distension and no tenderness.   Musculoskeletal:  No edema, no tenderness, and no deformity.  No red or swollen joints anywhere.    Neurological:  Sedated.   Skin:  Skin is warm and dry. No rash noted. No pallor.   Peripheral vascular:  Pulses in all 4 extremities with no clubbing, no cyanosis, no edema.  ----------------------------------------------------------------------------------------------------------------------  Tele:    ----------------------------------------------------------------------------------------------------------------------  Results from last 7 days   Lab Units 02/10/20  0952 02/10/20  0114 02/09/20  2231 02/09/20  1624  02/09/20  0113  02/08/20  0351  02/07/20  0820  02/05/20  1040   CRP mg/dL  --  18.05*  --   --   --  20.16*  --  22.31*  --   --    < >  --    LACTATE mmol/L 6.0*  --  6.7* 7.1*   < >  --    < > 9.9*   < >  --    < > 5.4*   WBC 10*3/mm3  --  54.33*  --   --   --  50.27*  --  49.47*  --   --    < >  --    HEMOGLOBIN g/dL  --  8.3*  --   --   --  8.3*  --  8.8*  --   --    < >  --    HEMATOCRIT %  --  26.9*  --   --   --  27.0*  --  28.9*  --   --    < >  --    MCV fL  --  84.3  --   --   --  83.3  --  82.8  --   --    < >  --    MCHC g/dL  --  30.9*  --   --   --  30.7*  --  30.4*  --   --    < >  --    PLATELETS 10*3/mm3  --  50*  --   --   --  40*  --  56*  --   --    < >  --    INR   --   --   --   --   --   --   --   --   --  1.12*  --  1.88*    < > = values in this interval not displayed.     Results from last 7 days   Lab Units 02/09/20  1639   PH, ARTERIAL pH units 7.363   PO2 ART mm Hg  102.0   PCO2, ARTERIAL mm Hg 39.9   HCO3 ART mmol/L 22.7     Results from last 7 days   Lab Units 02/10/20  0114 02/09/20  0113 02/08/20  1549 02/08/20  0351 02/07/20  1042  02/04/20 1932   SODIUM mmol/L 125* 127*  --  128*  --    < >  --    POTASSIUM mmol/L 3.9 3.7 3.4* 3.0*  --    < >  --    MAGNESIUM mg/dL 1.9 2.0  --  2.5  --    < > 2.1   CHLORIDE mmol/L 78* 82*  --  82*  --    < >  --    CO2 mmol/L 22.4 21.9*  --  21.0*  --    < >  --    BUN mg/dL 38* 21*  --  22*  --    < >  --    CREATININE mg/dL 2.28* 1.71*  --  1.90*  --    < >  --    EGFR IF NONAFRICN AM mL/min/1.73 23* 33*  --  29*  --    < >  --    CALCIUM mg/dL 7.7* 8.0*  --  8.2*  --    < >  --    IONIZED CALCIUM mmol/L  --   --   --   --  1.00*  --  1.08*   PHOSPHORUS mg/dL 3.7 3.3 1.8* 1.6*  --    < > 3.6   GLUCOSE mg/dL 288* 317*  --  304*  --    < >  --    ALBUMIN g/dL 3.26* 3.93  --  4.12  --    < >  --    BILIRUBIN mg/dL 5.0* 4.6*  --  4.0*  --    < >  --    ALK PHOS U/L 434* 337*  --  329*  --    < >  --    AST (SGOT) U/L 410* 322*  --  350*  --    < >  --    ALT (SGPT) U/L 79* 70*  --  91*  --    < >  --     < > = values in this interval not displayed.   Estimated Creatinine Clearance: 37.2 mL/min (A) (by C-G formula based on SCr of 2.28 mg/dL (H)).  No results found for: AMMONIA  Results from last 7 days   Lab Units 02/04/20  1932   CK TOTAL U/L 297*         Results from last 7 days   Lab Units 02/06/20  1117   TRIGLYCERIDES mg/dL 481*     Glucose   Date/Time Value Ref Range Status   02/10/2020 0505 283 (H) 70 - 130 mg/dL Final   02/09/2020 2314 272 (H) 70 - 130 mg/dL Final   02/09/2020 1954 290 (H) 70 - 130 mg/dL Final   02/09/2020 1658 312 (H) 70 - 130 mg/dL Final   02/09/2020 1212 274 (H) 70 - 130 mg/dL Final   02/09/2020 0500 312 (H) 70 - 130 mg/dL Final   02/09/2020 0039 308 (H) 70 - 130 mg/dL Final   02/08/2020 1812 249 (H) 70 - 130 mg/dL Final     Lab Results   Component Value Date    TSH 9.270 (H) 01/31/2020    FREET4 0.97 02/01/2020      No results found for: PREGTESTUR, PREGSERUM, HCG, HCGQUANT  Pain Management Panel     Pain Management Panel Latest Ref Rng & Units 2/1/2020    CREATININE UR mg/dL 146.0        Brief Urine Lab Results  (Last result in the past 365 days)      Color   Clarity   Blood   Leuk Est   Nitrite   Protein   CREAT   Urine HCG        02/02/20 2053 Dark Yellow Turbid Large (3+) Negative Negative 100 mg/dL (2+)             No results found for: BLOODCX  No results found for: URINECX  No results found for: WOUNDCX  No results found for: STOOLCX  Respiratory Culture   Date Value Ref Range Status   02/06/2020 Scant growth (1+) Candida albicans (A)  Final   02/06/2020 No Normal Respiratory Razia (A)  Final     No results found for: AFBCX  Results from last 7 days   Lab Units 02/10/20  0952 02/10/20  0114 02/09/20  2231 02/09/20  1624 02/09/20  0632 02/09/20  0113 02/08/20  2321 02/08/20  2103 02/08/20  1320 02/08/20  0351  02/07/20  0057  02/06/20  0303  02/05/20  0222  02/04/20  0053   LACTATE mmol/L 6.0*  --  6.7* 7.1* 7.6*  --  8.4* 8.3* 7.3* 9.9*   < > 8.3*   < > 5.5*   < > 8.9*   < > 7.1*   CRP mg/dL  --  18.05*  --   --   --  20.16*  --   --   --  22.31*  --  24.06*  --  34.91*  --  29.35*  --  31.38*    < > = values in this interval not displayed.       I have personally looked at the labs and they are summarized above.  ----------------------------------------------------------------------------------------------------------------------  Detailed radiology reports for the last 24 hours:    Imaging Results (Last 24 Hours)     ** No results found for the last 24 hours. **        Assessment & Plan    #Septic shock and Acute Respiratory Failure 2/2 SBP & PNA, Bacterial, treating for MDR organisms - Worse  #Lactic acidosis - Worse  #Anuric KODI 2/2 ATN/Sepsis c/b AGMA   #Stage 4 colon cancer with bulky lymphadenopathy and widely disseminated liver metastases and adrenal masses s/p diverting loop ileostomy   #Severe Leukocytosis    #Mild Rhabdomyolysis  #Normocytic anemia   #Respiratory Alkalosis  #Euthyroid  #Hypomagnesemia   #Diverting loop ileostomy  #Morbid Obesity    Patient has continued to decline from septic shock. Oncology reports patient's metastatic disease is non curable and would have poor prognosis even without this acute critical illness. Surgery has recommended palliative stance as well as she is a poor surgical candidate. Family decided to terminally extubate patient today and she was made comfort care. They have continued pressors for now knowing her demise will likely be quick after stopping medication and would like more time with her. They have been tearful but understanding. I have informed them to take their time but patient may pass away even with pressor support. I have started PRN morphine, PRN ativan, PRN robinol and will titrate them as needed. In unlikely situation patient stabilizes will consult palliative care.     F: Oral  E: Monitor & Replace PRN  N: TFs  Ppx: SQH  Code: Comfort care.     Dispo: Comfort care.     *This patient is considered high risk due to septic shock, KODI, underlying colon cancer    *I have spent 30min of critical care time of direct patient care, evaluating the patient, managing pressors, sedation medications and ventilator settings, coordinating life supporting care and continuing GOC conversations w/ family       VTE Prophylaxis:   Mechanical Order History:      Ordered        02/07/20 1014  Place Sequential Compression Device  Once         02/07/20 1014  Maintain Sequential Compression Device  Continuous                 Pharmalogical Order History:     Ordered     Dose Route Frequency Stop    02/04/20 1353  heparin (porcine) 5000 UNIT/ML injection  Status:  Discontinued      -- -- As Needed 02/04/20 1459    02/01/20 1455  heparin (porcine) 5000 UNIT/ML injection 5,000 Units  Status:  Discontinued      5,000 Units SC Every 8 Hours Scheduled 02/07/20 1014    02/01/20 0309  enoxaparin  (LOVENOX) syringe 40 mg  Status:  Discontinued      40 mg SC Every 24 Hours 02/01/20 1455          Max Riggs MD  [facility] Hospitalist  02/10/20  8:04 PM

## 2020-02-11 NOTE — PROGRESS NOTES
Case Management Discharge Note      Final Note: Patient  on 2-.    Provided post acute provider list?: Refused    Destination      No service has been selected for the patient.      Durable Medical Equipment      No service has been selected for the patient.      Dialysis/Infusion      No service has been selected for the patient.      Home Medical Care      No service has been selected for the patient.      Therapy      No service has been selected for the patient.      Community Resources      No service has been selected for the patient.             Final Discharge Disposition Code: 20 -

## 2020-02-17 NOTE — SIGNIFICANT NOTE
Department restraint form completed and notified risk management. Risk management entered in death restraint log 2/17/20

## (undated) DEVICE — 10 FR. PTFE PEEL-APART PERCUTANEOUS INTRODUCER KIT: Brand: PEEL-APART PERCUTANEOUS INTRODUCER KIT

## (undated) DEVICE — SYR LUERLOK 5CC

## (undated) DEVICE — PK BASIC 70

## (undated) DEVICE — KT CATH TAL PALINDROME SAPPHIRE 14.5F23CM

## (undated) DEVICE — ENDOCUT SCISSOR TIP, DISPOSABLE: Brand: RENEW

## (undated) DEVICE — PENCL E/S HNDSWCH PUSHBTN HOLSTR 10FT

## (undated) DEVICE — SUT VIC 4/0 P3 18IN J494G

## (undated) DEVICE — ENDOPATH XCEL BLADELESS TROCARS WITH STABILITY SLEEVES: Brand: ENDOPATH XCEL

## (undated) DEVICE — 2, DISPOSABLE SUCTION/IRRIGATOR WITH DISPOSABLE TIP: Brand: STRYKEFLOW

## (undated) DEVICE — ANTIBACTERIAL UNDYED BRAIDED (POLYGLACTIN 910), SYNTHETIC ABSORBABLE SUTURE: Brand: COATED VICRYL

## (undated) DEVICE — GLW STD STR 3CM .035X150CM

## (undated) DEVICE — DRP C/ARM W/BAND W/CLIPS 41X74IN

## (undated) DEVICE — PAD GRND REM POLYHESIVE A/ DISP

## (undated) DEVICE — DRAPE,T,LAPARO,TRANS,STERILE: Brand: MEDLINE

## (undated) DEVICE — SUT VIC 3/0 SH 27IN J416H

## (undated) DEVICE — BIOPATCH™ ANTIMICROBIAL DRESSING WITH CHLORHEXIDINE GLUCONATE IS A HYDROPHILLIC POLYURETHANE ABSORPTIVE FOAM WITH CHLORHEXIDINE GLUCONATE (CHG) WHICH INHIBITS BACTERIAL GROWTH UNDER THE DRESSING. THE DRESSING IS INTENDED TO BE USED TO ABSORB EXUDATE, COVER A WOUND CAUSED BY VASCULAR AND NONVASCULAR PERCUTANEOUS MEDICAL DEVICES DURING SURGERY, AS WELL AS REDUCE LOCAL INFECTION AND COLONIZATION OF MICROORGANISMS.: Brand: BIOPATCH

## (undated) DEVICE — APPL CHLORAPREP W/TINT 26ML ORNG

## (undated) DEVICE — PK LAP GEN 70

## (undated) DEVICE — DRSNG TELFA PAD NONADH STR 1S 3X4IN

## (undated) DEVICE — CONN IV MAXPLUS NDLESS ACC

## (undated) DEVICE — DRAPE,UTILTY,TAPE,15X26, 4EA/PK: Brand: MEDLINE

## (undated) DEVICE — SYR LUERLOK 30CC

## (undated) DEVICE — SUT SILK 2/0 FS BLK 18IN 685G

## (undated) DEVICE — MARKR SKIN W/RULR AND LBL

## (undated) DEVICE — NDL HYPO ECLPS SFTY 22G 1 1/2IN

## (undated) DEVICE — INSUFFLATION NEEDLE TO ESTABLISH PNEUMOPERITONEUM.: Brand: INSUFFLATION NEEDLE

## (undated) DEVICE — DRSNG WND BORDR/ADHS NONADHR/GZ LF 2X2IN STRL

## (undated) DEVICE — UNDYED BRAIDED (POLYGLACTIN 910), SYNTHETIC ABSORBABLE SUTURE: Brand: COATED VICRYL

## (undated) DEVICE — TROCAR: Brand: KII FIOS FIRST ENTRY

## (undated) DEVICE — HOLDER: Brand: DEROYAL

## (undated) DEVICE — DRSNG SURESITE WNDW 4X4.5

## (undated) DEVICE — SYR LL TP 10ML STRL

## (undated) DEVICE — GLV SURG PREMIERPRO MIC LTX PF SZ7 BRN

## (undated) DEVICE — LAB CORP FIT LEUR FEM CELLDYN 1/16IN ID FOR SAPPHIRE

## (undated) DEVICE — DECANT BG O JET

## (undated) DEVICE — 3M™ STERI-STRIP™ REINFORCED ADHESIVE SKIN CLOSURES, R1547, 1/2 IN X 4 IN (12 MM X 100 MM), 6 STRIPS/ENVELOPE: Brand: 3M™ STERI-STRIP™

## (undated) DEVICE — SUT PROLN 3/0 8832H